# Patient Record
Sex: MALE | Race: WHITE | NOT HISPANIC OR LATINO
[De-identification: names, ages, dates, MRNs, and addresses within clinical notes are randomized per-mention and may not be internally consistent; named-entity substitution may affect disease eponyms.]

---

## 2017-02-18 ENCOUNTER — APPOINTMENT (OUTPATIENT)
Dept: INTERNAL MEDICINE | Facility: CLINIC | Age: 57
End: 2017-02-18

## 2017-02-18 VITALS
BODY MASS INDEX: 29.84 KG/M2 | OXYGEN SATURATION: 87 % | TEMPERATURE: 98.1 F | SYSTOLIC BLOOD PRESSURE: 181 MMHG | WEIGHT: 240 LBS | HEART RATE: 87 BPM | DIASTOLIC BLOOD PRESSURE: 86 MMHG | HEIGHT: 75 IN

## 2017-02-19 LAB
25(OH)D3 SERPL-MCNC: 16.8 NG/ML
ALBUMIN SERPL ELPH-MCNC: 4.8 G/DL
ALP BLD-CCNC: 113 U/L
ALT SERPL-CCNC: 28 U/L
ANION GAP SERPL CALC-SCNC: 22 MMOL/L
AST SERPL-CCNC: 18 U/L
BASOPHILS # BLD AUTO: 0.01 K/UL
BASOPHILS NFR BLD AUTO: 0.2 %
BILIRUB SERPL-MCNC: 0.4 MG/DL
BUN SERPL-MCNC: 18 MG/DL
CALCIUM SERPL-MCNC: 10.1 MG/DL
CHLORIDE SERPL-SCNC: 100 MMOL/L
CHOLEST SERPL-MCNC: 252 MG/DL
CHOLEST/HDLC SERPL: 5.2 RATIO
CO2 SERPL-SCNC: 18 MMOL/L
CREAT SERPL-MCNC: 1.2 MG/DL
EOSINOPHIL # BLD AUTO: 0.09 K/UL
EOSINOPHIL NFR BLD AUTO: 1.9 %
GLUCOSE SERPL-MCNC: 198 MG/DL
HBA1C MFR BLD HPLC: 6.8 %
HCT VFR BLD CALC: 45 %
HDLC SERPL-MCNC: 48 MG/DL
HGB BLD-MCNC: 13.7 G/DL
IMM GRANULOCYTES NFR BLD AUTO: 0.2 %
LDLC SERPL CALC-MCNC: 154 MG/DL
LYMPHOCYTES # BLD AUTO: 0.8 K/UL
LYMPHOCYTES NFR BLD AUTO: 17.1 %
MAGNESIUM SERPL-MCNC: 1.9 MG/DL
MAN DIFF?: NORMAL
MCHC RBC-ENTMCNC: 27.7 PG
MCHC RBC-ENTMCNC: 30.4 GM/DL
MCV RBC AUTO: 91.1 FL
MONOCYTES # BLD AUTO: 0.18 K/UL
MONOCYTES NFR BLD AUTO: 3.8 %
NEUTROPHILS # BLD AUTO: 3.59 K/UL
NEUTROPHILS NFR BLD AUTO: 76.8 %
PHOSPHATE SERPL-MCNC: 1.9 MG/DL
PLATELET # BLD AUTO: 161 K/UL
POTASSIUM SERPL-SCNC: 4.1 MMOL/L
PROT SERPL-MCNC: 7.3 G/DL
PSA SERPL-MCNC: 1.38 NG/ML
RBC # BLD: 4.94 M/UL
RBC # FLD: 14.5 %
SODIUM SERPL-SCNC: 139 MMOL/L
T4 SERPL-MCNC: 7.5 UG/DL
TRIGL SERPL-MCNC: 251 MG/DL
TSH SERPL-ACNC: 1.03 UIU/ML
WBC # FLD AUTO: 4.68 K/UL

## 2017-04-03 ENCOUNTER — RX RENEWAL (OUTPATIENT)
Age: 57
End: 2017-04-03

## 2017-07-05 ENCOUNTER — RX RENEWAL (OUTPATIENT)
Age: 57
End: 2017-07-05

## 2017-08-01 ENCOUNTER — RX RENEWAL (OUTPATIENT)
Age: 57
End: 2017-08-01

## 2017-09-11 ENCOUNTER — APPOINTMENT (OUTPATIENT)
Dept: ELECTROPHYSIOLOGY | Facility: CLINIC | Age: 57
End: 2017-09-11
Payer: COMMERCIAL

## 2017-09-11 PROCEDURE — 93298 REM INTERROG DEV EVAL SCRMS: CPT

## 2017-09-28 ENCOUNTER — RX RENEWAL (OUTPATIENT)
Age: 57
End: 2017-09-28

## 2017-10-23 ENCOUNTER — APPOINTMENT (OUTPATIENT)
Dept: ELECTROPHYSIOLOGY | Facility: CLINIC | Age: 57
End: 2017-10-23
Payer: COMMERCIAL

## 2017-10-23 PROCEDURE — 93298 REM INTERROG DEV EVAL SCRMS: CPT

## 2017-11-30 ENCOUNTER — APPOINTMENT (OUTPATIENT)
Dept: ELECTROPHYSIOLOGY | Facility: CLINIC | Age: 57
End: 2017-11-30
Payer: COMMERCIAL

## 2017-11-30 PROCEDURE — 93298 REM INTERROG DEV EVAL SCRMS: CPT

## 2018-01-11 ENCOUNTER — APPOINTMENT (OUTPATIENT)
Dept: ELECTROPHYSIOLOGY | Facility: CLINIC | Age: 58
End: 2018-01-11
Payer: COMMERCIAL

## 2018-01-11 PROCEDURE — 93298 REM INTERROG DEV EVAL SCRMS: CPT

## 2018-02-27 ENCOUNTER — APPOINTMENT (OUTPATIENT)
Dept: ELECTROPHYSIOLOGY | Facility: CLINIC | Age: 58
End: 2018-02-27
Payer: COMMERCIAL

## 2018-02-27 PROCEDURE — 93298 REM INTERROG DEV EVAL SCRMS: CPT

## 2018-04-02 ENCOUNTER — APPOINTMENT (OUTPATIENT)
Dept: INTERNAL MEDICINE | Facility: CLINIC | Age: 58
End: 2018-04-02
Payer: COMMERCIAL

## 2018-04-02 ENCOUNTER — LABORATORY RESULT (OUTPATIENT)
Age: 58
End: 2018-04-02

## 2018-04-02 VITALS
WEIGHT: 238 LBS | OXYGEN SATURATION: 97 % | HEART RATE: 72 BPM | BODY MASS INDEX: 29.59 KG/M2 | TEMPERATURE: 98.3 F | DIASTOLIC BLOOD PRESSURE: 85 MMHG | HEIGHT: 75 IN | SYSTOLIC BLOOD PRESSURE: 182 MMHG

## 2018-04-02 DIAGNOSIS — R25.2 CRAMP AND SPASM: ICD-10-CM

## 2018-04-02 DIAGNOSIS — R42 DIZZINESS AND GIDDINESS: ICD-10-CM

## 2018-04-02 DIAGNOSIS — Z86.79 PERSONAL HISTORY OF OTHER DISEASES OF THE CIRCULATORY SYSTEM: ICD-10-CM

## 2018-04-02 PROCEDURE — 36415 COLL VENOUS BLD VENIPUNCTURE: CPT

## 2018-04-02 PROCEDURE — 99396 PREV VISIT EST AGE 40-64: CPT | Mod: 25

## 2018-04-03 LAB
25(OH)D3 SERPL-MCNC: 20.5 NG/ML
BASOPHILS # BLD AUTO: 0.01 K/UL
BASOPHILS NFR BLD AUTO: 0.2 %
CHOLEST SERPL-MCNC: 267 MG/DL
CHOLEST/HDLC SERPL: 5.4 RATIO
EOSINOPHIL # BLD AUTO: 0.09 K/UL
EOSINOPHIL NFR BLD AUTO: 2 %
GGT SERPL-CCNC: 60 U/L
HBA1C MFR BLD HPLC: 7.3 %
HCT VFR BLD CALC: 45.8 %
HDLC SERPL-MCNC: 49 MG/DL
HGB BLD-MCNC: 14.4 G/DL
IMM GRANULOCYTES NFR BLD AUTO: 0.4 %
LDLC SERPL CALC-MCNC: 161 MG/DL
LYMPHOCYTES # BLD AUTO: 0.9 K/UL
LYMPHOCYTES NFR BLD AUTO: 20.1 %
MAN DIFF?: NORMAL
MCHC RBC-ENTMCNC: 28.5 PG
MCHC RBC-ENTMCNC: 31.4 GM/DL
MCV RBC AUTO: 90.7 FL
MONOCYTES # BLD AUTO: 0.3 K/UL
MONOCYTES NFR BLD AUTO: 6.7 %
NEUTROPHILS # BLD AUTO: 3.15 K/UL
NEUTROPHILS NFR BLD AUTO: 70.6 %
PLATELET # BLD AUTO: 145 K/UL
RBC # BLD: 5.05 M/UL
RBC # FLD: 14.1 %
T4 SERPL-MCNC: 7.3 UG/DL
TRIGL SERPL-MCNC: 284 MG/DL
TSH SERPL-ACNC: 1.03 UIU/ML
WBC # FLD AUTO: 4.47 K/UL

## 2018-04-04 ENCOUNTER — APPOINTMENT (OUTPATIENT)
Dept: ELECTROPHYSIOLOGY | Facility: CLINIC | Age: 58
End: 2018-04-04
Payer: COMMERCIAL

## 2018-04-04 PROCEDURE — 93298 REM INTERROG DEV EVAL SCRMS: CPT

## 2018-05-16 ENCOUNTER — APPOINTMENT (OUTPATIENT)
Dept: ELECTROPHYSIOLOGY | Facility: CLINIC | Age: 58
End: 2018-05-16
Payer: COMMERCIAL

## 2018-05-16 PROCEDURE — 93298 REM INTERROG DEV EVAL SCRMS: CPT

## 2018-06-18 ENCOUNTER — APPOINTMENT (OUTPATIENT)
Dept: ELECTROPHYSIOLOGY | Facility: CLINIC | Age: 58
End: 2018-06-18
Payer: COMMERCIAL

## 2018-06-18 PROCEDURE — 93298 REM INTERROG DEV EVAL SCRMS: CPT

## 2018-07-03 ENCOUNTER — RX RENEWAL (OUTPATIENT)
Age: 58
End: 2018-07-03

## 2018-07-31 ENCOUNTER — APPOINTMENT (OUTPATIENT)
Dept: ELECTROPHYSIOLOGY | Facility: CLINIC | Age: 58
End: 2018-07-31
Payer: COMMERCIAL

## 2018-07-31 ENCOUNTER — RX RENEWAL (OUTPATIENT)
Age: 58
End: 2018-07-31

## 2018-07-31 PROCEDURE — XXXXX: CPT

## 2018-08-08 ENCOUNTER — RX RENEWAL (OUTPATIENT)
Age: 58
End: 2018-08-08

## 2018-10-19 ENCOUNTER — MEDICATION RENEWAL (OUTPATIENT)
Age: 58
End: 2018-10-19

## 2019-06-12 ENCOUNTER — RX RENEWAL (OUTPATIENT)
Age: 59
End: 2019-06-12

## 2019-06-25 ENCOUNTER — MEDICATION RENEWAL (OUTPATIENT)
Age: 59
End: 2019-06-25

## 2019-06-29 ENCOUNTER — INPATIENT (INPATIENT)
Facility: HOSPITAL | Age: 59
LOS: 4 days | Discharge: HOME | DRG: 247 | End: 2019-07-04
Attending: INTERNAL MEDICINE | Admitting: HOSPITALIST
Payer: COMMERCIAL

## 2019-06-29 VITALS
HEIGHT: 75 IN | OXYGEN SATURATION: 96 % | TEMPERATURE: 99 F | SYSTOLIC BLOOD PRESSURE: 194 MMHG | WEIGHT: 229.94 LBS | HEART RATE: 103 BPM | DIASTOLIC BLOOD PRESSURE: 101 MMHG | RESPIRATION RATE: 20 BRPM

## 2019-06-29 DIAGNOSIS — Z95.4 PRESENCE OF OTHER HEART-VALVE REPLACEMENT: Chronic | ICD-10-CM

## 2019-06-29 DIAGNOSIS — Z98.89 OTHER SPECIFIED POSTPROCEDURAL STATES: Chronic | ICD-10-CM

## 2019-06-29 LAB
ALBUMIN SERPL ELPH-MCNC: 4.4 G/DL — SIGNIFICANT CHANGE UP (ref 3.3–5.2)
ALP SERPL-CCNC: 115 U/L — SIGNIFICANT CHANGE UP (ref 40–120)
ALT FLD-CCNC: 32 U/L — SIGNIFICANT CHANGE UP
ANION GAP SERPL CALC-SCNC: 20 MMOL/L — HIGH (ref 5–17)
APTT BLD: 30.3 SEC — SIGNIFICANT CHANGE UP (ref 27.5–36.3)
AST SERPL-CCNC: 22 U/L — SIGNIFICANT CHANGE UP
BASOPHILS # BLD AUTO: 0.02 K/UL — SIGNIFICANT CHANGE UP (ref 0–0.2)
BASOPHILS NFR BLD AUTO: 0.3 % — SIGNIFICANT CHANGE UP (ref 0–2)
BILIRUB SERPL-MCNC: 0.3 MG/DL — LOW (ref 0.4–2)
BLD GP AB SCN SERPL QL: SIGNIFICANT CHANGE UP
BUN SERPL-MCNC: 14 MG/DL — SIGNIFICANT CHANGE UP (ref 8–20)
CALCIUM SERPL-MCNC: 9.6 MG/DL — SIGNIFICANT CHANGE UP (ref 8.6–10.2)
CHLORIDE SERPL-SCNC: 100 MMOL/L — SIGNIFICANT CHANGE UP (ref 98–107)
CO2 SERPL-SCNC: 16 MMOL/L — LOW (ref 22–29)
CREAT SERPL-MCNC: 1.07 MG/DL — SIGNIFICANT CHANGE UP (ref 0.5–1.3)
EOSINOPHIL # BLD AUTO: 0.09 K/UL — SIGNIFICANT CHANGE UP (ref 0–0.5)
EOSINOPHIL NFR BLD AUTO: 1.5 % — SIGNIFICANT CHANGE UP (ref 0–6)
GLUCOSE SERPL-MCNC: 172 MG/DL — HIGH (ref 70–115)
HCT VFR BLD CALC: 40 % — SIGNIFICANT CHANGE UP (ref 39–50)
HGB BLD-MCNC: 12.6 G/DL — LOW (ref 13–17)
IMM GRANULOCYTES NFR BLD AUTO: 0.2 % — SIGNIFICANT CHANGE UP (ref 0–1.5)
INR BLD: 0.92 RATIO — SIGNIFICANT CHANGE UP (ref 0.88–1.16)
LYMPHOCYTES # BLD AUTO: 1.09 K/UL — SIGNIFICANT CHANGE UP (ref 1–3.3)
LYMPHOCYTES # BLD AUTO: 18.5 % — SIGNIFICANT CHANGE UP (ref 13–44)
MCHC RBC-ENTMCNC: 27.5 PG — SIGNIFICANT CHANGE UP (ref 27–34)
MCHC RBC-ENTMCNC: 31.5 GM/DL — LOW (ref 32–36)
MCV RBC AUTO: 87.1 FL — SIGNIFICANT CHANGE UP (ref 80–100)
MONOCYTES # BLD AUTO: 0.54 K/UL — SIGNIFICANT CHANGE UP (ref 0–0.9)
MONOCYTES NFR BLD AUTO: 9.2 % — SIGNIFICANT CHANGE UP (ref 2–14)
NEUTROPHILS # BLD AUTO: 4.14 K/UL — SIGNIFICANT CHANGE UP (ref 1.8–7.4)
NEUTROPHILS NFR BLD AUTO: 70.3 % — SIGNIFICANT CHANGE UP (ref 43–77)
PLATELET # BLD AUTO: 174 K/UL — SIGNIFICANT CHANGE UP (ref 150–400)
POTASSIUM SERPL-MCNC: 3.9 MMOL/L — SIGNIFICANT CHANGE UP (ref 3.5–5.3)
POTASSIUM SERPL-SCNC: 3.9 MMOL/L — SIGNIFICANT CHANGE UP (ref 3.5–5.3)
PROT SERPL-MCNC: 6.9 G/DL — SIGNIFICANT CHANGE UP (ref 6.6–8.7)
PROTHROM AB SERPL-ACNC: 10.5 SEC — SIGNIFICANT CHANGE UP (ref 10–12.9)
RBC # BLD: 4.59 M/UL — SIGNIFICANT CHANGE UP (ref 4.2–5.8)
RBC # FLD: 13.6 % — SIGNIFICANT CHANGE UP (ref 10.3–14.5)
SODIUM SERPL-SCNC: 136 MMOL/L — SIGNIFICANT CHANGE UP (ref 135–145)
TROPONIN T SERPL-MCNC: <0.01 NG/ML — SIGNIFICANT CHANGE UP (ref 0–0.06)
TYPE + AB SCN PNL BLD: SIGNIFICANT CHANGE UP
WBC # BLD: 5.89 K/UL — SIGNIFICANT CHANGE UP (ref 3.8–10.5)
WBC # FLD AUTO: 5.89 K/UL — SIGNIFICANT CHANGE UP (ref 3.8–10.5)

## 2019-06-29 PROCEDURE — 75635 CT ANGIO ABDOMINAL ARTERIES: CPT | Mod: 26

## 2019-06-29 PROCEDURE — 71275 CT ANGIOGRAPHY CHEST: CPT | Mod: 26

## 2019-06-29 PROCEDURE — 99218: CPT

## 2019-06-29 RX ORDER — LOSARTAN POTASSIUM 100 MG/1
25 TABLET, FILM COATED ORAL DAILY
Refills: 0 | Status: DISCONTINUED | OUTPATIENT
Start: 2019-06-29 | End: 2019-06-30

## 2019-06-29 RX ORDER — SUCRALFATE 1 G
1 TABLET ORAL ONCE
Refills: 0 | Status: COMPLETED | OUTPATIENT
Start: 2019-06-29 | End: 2019-06-29

## 2019-06-29 RX ORDER — GABAPENTIN 400 MG/1
600 CAPSULE ORAL
Refills: 0 | Status: DISCONTINUED | OUTPATIENT
Start: 2019-06-29 | End: 2019-06-30

## 2019-06-29 RX ORDER — METFORMIN HYDROCHLORIDE 850 MG/1
1000 TABLET ORAL
Refills: 0 | Status: DISCONTINUED | OUTPATIENT
Start: 2019-06-29 | End: 2019-06-30

## 2019-06-29 RX ORDER — PANTOPRAZOLE SODIUM 20 MG/1
80 TABLET, DELAYED RELEASE ORAL ONCE
Refills: 0 | Status: COMPLETED | OUTPATIENT
Start: 2019-06-29 | End: 2019-06-29

## 2019-06-29 RX ORDER — FENTANYL CITRATE 50 UG/ML
100 INJECTION INTRAVENOUS ONCE
Refills: 0 | Status: DISCONTINUED | OUTPATIENT
Start: 2019-06-29 | End: 2019-06-29

## 2019-06-29 RX ORDER — ONDANSETRON 8 MG/1
8 TABLET, FILM COATED ORAL ONCE
Refills: 0 | Status: COMPLETED | OUTPATIENT
Start: 2019-06-29 | End: 2019-06-29

## 2019-06-29 RX ADMIN — FENTANYL CITRATE 100 MICROGRAM(S): 50 INJECTION INTRAVENOUS at 22:22

## 2019-06-29 RX ADMIN — ONDANSETRON 8 MILLIGRAM(S): 8 TABLET, FILM COATED ORAL at 18:37

## 2019-06-29 RX ADMIN — Medication 1 GRAM(S): at 22:22

## 2019-06-29 RX ADMIN — FENTANYL CITRATE 100 MICROGRAM(S): 50 INJECTION INTRAVENOUS at 18:39

## 2019-06-29 RX ADMIN — FENTANYL CITRATE 100 MICROGRAM(S): 50 INJECTION INTRAVENOUS at 18:27

## 2019-06-29 RX ADMIN — PANTOPRAZOLE SODIUM 80 MILLIGRAM(S): 20 TABLET, DELAYED RELEASE ORAL at 19:19

## 2019-06-29 NOTE — ED ADULT NURSE NOTE - ED STAT RN HANDOFF DETAILS
Pt alert and oriented. resting in stretcher, no signs of distress noted. Handoff report given to Tanya Romero RN and pt's safety maintained. RN with no questions or concerns at this time

## 2019-06-29 NOTE — ED PROVIDER NOTE - CLINICAL SUMMARY MEDICAL DECISION MAKING FREE TEXT BOX
Patient presents with chets pain.  CT scan demonstrates no acute pathology. Lab values do not require emergent intervention. ECG as above.  Patient now pain free.  smiling.  d/w Dr. Navarrete and will trasnfer to obs.  VSS.  Uneventful ED observation period. Non toxic.  Well appearing.

## 2019-06-29 NOTE — ED PROVIDER NOTE - PROGRESS NOTE DETAILS
Given the significant and immediate threats to this patient based on initial presentation, the benefits of emergency contrast-enhanced CT imaging without obtaining GFR/creatinine serum level results greatly outweigh the potential risk of harm due to contrast-induced nephropathy. d/w radiology and no signs of dissection.  repeating ECG for potential dynamic change

## 2019-06-29 NOTE — ED PROVIDER NOTE - OBJECTIVE STATEMENT
Pertinent PMH/PSH/FHx/SHx and Review of Systems contained within:  Patient presents to the ED for chest pain.  PMH of aortic valve replaced and ascending aneurysm repair (bovine), HTN, NIDDM2.  Patient states one month of pain.  States worsening today.  around chest and upper abdomen into back and left arm.  HTN to 200s/100s.  emergent CTA without signs of dissection.  As interpreted by ED physician, ECG is NSR with LVH but otherwise normal intervals/axis, no changes in QRS, no ST/T changes. family bedside.  No aggravating or relieving factors. No other pertinent PMH.  No other pertinent PSH.  No other pertinent FHx.  Patient denies EtOH/tobacco/illicit substance use. No fever/chills, No photophobia/eye pain/changes in vision, No ear pain/sore throat/dysphagia, No palpitations, no SOB/cough/wheeze/stridor,  No diarrhea, no dysuria/frequency/discharge, No neck/back pain, no rash, no changes in neurological status/function.

## 2019-06-29 NOTE — ED PROVIDER NOTE - PHYSICAL EXAMINATION
Gen: Alert, diaphoretic  Head: NC, AT, PERRL, EOMI, normal lids/conjunctiva  ENT: normal hearing, patent oropharynx without erythema/exudate, uvula midline  Neck: +supple, no tenderness/meningismus/JVD, +Trachea midline  Pulm: Bilateral BS, normal resp effort, no wheeze/stridor/retractions  CV: RRR, no M/R/G, +dist pulses  Abd: soft, NT/ND, +BS, no hepatosplenomegaly  Mskel: no edema/erythema/cyanosis  Skin: no rash  Neuro: AAOx3, no gross sensory/motor deficits, CN 2-12 intact

## 2019-06-29 NOTE — ED ADULT NURSE REASSESSMENT NOTE - NS ED NURSE REASSESS COMMENT FT1
assumed pt care from previous Rn Karlos Price@[id number]  pt transported to CDU-9 for observation. pt  A&Ox3;  resting in stretcher, with no complaints of pain or discomfort. B/L lungs clear, normal s1&s2 heard on ausculation. (+) pedal pulses, skin warm/dry intact. PIV patent. VSS and documented as per flow sheet.  NSR  on cardiac monitor. plan of care reviewed and pt verbalizes understanding. bed in lowest position, call bell within reach and safety maintained. monitoring ongoing for any changes.

## 2019-06-29 NOTE — ED ADULT NURSE NOTE - CHIEF COMPLAINT QUOTE
pt arrive by ambulance with sudden onset of left sided CP and heaviness that began after drinking water and choking on it. EMS administer 1 nitro SL, ASA 324mg.

## 2019-06-30 DIAGNOSIS — I24.9 ACUTE ISCHEMIC HEART DISEASE, UNSPECIFIED: ICD-10-CM

## 2019-06-30 DIAGNOSIS — Z98.890 OTHER SPECIFIED POSTPROCEDURAL STATES: Chronic | ICD-10-CM

## 2019-06-30 LAB
ALBUMIN SERPL ELPH-MCNC: 3.4 G/DL — SIGNIFICANT CHANGE UP (ref 3.3–5.2)
ALP SERPL-CCNC: 84 U/L — SIGNIFICANT CHANGE UP (ref 40–120)
ALT FLD-CCNC: 11 U/L — SIGNIFICANT CHANGE UP
AMYLASE P1 CFR SERPL: 30 U/L — LOW (ref 36–128)
ANION GAP SERPL CALC-SCNC: 8 MMOL/L — SIGNIFICANT CHANGE UP (ref 5–17)
APPEARANCE UR: CLEAR — SIGNIFICANT CHANGE UP
APTT BLD: 40.1 SEC — HIGH (ref 27.5–36.3)
APTT BLD: 68.9 SEC — HIGH (ref 27.5–36.3)
AST SERPL-CCNC: 17 U/L — SIGNIFICANT CHANGE UP
BILIRUB DIRECT SERPL-MCNC: 0.1 MG/DL — SIGNIFICANT CHANGE UP (ref 0–0.3)
BILIRUB INDIRECT FLD-MCNC: 0.5 MG/DL — SIGNIFICANT CHANGE UP (ref 0.2–1)
BILIRUB SERPL-MCNC: 0.6 MG/DL — SIGNIFICANT CHANGE UP (ref 0.4–2)
BILIRUB UR-MCNC: NEGATIVE — SIGNIFICANT CHANGE UP
BUN SERPL-MCNC: 7 MG/DL — LOW (ref 8–20)
CALCIUM SERPL-MCNC: 8.9 MG/DL — SIGNIFICANT CHANGE UP (ref 8.6–10.2)
CHLORIDE SERPL-SCNC: 108 MMOL/L — HIGH (ref 98–107)
CK SERPL-CCNC: 34 U/L — SIGNIFICANT CHANGE UP (ref 30–200)
CO2 SERPL-SCNC: 24 MMOL/L — SIGNIFICANT CHANGE UP (ref 22–29)
COLOR SPEC: YELLOW — SIGNIFICANT CHANGE UP
CREAT SERPL-MCNC: 0.37 MG/DL — LOW (ref 0.5–1.3)
DIFF PNL FLD: NEGATIVE — SIGNIFICANT CHANGE UP
EPI CELLS # UR: SIGNIFICANT CHANGE UP
GLUCOSE BLDC GLUCOMTR-MCNC: 118 MG/DL — HIGH (ref 70–99)
GLUCOSE BLDC GLUCOMTR-MCNC: 139 MG/DL — HIGH (ref 70–99)
GLUCOSE BLDC GLUCOMTR-MCNC: 177 MG/DL — HIGH (ref 70–99)
GLUCOSE SERPL-MCNC: 90 MG/DL — SIGNIFICANT CHANGE UP (ref 70–115)
GLUCOSE UR QL: 50 MG/DL
HCT VFR BLD CALC: 37.4 % — LOW (ref 39–50)
HCT VFR BLD CALC: 38.9 % — LOW (ref 39–50)
HGB BLD-MCNC: 12 G/DL — LOW (ref 13–17)
HGB BLD-MCNC: 12.1 G/DL — LOW (ref 13–17)
KETONES UR-MCNC: NEGATIVE — SIGNIFICANT CHANGE UP
LEUKOCYTE ESTERASE UR-ACNC: NEGATIVE — SIGNIFICANT CHANGE UP
LIDOCAIN IGE QN: 21 U/L — LOW (ref 22–51)
MCHC RBC-ENTMCNC: 27.2 PG — SIGNIFICANT CHANGE UP (ref 27–34)
MCHC RBC-ENTMCNC: 28 PG — SIGNIFICANT CHANGE UP (ref 27–34)
MCHC RBC-ENTMCNC: 31.1 GM/DL — LOW (ref 32–36)
MCHC RBC-ENTMCNC: 32.1 GM/DL — SIGNIFICANT CHANGE UP (ref 32–36)
MCV RBC AUTO: 87.2 FL — SIGNIFICANT CHANGE UP (ref 80–100)
MCV RBC AUTO: 87.4 FL — SIGNIFICANT CHANGE UP (ref 80–100)
NITRITE UR-MCNC: NEGATIVE — SIGNIFICANT CHANGE UP
PH UR: 6 — SIGNIFICANT CHANGE UP (ref 5–8)
PLATELET # BLD AUTO: 147 K/UL — LOW (ref 150–400)
PLATELET # BLD AUTO: 164 K/UL — SIGNIFICANT CHANGE UP (ref 150–400)
POTASSIUM SERPL-MCNC: 3.8 MMOL/L — SIGNIFICANT CHANGE UP (ref 3.5–5.3)
POTASSIUM SERPL-SCNC: 3.8 MMOL/L — SIGNIFICANT CHANGE UP (ref 3.5–5.3)
PROT SERPL-MCNC: 7 G/DL — SIGNIFICANT CHANGE UP (ref 6.6–8.7)
PROT UR-MCNC: 30 MG/DL
RBC # BLD: 4.29 M/UL — SIGNIFICANT CHANGE UP (ref 4.2–5.8)
RBC # BLD: 4.45 M/UL — SIGNIFICANT CHANGE UP (ref 4.2–5.8)
RBC # FLD: 13.6 % — SIGNIFICANT CHANGE UP (ref 10.3–14.5)
RBC # FLD: 13.8 % — SIGNIFICANT CHANGE UP (ref 10.3–14.5)
SODIUM SERPL-SCNC: 140 MMOL/L — SIGNIFICANT CHANGE UP (ref 135–145)
SP GR SPEC: 1.01 — SIGNIFICANT CHANGE UP (ref 1.01–1.02)
TROPONIN T SERPL-MCNC: 0.09 NG/ML — HIGH (ref 0–0.06)
TROPONIN T SERPL-MCNC: 0.33 NG/ML — HIGH (ref 0–0.06)
TROPONIN T SERPL-MCNC: <0.01 NG/ML — SIGNIFICANT CHANGE UP (ref 0–0.06)
UROBILINOGEN FLD QL: NEGATIVE MG/DL — SIGNIFICANT CHANGE UP
WBC # BLD: 4.83 K/UL — SIGNIFICANT CHANGE UP (ref 3.8–10.5)
WBC # BLD: 5.1 K/UL — SIGNIFICANT CHANGE UP (ref 3.8–10.5)
WBC # FLD AUTO: 4.83 K/UL — SIGNIFICANT CHANGE UP (ref 3.8–10.5)
WBC # FLD AUTO: 5.1 K/UL — SIGNIFICANT CHANGE UP (ref 3.8–10.5)

## 2019-06-30 PROCEDURE — 93010 ELECTROCARDIOGRAM REPORT: CPT | Mod: 77

## 2019-06-30 PROCEDURE — 99217: CPT

## 2019-06-30 PROCEDURE — 99222 1ST HOSP IP/OBS MODERATE 55: CPT | Mod: 25

## 2019-06-30 PROCEDURE — 93010 ELECTROCARDIOGRAM REPORT: CPT | Mod: 76

## 2019-06-30 PROCEDURE — 99223 1ST HOSP IP/OBS HIGH 75: CPT

## 2019-06-30 PROCEDURE — 12345: CPT | Mod: NC

## 2019-06-30 RX ORDER — HEPARIN SODIUM 5000 [USP'U]/ML
5000 INJECTION INTRAVENOUS; SUBCUTANEOUS ONCE
Refills: 0 | Status: COMPLETED | OUTPATIENT
Start: 2019-06-30 | End: 2019-06-30

## 2019-06-30 RX ORDER — MORPHINE SULFATE 50 MG/1
2 CAPSULE, EXTENDED RELEASE ORAL EVERY 6 HOURS
Refills: 0 | Status: DISCONTINUED | OUTPATIENT
Start: 2019-06-30 | End: 2019-07-01

## 2019-06-30 RX ORDER — GABAPENTIN 400 MG/1
600 CAPSULE ORAL
Refills: 0 | Status: DISCONTINUED | OUTPATIENT
Start: 2019-06-30 | End: 2019-07-04

## 2019-06-30 RX ORDER — SODIUM CHLORIDE 9 MG/ML
1000 INJECTION, SOLUTION INTRAVENOUS
Refills: 0 | Status: DISCONTINUED | OUTPATIENT
Start: 2019-06-30 | End: 2019-07-01

## 2019-06-30 RX ORDER — ASPIRIN/CALCIUM CARB/MAGNESIUM 324 MG
325 TABLET ORAL ONCE
Refills: 0 | Status: COMPLETED | OUTPATIENT
Start: 2019-06-30 | End: 2019-06-30

## 2019-06-30 RX ORDER — HEPARIN SODIUM 5000 [USP'U]/ML
INJECTION INTRAVENOUS; SUBCUTANEOUS
Qty: 25000 | Refills: 0 | Status: DISCONTINUED | OUTPATIENT
Start: 2019-06-30 | End: 2019-07-01

## 2019-06-30 RX ORDER — ASPIRIN/CALCIUM CARB/MAGNESIUM 324 MG
81 TABLET ORAL DAILY
Refills: 0 | Status: DISCONTINUED | OUTPATIENT
Start: 2019-07-01 | End: 2019-07-04

## 2019-06-30 RX ORDER — ENOXAPARIN SODIUM 100 MG/ML
40 INJECTION SUBCUTANEOUS DAILY
Refills: 0 | Status: DISCONTINUED | OUTPATIENT
Start: 2019-06-30 | End: 2019-06-30

## 2019-06-30 RX ORDER — GLUCAGON INJECTION, SOLUTION 0.5 MG/.1ML
1 INJECTION, SOLUTION SUBCUTANEOUS ONCE
Refills: 0 | Status: DISCONTINUED | OUTPATIENT
Start: 2019-06-30 | End: 2019-07-01

## 2019-06-30 RX ORDER — NITROGLYCERIN 6.5 MG
0.4 CAPSULE, EXTENDED RELEASE ORAL
Refills: 0 | Status: COMPLETED | OUTPATIENT
Start: 2019-06-30 | End: 2019-07-01

## 2019-06-30 RX ORDER — DEXTROSE 50 % IN WATER 50 %
25 SYRINGE (ML) INTRAVENOUS ONCE
Refills: 0 | Status: DISCONTINUED | OUTPATIENT
Start: 2019-06-30 | End: 2019-07-01

## 2019-06-30 RX ORDER — ATORVASTATIN CALCIUM 80 MG/1
40 TABLET, FILM COATED ORAL AT BEDTIME
Refills: 0 | Status: DISCONTINUED | OUTPATIENT
Start: 2019-06-30 | End: 2019-07-01

## 2019-06-30 RX ORDER — INSULIN LISPRO 100/ML
VIAL (ML) SUBCUTANEOUS
Refills: 0 | Status: DISCONTINUED | OUTPATIENT
Start: 2019-06-30 | End: 2019-07-01

## 2019-06-30 RX ORDER — DEXTROSE 50 % IN WATER 50 %
12.5 SYRINGE (ML) INTRAVENOUS ONCE
Refills: 0 | Status: DISCONTINUED | OUTPATIENT
Start: 2019-06-30 | End: 2019-07-01

## 2019-06-30 RX ORDER — HEPARIN SODIUM 5000 [USP'U]/ML
6000 INJECTION INTRAVENOUS; SUBCUTANEOUS EVERY 6 HOURS
Refills: 0 | Status: DISCONTINUED | OUTPATIENT
Start: 2019-06-30 | End: 2019-07-01

## 2019-06-30 RX ORDER — METOPROLOL TARTRATE 50 MG
25 TABLET ORAL
Refills: 0 | Status: DISCONTINUED | OUTPATIENT
Start: 2019-06-30 | End: 2019-07-02

## 2019-06-30 RX ORDER — LISINOPRIL 2.5 MG/1
10 TABLET ORAL DAILY
Refills: 0 | Status: DISCONTINUED | OUTPATIENT
Start: 2019-06-30 | End: 2019-06-30

## 2019-06-30 RX ORDER — DEXTROSE 50 % IN WATER 50 %
15 SYRINGE (ML) INTRAVENOUS ONCE
Refills: 0 | Status: DISCONTINUED | OUTPATIENT
Start: 2019-06-30 | End: 2019-07-01

## 2019-06-30 RX ORDER — HYDRALAZINE HCL 50 MG
10 TABLET ORAL EVERY 6 HOURS
Refills: 0 | Status: DISCONTINUED | OUTPATIENT
Start: 2019-06-30 | End: 2019-07-04

## 2019-06-30 RX ORDER — PANTOPRAZOLE SODIUM 20 MG/1
40 TABLET, DELAYED RELEASE ORAL
Refills: 0 | Status: DISCONTINUED | OUTPATIENT
Start: 2019-06-30 | End: 2019-07-04

## 2019-06-30 RX ORDER — LISINOPRIL 2.5 MG/1
10 TABLET ORAL DAILY
Refills: 0 | Status: DISCONTINUED | OUTPATIENT
Start: 2019-06-30 | End: 2019-07-04

## 2019-06-30 RX ADMIN — HEPARIN SODIUM 5000 UNIT(S): 5000 INJECTION INTRAVENOUS; SUBCUTANEOUS at 16:38

## 2019-06-30 RX ADMIN — HEPARIN SODIUM 1000 UNIT(S)/HR: 5000 INJECTION INTRAVENOUS; SUBCUTANEOUS at 21:10

## 2019-06-30 RX ADMIN — Medication 25 MILLIGRAM(S): at 05:36

## 2019-06-30 RX ADMIN — HEPARIN SODIUM 1000 UNIT(S)/HR: 5000 INJECTION INTRAVENOUS; SUBCUTANEOUS at 16:35

## 2019-06-30 RX ADMIN — LISINOPRIL 10 MILLIGRAM(S): 2.5 TABLET ORAL at 21:23

## 2019-06-30 RX ADMIN — LISINOPRIL 10 MILLIGRAM(S): 2.5 TABLET ORAL at 05:36

## 2019-06-30 RX ADMIN — Medication 25 MILLIGRAM(S): at 16:38

## 2019-06-30 RX ADMIN — HEPARIN SODIUM 1200 UNIT(S)/HR: 5000 INJECTION INTRAVENOUS; SUBCUTANEOUS at 23:46

## 2019-06-30 RX ADMIN — ENOXAPARIN SODIUM 40 MILLIGRAM(S): 100 INJECTION SUBCUTANEOUS at 08:24

## 2019-06-30 RX ADMIN — MORPHINE SULFATE 2 MILLIGRAM(S): 50 CAPSULE, EXTENDED RELEASE ORAL at 12:39

## 2019-06-30 RX ADMIN — Medication 0.4 MILLIGRAM(S): at 10:36

## 2019-06-30 RX ADMIN — ATORVASTATIN CALCIUM 40 MILLIGRAM(S): 80 TABLET, FILM COATED ORAL at 21:22

## 2019-06-30 RX ADMIN — GABAPENTIN 600 MILLIGRAM(S): 400 CAPSULE ORAL at 21:23

## 2019-06-30 RX ADMIN — GABAPENTIN 600 MILLIGRAM(S): 400 CAPSULE ORAL at 09:53

## 2019-06-30 RX ADMIN — Medication 325 MILLIGRAM(S): at 05:28

## 2019-06-30 RX ADMIN — PANTOPRAZOLE SODIUM 40 MILLIGRAM(S): 20 TABLET, DELAYED RELEASE ORAL at 08:24

## 2019-06-30 RX ADMIN — MORPHINE SULFATE 2 MILLIGRAM(S): 50 CAPSULE, EXTENDED RELEASE ORAL at 11:46

## 2019-06-30 NOTE — PATIENT PROFILE ADULT - NSPROGENBLOODRESTRICT_GEN_A_NUR
Called pt to schedule er f/u.  Pt stated no f/u needed at this time.  Pt is still sore, but was given muscle relaxer and pain med.  Stayed home from work today to rest, but plans to go back to work tomorrow.  
none

## 2019-06-30 NOTE — CHART NOTE - NSCHARTNOTEFT_GEN_A_CORE
Patient seen and examined , chart reviewed , patient c/o severe left sided pain , present for about 1 month , now severe , radiating to L arm , also c/o LUQ pain , sob  , states vomited once yesterday , no n/v today .     Urinalysis (06.30.19 @ 15:55)    pH Urine: 6.0    Glucose Qualitative, Urine: 50 mg/dL    Blood, Urine: Negative    Color: Yellow    Urine Appearance: Clear    Bilirubin: Negative    Ketone - Urine: Negative    Specific Gravity: 1.015    Protein, Urine: 30 mg/dL    Urobilinogen: Negative mg/dL    Nitrite: Negative    Leukocyte Esterase Concentration: Negative  Troponin T, Serum (06.30.19 @ 15:23)    Troponin T, Serum: 0.33: TYPE:(C=Critical, N=Notification, A=Abnormal) N  TESTS: tropo  DATE/TIME CALLED: 06/30/19 15:51  CALLED TO: stevenson clarke  READ BACK (2 Patient Identifiers)(Y/N): y  READ BACK VALUES (Y/N): y  CALLED BY: [facility]  Reference Interval for Troponin T  Less than 0.06 ng/mL - includes the 99th percentile of a healthy  population at a method C.V. of 10% or less.  NOTE: Troponin T is measured by the Roche CLIA method and these  results are not interchangable with Troponin I results since they are  different assays with different reference intervals. ng/mL  Troponin T, Serum (06.30.19 @ 06:54)    Troponin T, Serum: <0.01: Reference Interval for Troponin T  Less than 0.06 ng/mL - includes the 99th percentile of a healthy  population at a method C.V. of 10% or less.  NOTE: Troponin T is measured by the Roche CLIA method and these  results are not interchangable with Troponin I results since they are  different assays with different reference intervals. ng/mL  Troponin T, Serum (06.30.19 @ 01:59)    Troponin T, Serum: 0.09: TYPE:(C=Critical, N=Notification, A=Abnormal) N  TESTS: TROPONIN  DATE/TIME CALLED: 06/30/19 03:12  CALLED TO: FABRIZIO BOWERS  READ BACK (2 Patient Identifiers)(Y/N): Y  READ BACK VALUES (Y/N): Y  CALLED BY: IAA ng/mL  Lipase, Serum (06.30.19 @ 06:54)    Lipase, Serum: 21 U/L  Complete Blood Count (06.30.19 @ 12:00)    WBC Count: 4.83 K/uL    RBC Count: 4.45 M/uL    Hemoglobin: 12.1 g/dL    Hematocrit: 38.9 %    Mean Cell Volume: 87.4 fl    Mean Cell Hemoglobin: 27.2 pg    Mean Cell Hemoglobin Conc: 31.1 gm/dL    Red Cell Distrib Width: 13.8 %    Platelet Count - Automated: 164 K/uL  Amylase, Serum Total (06.30.19 @ 06:54)    Amylase, Serum Total: 30 U/L  Hepatic Function Panel (06.30.19 @ 06:54)    Indirect Reacting Bilirubin: 0.5 mg/dL    Protein Total, Serum: 7.0 g/dL    Albumin, Serum: 3.4 g/dL    Bilirubin Total, Serum: 0.6 mg/dL    Bilirubin Direct, Serum: 0.1 mg/dL    Alkaline Phosphatase, Serum: 84 U/L    Aspartate Aminotransferase (AST/SGOT): 17 U/L    Alanine Aminotransferase (ALT/SGPT): 11 U/L  Basic Metabolic Panel (06.30.19 @ 06:54)    Sodium, Serum: 140 mmol/L    Potassium, Serum: 3.8 mmol/L    Chloride, Serum: 108 mmol/L    Carbon Dioxide, Serum: 24.0 mmol/L    Anion Gap, Serum: 8 mmol/L    Blood Urea Nitrogen, Serum: 7.0 mg/dL    Creatinine, Serum: 0.37 mg/dL    Glucose, Serum: 90 mg/dL    Calcium, Total Serum: 8.9 mg/dL    eGFR if Non : 133: Interpretative comment  The units for eGFR are mL/min/1.73M2 (normalized body surface area). The  eGFR is calculated from a serum creatinine using the CKD-EPI equation.  Other variables required for calculation are race, age and sex. Among  patients with chronic kidney disease (CKD), the eGFR is useful in  determining the stage of disease according to KDOQI CKD classification.  All eGFR results are reported numerically with the following  interpretation.          GFR                    With                 Without     (ml/min/1.73 m2)    Kidney Damage       Kidney Damage        >= 90                    Stage 1                     Normal        60-89                    Stage 2                     Decreased GFR        30-59     Stage 3                     Stage 3        15-29                    Stage 4                     Stage 4        < 15                      Stage 5                     Stage 5  Each stage of CKD assumes that the associated GFR level has been in  effect for at least 3 months. Determination of stages one and two (with  eGFR > 59 ml/min/m2) requires estimation of kidney damage for at least 3  months as defined by structural or functional abnormalities.  Limitations: All estimates of GFR will be less accurate for patients at  extremes of muscle mass (including but not limited to frail elderly,  critically ill, or cancer patients), those with unusual diets, and those  with conditions associated with reduced secretion or extrarenal  elimination of creatinine. The eGFR equation is not recommended for use  in patients with unstable creatinine levels. mL/min/1.73M2    eGFR if African American: 154 mL/min/1.73M2      < from: CT Angio Abd Aorta w/run-off w/ IV Cont (06.29.19 @ 18:48) >       EXAM:  CT ANGIO ABD AOR W RUN(W)AW IC                         EXAM:  CT ANGIO CHEST (W)AW IC                          PROCEDURE DATE:  06/29/2019          INTERPRETATION:  CLINICAL INFORMATION: Sudden chest pain, hypertension,   history of aortic aneurysm, assess aortic dissection.    PROCEDURE: After noncontrast CT was obtained, a CT angiography of the   chest,abdomen and pelvis was performed with intravenous contrast   utilizing dedicated dissection protocol. 96 mls of Omnipaque-350   administered without complication. Coronal and sagittal reconstruction   images were obtained. Axial MIP images were obtained from a separate   workstation.     COMPARISON: None.    FINDINGS: Artifact from the patient's arms degrades images.    CHEST:     LUNGS AND AIRWAYS: Patent central airways. No focal consolidation.   Subsegmental bibasilar atelectasis.  PLEURA: No pleural effusion or pneumothorax.  HEART: Normal size. No pericardial effusion. Aortic valve replacement. A   5.7 x 1.2 cm low-attenuation structure along the right heart, suggesting   a pericardial cyst.  VESSELS: Atherosclerotic change of the thoracic aorta, great vessel   origin and coronary arteries. Post surgical changes in the ascending   aorta. No thoracic aortic intramural hematoma, dissection or aneurysm.   CHEST WALL AND LOWER NECK: Median sternotomy.  MEDIASTINUM AND ALTA: Subcentimeter mediastinal lymph nodes without   lymphadenopathy.    ABDOMEN/PELVIS:      LIVER: Grossly unremarkable.  GALLBLADDER/BILE DUCTS: No intrahepatic or extrahepatic biliary   dilatation. Cholelithiasis.  PANCREAS: Diffuse fatty atrophy.  SPLEEN: Grossly unremarkable.    ADRENALS: Grossly unremarkable.  KIDNEYS/URETERS: Nonspecific mild bilateral perinephric stranding without   hydroureteronephrosis. A 3.0 x 2.8 cm left renal cyst. Subcentimeter   hypodense foci in the kidneys, too small to characterize.  BLADDER: Partially distended.  REPRODUCTIVE ORGANS: Unremarkable.    BOWEL: No bowel obstruction. Unremarkable appendix. No significant bowel   wall thickening or inflammatory change. Colon diverticulosis.  PERITONEUM: No drainable fluid collection or free air.  VESSELS: Atherosclerotic change of the abdominal aorta and its branches.   Normal caliber of the abdominal aorta.   RETROPERITONEUM: No lymphadenopathy.    ABDOMINAL WALL/SOFT TISSUES: Small fat-containing umbilical and inguinal   hernias.  BONES: Degenerative changes of the spine. Chronic mild anterior wedging   of the midthoracic spine. Nonspecific small sclerotic foci in the femurs.    IMPRESSION:    No aortic dissection.    Additional findings as described.      VINCENZO RIGGINS M.D., ATTENDING RADIOLOGIST  This document has been electronically signed. Jun 29 2019  7:17PM        < end of copied text >    < from: CT Angio Chest w/ IV Cont (06.29.19 @ 18:39) >       EXAM:  CT ANGIO ABD AOR W RUN(W)AW IC                         EXAM:  CT ANGIO CHEST (W)AW IC                          PROCEDURE DATE:  06/29/2019          INTERPRETATION:  CLINICAL INFORMATION: Sudden chest pain, hypertension,   history of aortic aneurysm, assess aortic dissection.    PROCEDURE: After noncontrast CT was obtained, a CT angiography of the   chest,abdomen and pelvis was performed with intravenous contrast   utilizing dedicated dissection protocol. 96 mls of Omnipaque-350   administered without complication. Coronal and sagittal reconstruction   images were obtained. Axial MIP images were obtained from a separate   workstation.     COMPARISON: None.    FINDINGS: Artifact from the patient's arms degrades images.    CHEST:     LUNGS AND AIRWAYS: Patent central airways. No focal consolidation.   Subsegmental bibasilar atelectasis.  PLEURA: No pleural effusion or pneumothorax.  HEART: Normal size. No pericardial effusion. Aortic valve replacement. A   5.7 x 1.2 cm low-attenuation structure along the right heart, suggesting   a pericardial cyst.  VESSELS: Atherosclerotic change of the thoracic aorta, great vessel   origin and coronary arteries. Post surgical changes in the ascending   aorta. No thoracic aortic intramural hematoma, dissection or aneurysm.   CHEST WALL AND LOWER NECK: Median sternotomy.  MEDIASTINUM AND ALTA: Subcentimeter mediastinal lymph nodes without   lymphadenopathy.    ABDOMEN/PELVIS:      LIVER: Grossly unremarkable.  GALLBLADDER/BILE DUCTS: No intrahepatic or extrahepatic biliary   dilatation. Cholelithiasis.  PANCREAS: Diffuse fatty atrophy.  SPLEEN: Grossly unremarkable.    ADRENALS: Grossly unremarkable.  KIDNEYS/URETERS: Nonspecific mild bilateral perinephric stranding without   hydroureteronephrosis. A 3.0 x 2.8 cm left renal cyst. Subcentimeter   hypodense foci in the kidneys, too small to characterize.  BLADDER: Partially distended.  REPRODUCTIVE ORGANS: Unremarkable.    BOWEL: No bowel obstruction. Unremarkable appendix. No significant bowel   wall thickening or inflammatory change. Colon diverticulosis.  PERITONEUM: No drainable fluid collection or free air.  VESSELS: Atherosclerotic change of the abdominal aorta and its branches.   Normal caliber of the abdominal aorta.   RETROPERITONEUM: No lymphadenopathy.    ABDOMINAL WALL/SOFT TISSUES: Small fat-containing umbilical and inguinal   hernias.  BONES: Degenerative changes of the spine. Chronic mild anterior wedging   of the midthoracic spine. Nonspecific small sclerotic foci in the femurs.    IMPRESSION:    No aortic dissection.    Additional findings as described.    VINCENZO RIGGINS M.D., ATTENDING RADIOLOGIST  This document has been electronically signed. Jun 29 2019  7:17PM        < end of copied text >    < from: 12 Lead ECG (06.29.19 @ 18:46) >      Ventricular Rate 87 BPM    Atrial Rate 87 BPM    P-R Interval 148 ms    QRS Duration 110 ms    Q-T Interval 384 ms    QTC Calculation(Bezet) 462 ms    P Axis 5 degrees    R Axis -40 degrees    T Axis 60 degrees    Diagnosis Line Normal sinus rhythm  Left axis deviation  Voltage criteria for left ventricular hypertrophy  Abnormal ECG  poor r wave progression    Confirmed by Viry Fraga ([id number]) on 6/29/2019 9:22:26 PM    < end of copied text >    Pt is 59 y/o male with PMH of HTN, DM, Aortic valve replacement, ascending aneurysm repair, presented to ED with Chest pain - left sided , positive  Troponin .  1. NSTEMI - continue ASA / BB/ statins , cardiology on board , will start Heparin drip , monitor bed . For cath in am .

## 2019-06-30 NOTE — ED CDU PROVIDER DISPOSITION NOTE - ATTENDING CONTRIBUTION TO CARE
I, Gladis Payton, participated in the care of this patient with the PA. I discussed the history and physical exam findings as well as lab results and plan of care with the PA. I agree with PA's history, physical and assessment. I agree with the final disposition.

## 2019-06-30 NOTE — CONSULT NOTE ADULT - ASSESSMENT
59 y/o male with PMH of HTN, DM, Aortic valve replacement, ascending aneurysm repair, DM, HTN, presents to ED with c/o chest pain. Pt states CP intermittent x 1 month, yesterday worst its been, constant, all day, radiating to arm, no alleviating or aggravating factors, sharp, achy, pressure, tightness, associated with shortness of breath, nausea, and one episode vomiting. During exam pt began to experience same chest pain, and states also having abd pain, cramping, unable to get comfortable. EKG completed, no acute changes. Nitro sublingual given without relief.     Chest Pain   - Troponin <0.01, 0.09, <0.01. Send one more Troponin   - repeat EKG, during chest pain- no acute changes. SR  - continue ASA 81mg daily  - Pt states had cardiac work up in March (ECHO, stress, carotids) "all normal at that time" - office closed unable to get records  - chest pain relieved with Morphine/fentanyl   - Cardiac cath for Monday- NPO after midnight  - TTE ordered     HTN  - continue with lisinopril/metorpolol    DM  - as per admitting team     Abd pain-  - As per admitting team 57 y/o male with PMH of HTN, DM, Aortic valve replacement, ascending aneurysm repair, DM, HTN, presents to ED with c/o chest pain. Pt states CP intermittent x 1 month, yesterday worst its been, constant, all day, radiating to arm, no alleviating or aggravating factors, sharp, achy, pressure, tightness, associated with shortness of breath, nausea, and one episode vomiting. During exam pt began to experience same chest pain, and states also having abd pain, cramping, unable to get comfortable. EKG completed, no acute changes. Nitro sublingual given without relief.     NSTEMI/Chest Pain - Last troponin 0.33  - Troponin <0.01, 0.09, <0.01. Send one more Troponin   - repeat EKG, during chest pain- no acute changes. SR  - continue ASA 81mg daily  - Pt states had cardiac work up in March (ECHO, stress, carotids) "all normal at that time" - office closed unable to get records  - chest pain relieved with Morphine/fentanyl   - Cardiac cath for Monday- NPO after midnight  - TTE ordered   - Start IV Heparin  - Continue ASA/Metoprolol  HTN  - continue with lisinopril/metorpolol    DM  - as per admitting team     Abd pain-  - As per admitting team

## 2019-06-30 NOTE — H&P ADULT - NSHPPHYSICALEXAM_GEN_ALL_CORE
PHYSICAL EXAM:    Vital Signs Last 24 Hrs  T(C): 36.6 (29 Jun 2019 23:54), Max: 37.1 (29 Jun 2019 17:47)  T(F): 97.8 (29 Jun 2019 23:54), Max: 98.7 (29 Jun 2019 17:47)  HR: 76 (29 Jun 2019 23:54) (76 - 103)  BP: 170/76 (29 Jun 2019 23:54) (133/63 - 194/101)  BP(mean): --  RR: 18 (29 Jun 2019 23:54) (18 - 24)  SpO2: 97% (29 Jun 2019 23:54) (92% - 97%)    GENERAL: Pt lying comfortably, NAD.  ENMT: PERRL, +EOMI.  NECK: soft, Supple, No JVD,   CHEST/LUNG: Clear to auscultatation bilaterally; No wheezing.  HEART: S1S2+, Regular rate and rhythm; No murmurs.  ABDOMEN: Soft, Nontender, Nondistended; Bowel sounds present.  MUSCULOSKELETAL: Normal range of motion.  SKIN: No rashes or lesions.  NEURO: AAOX3, no focal deficits, no motor r sensory loss.  PSYCH: normal mood.

## 2019-06-30 NOTE — H&P ADULT - NSICDXPASTMEDICALHX_GEN_ALL_CORE_FT
PAST MEDICAL HISTORY:  Diabetes mellitus     H/O aortic valve repair     HTN (hypertension)     S/P aneurysm repair

## 2019-06-30 NOTE — H&P ADULT - NSHPLABSRESULTS_GEN_ALL_CORE
LABS:                        12.6   5.89  )-----------( 174      ( 29 Jun 2019 18:33 )             40.0     06-29    136  |  100  |  14.0  ----------------------------<  172<H>  3.9   |  16.0<L>  |  1.07    Ca    9.6      29 Jun 2019 18:33    TPro  6.9  /  Alb  4.4  /  TBili  0.3<L>  /  DBili  x   /  AST  22  /  ALT  32  /  AlkPhos  115  06-29    PT/INR - ( 29 Jun 2019 18:33 )   PT: 10.5 sec;   INR: 0.92 ratio         PTT - ( 29 Jun 2019 18:33 )  PTT:30.3 sec    LIVER FUNCTIONS - ( 29 Jun 2019 18:33 )  Alb: 4.4 g/dL / Pro: 6.9 g/dL / ALK PHOS: 115 U/L / ALT: 32 U/L / AST: 22 U/L / GGT: x               CARDIAC MARKERS ( 30 Jun 2019 01:59 )  x     / 0.09 ng/mL / x     / x     / x      CARDIAC MARKERS ( 29 Jun 2019 18:33 )  x     / <0.01 ng/mL / x     / x     / x

## 2019-06-30 NOTE — H&P ADULT - HISTORY OF PRESENT ILLNESS
Pt is 59 y/o male with PMH of HTN, DM, Aortic valve replacement, ascending aneurysm repair, presented to ED with Chest pain for 1 month. Pt has chest pain for 1 month, got more worse yesterday, 10/10, located at left upper chest, no radiation to neck/jaw or left arm, associated with one episode of vomiting. Currently chest pain free and resting comfortably. NO Fever, chills, nausea, vomiting, headache, dizziness, palpitation, SOB, bowel/ bladder habits at baseline.   Pt was initially admitted to ED observation- second troponin 0.09 hence hospitalist service was called for admission. During my eval Pt is comfortably and has no chest pain.

## 2019-06-30 NOTE — ED CDU PROVIDER DISPOSITION NOTE - CLINICAL COURSE
Pt is a 58M with chest pain.  PMH of aortic valve replaced and ascending aneurysm repair (bovine), HTN, NIDDM2.  Patient states one month of pain.  States worsening today.  around chest and upper abdomen into back and left arm. No aggravating or relieving factors. No other pertinent PMH.  No other pertinent PSH.  No other pertinent FHx.  Patient denies EtOH/tobacco/illicit substance use. No fever/chills, No photophobia/eye pain/changes in vision, No ear pain/sore throat/dysphagia, No palpitations, no SOB/cough/wheeze/stridor,  No diarrhea, no dysuria/frequency/discharge, No neck/back pain, no rash, no changes in neurological status/function. Pt now reports pain is subsiding. In obs for repeat trops/ekgs. Pt found to have elevated trop. Will admit.

## 2019-06-30 NOTE — CONSULT NOTE ADULT - SUBJECTIVE AND OBJECTIVE BOX
[place] [facility]                                                        Office: [address] [address]                                                       Telephone: [phone]. Fax:[phone]                                                              CARDIOLOGY CONSULTATION NOTE                                                                                             Consult requested by:      PCP    Primary Cardiologist.    Reason for Consultation:     History obtained by: Patient and medical record     obtained: No    Chief complaint:    Patient is a 58y old  Male who presents with a chief complaint of Chest pain (30 Jun 2019 03:53)      HPI:  Pt is 57 y/o male with PMH of HTN, DM, Aortic valve replacement, ascending aneurysm repair, presented to ED with Chest pain for 1 month. Pt has chest pain for 1 month, got more worse yesterday, 10/10, located at left upper chest, no radiation to neck/jaw or left arm, associated with one episode of vomiting. Currently chest pain free and resting comfortably. NO Fever, chills, nausea, vomiting, headache, dizziness, palpitation, SOB, bowel/ bladder habits at baseline.   Pt was initially admitted to ED observation- second troponin 0.09 hence hospitalist service was called for admission. During my eval Pt is comfortably and has no chest pain. (30 Jun 2019 03:53)        ALLERGIES: Allergies    Normodyne (Unknown)    Intolerances          CURRENT MEDICATIONS:  MEDICATIONS  (STANDING):  atorvastatin 40 milliGRAM(s) Oral at bedtime  dextrose 5%. 1000 milliLiter(s) (50 mL/Hr) IV Continuous <Continuous>  dextrose 50% Injectable 12.5 Gram(s) IV Push once  dextrose 50% Injectable 25 Gram(s) IV Push once  dextrose 50% Injectable 25 Gram(s) IV Push once  enoxaparin Injectable 40 milliGRAM(s) SubCutaneous daily  gabapentin 600 milliGRAM(s) Oral two times a day  insulin lispro (HumaLOG) corrective regimen sliding scale   SubCutaneous three times a day before meals  lisinopril 10 milliGRAM(s) Oral daily  metoprolol tartrate 25 milliGRAM(s) Oral two times a day  pantoprazole    Tablet 40 milliGRAM(s) Oral before breakfast      HOME MEDICATIONS:  Home Medications:  esomeprazole 40 mg oral delayed release capsule: 1 cap(s) orally once a day (30 Jun 2019 06:18)  gabapentin 600 mg oral tablet: 1 tab(s) orally 2 times a day (30 Jun 2019 06:18)  lisinopril 10 mg oral tablet: 1 tab(s) orally once a day (30 Jun 2019 06:18)  meclizine 12.5 mg oral tablet: 1 tab(s) orally 3 times a day, As Needed (30 Jun 2019 06:18)  metFORMIN 1000 mg oral tablet: 1 tab(s) orally once a day (30 Jun 2019 06:18)  metoprolol tartrate 25 mg oral tablet: 1 tab(s) orally 2 times a day (30 Jun 2019 06:18)    PAST MEDICAL HISTORY  S/P aneurysm repair  H/O aortic valve repair  Diabetes mellitus  HTN (hypertension)      PAST SURGICAL HISTORY  H/O aortic valve repair  S/P aortic aneurysm repair      FAMILY HISTORY:  FH: HTN (hypertension)      SOCIAL HISTORY:       CIGARETTES:       ALCOHOL    DRUG ABUSE      REVIEW OF SYSTEMS:  CONSTITUTIONAL:  as per HPI  HEENT:  Eyes:  No diplopia or blurred vision. ENT:  No earache, sore throat or runny nose.  CARDIOVASCULAR:  No pressure, squeezing, strangling, tightness, heaviness or aching about the chest, neck, axilla or epigastrium.  RESPIRATORY:  No cough, shortness of breath, PND or orthopnea.  GASTROINTESTINAL:  No nausea, vomiting or diarrhea.  GENITOURINARY:  No dysuria, frequency or urgency. No Blood in urine  MUSCULOSKELETAL:  no joint aches, no muscle pain  SKIN:  No change in skin, hair or nails.  NEUROLOGIC:  No paresthesias, fasciculations, seizures or weakness.  PSYCHIATRIC:  No disorder of thought or mood.  ENDOCRINE:  No heat or cold intolerance, polyuria or polydipsia.  HEMATOLOGICAL:  No easy bruising or bleeding.           	        Vital Signs Last 24 Hrs  T(C): 36.6 (06-30-19 @ 07:28), Max: 37.1 (06-29-19 @ 17:47)  T(F): 97.9 (06-30-19 @ 07:28), Max: 98.7 (06-29-19 @ 17:47)  HR: 67 (06-30-19 @ 10:38) (63 - 103)  BP: 175/95 (06-30-19 @ 10:38) (122/80 - 194/101)  BP(mean): --  RR: 18 (06-30-19 @ 07:28) (18 - 24)  SpO2: 98% (06-30-19 @ 07:28) (92% - 98%)    PHYSICAL EXAM:  Constitutional: Comfortable . No acute distress.   HEENT: Atraumatic and normcephalic , neck is supple . no JVD. Unremarkable  CNS: Alert and awake, Grossly nonfocal.  Lymph Nodes: Cervical : Not palpable.  Respiratory: Bilateral clear breath sounds.  Cardiovascular: Normal S1S2. . No murmur/rubs or gallop.  Gastrointestinal: Soft non-tender and non distended . +Bowel sounds.   Extremities: No leg edema.   Psychiatric: Calm . no agitation.  Skin: No skin rash.    Intake and output:     LABS:                        12.6   5.89  )-----------( 174      ( 29 Jun 2019 18:33 )             40.0     06-30    140  |  108<H>  |  7.0<L>  ----------------------------<  90  3.8   |  24.0  |  0.37<L>    Ca    8.9      30 Jun 2019 06:54    TPro  7.0  /  Alb  3.4  /  TBili  0.6  /  DBili  0.1  /  AST  17  /  ALT  11  /  AlkPhos  84  06-30    CARDIAC MARKERS ( 30 Jun 2019 06:54 )  x     / <0.01 ng/mL / 34 U/L / x     / x      CARDIAC MARKERS ( 30 Jun 2019 01:59 )  x     / 0.09 ng/mL / x     / x     / x      CARDIAC MARKERS ( 29 Jun 2019 18:33 )  x     / <0.01 ng/mL / x     / x     / x        ;p-BNP=  PT/INR - ( 29 Jun 2019 18:33 )   PT: 10.5 sec;   INR: 0.92 ratio         PTT - ( 29 Jun 2019 18:33 )  PTT:30.3 sec    LIVER FUNCTIONS - ( 30 Jun 2019 06:54 )  Alb: 3.4 g/dL / Pro: 7.0 g/dL / ALK PHOS: 84 U/L / ALT: 11 U/L / AST: 17 U/L / GGT: x           INTERPRETATION OF TELEMETRY: Reviewed by me.   ECG: Reviewed by me.     RADIOLOGY & ADDITIONAL STUDIES/ECHO/CARDIAC CATH: [place] [facility]                                                        Office: [address] [address]                                                       Telephone: [phone]. Fax:[phone]                                                              CARDIOLOGY CONSULTATION NOTE                                                                                             Consult requested by: Dr. Carpio     PCP    Primary Cardiologist. Dr. Cee/Vicente    Reason for Consultation: chest pain     History obtained by: Patient and medical record     obtained: No    Chief complaint:    Patient is a 58y old  Male who presents with a chief complaint of Chest pain (30 Jun 2019 03:53)      HPI: 57 y/o male with PMH of HTN, DM, Aortic valve replacement, ascending aneurysm repair, DM, HTN, presents to ED with c/o chest pain. Pt states CP intermittent x 1 month, yesterday worst its been, constant, all day, radiating to arm, no alleviating or aggravating factors, sharp, achy, pressure, tightness, associated with shortness of breath, nausea, and one episode vomiting. During exam pt began to experience same chest pain, and states also having abd pain, cramping, unable to get comfortable. EKG completed, no acute changes. Nitro sublingual given without relief. Troponin <0.01, 0.09, <0.01. Denies fever, chills, cough, phlegm production, orthopnea, PND, palpitations, irregular, fast heart beat, nausea, vomiting, melena, rectal bleed, hematuria, lightheadedness, dizziness, syncope, near syncope.      ALLERGIES: Allergies  Normodyne (Unknown)  Intolerances      CURRENT MEDICATIONS:  MEDICATIONS  (STANDING):  atorvastatin 40 milliGRAM(s) Oral at bedtime  dextrose 5%. 1000 milliLiter(s) (50 mL/Hr) IV Continuous <Continuous>  dextrose 50% Injectable 12.5 Gram(s) IV Push once  dextrose 50% Injectable 25 Gram(s) IV Push once  dextrose 50% Injectable 25 Gram(s) IV Push once  enoxaparin Injectable 40 milliGRAM(s) SubCutaneous daily  gabapentin 600 milliGRAM(s) Oral two times a day  insulin lispro (HumaLOG) corrective regimen sliding scale   SubCutaneous three times a day before meals  lisinopril 10 milliGRAM(s) Oral daily  metoprolol tartrate 25 milliGRAM(s) Oral two times a day  pantoprazole    Tablet 40 milliGRAM(s) Oral before breakfast      HOME MEDICATIONS:  esomeprazole 40 mg oral delayed release capsule: 1 cap(s) orally once a day (30 Jun 2019 06:18)  gabapentin 600 mg oral tablet: 1 tab(s) orally 2 times a day (30 Jun 2019 06:18)  lisinopril 10 mg oral tablet: 1 tab(s) orally once a day (30 Jun 2019 06:18)  meclizine 12.5 mg oral tablet: 1 tab(s) orally 3 times a day, As Needed (30 Jun 2019 06:18)  metFORMIN 1000 mg oral tablet: 1 tab(s) orally once a day (30 Jun 2019 06:18)  metoprolol tartrate 25 mg oral tablet: 1 tab(s) orally 2 times a day (30 Jun 2019 06:18)      PAST MEDICAL HISTORY  S/P aneurysm repair  H/O aortic valve repair  Diabetes mellitus  HTN (hypertension)      PAST SURGICAL HISTORY  H/O aortic valve repair  S/P aortic aneurysm repair      FAMILY HISTORY:  FH: HTN (hypertension)      SOCIAL HISTORY:     CIGARETTES:   former smoker   ALCOHOL: social 2-3 times/week   DRUG ABUSE: denies       REVIEW OF SYSTEMS:  CONSTITUTIONAL:  as per HPI  HEENT:  Eyes:  No diplopia or blurred vision. ENT:  No earache, sore throat or runny nose.  CARDIOVASCULAR:  + CHEST PAIN No pressure, squeezing, strangling, tightness, heaviness or aching about the chest, neck, axilla or epigastrium.  RESPIRATORY:  No cough, shortness of breath, PND or orthopnea.  GASTROINTESTINAL:  + ABD PAIN No nausea, vomiting or diarrhea.  GENITOURINARY:  No dysuria, frequency or urgency. No Blood in urine  MUSCULOSKELETAL:  no joint aches, no muscle pain  SKIN:  No change in skin, hair or nails.  NEUROLOGIC:  No paresthesias, fasciculations, seizures or weakness.  PSYCHIATRIC:  No disorder of thought or mood.  ENDOCRINE:  No heat or cold intolerance, polyuria or polydipsia.  HEMATOLOGICAL:  No easy bruising or bleeding. 	        Vital Signs Last 24 Hrs  T(C): 36.6 (06-30-19 @ 07:28), Max: 37.1 (06-29-19 @ 17:47)  T(F): 97.9 (06-30-19 @ 07:28), Max: 98.7 (06-29-19 @ 17:47)  HR: 67 (06-30-19 @ 10:38) (63 - 103)  BP: 175/95 (06-30-19 @ 10:38) (122/80 - 194/101)  RR: 18 (06-30-19 @ 07:28) (18 - 24)  SpO2: 98% (06-30-19 @ 07:28) (92% - 98%)        PHYSICAL EXAM:  Constitutional: uncomfortable, related to abd pain/chest pain    HEENT: Atraumatic and normocephalic , neck is supple . no JVD. Unremarkable  CNS: Alert and awake, Grossly nonfocal.  Lymph Nodes: Cervical : Not palpable.  Respiratory: Bilateral clear breath sounds.  Cardiovascular: Normal S1S2. . 2/6 systolic murmur/No rubs or gallop.  Gastrointestinal: tender to palpation L and R side; non distended . +Bowel sounds.   Extremities: trace B/L LE edema   Psychiatric: Calm . no agitation.  Skin: No skin rash.      LABS:                        12.6   5.89  )-----------( 174      ( 29 Jun 2019 18:33 )             40.0     06-30    140  |  108<H>  |  7.0<L>  ----------------------------<  90  3.8   |  24.0  |  0.37<L>    Ca    8.9      30 Jun 2019 06:54    TPro  7.0  /  Alb  3.4  /  TBili  0.6  /  DBili  0.1  /  AST  17  /  ALT  11  /  AlkPhos  84  06-30    CARDIAC MARKERS ( 30 Jun 2019 06:54 )  x     / <0.01 ng/mL / 34 U/L / x     / x      CARDIAC MARKERS ( 30 Jun 2019 01:59 )  x     / 0.09 ng/mL / x     / x     / x      CARDIAC MARKERS ( 29 Jun 2019 18:33 )  x     / <0.01 ng/mL / x     / x     / x          PT/INR - ( 29 Jun 2019 18:33 )   PT: 10.5 sec;   INR: 0.92 ratio    PTT - ( 29 Jun 2019 18:33 )  PTT:30.3 sec      LIVER FUNCTIONS - ( 30 Jun 2019 06:54 )  Alb: 3.4 g/dL / Pro: 7.0 g/dL / ALK PHOS: 84 U/L / ALT: 11 U/L / AST: 17 U/L / GGT: x             INTERPRETATION OF TELEMETRY: Reviewed by me.   ECG: Reviewed by me.       RADIOLOGY & ADDITIONAL STUDIES/ECHO/CARDIAC CATH: [place] [facility]                                                        Office: [address] [address]                                                       Telephone: [phone]. Fax:[phone]                                                              CARDIOLOGY CONSULTATION NOTE                                                                                             Consult requested by: Dr. Carpio     PCP    Primary Cardiologist. Dr. Cee/Vicente    Reason for Consultation: chest pain     History obtained by: Patient and medical record     obtained: No    Chief complaint:    Patient is a 58y old  Male who presents with a chief complaint of Chest pain (30 Jun 2019 03:53)      HPI: 57 y/o male with PMH of HTN, DM, Aortic valve replacement, ascending aneurysm repair, DM, HTN, presents to ED with c/o chest pain. Pt states CP intermittent x 1 month, yesterday worst its been, constant, all day, radiating to arm, no alleviating or aggravating factors, sharp, achy, pressure, tightness, associated with shortness of breath, nausea, and one episode vomiting. During exam pt began to experience same chest pain, and states also having abd pain, cramping, unable to get comfortable. EKG completed, no acute changes. Nitro sublingual given without relief. Troponin <0.01, 0.09, <0.01. Denies fever, chills, cough, phlegm production, orthopnea, PND, palpitations, irregular, fast heart beat, nausea, vomiting, melena, rectal bleed, hematuria, lightheadedness, dizziness, syncope, near syncope.      ALLERGIES: Allergies  Normodyne (Unknown)  Intolerances      CURRENT MEDICATIONS:  MEDICATIONS  (STANDING):  atorvastatin 40 milliGRAM(s) Oral at bedtime  dextrose 5%. 1000 milliLiter(s) (50 mL/Hr) IV Continuous <Continuous>  dextrose 50% Injectable 12.5 Gram(s) IV Push once  dextrose 50% Injectable 25 Gram(s) IV Push once  dextrose 50% Injectable 25 Gram(s) IV Push once  enoxaparin Injectable 40 milliGRAM(s) SubCutaneous daily  gabapentin 600 milliGRAM(s) Oral two times a day  insulin lispro (HumaLOG) corrective regimen sliding scale   SubCutaneous three times a day before meals  lisinopril 10 milliGRAM(s) Oral daily  metoprolol tartrate 25 milliGRAM(s) Oral two times a day  pantoprazole    Tablet 40 milliGRAM(s) Oral before breakfast      HOME MEDICATIONS:  esomeprazole 40 mg oral delayed release capsule: 1 cap(s) orally once a day (30 Jun 2019 06:18)  gabapentin 600 mg oral tablet: 1 tab(s) orally 2 times a day (30 Jun 2019 06:18)  lisinopril 10 mg oral tablet: 1 tab(s) orally once a day (30 Jun 2019 06:18)  meclizine 12.5 mg oral tablet: 1 tab(s) orally 3 times a day, As Needed (30 Jun 2019 06:18)  metFORMIN 1000 mg oral tablet: 1 tab(s) orally once a day (30 Jun 2019 06:18)  metoprolol tartrate 25 mg oral tablet: 1 tab(s) orally 2 times a day (30 Jun 2019 06:18)      PAST MEDICAL HISTORY  S/P aneurysm repair  H/O aortic valve repair  Diabetes mellitus  HTN (hypertension)      PAST SURGICAL HISTORY  H/O aortic valve repair  S/P aortic aneurysm repair      FAMILY HISTORY:  FH: HTN (hypertension)      SOCIAL HISTORY:     CIGARETTES:   former smoker   ALCOHOL: social 2-3 times/week   DRUG ABUSE: denies       REVIEW OF SYSTEMS:  CONSTITUTIONAL:  as per HPI  HEENT:  Eyes:  No diplopia or blurred vision. ENT:  No earache, sore throat or runny nose.  CARDIOVASCULAR:  + CHEST PAIN No pressure, squeezing, strangling, tightness, heaviness or aching about the chest, neck, axilla or epigastrium.  RESPIRATORY:  No cough, shortness of breath, PND or orthopnea.  GASTROINTESTINAL:  + ABD PAIN No nausea, vomiting or diarrhea.  GENITOURINARY:  No dysuria, frequency or urgency. No Blood in urine  MUSCULOSKELETAL:  no joint aches, no muscle pain  SKIN:  No change in skin, hair or nails.  NEUROLOGIC:  No paresthesias, fasciculations, seizures or weakness.  PSYCHIATRIC:  No disorder of thought or mood.  ENDOCRINE:  No heat or cold intolerance, polyuria or polydipsia.  HEMATOLOGICAL:  No easy bruising or bleeding. 	        Vital Signs Last 24 Hrs  T(C): 36.6 (06-30-19 @ 07:28), Max: 37.1 (06-29-19 @ 17:47)  T(F): 97.9 (06-30-19 @ 07:28), Max: 98.7 (06-29-19 @ 17:47)  HR: 67 (06-30-19 @ 10:38) (63 - 103)  BP: 175/95 (06-30-19 @ 10:38) (122/80 - 194/101)  RR: 18 (06-30-19 @ 07:28) (18 - 24)  SpO2: 98% (06-30-19 @ 07:28) (92% - 98%)        PHYSICAL EXAM:  Constitutional: uncomfortable, related to abd pain/chest pain    HEENT: Atraumatic and normocephalic , neck is supple . no JVD. Unremarkable  CNS: Alert and awake, Grossly nonfocal.  Lymph Nodes: Cervical : Not palpable.  Respiratory: Bilateral clear breath sounds.  Cardiovascular: Normal S1S2. . 2/6 systolic murmur/No rubs or gallop.  Gastrointestinal: tender to palpation L and R side; non distended . +Bowel sounds.   Extremities: trace B/L LE edema   Psychiatric: Calm . no agitation.  Skin: No skin rash.      LABS:                        12.6   5.89  )-----------( 174      ( 29 Jun 2019 18:33 )             40.0     06-30    140  |  108<H>  |  7.0<L>  ----------------------------<  90  3.8   |  24.0  |  0.37<L>    Ca    8.9      30 Jun 2019 06:54    TPro  7.0  /  Alb  3.4  /  TBili  0.6  /  DBili  0.1  /  AST  17  /  ALT  11  /  AlkPhos  84  06-30    CARDIAC MARKERS ( 30 Jun 2019 06:54 )  x     / <0.01 ng/mL / 34 U/L / x     / x      CARDIAC MARKERS ( 30 Jun 2019 01:59 )  x     / 0.09 ng/mL / x     / x     / x      CARDIAC MARKERS ( 29 Jun 2019 18:33 )  x     / <0.01 ng/mL / x     / x     / x          PT/INR - ( 29 Jun 2019 18:33 )   PT: 10.5 sec;   INR: 0.92 ratio    PTT - ( 29 Jun 2019 18:33 )  PTT:30.3 sec      LIVER FUNCTIONS - ( 30 Jun 2019 06:54 )  Alb: 3.4 g/dL / Pro: 7.0 g/dL / ALK PHOS: 84 U/L / ALT: 11 U/L / AST: 17 U/L / GGT: x             INTERPRETATION OF TELEMETRY: Reviewed by me.   ECG: SR, LAD, LVH, NSST-T abnl      RADIOLOGY & ADDITIONAL STUDIES/ECHO/CARDIAC CATH:

## 2019-06-30 NOTE — H&P ADULT - ASSESSMENT
Pt is 57 y/o male with PMH of HTN, DM, Aortic valve replacement, ascending aneurysm repair, presented to ED with Chest pain for 1 month which got more worse yesterday hence Pt came to ED. In ED initial troponin negative X1, no acute ischemic changes on EKG, no dissection on CTA and Pt was kept in ED observation- second troponin 0.09 hence hospitalist service was called for admission. During my eval Pt is comfortably and has no chest pain. Pt admitted for CP r/o ACS.    >Chest pain R/o ACS:  - Pt CP free now.   - First troponin negative, second troponin mildly elevated 0.09  - Stat EKG done, compared with evening EKG- only change in lead 111 TW inversion otherwise stable.   - Keep on telemetry, cycle CE/ EKG  - Start ASA/ Statins, nitro prn for CP. Will hold on starting AC. If second troponin goes high then will start AC.  - Resume home metoprolol / lisinopril.  - Cardiology consult called ([facility] cardiology).    >HTN- Resume home meds BB/ ACEIs.    >DM- Keep on MARJAN + FS monitoring.     >Hx of Aortic valve repair and aneurysm repair- O/p follow up.     >DVT ppx- Lovenox.

## 2019-06-30 NOTE — ED CDU PROVIDER INITIAL DAY NOTE - ATTENDING CONTRIBUTION TO CARE
I, Gladis Payton, participated in the care of this patient with the PA. I discussed the history and physical exam findings as well as lab results and plan of care with the PA. I agree with PA's history, physical and assessment. I, Gladis Payton, participated in the care of this patient with the PA. I discussed the history and physical exam findings as well as lab results and plan of care with the PA. I agree with PA's history, physical and assessment.    57 y/o M with HTN, DM2, AAA presents c/o one month of pain to his left chest, abdomen and left arm. He had negative work up for dissection in the ED, will be placed on obs for serial troponin and cardiac monitoring.    Exam: NAD, RRR, lungs CTA B/L, abd soft NT/ ND, no pulsatile midline mass, 2+ symmetric distal pulses.    Will cycle troponin and keep patient on cardiac monitor. Will also start protonix for GERD (patient has history, not on medications).

## 2019-06-30 NOTE — ED CDU PROVIDER INITIAL DAY NOTE - MEDICAL DECISION MAKING DETAILS
Pt with cardiac hx in obs for cp x 1 month. Pt had NST/ECHO in May which he states were fine. Will get repeat trops and ekgs and cards f/u.

## 2019-07-01 DIAGNOSIS — I25.10 ATHEROSCLEROTIC HEART DISEASE OF NATIVE CORONARY ARTERY WITHOUT ANGINA PECTORIS: ICD-10-CM

## 2019-07-01 LAB
APTT BLD: 32.9 SEC — SIGNIFICANT CHANGE UP (ref 27.5–36.3)
GLUCOSE BLDC GLUCOMTR-MCNC: 108 MG/DL — HIGH (ref 70–99)
GLUCOSE BLDC GLUCOMTR-MCNC: 109 MG/DL — HIGH (ref 70–99)
GLUCOSE BLDC GLUCOMTR-MCNC: 156 MG/DL — HIGH (ref 70–99)
HBA1C BLD-MCNC: 7.4 % — HIGH (ref 4–5.6)
HCT VFR BLD CALC: 40 % — SIGNIFICANT CHANGE UP (ref 39–50)
HCV AB S/CO SERPL IA: 0.05 S/CO — SIGNIFICANT CHANGE UP (ref 0–0.99)
HCV AB SERPL-IMP: SIGNIFICANT CHANGE UP
HGB BLD-MCNC: 12.5 G/DL — LOW (ref 13–17)
MCHC RBC-ENTMCNC: 27.5 PG — SIGNIFICANT CHANGE UP (ref 27–34)
MCHC RBC-ENTMCNC: 31.3 GM/DL — LOW (ref 32–36)
MCV RBC AUTO: 87.9 FL — SIGNIFICANT CHANGE UP (ref 80–100)
PLATELET # BLD AUTO: 156 K/UL — SIGNIFICANT CHANGE UP (ref 150–400)
RBC # BLD: 4.55 M/UL — SIGNIFICANT CHANGE UP (ref 4.2–5.8)
RBC # FLD: 13.9 % — SIGNIFICANT CHANGE UP (ref 10.3–14.5)
WBC # BLD: 4.84 K/UL — SIGNIFICANT CHANGE UP (ref 3.8–10.5)
WBC # FLD AUTO: 4.84 K/UL — SIGNIFICANT CHANGE UP (ref 3.8–10.5)

## 2019-07-01 PROCEDURE — 99233 SBSQ HOSP IP/OBS HIGH 50: CPT

## 2019-07-01 PROCEDURE — 99152 MOD SED SAME PHYS/QHP 5/>YRS: CPT

## 2019-07-01 PROCEDURE — 99232 SBSQ HOSP IP/OBS MODERATE 35: CPT

## 2019-07-01 PROCEDURE — 92941 PRQ TRLML REVSC TOT OCCL AMI: CPT | Mod: LD

## 2019-07-01 PROCEDURE — 93010 ELECTROCARDIOGRAM REPORT: CPT | Mod: 76

## 2019-07-01 PROCEDURE — 93454 CORONARY ARTERY ANGIO S&I: CPT | Mod: 26,59

## 2019-07-01 RX ORDER — ATORVASTATIN CALCIUM 80 MG/1
80 TABLET, FILM COATED ORAL AT BEDTIME
Refills: 0 | Status: DISCONTINUED | OUTPATIENT
Start: 2019-07-01 | End: 2019-07-04

## 2019-07-01 RX ORDER — SODIUM CHLORIDE 9 MG/ML
1000 INJECTION, SOLUTION INTRAVENOUS
Refills: 0 | Status: DISCONTINUED | OUTPATIENT
Start: 2019-07-01 | End: 2019-07-04

## 2019-07-01 RX ORDER — CLOPIDOGREL BISULFATE 75 MG/1
75 TABLET, FILM COATED ORAL DAILY
Refills: 0 | Status: DISCONTINUED | OUTPATIENT
Start: 2019-07-02 | End: 2019-07-04

## 2019-07-01 RX ORDER — MORPHINE SULFATE 50 MG/1
2 CAPSULE, EXTENDED RELEASE ORAL EVERY 4 HOURS
Refills: 0 | Status: DISCONTINUED | OUTPATIENT
Start: 2019-07-01 | End: 2019-07-03

## 2019-07-01 RX ORDER — DEXTROSE 50 % IN WATER 50 %
25 SYRINGE (ML) INTRAVENOUS ONCE
Refills: 0 | Status: DISCONTINUED | OUTPATIENT
Start: 2019-07-01 | End: 2019-07-04

## 2019-07-01 RX ORDER — DEXTROSE 50 % IN WATER 50 %
12.5 SYRINGE (ML) INTRAVENOUS ONCE
Refills: 0 | Status: DISCONTINUED | OUTPATIENT
Start: 2019-07-01 | End: 2019-07-04

## 2019-07-01 RX ORDER — GLUCAGON INJECTION, SOLUTION 0.5 MG/.1ML
1 INJECTION, SOLUTION SUBCUTANEOUS ONCE
Refills: 0 | Status: DISCONTINUED | OUTPATIENT
Start: 2019-07-01 | End: 2019-07-04

## 2019-07-01 RX ORDER — DEXTROSE 50 % IN WATER 50 %
15 SYRINGE (ML) INTRAVENOUS ONCE
Refills: 0 | Status: DISCONTINUED | OUTPATIENT
Start: 2019-07-01 | End: 2019-07-04

## 2019-07-01 RX ORDER — CLOPIDOGREL BISULFATE 75 MG/1
600 TABLET, FILM COATED ORAL ONCE
Refills: 0 | Status: COMPLETED | OUTPATIENT
Start: 2019-07-01 | End: 2019-07-01

## 2019-07-01 RX ORDER — INSULIN LISPRO 100/ML
VIAL (ML) SUBCUTANEOUS EVERY 6 HOURS
Refills: 0 | Status: DISCONTINUED | OUTPATIENT
Start: 2019-07-01 | End: 2019-07-04

## 2019-07-01 RX ORDER — NITROGLYCERIN 6.5 MG
0.4 CAPSULE, EXTENDED RELEASE ORAL
Refills: 0 | Status: COMPLETED | OUTPATIENT
Start: 2019-07-01 | End: 2019-07-02

## 2019-07-01 RX ADMIN — Medication 0.4 MILLIGRAM(S): at 05:16

## 2019-07-01 RX ADMIN — MORPHINE SULFATE 2 MILLIGRAM(S): 50 CAPSULE, EXTENDED RELEASE ORAL at 20:23

## 2019-07-01 RX ADMIN — GABAPENTIN 600 MILLIGRAM(S): 400 CAPSULE ORAL at 18:45

## 2019-07-01 RX ADMIN — Medication 0.4 MILLIGRAM(S): at 05:22

## 2019-07-01 RX ADMIN — HEPARIN SODIUM 1500 UNIT(S)/HR: 5000 INJECTION INTRAVENOUS; SUBCUTANEOUS at 05:41

## 2019-07-01 RX ADMIN — HEPARIN SODIUM 6000 UNIT(S): 5000 INJECTION INTRAVENOUS; SUBCUTANEOUS at 05:45

## 2019-07-01 RX ADMIN — ATORVASTATIN CALCIUM 80 MILLIGRAM(S): 80 TABLET, FILM COATED ORAL at 22:08

## 2019-07-01 RX ADMIN — LISINOPRIL 10 MILLIGRAM(S): 2.5 TABLET ORAL at 05:12

## 2019-07-01 RX ADMIN — Medication 25 MILLIGRAM(S): at 05:12

## 2019-07-01 RX ADMIN — Medication 2: at 08:29

## 2019-07-01 RX ADMIN — Medication 25 MILLIGRAM(S): at 18:45

## 2019-07-01 RX ADMIN — CLOPIDOGREL BISULFATE 600 MILLIGRAM(S): 75 TABLET, FILM COATED ORAL at 22:07

## 2019-07-01 RX ADMIN — MORPHINE SULFATE 2 MILLIGRAM(S): 50 CAPSULE, EXTENDED RELEASE ORAL at 20:38

## 2019-07-01 RX ADMIN — Medication 81 MILLIGRAM(S): at 11:13

## 2019-07-01 NOTE — PROGRESS NOTE ADULT - SUBJECTIVE AND OBJECTIVE BOX
CC:  Patient is a 58y old  Male who presents with a chief complaint of Chest pain (2019 17:29)      HPI:  Pt is 59 y/o male with PMH of HTN, DM, Aortic valve replacement, ascending aneurysm repair, presented to ED with Chest pain for 1 month. Pt has chest pain for 1 month, got more worse yesterday, 10/10, located at left upper chest, no radiation to neck/jaw or left arm, associated with one episode of vomiting. Currently chest pain free and resting comfortably. NO Fever, chills, nausea, vomiting, headache, dizziness, palpitation, SOB, bowel/ bladder habits at baseline.   Pt was initially admitted to ED observation- second troponin 0.09 hence hospitalist service was called for admission. During my eval Pt is comfortably and has no chest pain. (2019 03:53)      ROS: All review of systems negative unless indicated otherwise below.     Lab Results:  CBC  CBC Full  -  ( 2019 05:04 )  WBC Count : 4.84 K/uL  RBC Count : 4.55 M/uL  Hemoglobin : 12.5 g/dL  Hematocrit : 40.0 %  Platelet Count - Automated : 156 K/uL  Mean Cell Volume : 87.9 fl  Mean Cell Hemoglobin : 27.5 pg  Mean Cell Hemoglobin Concentration : 31.3 gm/dL  Auto Neutrophil # : x  Auto Lymphocyte # : x  Auto Monocyte # : x  Auto Eosinophil # : x  Auto Basophil # : x  Auto Neutrophil % : x  Auto Lymphocyte % : x  Auto Monocyte % : x  Auto Eosinophil % : x  Auto Basophil % : x    .		Differential:	[] Automated		[] Manual  Chemistry                        12.5   4.84  )-----------( 156      ( 2019 05:04 )             40.0     06-30    140  |  108<H>  |  7.0<L>  ----------------------------<  90  3.8   |  24.0  |  0.37<L>    Ca    8.9      2019 06:54    TPro  7.0  /  Alb  3.4  /  TBili  0.6  /  DBili  0.1  /  AST  17  /  ALT  11  /  AlkPhos  84  06-30    LIVER FUNCTIONS - ( 2019 06:54 )  Alb: 3.4 g/dL / Pro: 7.0 g/dL / ALK PHOS: 84 U/L / ALT: 11 U/L / AST: 17 U/L / GGT: x           PT/INR - ( 2019 18:33 )   PT: 10.5 sec;   INR: 0.92 ratio         PTT - ( 2019 05:04 )  PTT:32.9 sec  Urinalysis Basic - ( 2019 15:55 )    Color: Yellow / Appearance: Clear / S.015 / pH: x  Gluc: x / Ketone: Negative  / Bili: Negative / Urobili: Negative mg/dL   Blood: x / Protein: 30 mg/dL / Nitrite: Negative   Leuk Esterase: Negative / RBC: x / WBC x   Sq Epi: x / Non Sq Epi: Occasional / Bacteria: x    CARDIAC MARKERS ( 2019 15:23 )  x     / 0.33 ng/mL / x     / x     / x      CARDIAC MARKERS ( 2019 06:54 )  x     / <0.01 ng/mL / 34 U/L / x     / x      CARDIAC MARKERS ( 2019 01:59 )  x     / 0.09 ng/mL / x     / x     / x      CARDIAC MARKERS ( 2019 18:33 )  x     / <0.01 ng/mL / x     / x     / x        CATH REPORT: Patient s/p PCI to Diagonal. 1 AMBER.   Moderate LAD disease.   Significant progression of disease since .     MEDICATIONS  (STANDING):  aspirin  chewable 81 milliGRAM(s) Oral daily  atorvastatin 40 milliGRAM(s) Oral at bedtime  dextrose 5%. 1000 milliLiter(s) IV Continuous <Continuous>  dextrose 50% Injectable 12.5 Gram(s) IV Push once  dextrose 50% Injectable 25 Gram(s) IV Push once  dextrose 50% Injectable 25 Gram(s) IV Push once  gabapentin 600 milliGRAM(s) Oral two times a day  heparin  Infusion.  Unit(s)/Hr IV Continuous <Continuous>  insulin lispro (HumaLOG) corrective regimen sliding scale   SubCutaneous every 6 hours  lisinopril 10 milliGRAM(s) Oral daily  metoprolol tartrate 25 milliGRAM(s) Oral two times a day  pantoprazole    Tablet 40 milliGRAM(s) Oral before breakfast    MEDICATIONS  (PRN):  dextrose 40% Gel 15 Gram(s) Oral once PRN Blood Glucose LESS THAN 70 milliGRAM(s)/deciliter  glucagon  Injectable 1 milliGRAM(s) IntraMuscular once PRN Glucose LESS THAN 70 milligrams/deciliter  heparin  Injectable 6000 Unit(s) IV Push every 6 hours PRN For aPTT less than 40  hydrALAZINE 10 milliGRAM(s) Oral every 6 hours PRN for SBP > 170  nitroglycerin     SubLingual 0.4 milliGRAM(s) SubLingual every 5 minutes PRN Chest Pain      PHYSICAL EXAM:  Vital Signs Last 24 Hrs  T(C): 36.7 (2019 14:13), Max: 36.9 (2019 20:15)  T(F): 98 (2019 14:13), Max: 98.4 (2019 20:15)  HR: 59 (2019 15:00) (56 - 64)  BP: 167/77 (2019 15:00) (129/82 - 225/95)  BP(mean): --  RR: 12 (2019 15:00) (12 - 22)  SpO2: 98% (2019 15:00) (94% - 98%)    GENERAL: NAD, well-groomed, well-developed  HEAD:  Atraumatic, Normocephalic  NECK: Supple, No JVD  NERVOUS SYSTEM:  Alert & Oriented X3, Good concentration; Motor Strength 5/5 B/L upper and lower extremities, sensation intact  CHEST/LUNG: Clear to auscultation bilaterally, No rales, rhonchi, wheezing, or rubs  HEART: Regular rate and rhythm; s1 and s2 auscultated, No murmurs, rubs, or gallops  ABDOMEN: Soft, Nontender, Nondistended; Bowel sounds present and normoactive.   EXTREMITIES:  2+ Peripheral Pulses, No clubbing, cyanosis, or edema  SKIN: No rashes or lesions  Cath site: Right wrist benign s/p cath. band in place. No bleeding, no ecchymosis, no hematoma. Extremity Warm to touch, with palpable distal pulses, and brisk capillary refill.

## 2019-07-01 NOTE — PROGRESS NOTE ADULT - ASSESSMENT
57 y/o male with PMH of HTN, DM, Aortic valve replacement, ascending aneurysm repair,  and one vessel CABG DM, HTN,   Pt presented with acute SOB and chest pain and leg swelling  Nitro sublingual given without relief.   troponin now positive NSTEMI/ Chest Pain - Last troponin 0.33  Pt presents fro Cardiac cath   Currently brief episode of 8/10 chest pain  radiation down arm with hypertension treated with Nitroglycerin 59 y/o male with PMH of HTN, DM, Aortic valve replacement, ascending aneurysm repair,  and one vessel CABG DM, HTN,   Pt presented with acute SOB and chest pain and leg swelling  Nitro sublingual given without relief.   troponin now positive NSTEMI/ Chest Pain - Last troponin 0.33  Pt presents fro Cardiac cath   Currently brief episode of 8/10 chest pain  radiation down arm with hypertension treated with Nitroglycerin   PRE-PROCEDURE ASSESSMENT  [facility]   -Patient seen and examined  -Labs reviewed  -Pre-procedure teaching completed with patient and family  -Informed consent witnessed  -Questions answered  Pt received asa and plavix today

## 2019-07-01 NOTE — PROGRESS NOTE ADULT - ASSESSMENT
Pt is 59 y/o male with PMH of HTN, DM, Aortic valve replacement, ascending aneurysm repair, presented to ED with Chest pain for 1 month which got more worse day prior to admit, admitted for cp r/o acs.    >NSTEMI w/ unstable angina:  - Pt CP free now.   - peak trop 0.33  - tele  - ASA/ Statins, nitro prn for CP, hep drip  - metoprolol / lisinopril.  - Cardiology consult appreciated, ss cardio, npo for cath today  - pending echo  - lipid panel in am for risk stratification    >HTN- controlled  home meds BB/ ACEIs.    >DM2 not on long term insulin complicated by asymptomatic hyperglycemia - ISS + FS q6 while npo  a1c 7.4, controlled     > anemia. baseline hgb 12-13  no acute/active signs blood loss  outpt fu for w/u    >DVT ppx. hep drip    >dispo. dc home post cardiac w/u, likely in 1-2 days    > outpt fu. pmd, cardio Pt is 57 y/o male with PMH of HTN, DM, Aortic valve replacement, ascending aneurysm repair, presented to ED with Chest pain for 1 month which got more worse day prior to admit, admitted for cp r/o acs.    >NSTEMI w/ unstable angina:  - Pt CP free now.   - peak trop 0.33  - tele  - ASA/ Statins, nitro prn for CP, hep drip  - metoprolol / lisinopril.  - Cardiology consult appreciated, ss cardio, so cath w/ franchesca x1 today, well tolerated uncomplicated  - pending echo  - lipid panel in am for risk stratification    >HTN- controlled  home meds BB/ ACEIs.    >DM2 not on long term insulin complicated by asymptomatic hyperglycemia - ISS + FS q6 while npo  a1c 7.4, controlled     > anemia. baseline hgb 12-13  no acute/active signs blood loss  outpt fu for w/u    >DVT ppx. hep drip    >dispo. dc home post cardiac w/u, likely in 1-2 days    > outpt fu. pmd, cardio

## 2019-07-01 NOTE — PROGRESS NOTE ADULT - SUBJECTIVE AND OBJECTIVE BOX
CC:    Patient seen and examined at the bedside. No acute overnight events. - yesterday. Complaining of - today. Denies fever/chills, headache, lightheadedness, dizziness, chest pain, palpitations, shortness of breath, cough, abd pain, nausea/vomiting/diarrhea, muscle pain.      =========================================================================================  T(C): 36.9 (19 @ 07:59), Max: 36.9 (19 @ 20:15)  HR: 64 (19 @ 07:59) (56 - 67)  BP: 130/78 (19 @ 07:59) (129/82 - 175/95)  RR: 20 (19 @ 07:59) (18 - 20)  SpO2: 95% (19 @ 05:10) (94% - 98%)    PHYSICAL EXAM.    GEN - appears age appropriate. well nourished. pleasant. no distress.   HEENT - NCAT, EOMI, JALEN  RESP - CTA BL, no wheeze/stridor/rhonchi/crackles. not on supplemental O2. able to speak in full sentences without distress.   CARDIO - NS1S2, RRR. No murmurs/rubs/gallops.  ABD - Soft/Non tender/Non distended. Normal BS x4 quadrants. no guarding/rebound tenderness.  Ext - No VAISHNAVI.  MSK - BL 5/5 strength on upper and lower extremities.   Neuro - AAOx3. cn 2-12 grossly intact  Psych - normal affect  Skin - c/d/i. no rashes/lesions      I&O's Summary    2019 07:01  -  2019 07:00  --------------------------------------------------------  IN: 480 mL / OUT: 0 mL / NET: 480 mL      Daily     Daily     =========================================================================================  LABS.        140  |  108<H>  |  7.0<L>  ----------------------------<  90  3.8   |  24.0  |  0.37<L>    Ca    8.9      2019 06:54    TPro  7.0  /  Alb  3.4  /  TBili  0.6  /  DBili  0.1  /  AST  17  /  ALT  11  /  AlkPhos  84  30                          12.5   4.84  )-----------( 156      ( 2019 05:04 )             40.0     LIVER FUNCTIONS - ( 2019 06:54 )  Alb: 3.4 g/dL / Pro: 7.0 g/dL / ALK PHOS: 84 U/L / ALT: 11 U/L / AST: 17 U/L / GGT: x           PT/INR - ( 2019 18:33 )   PT: 10.5 sec;   INR: 0.92 ratio         PTT - ( 2019 05:04 )  PTT:32.9 sec  CARDIAC MARKERS ( 2019 15:23 )  x     / 0.33 ng/mL / x     / x     / x      CARDIAC MARKERS ( 2019 06:54 )  x     / <0.01 ng/mL / 34 U/L / x     / x      CARDIAC MARKERS ( 2019 01:59 )  x     / 0.09 ng/mL / x     / x     / x      CARDIAC MARKERS ( 2019 18:33 )  x     / <0.01 ng/mL / x     / x     / x          Urinalysis Basic - ( 2019 15:55 )    Color: Yellow / Appearance: Clear / S.015 / pH: x  Gluc: x / Ketone: Negative  / Bili: Negative / Urobili: Negative mg/dL   Blood: x / Protein: 30 mg/dL / Nitrite: Negative   Leuk Esterase: Negative / RBC: x / WBC x   Sq Epi: x / Non Sq Epi: Occasional / Bacteria: x          =========================================================================================  IMAGING.     =========================================================================================    HOME MEDS.    Home Medications:  aspirin 81 mg oral tablet, chewable: 1 tab(s) orally once a day (2016 20:15)  atorvastatin 40 mg oral tablet: 1 tab(s) orally once a day (at bedtime) (2016 20:15)  docusate sodium 100 mg oral capsule: 1 cap(s) orally once a day (2016 20:15)  esomeprazole 40 mg oral delayed release capsule: 1 cap(s) orally once a day (2019 17:53)  gabapentin 600 mg oral tablet:  orally 4 times a day (2016 20:15)  gabapentin 600 mg oral tablet: 1 tab(s) orally 2 times a day (2019 17:53)  Lasix 20 mg oral tablet: 1 tab(s) orally once a day (2016 20:15)  lisinopril 10 mg oral tablet: 1 tab(s) orally once a day (2019 17:53)  meclizine 12.5 mg oral tablet: 1 tab(s) orally 3 times a day, As Needed (2019 17:53)  metFORMIN 1000 mg oral tablet: 1 tab(s) orally once a day (2019 17:53)  metFORMIN 1000 mg oral tablet: 1 tab(s) orally 2 times a day (2016 20:15)  metoprolol tartrate 25 mg oral tablet: 1 tab(s) orally 2 times a day (2016 20:15)  metoprolol tartrate 25 mg oral tablet: 1 tab(s) orally 2 times a day (2019 17:53)  ocular lubricant ophthalmic solution: 1 drop(s) to each affected eye every 4 hours, As needed, Dry Eyes (2016 20:15)      =========================================================================================    HOSPITAL MEDS.    MEDICATIONS  (STANDING):  aspirin  chewable 81 milliGRAM(s) Oral daily  atorvastatin 40 milliGRAM(s) Oral at bedtime  dextrose 5%. 1000 milliLiter(s) (50 mL/Hr) IV Continuous <Continuous>  dextrose 50% Injectable 12.5 Gram(s) IV Push once  dextrose 50% Injectable 25 Gram(s) IV Push once  dextrose 50% Injectable 25 Gram(s) IV Push once  gabapentin 600 milliGRAM(s) Oral two times a day  heparin  Infusion.  Unit(s)/Hr (10 mL/Hr) IV Continuous <Continuous>  insulin lispro (HumaLOG) corrective regimen sliding scale   SubCutaneous three times a day before meals  lisinopril 10 milliGRAM(s) Oral daily  metoprolol tartrate 25 milliGRAM(s) Oral two times a day  pantoprazole    Tablet 40 milliGRAM(s) Oral before breakfast    MEDICATIONS  (PRN):  dextrose 40% Gel 15 Gram(s) Oral once PRN Blood Glucose LESS THAN 70 milliGRAM(s)/deciliter  glucagon  Injectable 1 milliGRAM(s) IntraMuscular once PRN Glucose LESS THAN 70 milligrams/deciliter  heparin  Injectable 6000 Unit(s) IV Push every 6 hours PRN For aPTT less than 40  hydrALAZINE 10 milliGRAM(s) Oral every 6 hours PRN for SBP > 170  morphine  - Injectable 2 milliGRAM(s) IV Push every 6 hours PRN Severe Pain (7 - 10) CC: cp    Patient seen and examined at the bedside. No acute overnight events. Complaining of cp post cath today. Denies fever/chills, headache, lightheadedness, dizziness, palpitations, shortness of breath, cough, abd pain, nausea/vomiting/diarrhea      =========================================================================================  T(C): 36.9 (19 @ 07:59), Max: 36.9 (19 @ 20:15)  HR: 64 (19 @ 07:59) (56 - 67)  BP: 130/78 (19 @ 07:59) (129/82 - 175/95)  RR: 20 (19 @ 07:59) (18 - 20)  SpO2: 95% (19 @ 05:10) (94% - 98%)    PHYSICAL EXAM.    GEN - appears age appropriate. well nourished. pleasant. no distress.   HEENT - NCAT, EOMI, JALEN  RESP - CTA BL, no wheeze/stridor/rhonchi/crackles. not on supplemental O2. able to speak in full sentences without distress.   CARDIO - NS1S2, RRR. No murmurs/rubs/gallops.  ABD - Soft/Non tender/Non distended. Normal BS x4 quadrants. no guarding/rebound tenderness.  Ext - No VAISHNAVI. RT wrist comrpession noted  MSK - BL 5/5 strength on upper and lower extremities.   Neuro - AAOx3. cn 2-12 grossly intact  Psych - normal affect  Skin - c/d/i. no rashes/lesions      I&O's Summary    2019 07:01  -  2019 07:00  --------------------------------------------------------  IN: 480 mL / OUT: 0 mL / NET: 480 mL      Daily     Daily     =========================================================================================  LABS.        140  |  108<H>  |  7.0<L>  ----------------------------<  90  3.8   |  24.0  |  0.37<L>    Ca    8.9      2019 06:54    TPro  7.0  /  Alb  3.4  /  TBili  0.6  /  DBili  0.1  /  AST  17  /  ALT  11  /  AlkPhos  84                            12.5   4.84  )-----------( 156      ( 2019 05:04 )             40.0     LIVER FUNCTIONS - ( 2019 06:54 )  Alb: 3.4 g/dL / Pro: 7.0 g/dL / ALK PHOS: 84 U/L / ALT: 11 U/L / AST: 17 U/L / GGT: x           PT/INR - ( 2019 18:33 )   PT: 10.5 sec;   INR: 0.92 ratio         PTT - ( 2019 05:04 )  PTT:32.9 sec  CARDIAC MARKERS ( 2019 15:23 )  x     / 0.33 ng/mL / x     / x     / x      CARDIAC MARKERS ( 2019 06:54 )  x     / <0.01 ng/mL / 34 U/L / x     / x      CARDIAC MARKERS ( 2019 01:59 )  x     / 0.09 ng/mL / x     / x     / x      CARDIAC MARKERS ( 2019 18:33 )  x     / <0.01 ng/mL / x     / x     / x          Urinalysis Basic - ( 2019 15:55 )    Color: Yellow / Appearance: Clear / S.015 / pH: x  Gluc: x / Ketone: Negative  / Bili: Negative / Urobili: Negative mg/dL   Blood: x / Protein: 30 mg/dL / Nitrite: Negative   Leuk Esterase: Negative / RBC: x / WBC x   Sq Epi: x / Non Sq Epi: Occasional / Bacteria: x          =========================================================================================  IMAGING.     =========================================================================================    HOME MEDS.    Home Medications:  aspirin 81 mg oral tablet, chewable: 1 tab(s) orally once a day (2016 20:15)  atorvastatin 40 mg oral tablet: 1 tab(s) orally once a day (at bedtime) (2016 20:15)  docusate sodium 100 mg oral capsule: 1 cap(s) orally once a day (2016 20:15)  esomeprazole 40 mg oral delayed release capsule: 1 cap(s) orally once a day (2019 17:53)  gabapentin 600 mg oral tablet:  orally 4 times a day (2016 20:15)  gabapentin 600 mg oral tablet: 1 tab(s) orally 2 times a day (2019 17:53)  Lasix 20 mg oral tablet: 1 tab(s) orally once a day (2016 20:15)  lisinopril 10 mg oral tablet: 1 tab(s) orally once a day (2019 17:53)  meclizine 12.5 mg oral tablet: 1 tab(s) orally 3 times a day, As Needed (2019 17:53)  metFORMIN 1000 mg oral tablet: 1 tab(s) orally once a day (2019 17:53)  metFORMIN 1000 mg oral tablet: 1 tab(s) orally 2 times a day (2016 20:15)  metoprolol tartrate 25 mg oral tablet: 1 tab(s) orally 2 times a day (2016 20:15)  metoprolol tartrate 25 mg oral tablet: 1 tab(s) orally 2 times a day (2019 17:53)  ocular lubricant ophthalmic solution: 1 drop(s) to each affected eye every 4 hours, As needed, Dry Eyes (2016 20:15)      =========================================================================================    HOSPITAL MEDS.    MEDICATIONS  (STANDING):  aspirin  chewable 81 milliGRAM(s) Oral daily  atorvastatin 40 milliGRAM(s) Oral at bedtime  dextrose 5%. 1000 milliLiter(s) (50 mL/Hr) IV Continuous <Continuous>  dextrose 50% Injectable 12.5 Gram(s) IV Push once  dextrose 50% Injectable 25 Gram(s) IV Push once  dextrose 50% Injectable 25 Gram(s) IV Push once  gabapentin 600 milliGRAM(s) Oral two times a day  heparin  Infusion.  Unit(s)/Hr (10 mL/Hr) IV Continuous <Continuous>  insulin lispro (HumaLOG) corrective regimen sliding scale   SubCutaneous three times a day before meals  lisinopril 10 milliGRAM(s) Oral daily  metoprolol tartrate 25 milliGRAM(s) Oral two times a day  pantoprazole    Tablet 40 milliGRAM(s) Oral before breakfast    MEDICATIONS  (PRN):  dextrose 40% Gel 15 Gram(s) Oral once PRN Blood Glucose LESS THAN 70 milliGRAM(s)/deciliter  glucagon  Injectable 1 milliGRAM(s) IntraMuscular once PRN Glucose LESS THAN 70 milligrams/deciliter  heparin  Injectable 6000 Unit(s) IV Push every 6 hours PRN For aPTT less than 40  hydrALAZINE 10 milliGRAM(s) Oral every 6 hours PRN for SBP > 170  morphine  - Injectable 2 milliGRAM(s) IV Push every 6 hours PRN Severe Pain (7 - 10)

## 2019-07-01 NOTE — PROGRESS NOTE ADULT - ASSESSMENT
59 y/o male with PMH of HTN, DM, Aortic valve replacement, ascending aneurysm repair,  and one vessel CABG DM, HTN,   Pt presented with acute SOB and chest pain and leg swelling  Nitro sublingual given without relief.   troponin now positive NSTEMI/ Chest Pain - Last troponin 0.33  Pt presents fro Cardiac cath   Currently brief episode of 8/10 chest pain  radiation down arm with hypertension treated with Nitroglycerin   Now s/p [facility] with successful PCI to Diag x 1 AMBER. Moderate LAD disease. Significant progression of disease since 2015.   Patient had marked symptoms which resolved after PCI but was symptomatic on table. Close monitoring overnight.

## 2019-07-01 NOTE — PROGRESS NOTE ADULT - SUBJECTIVE AND OBJECTIVE BOX
[place] [facility]                                                        Office: [address] [address]                                                       Telephone: [phone]. Fax:[phone]                                                                             PROGRESS NOTE    Subjective:  Patient states that he had 3 episodes of chest pain today, awaiting [facility]. Currently OK, no chest pain     Review of symptoms:   Cardiac:  No chest pain. No dyspnea. No palpitations.  Respiratory:no cough. No dyspnea  Gastrointestinal: No diarrhea. No abdominal pain. No bleeding.   Neuro: No focal neuro complaints.      	  Vitals:  T(C): 36.7 (07-01-19 @ 14:13), Max: 36.9 (06-30-19 @ 20:15)  HR: 59 (07-01-19 @ 15:00) (56 - 64)  BP: 167/77 (07-01-19 @ 15:00) (129/82 - 225/95)  RR: 12 (07-01-19 @ 15:00) (12 - 22)  SpO2: 98% (07-01-19 @ 15:00) (94% - 98%)  Wt(kg): --  I&O's Summary    30 Jun 2019 07:01  -  01 Jul 2019 07:00  --------------------------------------------------------  IN: 480 mL / OUT: 0 mL / NET: 480 mL      Weight (kg): 104.3 (06-29 @ 17:47)    PHYSICAL EXAM:  Appearance: Comfortable. No acute distress  HEENT:  Head and neck: Atraumatic. Normocephalic. , Neck is supple. No JVD,   Neurologic: Alert and awake, Grossly nonfocal.   Lymphatic: No cervical lymphadenopathy  Cardiovascular: Normal S1 S2, No murmurs. No JVD,   Respiratory: Lungs clear to auscultation  Gastrointestinal:  Soft, Non-tender, + BS  Lower Extremities: No edema  Psychiatry: Patient is calm. No agitation.  Skin: No rashes.    CURRENT MEDICATIONS:    MEDICATIONS  (STANDING):  aspirin  chewable 81 milliGRAM(s) Oral daily  atorvastatin 40 milliGRAM(s) Oral at bedtime  dextrose 5%. 1000 milliLiter(s) (50 mL/Hr) IV Continuous <Continuous>  dextrose 50% Injectable 12.5 Gram(s) IV Push once  dextrose 50% Injectable 25 Gram(s) IV Push once  dextrose 50% Injectable 25 Gram(s) IV Push once  gabapentin 600 milliGRAM(s) Oral two times a day  heparin  Infusion.  Unit(s)/Hr (10 mL/Hr) IV Continuous <Continuous>  insulin lispro (HumaLOG) corrective regimen sliding scale   SubCutaneous every 6 hours  lisinopril 10 milliGRAM(s) Oral daily  metoprolol tartrate 25 milliGRAM(s) Oral two times a day  pantoprazole    Tablet 40 milliGRAM(s) Oral before breakfast      LABS:	 	                          12.5   4.84  )-----------( 156      ( 01 Jul 2019 05:04 )             40.0     06-30    140  |  108<H>  |  7.0<L>  ----------------------------<  90  3.8   |  24.0  |  0.37<L>    Ca    8.9      30 Jun 2019 06:54    TPro  7.0  /  Alb  3.4  /  TBili  0.6  /  DBili  0.1  /  AST  17  /  ALT  11  /  AlkPhos  84  06-30    proBNP:   Lipid Profile:   CARDIAC MARKERS ( 30 Jun 2019 15:23 )  x     / 0.33 ng/mL / x     / x     / x      CARDIAC MARKERS ( 30 Jun 2019 06:54 )  x     / <0.01 ng/mL / 34 U/L / x     / x      CARDIAC MARKERS ( 30 Jun 2019 01:59 )  x     / 0.09 ng/mL / x     / x     / x      CARDIAC MARKERS ( 29 Jun 2019 18:33 )  x     / <0.01 ng/mL / x     / x     / x          LIVER FUNCTIONS - ( 30 Jun 2019 06:54 )  Alb: 3.4 g/dL / Pro: 7.0 g/dL / ALK PHOS: 84 U/L / ALT: 11 U/L / AST: 17 U/L / GGT: x             TELEMETRY: Reviewed  - No significant arrhythmias    ECG:    < from: TTE Echo Complete w/Doppler (07.01.19 @ 13:16) >  Summary:   1. Left ventricular ejection fraction, by visual estimation, is 45 to   50%.   2. Mildly decreased global left ventricular systolic function.   3. Apical inferior segment, mid and apical anterior wall, mid inferior   segment, and apex are abnormal as described above.   4. The left ventricular diastolic function could not be assessed in this   study.   5. There is no evidence of pericardial effusion.   6. Bioprosthesis in the aortic position.   7. Endocardial visualization was enhanced with intravenous echo contrast.   8. Peak aortic valve gradient is 19.2 mmHg and the mean gradient is 10.2   mmHg, which is probably normal in the setting of a prosthetic aortic  valve.    MD Alfa Electronically signed on 7/1/2019 at 5:09:04 PM         < end of copied text >

## 2019-07-01 NOTE — PROGRESS NOTE ADULT - SUBJECTIVE AND OBJECTIVE BOX
Patient s/p PCI to Diagonal. 1 AMBER.     Moderate LAD disease.     Significant progression of disease since 2015.

## 2019-07-01 NOTE — PROGRESS NOTE ADULT - ASSESSMENT
59 y/o male with PMH of HTN, DM, Aortic valve replacement, ascending aneurysm repair, DM, HTN, presents to ED with c/o chest pain. Pt states CP intermittent x 1 month, yesterday worst its been, constant, all day, radiating to arm, no alleviating or aggravating factors, sharp, achy, pressure, tightness, associated with shortness of breath, nausea, and one episode vomiting. During exam pt began to experience same chest pain, and states also having abd pain, cramping, unable to get comfortable. EKG completed, no acute changes. Nitro sublingual given without relief.     NSTEMI/Chest Pain - Last troponin 0.33  - Continue current medical therapy, Awaiting [facility] today    DM  - as per admitting team     Abd pain-  - As per admitting team

## 2019-07-01 NOTE — PROGRESS NOTE ADULT - SUBJECTIVE AND OBJECTIVE BOX
Subjective:  I am having pain R sided pain positional with radiation to Left arm wit associated hypertensive   EKG done NSR rate 58 with no acute  ST or T wave changes          Medications:  aspirin  chewable 81 milliGRAM(s) Oral daily  atorvastatin 40 milliGRAM(s) Oral at bedtime  gabapentin 600 milliGRAM(s) Oral two times a day  glucagon  Injectable 1 milliGRAM(s) IntraMuscular once PRN  heparin  Infusion.  Unit(s)/Hr IV Continuous <Continuous>  heparin  Injectable 6000 Unit(s) IV Push every 6 hours PRN  hydrALAZINE 10 milliGRAM(s) Oral every 6 hours PRN  insulin lispro (HumaLOG) corrective regimen sliding scale   SubCutaneous every 6 hours  lisinopril 10 milliGRAM(s) Oral daily  metoprolol tartrate 25 milliGRAM(s) Oral two times a day  nitroglycerin     SubLingual 0.4 milliGRAM(s) SubLingual every 5 minutes PRN  pantoprazole    Tablet 40 milliGRAM(s) Oral before breakfast      PHYSICAL EXAM:  Vital Signs Last 24 Hrs  T(C): 36.7 (01 Jul 2019 11:05), Max: 36.9 (30 Jun 2019 20:15)  T(F): 98 (01 Jul 2019 11:05), Max: 98.4 (30 Jun 2019 20:15)  HR: 56 (01 Jul 2019 12:15) (56 - 64)  BP: 155/82 (01 Jul 2019 12:15) (129/82 - 156/77)  RR: 16 (01 Jul 2019 12:15) (16 - 20)  SpO2: 96% (01 Jul 2019 12:15) (94% - 98%)      General: A/ox 3, No acute Distress  Neck: Supple, NO JVD  Cardiac: S1 S2, No M/R/G  Pulmonary: decreased   Abdomen: Soft, Non -tender, +BS   Extremities: No Rashes, No edema  Neuro: A/o x 3, No focal deficits  Psch: normal mood , normal affect    LABS:                          12.5   4.84  )-----------( 156      ( 01 Jul 2019 05:04 )             40.0     06-30    140  |  108<H>  |  7.0<L>  ----------------------------<  90  3.8   |  24.0  |  0.37<L>    Ca    8.9      30 Jun 2019 06:54    TPro  7.0  /  Alb  3.4  /  TBili  0.6  /  DBili  0.1  /  AST  17  /  ALT  11  /  AlkPhos  84  06-30    PT/INR - ( 29 Jun 2019 18:33 )   PT: 10.5 sec;   INR: 0.92 ratio         PTT - ( 01 Jul 2019 05:04 )  PTT:32.9 sec    Troponin T, Serum: 0.33: T (06.30.19 @ 15:23) Subjective:  I am having pain R sided pain positional with radiation to Left arm wit associated hypertensive   EKG done NSR rate 58 with no acute  ST or T wave changes        Medications:  aspirin  chewable 81 milliGRAM(s) Oral daily  atorvastatin 40 milliGRAM(s) Oral at bedtime  gabapentin 600 milliGRAM(s) Oral two times a day  glucagon  Injectable 1 milliGRAM(s) IntraMuscular once PRN  heparin  Infusion.  Unit(s)/Hr IV Continuous <Continuous>  heparin  Injectable 6000 Unit(s) IV Push every 6 hours PRN  hydrALAZINE 10 milliGRAM(s) Oral every 6 hours PRN  insulin lispro (HumaLOG) corrective regimen sliding scale   SubCutaneous every 6 hours  lisinopril 10 milliGRAM(s) Oral daily  metoprolol tartrate 25 milliGRAM(s) Oral two times a day  nitroglycerin     SubLingual 0.4 milliGRAM(s) SubLingual every 5 minutes PRN  pantoprazole    Tablet 40 milliGRAM(s) Oral before breakfast      PHYSICAL EXAM:  Vital Signs Last 24 Hrs  T(C): 36.7 (01 Jul 2019 11:05), Max: 36.9 (30 Jun 2019 20:15)  T(F): 98 (01 Jul 2019 11:05), Max: 98.4 (30 Jun 2019 20:15)  HR: 56 (01 Jul 2019 12:15) (56 - 64)  BP: 155/82 (01 Jul 2019 12:15) (129/82 - 156/77)  RR: 16 (01 Jul 2019 12:15) (16 - 20)  SpO2: 96% (01 Jul 2019 12:15) (94% - 98%)      General: A/ox 3, No acute Distress  Neck: Supple, NO JVD  Cardiac: S1 S2 2/3 ODILON   Pulmonary: decreased   Abdomen: Soft, Non -tender, +BS   Extremities: +pp cool leg  edema   Neuro: A/o x 3, No focal deficits  Psch: normal mood , normal affect    LABS:                          12.5   4.84  )-----------( 156      ( 01 Jul 2019 05:04 )             40.0     06-30    140  |  108<H>  |  7.0<L>  ----------------------------<  90  3.8   |  24.0  |  0.37<L>    Ca    8.9      30 Jun 2019 06:54    TPro  7.0  /  Alb  3.4  /  TBili  0.6  /  DBili  0.1  /  AST  17  /  ALT  11  /  AlkPhos  84  06-30    PT/INR - ( 29 Jun 2019 18:33 )   PT: 10.5 sec;   INR: 0.92 ratio         PTT - ( 01 Jul 2019 05:04 )  PTT:32.9 sec    Troponin T, Serum: 0.33: T (06.30.19 @ 15:23) Subjective:  I am having pain R sided pain positional with radiation to Left arm wit associated hypertensive   EKG done NSR rate 58 with no acute  ST or T wave changes   Mallampati 2   ASA 2  BRA 0.9       Medications:  aspirin  chewable 81 milliGRAM(s) Oral daily  atorvastatin 40 milliGRAM(s) Oral at bedtime  gabapentin 600 milliGRAM(s) Oral two times a day  glucagon  Injectable 1 milliGRAM(s) IntraMuscular once PRN  heparin  Infusion.  Unit(s)/Hr IV Continuous <Continuous>  heparin  Injectable 6000 Unit(s) IV Push every 6 hours PRN  hydrALAZINE 10 milliGRAM(s) Oral every 6 hours PRN  insulin lispro (HumaLOG) corrective regimen sliding scale   SubCutaneous every 6 hours  lisinopril 10 milliGRAM(s) Oral daily  metoprolol tartrate 25 milliGRAM(s) Oral two times a day  nitroglycerin     SubLingual 0.4 milliGRAM(s) SubLingual every 5 minutes PRN  pantoprazole    Tablet 40 milliGRAM(s) Oral before breakfast      PHYSICAL EXAM:  Vital Signs Last 24 Hrs  T(C): 36.7 (01 Jul 2019 11:05), Max: 36.9 (30 Jun 2019 20:15)  T(F): 98 (01 Jul 2019 11:05), Max: 98.4 (30 Jun 2019 20:15)  HR: 56 (01 Jul 2019 12:15) (56 - 64)  BP: 155/82 (01 Jul 2019 12:15) (129/82 - 156/77)  RR: 16 (01 Jul 2019 12:15) (16 - 20)  SpO2: 96% (01 Jul 2019 12:15) (94% - 98%)      General: A/ox 3, No acute Distress  Neck: Supple, NO JVD  Cardiac: S1 S2 2/3 ODILON   Pulmonary: decreased   Abdomen: Soft, Non -tender, +BS   Extremities: +pp cool leg  edema   Neuro: A/o x 3, No focal deficits  Psch: normal mood , normal affect    LABS:                          12.5   4.84  )-----------( 156      ( 01 Jul 2019 05:04 )             40.0     06-30    140  |  108<H>  |  7.0<L>  ----------------------------<  90  3.8   |  24.0  |  0.37<L>    Ca    8.9      30 Jun 2019 06:54    TPro  7.0  /  Alb  3.4  /  TBili  0.6  /  DBili  0.1  /  AST  17  /  ALT  11  /  AlkPhos  84  06-30    PT/INR - ( 29 Jun 2019 18:33 )   PT: 10.5 sec;   INR: 0.92 ratio         PTT - ( 01 Jul 2019 05:04 )  PTT:32.9 sec    Troponin T, Serum: 0.33: T (06.30.19 @ 15:23)

## 2019-07-02 ENCOUNTER — TRANSCRIPTION ENCOUNTER (OUTPATIENT)
Age: 59
End: 2019-07-02

## 2019-07-02 DIAGNOSIS — D64.9 ANEMIA, UNSPECIFIED: ICD-10-CM

## 2019-07-02 DIAGNOSIS — I10 ESSENTIAL (PRIMARY) HYPERTENSION: ICD-10-CM

## 2019-07-02 DIAGNOSIS — I21.4 NON-ST ELEVATION (NSTEMI) MYOCARDIAL INFARCTION: ICD-10-CM

## 2019-07-02 DIAGNOSIS — E11.9 TYPE 2 DIABETES MELLITUS WITHOUT COMPLICATIONS: ICD-10-CM

## 2019-07-02 DIAGNOSIS — Z87.19 PERSONAL HISTORY OF OTHER DISEASES OF THE DIGESTIVE SYSTEM: ICD-10-CM

## 2019-07-02 LAB
ALBUMIN SERPL ELPH-MCNC: 3.9 G/DL — SIGNIFICANT CHANGE UP (ref 3.3–5.2)
ALP SERPL-CCNC: 95 U/L — SIGNIFICANT CHANGE UP (ref 40–120)
ALT FLD-CCNC: 34 U/L — SIGNIFICANT CHANGE UP
ANION GAP SERPL CALC-SCNC: 11 MMOL/L — SIGNIFICANT CHANGE UP (ref 5–17)
AST SERPL-CCNC: 29 U/L — SIGNIFICANT CHANGE UP
BASOPHILS # BLD AUTO: 0.03 K/UL — SIGNIFICANT CHANGE UP (ref 0–0.2)
BASOPHILS NFR BLD AUTO: 0.6 % — SIGNIFICANT CHANGE UP (ref 0–2)
BILIRUB SERPL-MCNC: 0.5 MG/DL — SIGNIFICANT CHANGE UP (ref 0.4–2)
BUN SERPL-MCNC: 21 MG/DL — HIGH (ref 8–20)
CALCIUM SERPL-MCNC: 9.5 MG/DL — SIGNIFICANT CHANGE UP (ref 8.6–10.2)
CHLORIDE SERPL-SCNC: 100 MMOL/L — SIGNIFICANT CHANGE UP (ref 98–107)
CHOLEST SERPL-MCNC: 196 MG/DL — SIGNIFICANT CHANGE UP (ref 110–199)
CO2 SERPL-SCNC: 27 MMOL/L — SIGNIFICANT CHANGE UP (ref 22–29)
CREAT SERPL-MCNC: 0.93 MG/DL — SIGNIFICANT CHANGE UP (ref 0.5–1.3)
EOSINOPHIL # BLD AUTO: 0.09 K/UL — SIGNIFICANT CHANGE UP (ref 0–0.5)
EOSINOPHIL NFR BLD AUTO: 1.8 % — SIGNIFICANT CHANGE UP (ref 0–6)
GLUCOSE BLDC GLUCOMTR-MCNC: 109 MG/DL — HIGH (ref 70–99)
GLUCOSE BLDC GLUCOMTR-MCNC: 132 MG/DL — HIGH (ref 70–99)
GLUCOSE BLDC GLUCOMTR-MCNC: 156 MG/DL — HIGH (ref 70–99)
GLUCOSE BLDC GLUCOMTR-MCNC: 160 MG/DL — HIGH (ref 70–99)
GLUCOSE SERPL-MCNC: 126 MG/DL — HIGH (ref 70–115)
HCT VFR BLD CALC: 39.8 % — SIGNIFICANT CHANGE UP (ref 39–50)
HDLC SERPL-MCNC: 37 MG/DL — LOW
HGB BLD-MCNC: 12.5 G/DL — LOW (ref 13–17)
IMM GRANULOCYTES NFR BLD AUTO: 0.4 % — SIGNIFICANT CHANGE UP (ref 0–1.5)
LIPID PNL WITH DIRECT LDL SERPL: 106 MG/DL — SIGNIFICANT CHANGE UP
LYMPHOCYTES # BLD AUTO: 0.71 K/UL — LOW (ref 1–3.3)
LYMPHOCYTES # BLD AUTO: 14.5 % — SIGNIFICANT CHANGE UP (ref 13–44)
MCHC RBC-ENTMCNC: 27.4 PG — SIGNIFICANT CHANGE UP (ref 27–34)
MCHC RBC-ENTMCNC: 31.4 GM/DL — LOW (ref 32–36)
MCV RBC AUTO: 87.3 FL — SIGNIFICANT CHANGE UP (ref 80–100)
MONOCYTES # BLD AUTO: 0.52 K/UL — SIGNIFICANT CHANGE UP (ref 0–0.9)
MONOCYTES NFR BLD AUTO: 10.7 % — SIGNIFICANT CHANGE UP (ref 2–14)
NEUTROPHILS # BLD AUTO: 3.51 K/UL — SIGNIFICANT CHANGE UP (ref 1.8–7.4)
NEUTROPHILS NFR BLD AUTO: 72 % — SIGNIFICANT CHANGE UP (ref 43–77)
PLATELET # BLD AUTO: 140 K/UL — LOW (ref 150–400)
POTASSIUM SERPL-MCNC: 4.1 MMOL/L — SIGNIFICANT CHANGE UP (ref 3.5–5.3)
POTASSIUM SERPL-SCNC: 4.1 MMOL/L — SIGNIFICANT CHANGE UP (ref 3.5–5.3)
PROT SERPL-MCNC: 6.6 G/DL — SIGNIFICANT CHANGE UP (ref 6.6–8.7)
RBC # BLD: 4.56 M/UL — SIGNIFICANT CHANGE UP (ref 4.2–5.8)
RBC # FLD: 13.7 % — SIGNIFICANT CHANGE UP (ref 10.3–14.5)
SODIUM SERPL-SCNC: 138 MMOL/L — SIGNIFICANT CHANGE UP (ref 135–145)
TOTAL CHOLESTEROL/HDL RATIO MEASUREMENT: 5 RATIO — SIGNIFICANT CHANGE UP (ref 3.4–9.6)
TRIGL SERPL-MCNC: 265 MG/DL — HIGH (ref 10–200)
WBC # BLD: 4.88 K/UL — SIGNIFICANT CHANGE UP (ref 3.8–10.5)
WBC # FLD AUTO: 4.88 K/UL — SIGNIFICANT CHANGE UP (ref 3.8–10.5)

## 2019-07-02 PROCEDURE — 99233 SBSQ HOSP IP/OBS HIGH 50: CPT

## 2019-07-02 PROCEDURE — 93010 ELECTROCARDIOGRAM REPORT: CPT | Mod: 77

## 2019-07-02 PROCEDURE — 93010 ELECTROCARDIOGRAM REPORT: CPT

## 2019-07-02 PROCEDURE — 99232 SBSQ HOSP IP/OBS MODERATE 35: CPT

## 2019-07-02 RX ORDER — HEPARIN SODIUM 5000 [USP'U]/ML
INJECTION INTRAVENOUS; SUBCUTANEOUS
Qty: 25000 | Refills: 0 | Status: DISCONTINUED | OUTPATIENT
Start: 2019-07-02 | End: 2019-07-03

## 2019-07-02 RX ORDER — HEPARIN SODIUM 5000 [USP'U]/ML
4000 INJECTION INTRAVENOUS; SUBCUTANEOUS EVERY 6 HOURS
Refills: 0 | Status: DISCONTINUED | OUTPATIENT
Start: 2019-07-02 | End: 2019-07-03

## 2019-07-02 RX ORDER — CLOPIDOGREL BISULFATE 75 MG/1
1 TABLET, FILM COATED ORAL
Qty: 0 | Refills: 0 | DISCHARGE
Start: 2019-07-02

## 2019-07-02 RX ORDER — METOPROLOL TARTRATE 50 MG
5 TABLET ORAL ONCE
Refills: 0 | Status: COMPLETED | OUTPATIENT
Start: 2019-07-02 | End: 2019-07-02

## 2019-07-02 RX ORDER — METOPROLOL TARTRATE 50 MG
50 TABLET ORAL
Refills: 0 | Status: DISCONTINUED | OUTPATIENT
Start: 2019-07-02 | End: 2019-07-04

## 2019-07-02 RX ORDER — HEPARIN SODIUM 5000 [USP'U]/ML
4000 INJECTION INTRAVENOUS; SUBCUTANEOUS ONCE
Refills: 0 | Status: COMPLETED | OUTPATIENT
Start: 2019-07-02 | End: 2019-07-02

## 2019-07-02 RX ORDER — ATORVASTATIN CALCIUM 80 MG/1
1 TABLET, FILM COATED ORAL
Qty: 0 | Refills: 0 | DISCHARGE
Start: 2019-07-02

## 2019-07-02 RX ORDER — NITROGLYCERIN 6.5 MG
1 CAPSULE, EXTENDED RELEASE ORAL ONCE
Refills: 0 | Status: COMPLETED | OUTPATIENT
Start: 2019-07-02 | End: 2019-07-02

## 2019-07-02 RX ORDER — METOPROLOL TARTRATE 50 MG
1 TABLET ORAL
Qty: 0 | Refills: 0 | DISCHARGE

## 2019-07-02 RX ORDER — ISOSORBIDE MONONITRATE 60 MG/1
30 TABLET, EXTENDED RELEASE ORAL DAILY
Refills: 0 | Status: DISCONTINUED | OUTPATIENT
Start: 2019-07-02 | End: 2019-07-04

## 2019-07-02 RX ADMIN — GABAPENTIN 600 MILLIGRAM(S): 400 CAPSULE ORAL at 17:21

## 2019-07-02 RX ADMIN — MORPHINE SULFATE 2 MILLIGRAM(S): 50 CAPSULE, EXTENDED RELEASE ORAL at 11:01

## 2019-07-02 RX ADMIN — HEPARIN SODIUM 4000 UNIT(S): 5000 INJECTION INTRAVENOUS; SUBCUTANEOUS at 18:02

## 2019-07-02 RX ADMIN — Medication 25 MILLIGRAM(S): at 06:30

## 2019-07-02 RX ADMIN — HEPARIN SODIUM 1000 UNIT(S)/HR: 5000 INJECTION INTRAVENOUS; SUBCUTANEOUS at 18:03

## 2019-07-02 RX ADMIN — GABAPENTIN 600 MILLIGRAM(S): 400 CAPSULE ORAL at 06:30

## 2019-07-02 RX ADMIN — PANTOPRAZOLE SODIUM 40 MILLIGRAM(S): 20 TABLET, DELAYED RELEASE ORAL at 06:30

## 2019-07-02 RX ADMIN — LISINOPRIL 10 MILLIGRAM(S): 2.5 TABLET ORAL at 06:30

## 2019-07-02 RX ADMIN — MORPHINE SULFATE 2 MILLIGRAM(S): 50 CAPSULE, EXTENDED RELEASE ORAL at 11:16

## 2019-07-02 RX ADMIN — Medication 50 MILLIGRAM(S): at 15:29

## 2019-07-02 RX ADMIN — CLOPIDOGREL BISULFATE 75 MILLIGRAM(S): 75 TABLET, FILM COATED ORAL at 11:08

## 2019-07-02 RX ADMIN — Medication 1: at 12:32

## 2019-07-02 RX ADMIN — Medication 5 MILLIGRAM(S): at 11:06

## 2019-07-02 RX ADMIN — Medication 0.4 MILLIGRAM(S): at 10:50

## 2019-07-02 RX ADMIN — Medication 1 INCH(S): at 17:24

## 2019-07-02 RX ADMIN — Medication 1: at 17:20

## 2019-07-02 RX ADMIN — Medication 0.4 MILLIGRAM(S): at 10:57

## 2019-07-02 RX ADMIN — Medication 0.4 MILLIGRAM(S): at 15:45

## 2019-07-02 RX ADMIN — ATORVASTATIN CALCIUM 80 MILLIGRAM(S): 80 TABLET, FILM COATED ORAL at 21:22

## 2019-07-02 RX ADMIN — ISOSORBIDE MONONITRATE 30 MILLIGRAM(S): 60 TABLET, EXTENDED RELEASE ORAL at 15:25

## 2019-07-02 RX ADMIN — Medication 81 MILLIGRAM(S): at 11:08

## 2019-07-02 NOTE — PROGRESS NOTE ADULT - PROBLEM SELECTOR PLAN 1
S/p LHC mod LAD, PCI AMBER x 1 to Diag - Synergy 2.5 x 20mm   monitor overnight  Aggressive medication management   pt received brilinta, load with 600mg plavix tonight, start 75mg tomorrow  continue ASA, metoprolol, lisinopril,   increase atorvastatin to 80mg  lipid panel, a1c in AM  strict DASH diet   diet/lifestyle modifications reinforced  Site check AM, EKG/Labs AM  Follow up outpatient within 1 to 2 weeks.    Radial band off in 2 hours  HOB up @ 19:00, Ambulate @ 20:00  frequent neurovascular assessments.   Resume diet    Patient symptomatic in Lab, closely monitor patient overnight, if c/o of SOB/CP EKG STAT and call Cardiology NP
as above

## 2019-07-02 NOTE — PROGRESS NOTE ADULT - ATTENDING COMMENTS
Patient seen and examined by me.  I have discussed my recommendation with the PA which are outlined above.  I spoke to the patient, he went to the bathroom this AM, he had Substernal chest pressure radiating to left arm. Patient states this was his worst pain. Patient has localized chest pain upon touching, no rashes.  Patient has two types of chest pain based on his description.  Possible repeat LHC tomorrow Discussed with the patient.  Will follow.

## 2019-07-02 NOTE — PROGRESS NOTE ADULT - SUBJECTIVE AND OBJECTIVE BOX
SUBJECTIVE:    Cardiology NP F/U note:   Pt c/o recurent Chest pain that occurred at rest.  Then went to bathroom and it intensified but not as bad as this am.  4/10 CP radiating to Left arm. no diaphoresis , N/V/ starts out as a "cramp"   EKG RSR 80/  again no acute STTW changes/ less PVCs with increased metoprolol  NTG given x1 with relief     	  MEDICATIONS  (STANDING):  aspirin  chewable 81 milliGRAM(s) Oral daily  atorvastatin 80 milliGRAM(s) Oral at bedtime  clopidogrel Tablet 75 milliGRAM(s) Oral daily  dextrose 5%. 1000 milliLiter(s) IV Continuous <Continuous>  dextrose 50% Injectable 12.5 Gram(s) IV Push once  dextrose 50% Injectable 25 Gram(s) IV Push once  dextrose 50% Injectable 25 Gram(s) IV Push once  gabapentin 600 milliGRAM(s) Oral two times a day  insulin lispro (HumaLOG) corrective regimen sliding scale   SubCutaneous every 6 hours  isosorbide   mononitrate ER Tablet (IMDUR) 30 milliGRAM(s) Oral daily  lisinopril 10 milliGRAM(s) Oral daily  metoprolol tartrate 50 milliGRAM(s) Oral two times a day  pantoprazole    Tablet 40 milliGRAM(s) Oral before breakfast    MEDICATIONS  (PRN):  dextrose 40% Gel 15 Gram(s) Oral once PRN Blood Glucose LESS THAN 70 milliGRAM(s)/deciliter  glucagon  Injectable 1 milliGRAM(s) IntraMuscular once PRN Glucose LESS THAN 70 milligrams/deciliter  hydrALAZINE 10 milliGRAM(s) Oral every 6 hours PRN for SBP > 170  morphine  - Injectable 2 milliGRAM(s) IV Push every 4 hours PRN Severe Pain (7 - 10)      PHYSICAL EXAM:    T(C): 36.6 (19 @ 09:47), Max: 36.6 (19 @ 09:47)  HR: 93 (19 @ 15:50) (54 - 110)  BP: 132/88 (19 @ 15:50) (125/69 - 210/112)  RR: 18 (19 @ 15:50) (14 - 22)  SpO2: 95% (19 @ 15:50) (95% - 100%)  Wt(kg): --    I&O's Summary    2019 07:  -  2019 07:00  --------------------------------------------------------  IN: 240 mL / OUT: 550 mL / NET: -310 mL    2019 07:  -  2019 15:58  --------------------------------------------------------  IN: 480 mL / OUT: 0 mL / NET: 480 mL        Daily     Daily Weight in k.9 (2019 04:45)    Appearance: anxious at times 	  Cardiovascular: Normal S1 S2 RRR 80 , No JVD, No murmurs, No edema  Respiratory: Lungs clear to auscultation	  Psychiatry: A & O x 3, Mood & affect appropriate  Gastrointestinal:  Soft, Non-tender, + BS	  Skin: warm and dry  Neurologic: Non-focal  Extremities: Normal range of motion,:  Right Radial no hematoma / good pulse / dressing removed/ some ecchymosis noted   Vascular: Peripheral pulses palpable 2+ bilaterally    TELEMETRY: RSR 80's / less PVCs noted 	    ECG:  RSR 80/ no acute STTW changes / poor R wave progression	  RADIOLOGY:   DIAGNOSTIC TESTING:  [ ] Echocardiogram:  [ ]  Catheterization:PCI to Diagonal. 1 AMBER.  Moderate LAD disease. Significant progression of disease since .   [ ] Stress Test:    OTHER: 	    LABS:	 	    CARDIAC MARKERS: positive.                                   12.5   4.88  )-----------( 140      ( 2019 06:33 )             39.8         138  |  100  |  21.0<H>  ----------------------------<  126<H>  4.1   |  27.0  |  0.93    Ca    9.5      2019 06:33    TPro  6.6  /  Alb  3.9  /  TBili  0.5  /  DBili  x   /  AST  29  /  ALT  34  /  AlkPhos  95        ASSESSMENT:  58y m presents to ED with Chest pain x 1month / Hypertensive with  Troponin +  HX: HTN/ AVR/aneurysm repair/DM  Now with  NSTEMI with mildy decreased LVF  SP LHC : : PCI to Diagonal. 1 AMBER.  Moderate LAD disease. Significant progression of disease since .   Now with 2 episodes of chest discomfort today both  responding to NTG , one with exertion and one at rest/ no ekg changes noted  Beta blockers increased and Imdur ordered , just got am dose now   Plan:  continue current meds and management /   to be discussed with Dr. Rueda. SUBJECTIVE:    Cardiology NP F/U note:   Pt c/o recurent Chest pain that occurred at rest.  Then went to bathroom and it intensified but not as bad as this am.  4/10 CP radiating to Left arm. no diaphoresis , N/V/ starts out as a "cramp"   EKG RSR 80/  again no acute STTW changes/ less PVCs with increased metoprolol  NTG given x1 with relief     	  MEDICATIONS  (STANDING):  aspirin  chewable 81 milliGRAM(s) Oral daily  atorvastatin 80 milliGRAM(s) Oral at bedtime  clopidogrel Tablet 75 milliGRAM(s) Oral daily  dextrose 5%. 1000 milliLiter(s) IV Continuous <Continuous>  dextrose 50% Injectable 12.5 Gram(s) IV Push once  dextrose 50% Injectable 25 Gram(s) IV Push once  dextrose 50% Injectable 25 Gram(s) IV Push once  gabapentin 600 milliGRAM(s) Oral two times a day  insulin lispro (HumaLOG) corrective regimen sliding scale   SubCutaneous every 6 hours  isosorbide   mononitrate ER Tablet (IMDUR) 30 milliGRAM(s) Oral daily  lisinopril 10 milliGRAM(s) Oral daily  metoprolol tartrate 50 milliGRAM(s) Oral two times a day  pantoprazole    Tablet 40 milliGRAM(s) Oral before breakfast    MEDICATIONS  (PRN):  dextrose 40% Gel 15 Gram(s) Oral once PRN Blood Glucose LESS THAN 70 milliGRAM(s)/deciliter  glucagon  Injectable 1 milliGRAM(s) IntraMuscular once PRN Glucose LESS THAN 70 milligrams/deciliter  hydrALAZINE 10 milliGRAM(s) Oral every 6 hours PRN for SBP > 170  morphine  - Injectable 2 milliGRAM(s) IV Push every 4 hours PRN Severe Pain (7 - 10)      PHYSICAL EXAM:    T(C): 36.6 (19 @ 09:47), Max: 36.6 (19 @ 09:47)  HR: 93 (19 @ 15:50) (54 - 110)  BP: 132/88 (19 @ 15:50) (125/69 - 210/112)  RR: 18 (19 @ 15:50) (14 - 22)  SpO2: 95% (19 @ 15:50) (95% - 100%)  Wt(kg): --    I&O's Summary    2019 07:  -  2019 07:00  --------------------------------------------------------  IN: 240 mL / OUT: 550 mL / NET: -310 mL    2019 07:  -  2019 15:58  --------------------------------------------------------  IN: 480 mL / OUT: 0 mL / NET: 480 mL        Daily     Daily Weight in k.9 (2019 04:45)    Appearance: anxious at times 	  Cardiovascular: Normal S1 S2 RRR 80 , No JVD, No murmurs, No edema  Respiratory: Lungs clear to auscultation	  Psychiatry: A & O x 3, Mood & affect appropriate  Gastrointestinal:  Soft, Non-tender, + BS	  Skin: warm and dry  Neurologic: Non-focal  Extremities: Normal range of motion,:  Right Radial no hematoma / good pulse / dressing removed/ some ecchymosis noted   Vascular: Peripheral pulses palpable 2+ bilaterally    TELEMETRY: RSR 80's / less PVCs noted 	    ECG:  RSR 80/ no acute STTW changes / poor R wave progression	  RADIOLOGY:   DIAGNOSTIC TESTING:  [ ] Echocardiogram:  [ ]  Catheterization:PCI to Diagonal. 1 AMBER.  Moderate LAD disease. Significant progression of disease since .   [ ] Stress Test:    OTHER: 	    LABS:	 	    CARDIAC MARKERS: positive.                                   12.5   4.88  )-----------( 140      ( 2019 06:33 )             39.8         138  |  100  |  21.0<H>  ----------------------------<  126<H>  4.1   |  27.0  |  0.93    Ca    9.5      2019 06:33    TPro  6.6  /  Alb  3.9  /  TBili  0.5  /  DBili  x   /  AST  29  /  ALT  34  /  AlkPhos  95        ASSESSMENT:  58y m presents to ED with Chest pain x 1month / Hypertensive with  Troponin +  HX: HTN/ AVR/aneurysm repair/DM  Now with  NSTEMI with mildy decreased LVF  SP LHC : : PCI to Diagonal. 1 AMBER.  Moderate LAD disease. Significant progression of disease since .   Now with 2 episodes of chest discomfort today both  responding to NTG , one with exertion and one at rest/ no ekg changes noted  Beta blockers increased and Imdur ordered , just got am dose now   Plan:  continue current meds and management / add Nitropaste1 inch to chest wall    discussed with Dr. Rueda.  will re- cath in am SUBJECTIVE:    Cardiology NP F/U note:   Pt c/o recurent Chest pain that occurred at rest.  Then went to bathroom and it intensified but not as bad as this am.  4/10 CP radiating to Left arm. no diaphoresis , N/V/ starts out as a "cramp"   EKG RSR 80/  again no acute STTW changes/ less PVCs with increased metoprolol  NTG given x1 with relief     	  MEDICATIONS  (STANDING):  aspirin  chewable 81 milliGRAM(s) Oral daily  atorvastatin 80 milliGRAM(s) Oral at bedtime  clopidogrel Tablet 75 milliGRAM(s) Oral daily  dextrose 5%. 1000 milliLiter(s) IV Continuous <Continuous>  dextrose 50% Injectable 12.5 Gram(s) IV Push once  dextrose 50% Injectable 25 Gram(s) IV Push once  dextrose 50% Injectable 25 Gram(s) IV Push once  gabapentin 600 milliGRAM(s) Oral two times a day  insulin lispro (HumaLOG) corrective regimen sliding scale   SubCutaneous every 6 hours  isosorbide   mononitrate ER Tablet (IMDUR) 30 milliGRAM(s) Oral daily  lisinopril 10 milliGRAM(s) Oral daily  metoprolol tartrate 50 milliGRAM(s) Oral two times a day  pantoprazole    Tablet 40 milliGRAM(s) Oral before breakfast    MEDICATIONS  (PRN):  dextrose 40% Gel 15 Gram(s) Oral once PRN Blood Glucose LESS THAN 70 milliGRAM(s)/deciliter  glucagon  Injectable 1 milliGRAM(s) IntraMuscular once PRN Glucose LESS THAN 70 milligrams/deciliter  hydrALAZINE 10 milliGRAM(s) Oral every 6 hours PRN for SBP > 170  morphine  - Injectable 2 milliGRAM(s) IV Push every 4 hours PRN Severe Pain (7 - 10)      PHYSICAL EXAM:    T(C): 36.6 (19 @ 09:47), Max: 36.6 (19 @ 09:47)  HR: 93 (19 @ 15:50) (54 - 110)  BP: 132/88 (19 @ 15:50) (125/69 - 210/112)  RR: 18 (19 @ 15:50) (14 - 22)  SpO2: 95% (19 @ 15:50) (95% - 100%)  Wt(kg): --    I&O's Summary    2019 07:  -  2019 07:00  --------------------------------------------------------  IN: 240 mL / OUT: 550 mL / NET: -310 mL    2019 07:  -  2019 15:58  --------------------------------------------------------  IN: 480 mL / OUT: 0 mL / NET: 480 mL        Daily     Daily Weight in k.9 (2019 04:45)    Appearance: anxious at times 	  Cardiovascular: Normal S1 S2 RRR 80 , No JVD, No murmurs, No edema  Respiratory: Lungs clear to auscultation	  Psychiatry: A & O x 3, Mood & affect appropriate  Gastrointestinal:  Soft, Non-tender, + BS	  Skin: warm and dry  Neurologic: Non-focal  Extremities: Normal range of motion,:  Right Radial no hematoma / good pulse / dressing removed/ some ecchymosis noted   Vascular: Peripheral pulses palpable 2+ bilaterally    TELEMETRY: RSR 80's / less PVCs noted 	    ECG:  RSR 80/ no acute STTW changes / poor R wave progression	  RADIOLOGY:   DIAGNOSTIC TESTING:  [ ] Echocardiogram:  [ ]  Catheterization:PCI to Diagonal. 1 AMBER.  Moderate LAD disease. Significant progression of disease since .   [ ] Stress Test:    OTHER: 	    LABS:	 	    CARDIAC MARKERS: positive.                                   12.5   4.88  )-----------( 140      ( 2019 06:33 )             39.8         138  |  100  |  21.0<H>  ----------------------------<  126<H>  4.1   |  27.0  |  0.93    Ca    9.5      2019 06:33    TPro  6.6  /  Alb  3.9  /  TBili  0.5  /  DBili  x   /  AST  29  /  ALT  34  /  AlkPhos  95        ASSESSMENT:  58y m presents to ED with Chest pain x 1month / Hypertensive with  Troponin +  HX: HTN/ AVR/aneurysm repair/DM  Now with  NSTEMI with mildy decreased LVF  SP LHC : : PCI to Diagonal. 1 AMBER.  Moderate LAD disease. Significant progression of disease since .   Now with 2 episodes of chest discomfort today both  responding to NTG , one with exertion and one at rest/ no ekg changes noted  Beta blockers increased and Imdur ordered , just got am dose now   Plan:  continue current meds and management / add Nitropaste1 inch to chest wall   start heparin drip/ ACS protocol for unstable angina    discussed with Dr. Rueda.  will re- cath in am

## 2019-07-02 NOTE — DISCHARGE NOTE PROVIDER - NSDCCPCAREPLAN_GEN_ALL_CORE_FT
PRINCIPAL DISCHARGE DIAGNOSIS  Diagnosis: Non-ST elevation MI (NSTEMI)  Assessment and Plan of Treatment: S/P PCI , 1 AMBER to diagonal , continue meds , follow up with cardiologist / pmd as recommended PRINCIPAL DISCHARGE DIAGNOSIS  Diagnosis: Non-ST elevation MI (NSTEMI)  Assessment and Plan of Treatment: S/P PCI , 1 AMBER to diagonal , continue meds , follow up with cardiologist / pmd as recommended.   Also with left anterior chest tenderness , likely due to pericarditis vs musculosceletal inflamation - continu Naprosyn x 10 days      SECONDARY DISCHARGE DIAGNOSES  Diagnosis: Hyperlipidemia  Assessment and Plan of Treatment: continue Atorvastatin , follow Liver function test in 1- 2 month    Diagnosis: HTN (hypertension)  Assessment and Plan of Treatment: continue meds , follow up with PMD / Cardiologist    Diagnosis: DM (diabetes mellitus)  Assessment and Plan of Treatment: hold Metformin x 48 hrs after cath , restar on 7 /6

## 2019-07-02 NOTE — PROGRESS NOTE ADULT - SUBJECTIVE AND OBJECTIVE BOX
SUBJECTIVE:  Cardiology NP F/U note:  SP: [facility] which revealed:  s/p PCI to Diagonal. 1 AMBER.  Moderate LAD disease. Significant progression of disease since .   denies complaints of chest pain/sob/dizziness/palps this am   has some chest discomfort post procedure relieved with Fentanyl  none further  tolerating diet/ ambulating     	  MEDICATIONS  (STANDING):  aspirin  chewable 81 milliGRAM(s) Oral daily  atorvastatin 80 milliGRAM(s) Oral at bedtime  clopidogrel Tablet 75 milliGRAM(s) Oral daily  dextrose 5%. 1000 milliLiter(s) IV Continuous <Continuous>  dextrose 50% Injectable 12.5 Gram(s) IV Push once  dextrose 50% Injectable 25 Gram(s) IV Push once  dextrose 50% Injectable 25 Gram(s) IV Push once  gabapentin 600 milliGRAM(s) Oral two times a day  insulin lispro (HumaLOG) corrective regimen sliding scale   SubCutaneous every 6 hours  lisinopril 10 milliGRAM(s) Oral daily  metoprolol tartrate 25 milliGRAM(s) Oral two times a day  pantoprazole    Tablet 40 milliGRAM(s) Oral before breakfast    MEDICATIONS  (PRN):  dextrose 40% Gel 15 Gram(s) Oral once PRN Blood Glucose LESS THAN 70 milliGRAM(s)/deciliter  glucagon  Injectable 1 milliGRAM(s) IntraMuscular once PRN Glucose LESS THAN 70 milligrams/deciliter  hydrALAZINE 10 milliGRAM(s) Oral every 6 hours PRN for SBP > 170  morphine  - Injectable 2 milliGRAM(s) IV Push every 4 hours PRN Severe Pain (7 - 10)  nitroglycerin     SubLingual 0.4 milliGRAM(s) SubLingual every 5 minutes PRN Chest Pain      PHYSICAL EXAM:    T(C): 36.6 (19 @ 09:47), Max: 36.7 (19 @ 11:05)  HR: 78 (19 @ 09:47) (54 - 78)  BP: 155/86 (19 @ 09:47) (125/69 - 225/95)  RR: 18 (19 @ 09:47) (12 - 22)  SpO2: 98% (19 @ 09:47) (95% - 100%)  Wt(kg): --    I&O's Summary    2019 07:01  -  2019 07:00  --------------------------------------------------------  IN: 240 mL / OUT: 550 mL / NET: -310 mL        Daily     Daily Weight in k.9 (2019 04:45)    Appearance: NAD   Cardiovascular: Normal S1 S2,RRR 74  No JVD, 2/6 ODILON LSB  No edema  Respiratory: Lungs clear to auscultation	  Psychiatry: A & O x 3, Mood & affect appropriate  Gastrointestinal:  Soft, Non-tender, + BS	  Skin: warm and dry  Neurologic: Non-focal  Extremities: Normal range of motion,:  Right Radial no hematoma / good pulse / dressing removed/ right hand swollen from old IV   Vascular: Peripheral pulses palpable 2+ bilaterally    TELEMETRY: 	RSR 70's PVC's and couplets overnight     ECG:  Pending 	  RADIOLOGY:   DIAGNOSTIC TESTING:  [X ] Echocardiogram:  < from: TTE Echo Complete w/Doppler (19 @ 13:16) >  1. Left ventricular ejection fraction, by visual estimation, is 45 to   50%.   2. Mildly decreased global left ventricular systolic function.   3. Apical inferior segment, mid and apical anterior wall, mid inferior   segment, and apex are abnormal as described above.   4. The left ventricular diastolic function could not be assessed in this   study.   5. There is no evidence of pericardial effusion.   6. Bioprosthesis in the aortic position.   7. Endocardial visualization was enhanced with intravenous echo contrast.   8. Peak aortic valve gradient is 19.2 mmHg and the mean gradient is 10.2   mmHg, which is probably normal in the setting of a prosthetic aortic  valve.    < end of copied text >    [X ]  Catheterization: official pending:  PCI to Diagonal. 1 AMBER.  Moderate LAD disease. Significant progression of disease since .   [ ] Stress Test:    OTHER: 	    LABS:	 	    CARDIAC MARKERS:                                  12.5   4.88  )-----------( 140      ( 2019 06:33 )             39.8     07-    138  |  100  |  21.0<H>  ----------------------------<  126<H>  4.1   |  27.0  |  0.93    Ca    9.5      2019 06:33    TPro  6.6  /  Alb  3.9  /  TBili  0.5  /  DBili  x   /  AST  29  /  ALT  34  /  AlkPhos  95  07-02    proBNP:   Lipid Profile:   HgA1c:   TSH:     ASSESSMENT:      Plan: SUBJECTIVE:  Cardiology NP F/U note:  SP: [facility] which revealed:  s/p PCI to Diagonal. 1 AMBER.  Moderate LAD disease. Significant progression of disease since .   denies complaints of chest pain/sob/dizziness/palps this am   has some chest discomfort post procedure relieved with Fentanyl  none further  tolerating diet/ ambulating     	  MEDICATIONS  (STANDING):  aspirin  chewable 81 milliGRAM(s) Oral daily  atorvastatin 80 milliGRAM(s) Oral at bedtime  clopidogrel Tablet 75 milliGRAM(s) Oral daily  dextrose 5%. 1000 milliLiter(s) IV Continuous <Continuous>  dextrose 50% Injectable 12.5 Gram(s) IV Push once  dextrose 50% Injectable 25 Gram(s) IV Push once  dextrose 50% Injectable 25 Gram(s) IV Push once  gabapentin 600 milliGRAM(s) Oral two times a day  insulin lispro (HumaLOG) corrective regimen sliding scale   SubCutaneous every 6 hours  lisinopril 10 milliGRAM(s) Oral daily  metoprolol tartrate 25 milliGRAM(s) Oral two times a day  pantoprazole    Tablet 40 milliGRAM(s) Oral before breakfast    MEDICATIONS  (PRN):  dextrose 40% Gel 15 Gram(s) Oral once PRN Blood Glucose LESS THAN 70 milliGRAM(s)/deciliter  glucagon  Injectable 1 milliGRAM(s) IntraMuscular once PRN Glucose LESS THAN 70 milligrams/deciliter  hydrALAZINE 10 milliGRAM(s) Oral every 6 hours PRN for SBP > 170  morphine  - Injectable 2 milliGRAM(s) IV Push every 4 hours PRN Severe Pain (7 - 10)  nitroglycerin     SubLingual 0.4 milliGRAM(s) SubLingual every 5 minutes PRN Chest Pain      PHYSICAL EXAM:    T(C): 36.6 (19 @ 09:47), Max: 36.7 (19 @ 11:05)  HR: 78 (19 @ 09:47) (54 - 78)  BP: 155/86 (19 @ 09:47) (125/69 - 225/95)  RR: 18 (19 @ 09:47) (12 - 22)  SpO2: 98% (19 @ 09:47) (95% - 100%)  Wt(kg): --    I&O's Summary    2019 07:01  -  2019 07:00  --------------------------------------------------------  IN: 240 mL / OUT: 550 mL / NET: -310 mL        Daily     Daily Weight in k.9 (2019 04:45)    Appearance: NAD   Cardiovascular: Normal S1 S2,RRR 74  No JVD, 2/6 ODILON LSB  No edema  Respiratory: Lungs clear to auscultation	  Psychiatry: A & O x 3, Mood & affect appropriate  Gastrointestinal:  Soft, Non-tender, + BS	  Skin: warm and dry  Neurologic: Non-focal  Extremities: Normal range of motion,:  Right Radial no hematoma / good pulse / dressing removed/ right hand swollen from old IV   Vascular: Peripheral pulses palpable 2+ bilaterally    TELEMETRY: 	RSR 70's PVC's and couplets overnight     ECG:  Pending 	  RADIOLOGY:   DIAGNOSTIC TESTING:  [X ] Echocardiogram:  < from: TTE Echo Complete w/Doppler (19 @ 13:16) >  1. Left ventricular ejection fraction, by visual estimation, is 45 to   50%.   2. Mildly decreased global left ventricular systolic function.   3. Apical inferior segment, mid and apical anterior wall, mid inferior   segment, and apex are abnormal as described above.   4. The left ventricular diastolic function could not be assessed in this   study.   5. There is no evidence of pericardial effusion.   6. Bioprosthesis in the aortic position.   7. Endocardial visualization was enhanced with intravenous echo contrast.   8. Peak aortic valve gradient is 19.2 mmHg and the mean gradient is 10.2   mmHg, which is probably normal in the setting of a prosthetic aortic  valve.    < end of copied text >    [X ]  Catheterization: official pending:  PCI to Diagonal. 1 AMBER.  Moderate LAD disease. Significant progression of disease since .   [ ] Stress Test:    OTHER: 	    LABS:	 	    CARDIAC MARKERS: Positive                                   12.5   4.88  )-----------( 140      ( 2019 06:33 )             39.8     07    138  |  100  |  21.0<H>  ----------------------------<  126<H>  4.1   |  27.0  |  0.93    Ca    9.5      2019 06:33    TPro  6.6  /  Alb  3.9  /  TBili  0.5  /  DBili  x   /  AST  29  /  ALT  34  /  AlkPhos  95  -    Addendum : pt was OOB to BR suddenly developed acute onset 10/10 Chest pain radiating to the left arm   EKG without acute STTW changes   /90 denies SOB/ HR / PVCs  NTG x2 given with relief/ Morphine 2mg and lopressor 5 IV given   Pain gone , pt less anxious     ASSESSMENT:  58y m presents to ED with Chest pain x 1month / Hypertensive with  Troponin +  HX: HTN/ AVR/aneurysm repair/DM  Now with  NSTEMI with mildy decreased LVF  [facility] : : PCI to Diagonal. 1 AMBER.  Moderate LAD disease. Significant progression of disease since .   Now with recurrent chest pain , likely related to LAD disease , responded to NTG x2   EKG : RSRT 100/ PVCs , no acute STTW changes.     Plan:  Continue current meds and management ASA/ plavix/ statin at 80mg /ACE/ BB  will add imdur  and increase BB / BP control   continue to monitor overnight for episodes of chest pain   med compliance/ radial care / follow up discussed  cardiac rehab info provided/referral and communication to cardiac rehab completed    D/w with Dr. Rueda and Dr. White

## 2019-07-02 NOTE — DISCHARGE NOTE PROVIDER - PROVIDER TOKENS
FREE:[LAST:[PMD],PHONE:[(   )    -],FAX:[(   )    -]],FREE:[LAST:[Own Cardiologist],PHONE:[(   )    -],FAX:[(   )    -],ADDRESS:[in 2 wks]]

## 2019-07-02 NOTE — DISCHARGE NOTE PROVIDER - HOSPITAL COURSE
Pt is 59 y/o male with PMH of HTN, DM, Aortic valve replacement, ascending aneurysm repair, presented to ED with Chest pain for 1 month which got more worse day prior to admit, admitted for cp r/o acs. Dx with NSTEMI , ECHO with      1. Left ventricular ejection fraction, by visual estimation, is 45 to     50%.     2. Mildly decreased global left ventricular systolic function.     3. Apical inferior segment, mid and apical anterior wall, mid inferior     segment, and apex are abnormal as described above.     4. The left ventricular diastolic function could not be assessed in this     study.     5. There is no evidence of pericardial effusion.     6. Bioprosthesis in the aortic position.    Seen by cardiologist Dr Thornton , s/p [facility] ( by Dr Rueda ) , s/p PCI , 1 AMBER to diagonal . Pt is 59 y/o male with PMH of HTN, DM, Aortic valve replacement, ascending aneurysm repair, presented to ED with Chest pain for 1 month which got more worse day prior to admit, admitted for cp r/o acs. Dx with NSTEMI , ECHO with      1. Left ventricular ejection fraction, by visual estimation, is 45 to     50%.     2. Mildly decreased global left ventricular systolic function.     3. Apical inferior segment, mid and apical anterior wall, mid inferior     segment, and apex are abnormal as described above.     4. The left ventricular diastolic function could not be assessed in this     study.     5. There is no evidence of pericardial effusion.     6. Bioprosthesis in the aortic position.        [facility] : 7/1: PCI to Diagonal. 1 AMBER.  Moderate LAD disease. Significant progression of disease since 2015. Returned to [facility] for recurrent CP:    7/3/19:Patent diagonal stent.  LAD disease- iFR normal.     LPDA- 90% lesion- small vessel. PCI performed.    Pt continues to complain of  left chest discomfort that is reproducible and related to movement .     Likely pericarditis Vs: musculoskeletal in origin / inflamatory . Started on Naprosyn .     Today patient seen and examined . L anterior chest wall - no tenderness , chest pain free . No other complaints .    D/W Dr Rueda - OK to discharge patient from cardiac standpoint .                 REVIEW OF SYSTEMS:    Chest pain resolved , L anterior chest wall tenderness - resolved , all other systems are reviewed and are negative .             PHYSICAL EXAM:        GENERAL: NAD, well-groomed, well-developed    HEAD:  Atraumatic, Normocephalic    EYES: EOMI, PERRLA, conjunctiva and sclera clear    NECK: Supple, No JVD, Normal thyroid    NERVOUS SYSTEM:  Alert & Oriented X3, no focal deficit    CHEST/LUNG: CTA b/l ,  no  rales, rhonchi, wheezing, or rubs , L sided tenderness +     HEART: Regular rate and rhythm; No murmurs, rubs, or gallops    ABDOMEN: Soft, Nontender, Nondistended; Bowel sounds present    EXTREMITIES:  2+ Peripheral Pulses, No clubbing, cyanosis, or edema    LYMPH: No lymphadenopathy noted    SKIN: No rashes or lesions    R groin - dry dressing + , clean and dry

## 2019-07-02 NOTE — PROGRESS NOTE ADULT - SUBJECTIVE AND OBJECTIVE BOX
Patient seen and examined . S/p  PCI to Diagonal. 1 AMBER. Moderate LAD disease. , POD # 1. Doing well , no sob , no chest pain , c/o mild R hand swelling at previous peripheral  line.     CC : chest pain , now resolved , R hand swelling         MEDICATIONS  (STANDING):  aspirin  chewable 81 milliGRAM(s) Oral daily  atorvastatin 80 milliGRAM(s) Oral at bedtime  clopidogrel Tablet 75 milliGRAM(s) Oral daily  dextrose 5%. 1000 milliLiter(s) (50 mL/Hr) IV Continuous <Continuous>  dextrose 50% Injectable 12.5 Gram(s) IV Push once  dextrose 50% Injectable 25 Gram(s) IV Push once  dextrose 50% Injectable 25 Gram(s) IV Push once  gabapentin 600 milliGRAM(s) Oral two times a day  insulin lispro (HumaLOG) corrective regimen sliding scale   SubCutaneous every 6 hours  lisinopril 10 milliGRAM(s) Oral daily  metoprolol tartrate 25 milliGRAM(s) Oral two times a day  pantoprazole    Tablet 40 milliGRAM(s) Oral before breakfast    MEDICATIONS  (PRN):  dextrose 40% Gel 15 Gram(s) Oral once PRN Blood Glucose LESS THAN 70 milliGRAM(s)/deciliter  glucagon  Injectable 1 milliGRAM(s) IntraMuscular once PRN Glucose LESS THAN 70 milligrams/deciliter  hydrALAZINE 10 milliGRAM(s) Oral every 6 hours PRN for SBP > 170  morphine  - Injectable 2 milliGRAM(s) IV Push every 4 hours PRN Severe Pain (7 - 10)  nitroglycerin     SubLingual 0.4 milliGRAM(s) SubLingual every 5 minutes PRN Chest Pain      LABS:                          12.5   4.88  )-----------( 140      ( 2019 06:33 )             39.8     07-02    138  |  100  |  21.0<H>  ----------------------------<  126<H>  4.1   |  27.0  |  0.93    Ca    9.5      2019 06:33    TPro  6.6  /  Alb  3.9  /  TBili  0.5  /  DBili  x   /  AST  29  /  ALT  34  /  AlkPhos  95  07-02    PTT - ( 2019 05:04 )  PTT:32.9 sec  Urinalysis Basic - ( 2019 15:55 )    Color: Yellow / Appearance: Clear / S.015 / pH: x  Gluc: x / Ketone: Negative  / Bili: Negative / Urobili: Negative mg/dL   Blood: x / Protein: 30 mg/dL / Nitrite: Negative   Leuk Esterase: Negative / RBC: x / WBC x   Sq Epi: x / Non Sq Epi: Occasional / Bacteria: x        RADIOLOGY & ADDITIONAL TESTS:  < from: CT Angio Abd Aorta w/run-off w/ IV Cont (19 @ 18:48) >   EXAM:  CT ANGIO ABD AOR W RUN(W)AW IC                         EXAM:  CT ANGIO CHEST (W)AW IC                          PROCEDURE DATE:  2019          INTERPRETATION:  CLINICAL INFORMATION: Sudden chest pain, hypertension,   history of aortic aneurysm, assess aortic dissection.    PROCEDURE: After noncontrast CT was obtained, a CT angiography of the   chest,abdomen and pelvis was performed with intravenous contrast   utilizing dedicated dissection protocol. 96 mls of Omnipaque-350   administered without complication. Coronal and sagittal reconstruction   images were obtained. Axial MIP images were obtained from a separate   workstation.     COMPARISON: None.    FINDINGS: Artifact from the patient's arms degrades images.    CHEST:     LUNGS AND AIRWAYS: Patent central airways. No focal consolidation.   Subsegmental bibasilar atelectasis.  PLEURA: No pleural effusion or pneumothorax.  HEART: Normal size. No pericardial effusion. Aortic valve replacement. A   5.7 x 1.2 cm low-attenuation structure along the right heart, suggesting   a pericardial cyst.  VESSELS: Atherosclerotic change of the thoracic aorta, great vessel   origin and coronary arteries. Post surgical changes in the ascending   aorta. No thoracic aortic intramural hematoma, dissection or aneurysm.   CHEST WALL AND LOWER NECK: Median sternotomy.  MEDIASTINUM AND ALTA: Subcentimeter mediastinal lymph nodes without   lymphadenopathy.    ABDOMEN/PELVIS:      LIVER: Grossly unremarkable.  GALLBLADDER/BILE DUCTS: No intrahepatic or extrahepatic biliary   dilatation. Cholelithiasis.  PANCREAS: Diffuse fatty atrophy.  SPLEEN: Grossly unremarkable.    ADRENALS: Grossly unremarkable.  KIDNEYS/URETERS: Nonspecific mild bilateral perinephric stranding without   hydroureteronephrosis. A 3.0 x 2.8 cm left renal cyst. Subcentimeter   hypodense foci in the kidneys, too small to characterize.  BLADDER: Partially distended.  REPRODUCTIVE ORGANS: Unremarkable.    BOWEL: No bowel obstruction. Unremarkable appendix. No significant bowel   wall thickening or inflammatory change. Colon diverticulosis.  PERITONEUM: No drainable fluid collection or free air.  VESSELS: Atherosclerotic change of the abdominal aorta and its branches.   Normal caliber of the abdominal aorta.   RETROPERITONEUM: No lymphadenopathy.    ABDOMINAL WALL/SOFT TISSUES: Small fat-containing umbilical and inguinal   hernias.  BONES: Degenerative changes of the spine. Chronic mild anterior wedging   of the midthoracic spine. Nonspecific small sclerotic foci in the femurs.    IMPRESSION:    No aortic dissection.    Additional findings as described.      VINCENZO RIGGINS M.D., ATTENDING RADIOLOGIST  This document has been electronically signed. 2019  7:17PM        < end of copied text >  < from: CT Angio Chest w/ IV Cont (19 @ 18:39) >     EXAM:  CT ANGIO ABD AOR W RUN(W)AW IC                         EXAM:  CT ANGIO CHEST (W)AW IC                          PROCEDURE DATE:  2019          INTERPRETATION:  CLINICAL INFORMATION: Sudden chest pain, hypertension,   history of aortic aneurysm, assess aortic dissection.    PROCEDURE: After noncontrast CT was obtained, a CT angiography of the   chest,abdomen and pelvis was performed with intravenous contrast   utilizing dedicated dissection protocol. 96 mls of Omnipaque-350   administered without complication. Coronal and sagittal reconstruction   images were obtained. Axial MIP images were obtained from a separate   workstation.     COMPARISON: None.    FINDINGS: Artifact from the patient's arms degrades images.    CHEST:     LUNGS AND AIRWAYS: Patent central airways. No focal consolidation.   Subsegmental bibasilar atelectasis.  PLEURA: No pleural effusion or pneumothorax.  HEART: Normal size. No pericardial effusion. Aortic valve replacement. A   5.7 x 1.2 cm low-attenuation structure along the right heart, suggesting   a pericardial cyst.  VESSELS: Atherosclerotic change of the thoracic aorta, great vessel   origin and coronary arteries. Post surgical changes in the ascending   aorta. No thoracic aortic intramural hematoma, dissection or aneurysm.   CHEST WALL AND LOWER NECK: Median sternotomy.  MEDIASTINUM AND ALTA: Subcentimeter mediastinal lymph nodes without   lymphadenopathy.    ABDOMEN/PELVIS:      LIVER: Grossly unremarkable.  GALLBLADDER/BILE DUCTS: No intrahepatic or extrahepatic biliary   dilatation. Cholelithiasis.  PANCREAS: Diffuse fatty atrophy.  SPLEEN: Grossly unremarkable.    ADRENALS: Grossly unremarkable.  KIDNEYS/URETERS: Nonspecific mild bilateral perinephric stranding without   hydroureteronephrosis. A 3.0 x 2.8 cm left renal cyst. Subcentimeter   hypodense foci in the kidneys, too small to characterize.  BLADDER: Partially distended.  REPRODUCTIVE ORGANS: Unremarkable.    BOWEL: No bowel obstruction. Unremarkable appendix. No significant bowel   wall thickening or inflammatory change. Colon diverticulosis.  PERITONEUM: No drainable fluid collection or free air.  VESSELS: Atherosclerotic change of the abdominal aorta and its branches.   Normal caliber of the abdominal aorta.   RETROPERITONEUM: No lymphadenopathy.    ABDOMINAL WALL/SOFT TISSUES: Small fat-containing umbilical and inguinal   hernias.  BONES: Degenerative changes of the spine. Chronic mild anterior wedging   of the midthoracic spine. Nonspecific small sclerotic foci in the femurs.    IMPRESSION:    No aortic dissection.    Additional findings as described.      VINCENZO RIGGINS M.D., ATTENDING RADIOLOGIST  This document has been electronically signed. 2019  7:17PM    < end of copied text >    < from: TTE Echo Complete w/Doppler (19 @ 13:16) >  EXAM:  ECHO TRANSTHORACIC COMP W DOPP      PROCEDURE DATE:  2019   .      INTERPRETATION:  REPORT:    TRANSTHORACIC ECHOCARDIOGRAM REPORT         Patient Name:   DUSTY STRICKLAND Patient Location: Inpatient  Medical Rec #:  [id number]        Accession #:      [id number]  Account #:      [id number]        Height:           74.8 in 190.0 cm  YOB: 1960      Weight:           229.3 lb 104.00 kg  Patient Age:    58 years        BSA:              2.32 m²  Patient Gender: M   BP:               129/82 mmHg       Date of Exam:        2019 1:16:32 PM  Sonographer:         Janina Alberto  Referring Physician: J Luis Hill MD    Procedure:   2D Echo/Doppler/Color Doppler Complete and Echocardiogram   with               Definity Contrast.  Indications: Chest pain, unspecified - R07.9  Diagnosis:   Chest pain, unspecified - R07.9         2D AND M-MODE MEASUREMENTS (normal ranges within parentheses):  Left Ventricle:                  Normal   Aorta/Left Atrium:   Normal  IVSd (2D):              1.16 cm (0.7-1.1) LA Volume Index    35.3 ml/m²  LVPWd (2D):             1.38 cm (0.7-1.1)  LVIDd (2D):             4.72 cm (3.4-5.7)  Relative Wall Thickness  0.58    (<0.42)    LV SYSTOLIC FUNCTION BY 2D PLANIMETRY (MOD):  EF-A2C View: 27.9 % EF-Biplane: 46 %    LV DIASTOLIC FUNCTION:  MV Peak E: 0.82 m/s Decel Time: 220 msec  MV Peak A: 0.66 m/s  E/A Ratio: 1.23    SPECTRAL DOPPLER ANALYSIS (where applicable):  Mitral Valve:  MV P1/2 Time: 63.71 msec  MV Area, PHT: 3.45 cm²    Aortic Valve: AoV Max Jasvir: 2.19 m/s AoV Peak P.2 mmHg AoV Mean PG:   10.2 mmHg    AoV Area, Vmax:  AoV Area, VTI:  AoV Area, Vmn:  Ao VTI: 0.465  Tricuspid Valve and PA/RV Systolic Pressure: TR Max Velocity: 2.46 m/s RA   Pressure: 3 mmHg RVSP/PASP: 27.2 mmHg       PHYSICIAN INTERPRETATION:  Left Ventricle: Endocardial visualization was enhanced with intravenous   echo contrast. The left ventricular internal cavity size is normal.  Global LV systolic function was mildly decreased. Left ventricular   ejection fraction, by visual estimation, is 45 to 50%. The left   ventricular diastolic function could not be assessed in this study.       LV Wall Scoring:  The apical inferior segment is akinetic. The mid and apical anterior   wall, mid  inferior segment, and apex are hypokinetic.    Right Ventricle: Normal right ventricular size and function.  Left Atrium: The left atrium is normal in size.  Right Atrium: The right atrium is normal in size.  Pericardium: There is no evidence of pericardial effusion.  Mitral Valve: Structurally normal mitral valve, with normal leaflet   excursion. Mild mitral valve regurgitation is seen.  Tricuspid Valve: Structurally normal tricuspid valve, with normal leaflet   excursion. Mild tricuspid regurgitation is visualized.  Aortic Valve: A bioprosthetic AoV is visualized. Peak aortic valve   gradient is 19.2 mmHg and the mean gradient is 10.2 mmHg, which is   probably normal in the setting of a prosthetic aortic valve. Trivial   aortic valve regurgitation is seen.  Pulmonic Valve: Structurally normal pulmonic valve, with normal leaflet   excursion. Trace pulmonic valve regurgitation.  Pulmonary Artery: The main pulmonary artery is normal in size.  Venous: The inferior vena cava was normal sized, with respiratory size   variation greater than 50%.       Summary:   1. Left ventricular ejection fraction, by visual estimation, is 45 to   50%.   2. Mildly decreased global left ventricular systolic function.   3. Apical inferior segment, mid and apical anterior wall, mid inferior   segment, and apex are abnormal as described above.   4. The left ventricular diastolic function could not be assessed in this   study.   5. There is no evidence of pericardial effusion.   6. Bioprosthesis in the aortic position.   7. Endocardial visualization was enhanced with intravenous echo contrast.   8. Peak aortic valve gradient is 19.2 mmHg and the mean gradient is 10.2   mmHg, which is probably normal in the setting of a prosthetic aortic  valve.    MD Alfa Electronically signed on 2019 at 5:09:04 PM     *** Final ***        ERICK GOLDSTEIN MD  This document has been electronically signed. 2019  1:16PM    < end of copied text >                REVIEW OF SYSTEMS:    As above , all other systems reviewed and are negative     Vital Signs Last 24 Hrs  T(C): 36.6 (2019 09:47), Max: 36.7 (2019 11:05)  T(F): 97.9 (2019 09:47), Max: 98 (2019 11:05)  HR: 90 (2019 10:56) (54 - 110)  BP: 186/82 (2019 10:56) (125/69 - 225/95)  BP(mean): --  RR: 17 (2019 10:56) (12 - 22)  SpO2: 100% (2019 10:56) (95% - 100%)    PHYSICAL EXAM:    GENERAL: NAD, well-groomed, well-developed  HEAD:  Atraumatic, Normocephalic  EYES: EOMI, PERRLA, conjunctiva and sclera clear  NECK: Supple, No JVD, Normal thyroid  NERVOUS SYSTEM:  Alert & Oriented X3, no focal deficit  CHEST/LUNG: CTA b/l ,  no  rales, rhonchi, wheezing, or rubs  HEART: Regular rate and rhythm; No murmurs, rubs, or gallops  ABDOMEN: Soft, Nontender, Nondistended; Bowel sounds present  EXTREMITIES:  2+ Peripheral Pulses, No clubbing, cyanosis, or edema  LYMPH: No lymphadenopathy noted  SKIN: No rashes or lesions

## 2019-07-02 NOTE — PROGRESS NOTE ADULT - ASSESSMENT
Pt is 59 y/o male with PMH of HTN, DM, Aortic valve replacement, ascending aneurysm repair, presented to ED with Chest pain for 1 month which got more worse day prior to admit, admitted for cp r/o acs.    >NSTEMI w/ unstable angina:  s/p LHC - 95% occlusion of diagonal , s/p PCI with 1 AMBER , significant progression of dz since 2015 .  Asymptomatic this am .   Continue ASA / BB / ACE / statins , cardiology follow up pending     >HTN- controlled  home meds BB/ ACEIs.    >DM2 not on long term insulin , on Metformin at home - ISS + FS q6 while npo  a1c 7.4, controlled . May restart Metformin on 7/4     > anemia. baseline hgb 12-13  no acute/active signs blood loss  outpt fu for w/u    > GERD - continue PPI     > Neuropathy - likely diabetic - continue Gabapentin .

## 2019-07-03 ENCOUNTER — RX RENEWAL (OUTPATIENT)
Age: 59
End: 2019-07-03

## 2019-07-03 LAB
APTT BLD: 133.8 SEC — CRITICAL HIGH (ref 27.5–36.3)
APTT BLD: 47.1 SEC — HIGH (ref 27.5–36.3)
BLD GP AB SCN SERPL QL: SIGNIFICANT CHANGE UP
CK SERPL-CCNC: 66 U/L — SIGNIFICANT CHANGE UP (ref 30–200)
CK SERPL-CCNC: 67 U/L — SIGNIFICANT CHANGE UP (ref 30–200)
GLUCOSE BLDC GLUCOMTR-MCNC: 114 MG/DL — HIGH (ref 70–99)
GLUCOSE BLDC GLUCOMTR-MCNC: 116 MG/DL — HIGH (ref 70–99)
GLUCOSE BLDC GLUCOMTR-MCNC: 140 MG/DL — HIGH (ref 70–99)
GLUCOSE BLDC GLUCOMTR-MCNC: 190 MG/DL — HIGH (ref 70–99)
HCT VFR BLD CALC: 37.8 % — LOW (ref 39–50)
HGB BLD-MCNC: 11.9 G/DL — LOW (ref 13–17)
MCHC RBC-ENTMCNC: 27.2 PG — SIGNIFICANT CHANGE UP (ref 27–34)
MCHC RBC-ENTMCNC: 31.5 GM/DL — LOW (ref 32–36)
MCV RBC AUTO: 86.5 FL — SIGNIFICANT CHANGE UP (ref 80–100)
PLATELET # BLD AUTO: 160 K/UL — SIGNIFICANT CHANGE UP (ref 150–400)
RBC # BLD: 4.37 M/UL — SIGNIFICANT CHANGE UP (ref 4.2–5.8)
RBC # FLD: 13.6 % — SIGNIFICANT CHANGE UP (ref 10.3–14.5)
TROPONIN T SERPL-MCNC: 0.33 NG/ML — HIGH (ref 0–0.06)
TROPONIN T SERPL-MCNC: 0.34 NG/ML — HIGH (ref 0–0.06)
TYPE + AB SCN PNL BLD: SIGNIFICANT CHANGE UP
WBC # BLD: 5.09 K/UL — SIGNIFICANT CHANGE UP (ref 3.8–10.5)
WBC # FLD AUTO: 5.09 K/UL — SIGNIFICANT CHANGE UP (ref 3.8–10.5)

## 2019-07-03 PROCEDURE — 93010 ELECTROCARDIOGRAM REPORT: CPT

## 2019-07-03 PROCEDURE — 93571 IV DOP VEL&/PRESS C FLO 1ST: CPT | Mod: 26,LD

## 2019-07-03 PROCEDURE — 99232 SBSQ HOSP IP/OBS MODERATE 35: CPT

## 2019-07-03 PROCEDURE — 92928 PRQ TCAT PLMT NTRAC ST 1 LES: CPT | Mod: LC

## 2019-07-03 PROCEDURE — 99152 MOD SED SAME PHYS/QHP 5/>YRS: CPT

## 2019-07-03 PROCEDURE — 99233 SBSQ HOSP IP/OBS HIGH 50: CPT

## 2019-07-03 PROCEDURE — 93454 CORONARY ARTERY ANGIO S&I: CPT | Mod: 26,59

## 2019-07-03 RX ORDER — SODIUM CHLORIDE 9 MG/ML
100 INJECTION INTRAMUSCULAR; INTRAVENOUS; SUBCUTANEOUS
Refills: 0 | Status: DISCONTINUED | OUTPATIENT
Start: 2019-07-03 | End: 2019-07-03

## 2019-07-03 RX ORDER — DOCUSATE SODIUM 100 MG
100 CAPSULE ORAL
Refills: 0 | Status: DISCONTINUED | OUTPATIENT
Start: 2019-07-03 | End: 2019-07-04

## 2019-07-03 RX ADMIN — Medication 375 MILLIGRAM(S): at 21:30

## 2019-07-03 RX ADMIN — Medication 375 MILLIGRAM(S): at 14:38

## 2019-07-03 RX ADMIN — MORPHINE SULFATE 2 MILLIGRAM(S): 50 CAPSULE, EXTENDED RELEASE ORAL at 05:46

## 2019-07-03 RX ADMIN — MORPHINE SULFATE 2 MILLIGRAM(S): 50 CAPSULE, EXTENDED RELEASE ORAL at 07:00

## 2019-07-03 RX ADMIN — GABAPENTIN 600 MILLIGRAM(S): 400 CAPSULE ORAL at 05:07

## 2019-07-03 RX ADMIN — Medication 81 MILLIGRAM(S): at 07:49

## 2019-07-03 RX ADMIN — LISINOPRIL 10 MILLIGRAM(S): 2.5 TABLET ORAL at 05:07

## 2019-07-03 RX ADMIN — ISOSORBIDE MONONITRATE 30 MILLIGRAM(S): 60 TABLET, EXTENDED RELEASE ORAL at 11:42

## 2019-07-03 RX ADMIN — Medication 50 MILLIGRAM(S): at 17:46

## 2019-07-03 RX ADMIN — GABAPENTIN 600 MILLIGRAM(S): 400 CAPSULE ORAL at 17:45

## 2019-07-03 RX ADMIN — PANTOPRAZOLE SODIUM 40 MILLIGRAM(S): 20 TABLET, DELAYED RELEASE ORAL at 05:07

## 2019-07-03 RX ADMIN — HEPARIN SODIUM 1200 UNIT(S)/HR: 5000 INJECTION INTRAVENOUS; SUBCUTANEOUS at 02:12

## 2019-07-03 RX ADMIN — Medication 50 MILLIGRAM(S): at 05:07

## 2019-07-03 RX ADMIN — CLOPIDOGREL BISULFATE 75 MILLIGRAM(S): 75 TABLET, FILM COATED ORAL at 07:49

## 2019-07-03 RX ADMIN — ATORVASTATIN CALCIUM 80 MILLIGRAM(S): 80 TABLET, FILM COATED ORAL at 21:14

## 2019-07-03 RX ADMIN — Medication 100 MILLIGRAM(S): at 18:32

## 2019-07-03 RX ADMIN — Medication 375 MILLIGRAM(S): at 17:36

## 2019-07-03 RX ADMIN — Medication 1 INCH(S): at 05:07

## 2019-07-03 RX ADMIN — Medication 1: at 17:45

## 2019-07-03 RX ADMIN — Medication 375 MILLIGRAM(S): at 21:14

## 2019-07-03 NOTE — PROGRESS NOTE ADULT - ASSESSMENT
58y m presents to ED with Chest pain x 1month / Hypertensive with  Troponin +  HX: HTN/ AVR/aneurysm repair/DM  Now with  NSTEMI with mildy decreased LVF  SP LHC : 7/1: PCI to Diagonal. 1 AMBER.  Moderate LAD disease. Significant progression of disease since 2015.   Patient had cath today, has PCI to PDA. No disease in LAD  LHC.   Patent diagonal stent.   LAD disease- iFR normal.   LPDA- 90% lesion- small vessel. Was medically managed on monday. PCI performed. Unclear regarding clinical significance.   Considering anterior wall hypokinesis and no clinically significant CAD, consider attila-myocarditis. Plan for naproxen 375 mg po bid for 7 days.     1. NSTEMI  2. Possible Myopericarditis  3. Other problems as noted    Recommendations:    Patient was educated about DAPT.  Patient to be on Naproxed for 7 days along with PPI  For F/u, Patient lives in [place], He wishes to f/u with a Cardiologist close to home, he states that there is a [facility] in [place], He will call and make an appointment for f/u with them.    As long as patient remains stable, May d/c d on 7/4/2019.    I will sign off    Discussed with Dr Souza.

## 2019-07-03 NOTE — PROGRESS NOTE ADULT - SUBJECTIVE AND OBJECTIVE BOX
Subjective:  58y  Male s/p AMBER to LPDA, RFA with angioseal closure device. Patient awake and alert without complaints. Denies chest pain, sob, palps.    PAST MEDICAL & SURGICAL HISTORY:  S/P aneurysm repair  H/O aortic valve repair  Diabetes mellitus  HTN (hypertension)  Cardiac tamponade  Aortic valve replaced  Leg weakness  GERD (gastroesophageal reflux disease)  AAA (abdominal aortic aneurysm): dx 2012  Hypertension  Diabetes mellitus  H/O aortic valve repair  S/P aortic aneurysm repair  S/P AAA repair: Repaired 3/2015  Aortic valve replaced    FAMILY HISTORY:  FH: HTN (hypertension)  Family history of COPD (chronic obstructive pulmonary disease) (Grandparent)  Family history of prostate cancer  Family history of stroke  Family history of hypertension  Family history of breast cancer    MEDICATIONS  (STANDING):  aspirin  chewable 81 milliGRAM(s) Oral daily  atorvastatin 80 milliGRAM(s) Oral at bedtime  clopidogrel Tablet 75 milliGRAM(s) Oral daily  dextrose 5%. 1000 milliLiter(s) (50 mL/Hr) IV Continuous <Continuous>  dextrose 50% Injectable 12.5 Gram(s) IV Push once  dextrose 50% Injectable 25 Gram(s) IV Push once  dextrose 50% Injectable 25 Gram(s) IV Push once  gabapentin 600 milliGRAM(s) Oral two times a day  insulin lispro (HumaLOG) corrective regimen sliding scale   SubCutaneous every 6 hours  isosorbide   mononitrate ER Tablet (IMDUR) 30 milliGRAM(s) Oral daily  lisinopril 10 milliGRAM(s) Oral daily  metoprolol tartrate 50 milliGRAM(s) Oral two times a day  naproxen 375 milliGRAM(s) Oral two times a day  pantoprazole    Tablet 40 milliGRAM(s) Oral before breakfast  sodium chloride 0.9%. 100 milliLiter(s) (100 mL/Hr) IV Continuous <Continuous>          General: No fatigue, no fevers/chills  Respiratory: No dyspnea, no cough, no wheeze  CV: No chest pain, no palpitations  Abd: No nausea  Neuro: No headache, no dizziness  Normodyne (Faint)  Normodyne (Unknown)      Objective:  Vital Signs Last 24 Hrs  T(C): 36.7 (03 Jul 2019 07:15), Max: 36.7 (02 Jul 2019 21:19)  T(F): 98 (03 Jul 2019 07:15), Max: 98 (02 Jul 2019 21:19)  HR: 64 (03 Jul 2019 09:30) (56 - 110)  BP: 138/78 (03 Jul 2019 09:30) (113/76 - 210/112)  BP(mean): --  RR: 18 (03 Jul 2019 09:30) (15 - 22)  SpO2: 96% (03 Jul 2019 09:30) (95% - 100%)    Neuro: A&OX3  Lungs: CTA B/L  CV: S1, S2, no murmur, RRR  Abd: Soft  Right Groin: Soft, no bleeding, no hematoma  Extremity: + distal pulses  EKG: NSR 66bpm                        11.9   5.09  )-----------( 160      ( 03 Jul 2019 01:48 )             37.8     07-02    138  |  100  |  21.0<H>  ----------------------------<  126<H>  4.1   |  27.0  |  0.93    Ca    9.5      02 Jul 2019 06:33    TPro  6.6  /  Alb  3.9  /  TBili  0.5  /  DBili  x   /  AST  29  /  ALT  34  /  AlkPhos  95  07-02    PTT - ( 03 Jul 2019 01:48 )  PTT:47.1 sec    A/P: CAD s/p PCI: AMBER to LPDA  1.                 Groin management discussed with patient.  2.                 Post procedure EKG reviewed and stable.    3.                 Pt given instructions on importance of taking antiplatelet medication.    4.                 Aspirin/Plavix/Statin/BB/ACE  5.                 Follow up with cardiologist in 2 weeks or sooner if needed  6.                 Bedrest x 4 hours  8.                 Add Naprosyn 375mg po bid for ?perimyocarditis  9.                 Follow up groin check in am

## 2019-07-03 NOTE — DIETITIAN INITIAL EVALUATION ADULT. - OTHER INFO
Pt with PMH of HTN, DM, Aortic valve replacement, ascending aneurysm repair, presented to ED with Chest pain for 1 month.  Pt with NSTEMI w/ unstable angina: s/p LHC.  Pt currently in cath lab.  Pt with good po intake per RN flowsheets.  Diabetes nutrition literature left at bedside.  RD to remain available.

## 2019-07-03 NOTE — PROGRESS NOTE ADULT - SUBJECTIVE AND OBJECTIVE BOX
SUBJECTIVE:    Cardiology NP F/U note:  SP: [facility] which revealed:  Patent diagonal stent.  LAD disease- iFR normal.   LPDA- 90% lesion- small vessel. Was medically managed on monday. PCI performed.  Pt post PCI ,, complains of" exact same pain " in left chest area 5/10 "episode while ambulating   Ekg without changes / pt seen and examined   non radiating / no diaphoresis/ BP stable   Left pectoral area with significant point tenderness this time / pt admits pain worse with moving the arm and shoulder   + tenderness with light to moderate palpatation   Naproysn ordered earlier , pt has not received as of yet  	  MEDICATIONS  (STANDING):  aspirin  chewable 81 milliGRAM(s) Oral daily  atorvastatin 80 milliGRAM(s) Oral at bedtime  clopidogrel Tablet 75 milliGRAM(s) Oral daily  dextrose 5%. 1000 milliLiter(s) IV Continuous <Continuous>  dextrose 50% Injectable 12.5 Gram(s) IV Push once  dextrose 50% Injectable 25 Gram(s) IV Push once  dextrose 50% Injectable 25 Gram(s) IV Push once  gabapentin 600 milliGRAM(s) Oral two times a day  insulin lispro (HumaLOG) corrective regimen sliding scale   SubCutaneous every 6 hours  isosorbide   mononitrate ER Tablet (IMDUR) 30 milliGRAM(s) Oral daily  lisinopril 10 milliGRAM(s) Oral daily  metoprolol tartrate 50 milliGRAM(s) Oral two times a day  naproxen 375 milliGRAM(s) Oral two times a day  naproxen 375 milliGRAM(s) Oral once  pantoprazole    Tablet 40 milliGRAM(s) Oral before breakfast    MEDICATIONS  (PRN):  dextrose 40% Gel 15 Gram(s) Oral once PRN Blood Glucose LESS THAN 70 milliGRAM(s)/deciliter  glucagon  Injectable 1 milliGRAM(s) IntraMuscular once PRN Glucose LESS THAN 70 milligrams/deciliter  hydrALAZINE 10 milliGRAM(s) Oral every 6 hours PRN for SBP > 170      PHYSICAL EXAM:    T(C): 36.8 (19 @ 10:59), Max: 36.8 (19 @ 10:59)  HR: 84 (19 @ 13:53) (56 - 93)  BP: 114/76 (19 @ 13:53) (113/76 - 176/96)  RR: 16 (19 @ 13:53) (15 - 20)  SpO2: 97% (19 @ 13:53) (95% - 100%)  Wt(kg): --    I&O's Summary    2019 07:01  -  2019 07:00  --------------------------------------------------------  IN: 480 mL / OUT: 0 mL / NET: 480 mL        Daily     Daily Weight in k.8 (2019 08:50)    Appearance: NAD	  Cardiovascular: Normal S1 S2, RRR 80  No JVD, No murmurs, No edema  Respiratory: Lungs clear to auscultation	  Psychiatry: A & O x 3, Mood & affect appropriate  Gastrointestinal:  Soft, Non-tender, + BS	  Skin: warm and dry  Neurologic: Non-focal  Extremities: Normal range of motion,: + tenderness of Left pectoral area with palpation  Vascular: Peripheral pulses palpable 2+ bilaterally    TELEMETRY: 	    ECG:  	  RADIOLOGY:   DIAGNOSTIC TESTING:  [X ] Echocardiogram:  < from: TTE Echo Complete w/Doppler (19 @ 13:16) >  Left ventricular ejection fraction, by visual estimation, is 45 to   50%.   2. Mildly decreased global left ventricular systolic function.   3. Apical inferior segment, mid and apical anterior wall, mid inferior   segment, and apex are abnormal as described above.   4. The left ventricular diastolic function could not be assessed in this   study.   5. There is no evidence of pericardial effusion.   6. Bioprosthesis in the aortic position.   7. Endocardial visualization was enhanced with intravenous echo contrast.   8. Peak aortic valve gradient is 19.2 mmHg and the mean gradient is 10.2   mmHg, which is probably normal in the setting of a prosthetic aortic  valve.        [X ]  Catheterization:   19:PCI to Diagonal. 1 AMBER.  Moderate LAD disease. Significant progression of disease since .   7/3/19:Patent diagonal stent.  LAD disease- iFR normal.   LPDA- 90% lesion- small vessel. Was medically managed on monday. PCI performed.  	    CARDIAC MARKERS: Positive                                  11.9   5.09  )-----------( 160      ( 2019 01:48 )             37.8     07-    138  |  100  |  21.0<H>  ----------------------------<  126<H>  4.1   |  27.0  |  0.93    Ca    9.5      2019 06:33    TPro  6.6  /  Alb  3.9  /  TBili  0.5  /  DBili  x   /  AST  29  /  ALT  34  /  AlkPhos  95      ASSESSMENT:  58y m presents to ED with Chest pain x 1month / Hypertensive with  Troponin +  HX: HTN/ AVR/aneurysm repair/DM  Now with  NSTEMI with mildy decreased LVF  SP LHC : : PCI to Diagonal. 1 AMBER.  Moderate LAD disease. Significant progression of disease since . [facility]ed [facility] for recurrent CP:  7/3/19:Patent diagonal stent.  LAD disease- iFR normal.   LPDA- 90% lesion- small vessel. Was medically managed on monday. PCI performed.  Pt continues to complain of  left chest discomfort that is reprodoucible and related to movement This episode is different that yesterday.   pericarditis Vs: musculoskeletal in origin / inflamatory   	    Plan:  Continue ASA/ Plavix/statin/BB/ACE  Naproyosen was ordered earlier give dose now  D/c Morphine   warm compress to chest wall   continue to monitor

## 2019-07-03 NOTE — PROGRESS NOTE ADULT - SUBJECTIVE AND OBJECTIVE BOX
LHC.   Patent diagonal stent.   LAD disease- iFR normal.   LPDA- 90% lesion- small vessel. Was medically managed on monday. PCI performed. Unclear regarding clinical significance.   Considering anterior wall hypokinesis and no clinically significant CAD, consider attila-myocarditis. Plan for naproxen 375 mg po bid for 7 days.

## 2019-07-03 NOTE — PROGRESS NOTE ADULT - SUBJECTIVE AND OBJECTIVE BOX
Covering Dr Cartagena .  Patient seen and examined .   S/P repeated LHC which revealed:  Patent diagonal stent ( placed on 7/1 ) ,  LAD disease- iFR normal.   LPDA- 90% lesion- small vessel. Was medically managed on Monday.  PCI performed today .   Pt post PCI ,, complains of" exact same pain " in left chest area 5/10 "episode while ambulating   non radiating / no diaphoresis/ BP stable , tenderness +      CC : chest pain , L sided chest tenderness +         MEDICATIONS  (STANDING):  aspirin  chewable 81 milliGRAM(s) Oral daily  atorvastatin 80 milliGRAM(s) Oral at bedtime  clopidogrel Tablet 75 milliGRAM(s) Oral daily  dextrose 5%. 1000 milliLiter(s) (50 mL/Hr) IV Continuous <Continuous>  dextrose 50% Injectable 12.5 Gram(s) IV Push once  dextrose 50% Injectable 25 Gram(s) IV Push once  dextrose 50% Injectable 25 Gram(s) IV Push once  gabapentin 600 milliGRAM(s) Oral two times a day  insulin lispro (HumaLOG) corrective regimen sliding scale   SubCutaneous every 6 hours  isosorbide   mononitrate ER Tablet (IMDUR) 30 milliGRAM(s) Oral daily  lisinopril 10 milliGRAM(s) Oral daily  metoprolol tartrate 50 milliGRAM(s) Oral two times a day  naproxen 375 milliGRAM(s) Oral two times a day  pantoprazole    Tablet 40 milliGRAM(s) Oral before breakfast    MEDICATIONS  (PRN):  dextrose 40% Gel 15 Gram(s) Oral once PRN Blood Glucose LESS THAN 70 milliGRAM(s)/deciliter  glucagon  Injectable 1 milliGRAM(s) IntraMuscular once PRN Glucose LESS THAN 70 milligrams/deciliter  hydrALAZINE 10 milliGRAM(s) Oral every 6 hours PRN for SBP > 170      LABS:                          11.9   5.09  )-----------( 160      ( 03 Jul 2019 01:48 )             37.8     07-02    138  |  100  |  21.0<H>  ----------------------------<  126<H>  4.1   |  27.0  |  0.93    Ca    9.5      02 Jul 2019 06:33    TPro  6.6  /  Alb  3.9  /  TBili  0.5  /  DBili  x   /  AST  29  /  ALT  34  /  AlkPhos  95  07-02    PTT - ( 03 Jul 2019 11:58 )  PTT:133.8 sec      RADIOLOGY & ADDITIONAL TESTS:    < from: 12 Lead ECG (07.02.19 @ 15:34) >    Ventricular Rate 80 BPM    Atrial Rate 80 BPM    P-R Interval 182 ms    QRS Duration 78 ms    Q-T Interval 360 ms    QTC Calculation(Bezet) 415 ms    P Axis 44 degrees    R Axis -40 degrees    T Axis 32 degrees    Diagnosis Line *** Poor data quality, interpretation may be adversely affected  Normal sinus rhythm  Left axis deviation  Voltage criteria for left ventricular hypertrophy  Possible Lateral infarct , age undetermined  Inferior infarct , age undetermined  Abnormal ECG    Confirmed by ERICK GOLDSTEIN ([id number]) on 7/2/2019 5:38:11 PM    < end of copied text >        REVIEW OF SYSTEMS:  Chest pain / l sided tenderness + , all other systems reviewed and are negative .     Vital Signs Last 24 Hrs  T(C): 36.4 (03 Jul 2019 15:36), Max: 36.8 (03 Jul 2019 10:59)  T(F): 97.6 (03 Jul 2019 15:36), Max: 98.2 (03 Jul 2019 10:59)  HR: 80 (03 Jul 2019 15:36) (56 - 88)  BP: 125/80 (03 Jul 2019 15:36) (114/76 - 158/98)  BP(mean): --  RR: 16 (03 Jul 2019 15:36) (15 - 18)  SpO2: 97% (03 Jul 2019 15:36) (95% - 98%)    PHYSICAL EXAM:    GENERAL: NAD, well-groomed, well-developed  HEAD:  Atraumatic, Normocephalic  EYES: EOMI, PERRLA, conjunctiva and sclera clear  NECK: Supple, No JVD, Normal thyroid  NERVOUS SYSTEM:  Alert & Oriented X3, no focal deficit  CHEST/LUNG: CTA b/l ,  no  rales, rhonchi, wheezing, or rubs , L sided tenderness +   HEART: Regular rate and rhythm; No murmurs, rubs, or gallops  ABDOMEN: Soft, Nontender, Nondistended; Bowel sounds present  EXTREMITIES:  2+ Peripheral Pulses, No clubbing, cyanosis, or edema  LYMPH: No lymphadenopathy noted  SKIN: No rashes or lesions

## 2019-07-03 NOTE — PROGRESS NOTE ADULT - ASSESSMENT
58y m presents to ED with Chest pain x 1month / Hypertensive with  Troponin +  HX: HTN/ AVR/aneurysm repair/DM  Now with  NSTEMI with mildy decreased LVF  SP LHC : 7/1: PCI to Diagonal. 1 AMBER.  Moderate LAD disease. Significant progression of disease since 2015. Returned to CCL for recurrent CP:  7/3/19:Patent diagonal stent.  LAD disease- iFR normal.   LPDA- 90% lesion- small vessel. Was medically managed on monday. PCI performed.  Pt continues to complain of  left chest discomfort that is reproducible and related to movement This episode is different that yesterday.   pericarditis Vs: musculoskeletal in origin / inflamatory   	    Plan:    1. CAD / NSTEMI - S/P AMBER X1 to diagonal ,   Returned to CCL today , s/p PCI - patient stent , LAD - iFR nl .  likely pericarditis  vs musculoskeletal , Naprosyn given - got some relief    Continue ASA/ Plavix/statin/BB/ACE  warm compress to chest wall   continue to monitor    Cardiology on board       >HTN- controlled  home meds BB/ ACEIs.    >DM2 not on long term insulin , on Metformin at home - ISS + FS q6 while npo  a1c 7.4, controlled . May restart Metformin 48 hrs post cath     > anemia. baseline hgb 12-13  no acute/active signs blood loss  outpt fu for w/u    > GERD - continue PPI     > Neuropathy - likely diabetic - continue Gabapentin .    d/c plan likely in am .

## 2019-07-03 NOTE — PROGRESS NOTE ADULT - SUBJECTIVE AND OBJECTIVE BOX
NSTEMI   PCI D1 with residual chest pain with total relief after morphine  Heparin infusion DCd at 0730  Repeat ECG NRS No ST elevation  ASA 3   Mallampati2  BR 0.8%  GFR>60   IVF 100cc / hr for repeat contrast planned   Dr Rueda to consent for repeat C to evaluate cardiac source of post PCI chest pain  ASA/Plavix given        REVIEW OF SYSTEMS:  Denies SOB, CP, NV, HA, dizziness, palpitations, site pain    PHYSICAL EXAM: A&Ox3 NAD Skin warm and dry  NEURO: Speech intact +gag +swallow Tongue midline JUSTICE  NECK: No JVD, trachea midline. Eupneic  HEART: RRR S1S2 Tele/ECG NSR  PULMONARY:  CTA marshal  ABDOMEN: Soft nontender X4 +BS Vdg  EXTREMITIES: Rt Radial site: Rt radial pulse + w/pulse ox on right index finger SaO2>95% RUE w/oneurovascular deficit. Capillary refill <3 sec  Site healing well

## 2019-07-03 NOTE — DIETITIAN INITIAL EVALUATION ADULT. - DIET TYPE
cons cho.  NPO for cath lab today DASH/TLC (sodium and cholesterol restricted diet)/consistent carbohydrate (no snacks)

## 2019-07-04 ENCOUNTER — TRANSCRIPTION ENCOUNTER (OUTPATIENT)
Age: 59
End: 2019-07-04

## 2019-07-04 VITALS
RESPIRATION RATE: 16 BRPM | OXYGEN SATURATION: 99 % | DIASTOLIC BLOOD PRESSURE: 93 MMHG | TEMPERATURE: 97 F | HEART RATE: 60 BPM | SYSTOLIC BLOOD PRESSURE: 160 MMHG

## 2019-07-04 LAB — GLUCOSE BLDC GLUCOMTR-MCNC: 148 MG/DL — HIGH (ref 70–99)

## 2019-07-04 PROCEDURE — 86850 RBC ANTIBODY SCREEN: CPT

## 2019-07-04 PROCEDURE — 80053 COMPREHEN METABOLIC PANEL: CPT

## 2019-07-04 PROCEDURE — 75635 CT ANGIO ABDOMINAL ARTERIES: CPT

## 2019-07-04 PROCEDURE — 99239 HOSP IP/OBS DSCHRG MGMT >30: CPT

## 2019-07-04 PROCEDURE — 84484 ASSAY OF TROPONIN QUANT: CPT

## 2019-07-04 PROCEDURE — C1760: CPT

## 2019-07-04 PROCEDURE — 82962 GLUCOSE BLOOD TEST: CPT

## 2019-07-04 PROCEDURE — 99152 MOD SED SAME PHYS/QHP 5/>YRS: CPT

## 2019-07-04 PROCEDURE — 96375 TX/PRO/DX INJ NEW DRUG ADDON: CPT | Mod: XU

## 2019-07-04 PROCEDURE — 93010 ELECTROCARDIOGRAM REPORT: CPT

## 2019-07-04 PROCEDURE — 85610 PROTHROMBIN TIME: CPT

## 2019-07-04 PROCEDURE — 99285 EMERGENCY DEPT VISIT HI MDM: CPT | Mod: 25

## 2019-07-04 PROCEDURE — 93306 TTE W/DOPPLER COMPLETE: CPT

## 2019-07-04 PROCEDURE — 83036 HEMOGLOBIN GLYCOSYLATED A1C: CPT

## 2019-07-04 PROCEDURE — 80061 LIPID PANEL: CPT

## 2019-07-04 PROCEDURE — 82150 ASSAY OF AMYLASE: CPT

## 2019-07-04 PROCEDURE — C1725: CPT

## 2019-07-04 PROCEDURE — 86900 BLOOD TYPING SEROLOGIC ABO: CPT

## 2019-07-04 PROCEDURE — 83690 ASSAY OF LIPASE: CPT

## 2019-07-04 PROCEDURE — 93005 ELECTROCARDIOGRAM TRACING: CPT

## 2019-07-04 PROCEDURE — 93454 CORONARY ARTERY ANGIO S&I: CPT | Mod: 59

## 2019-07-04 PROCEDURE — C9606: CPT | Mod: LD

## 2019-07-04 PROCEDURE — C9600: CPT | Mod: LC

## 2019-07-04 PROCEDURE — 86803 HEPATITIS C AB TEST: CPT

## 2019-07-04 PROCEDURE — 93571 IV DOP VEL&/PRESS C FLO 1ST: CPT | Mod: LD

## 2019-07-04 PROCEDURE — C1894: CPT

## 2019-07-04 PROCEDURE — 80076 HEPATIC FUNCTION PANEL: CPT

## 2019-07-04 PROCEDURE — 99153 MOD SED SAME PHYS/QHP EA: CPT

## 2019-07-04 PROCEDURE — 81001 URINALYSIS AUTO W/SCOPE: CPT

## 2019-07-04 PROCEDURE — 85730 THROMBOPLASTIN TIME PARTIAL: CPT

## 2019-07-04 PROCEDURE — C1769: CPT

## 2019-07-04 PROCEDURE — G0378: CPT

## 2019-07-04 PROCEDURE — 71275 CT ANGIOGRAPHY CHEST: CPT

## 2019-07-04 PROCEDURE — 96376 TX/PRO/DX INJ SAME DRUG ADON: CPT | Mod: XU

## 2019-07-04 PROCEDURE — 82550 ASSAY OF CK (CPK): CPT

## 2019-07-04 PROCEDURE — C1887: CPT

## 2019-07-04 PROCEDURE — 96374 THER/PROPH/DIAG INJ IV PUSH: CPT | Mod: XU

## 2019-07-04 PROCEDURE — 86901 BLOOD TYPING SEROLOGIC RH(D): CPT

## 2019-07-04 PROCEDURE — 80048 BASIC METABOLIC PNL TOTAL CA: CPT

## 2019-07-04 PROCEDURE — C1874: CPT

## 2019-07-04 PROCEDURE — 85027 COMPLETE CBC AUTOMATED: CPT

## 2019-07-04 PROCEDURE — 36415 COLL VENOUS BLD VENIPUNCTURE: CPT

## 2019-07-04 RX ORDER — ATORVASTATIN CALCIUM 80 MG/1
1 TABLET, FILM COATED ORAL
Qty: 30 | Refills: 0
Start: 2019-07-04 | End: 2019-08-02

## 2019-07-04 RX ORDER — PANTOPRAZOLE SODIUM 20 MG/1
1 TABLET, DELAYED RELEASE ORAL
Qty: 30 | Refills: 0
Start: 2019-07-04 | End: 2019-08-02

## 2019-07-04 RX ORDER — METOPROLOL TARTRATE 50 MG
1 TABLET ORAL
Qty: 0 | Refills: 0 | DISCHARGE
Start: 2019-07-04

## 2019-07-04 RX ORDER — ESOMEPRAZOLE MAGNESIUM 40 MG/1
1 CAPSULE, DELAYED RELEASE ORAL
Qty: 0 | Refills: 0 | DISCHARGE

## 2019-07-04 RX ORDER — ASPIRIN/CALCIUM CARB/MAGNESIUM 324 MG
1 TABLET ORAL
Qty: 30 | Refills: 0
Start: 2019-07-04 | End: 2019-08-02

## 2019-07-04 RX ORDER — METFORMIN HYDROCHLORIDE 850 MG/1
1 TABLET ORAL
Qty: 0 | Refills: 0 | DISCHARGE

## 2019-07-04 RX ORDER — POLYETHYLENE GLYCOL 3350 17 G/17G
17 POWDER, FOR SOLUTION ORAL ONCE
Refills: 0 | Status: COMPLETED | OUTPATIENT
Start: 2019-07-04 | End: 2019-07-04

## 2019-07-04 RX ORDER — CLOPIDOGREL BISULFATE 75 MG/1
1 TABLET, FILM COATED ORAL
Qty: 30 | Refills: 0
Start: 2019-07-04 | End: 2019-08-02

## 2019-07-04 RX ORDER — METOPROLOL TARTRATE 50 MG
1 TABLET ORAL
Qty: 60 | Refills: 0
Start: 2019-07-04 | End: 2019-08-02

## 2019-07-04 RX ADMIN — Medication 81 MILLIGRAM(S): at 10:31

## 2019-07-04 RX ADMIN — Medication 375 MILLIGRAM(S): at 05:37

## 2019-07-04 RX ADMIN — Medication 100 MILLIGRAM(S): at 05:36

## 2019-07-04 RX ADMIN — CLOPIDOGREL BISULFATE 75 MILLIGRAM(S): 75 TABLET, FILM COATED ORAL at 10:31

## 2019-07-04 RX ADMIN — Medication 50 MILLIGRAM(S): at 05:37

## 2019-07-04 RX ADMIN — LISINOPRIL 10 MILLIGRAM(S): 2.5 TABLET ORAL at 05:37

## 2019-07-04 RX ADMIN — PANTOPRAZOLE SODIUM 40 MILLIGRAM(S): 20 TABLET, DELAYED RELEASE ORAL at 05:37

## 2019-07-04 RX ADMIN — GABAPENTIN 600 MILLIGRAM(S): 400 CAPSULE ORAL at 05:37

## 2019-07-04 NOTE — PROGRESS NOTE ADULT - REASON FOR ADMISSION
Chest pain

## 2019-07-04 NOTE — PROGRESS NOTE ADULT - SUBJECTIVE AND OBJECTIVE BOX
Np assessment and cath lab follow up.    Pt is 57 y/o male with PMH of HTN, DM, Aortic valve replacement, ascending aneurysm repair, presented to ED with Chest pain for 1 month. Pt has chest pain for 1 month, got more worse yesterday, 10/10, located at left upper chest, no radiation to neck/jaw or left arm, associated with one episode of vomiting.  LHC yesterday    LHC Hx.   SP: LHC which revealed:  Patent diagonal stent.  LAD disease- iFR normal.   LPDA- 90% lesion- small vessel. Was medically managed on monday. PCI performed.  Pt post PCI ,, complained of" exact same pain " in left chest area 5/10 "episode while ambulating which was diagnosed as mycarditis and started on Naproxen    PM  Acute ischemic heart disease ACS  S/P aneurysm repair  Diabetes mellitus  HTN (hypertension)  Cardiac tamponade  Aortic valve replaced  Leg weakness  GERD (gastroesophageal reflux disease)  AAA (abdominal aortic aneurysm)  Hypertension  Diabetes mellitus  Non-ST elevation MI (NSTEMI)  gastroesophageal reflux (GERD)  CAD (coronary artery disease)  Hyperlipidemia    REVIEW OF SYSTEMS  General: Denies fever, chills, pain, no discomfort  Respiratory and Thorax:  Denies cough, sob, or any discomfort  Cardiovascular:  Denies chest pain, palpitations or any discomfort	  Gastrointestinal:  Denies n/v/d, constipation, or any discomfort  Genitourinary:  Denies frequency, burning, or pain  Musculoskeletal:  Denies joint pain, swelling, or any discomfort   Neurological:  Denies headache, dizziness blurred vision, numbing or tingling  Psychiatric:  Denies sadness or depression  Hematology  Javier bleeding o swelling  	  MEDICATIONS:  hydrALAZINE 10 milliGRAM(s) Oral every 6 hours PRN  lisinopril 10 milliGRAM(s) Oral daily  metoprolol tartrate 50 milliGRAM(s) Oral two times a day  gabapentin 600 milliGRAM(s) Oral two times a day  naproxen 375 milliGRAM(s) Oral two times a day  docusate sodium 100 milliGRAM(s) Oral two times a day  pantoprazole    Tablet 40 milliGRAM(s) Oral before breakfast  atorvastatin 80 milliGRAM(s) Oral at bedtime  dextrose 40% Gel 15 Gram(s) Oral once PRN  dextrose 50% Injectable 25 Gram(s) IV Push once  glucagon  Injectable 1 milliGRAM(s) IntraMuscular once PRN  insulin lispro (HumaLOG) corrective regimen sliding scale   SubCutaneous every 6 hours  aspirin  chewable 81 milliGRAM(s) Oral daily  clopidogrel Tablet 75 milliGRAM(s) Oral daily  dextrose 5%. 1000 milliLiter(s) IV Continuous <Continuous>    PHYSICAL EXAM:  T(C): 36.1 (19 @ 10:28), Max: 36.4 (19 @ 15:36)  HR: 60 (19 @ 10:28) (59 - 84)  BP: 160/93 (19 @ 10:28) (114/76 - 160/93)  RR: 16 (19 @ 10:28) (16 - 18)  SpO2: 99% (19 @ 10:28) (97% - 99%)  I&O's Summary  2019 07:01  -  2019 11:16  --------------------------------------------------------  IN: 240 mL / OUT: 0 mL / NET: 240 mL  Daily Weight in k.3 (2019 03:56)  Appearance: Normal	  HEENT:   Normal oral mucosa, PERRL, EOMI	  Lymphatic: No lymphadenopathy  Cardiovascular: Normal S1 S2, No JVD, No murmurs, No edema  Respiratory: Lungs clear to auscultation	  Psychiatry: A & O x 3, Mood & affect appropriate  Gastrointestinal:  Soft, Non-tender, + BS	  Skin: No rashes, No ecchymoses, No cyanosis  Neurologic: Non-focal  Extremities: Normal range of motion, No clubbing, cyanosis or edema  Vascular: Peripheral pulses palpable 2+ bilaterally  Procedure Site:  Right groin, no bleeding, no pain, no bruising, no hematoma, +PP, no edema.      TELEMETRY:  SR with PVC's, trigeminey	    ECG:  	NSR 63,     LABS:	 	                       11.9   5.09  )-----------( 160      ( 2019 01:48 )             37.8

## 2019-07-04 NOTE — DISCHARGE NOTE NURSING/CASE MANAGEMENT/SOCIAL WORK - NSDCDPATPORTLINK_GEN_ALL_CORE
You can access the [facility], offered by [facility], by registering with the following website: [email or web address]

## 2019-07-04 NOTE — PROGRESS NOTE ADULT - ASSESSMENT
Np assessment and cath lab follow up.    Pt is 57 y/o male with PMH of HTN, DM, Aortic valve replacement, ascending aneurysm repair, presented to ED with Chest pain for 1 month. Pt has chest pain for 1 month, got more worse yesterday, 10/10, located at left upper chest, no radiation to neck/jaw or left arm, associated with one episode of vomiting.  LHC yesterday    LHC Hx.   SP: LHC which revealed:  Patent diagonal stent.  LAD disease- iFR normal.   LPDA- 90% lesion- small vessel. Was medically managed on monday. PCI performed.  Pt post PCI ,, complained of" exact same pain " in left chest area 5/10 "episode while ambulating which was diagnosed as mycarditis and started on Naproxen    Plan:  Continue ASA/ Plavix/statin/BB/ACE  Naprosyn given with + response   Life style modifications including:  Diet/excercise/medications compliance and cardiac rehab discusses.  Patient stated verbal understanding of risk and benefits    Follow up with cardilogy in one to two weeks  As discussed with michelle Alba d.c.  Groin precautions  No lifting > 10 pounds, no bathing x 3 days, limits stairs, showering okay.

## 2019-07-04 NOTE — PROGRESS NOTE ADULT - PROVIDER SPECIALTY LIST ADULT
Cardiology
Hospitalist
Hospitalist
Intervent Cardiology
Intervent Cardiology
Cardiology
Hospitalist

## 2019-07-24 ENCOUNTER — MEDICATION RENEWAL (OUTPATIENT)
Age: 59
End: 2019-07-24

## 2019-07-24 PROBLEM — Z98.890 OTHER SPECIFIED POSTPROCEDURAL STATES: Chronic | Status: ACTIVE | Noted: 2019-06-30

## 2019-07-24 PROBLEM — I10 ESSENTIAL (PRIMARY) HYPERTENSION: Chronic | Status: ACTIVE | Noted: 2019-06-30

## 2019-08-07 ENCOUNTER — APPOINTMENT (OUTPATIENT)
Dept: INTERNAL MEDICINE | Facility: CLINIC | Age: 59
End: 2019-08-07
Payer: COMMERCIAL

## 2019-08-07 VITALS
TEMPERATURE: 98.8 F | HEART RATE: 73 BPM | HEIGHT: 75 IN | OXYGEN SATURATION: 98 % | WEIGHT: 238 LBS | DIASTOLIC BLOOD PRESSURE: 81 MMHG | SYSTOLIC BLOOD PRESSURE: 177 MMHG | BODY MASS INDEX: 29.59 KG/M2

## 2019-08-07 DIAGNOSIS — R74.8 ABNORMAL LEVELS OF OTHER SERUM ENZYMES: ICD-10-CM

## 2019-08-07 PROCEDURE — 36415 COLL VENOUS BLD VENIPUNCTURE: CPT

## 2019-08-07 PROCEDURE — 99214 OFFICE O/P EST MOD 30 MIN: CPT | Mod: 25

## 2019-08-07 RX ORDER — CLOPIDOGREL 75 MG/1
75 TABLET, FILM COATED ORAL
Refills: 0 | Status: COMPLETED | COMMUNITY

## 2019-08-07 RX ORDER — ATORVASTATIN CALCIUM 80 MG/1
80 TABLET, FILM COATED ORAL
Refills: 0 | Status: COMPLETED | COMMUNITY

## 2019-08-07 RX ORDER — ESOMEPRAZOLE MAGNESIUM 20 MG/1
20 CAPSULE, DELAYED RELEASE ORAL DAILY
Qty: 90 | Refills: 0 | Status: DISCONTINUED | COMMUNITY
Start: 2018-04-02 | End: 2019-08-07

## 2019-08-07 NOTE — REVIEW OF SYSTEMS
[Fever] : no fever [Night Sweats] : no night sweats [Earache] : no earache [Nasal Discharge] : no nasal discharge [Chest Pain] : no chest pain [Shortness Of Breath] : no shortness of breath [Orthopena] : no orthopnea [Wheezing] : no wheezing [Dysuria] : no dysuria [Hematuria] : no hematuria [Joint Pain] : no joint pain [Back Pain] : no back pain

## 2019-08-07 NOTE — PHYSICAL EXAM
[Well Nourished] : well nourished [No Acute Distress] : no acute distress [Normal Outer Ear/Nose] : the outer ears and nose were normal in appearance [Normal Oropharynx] : the oropharynx was normal [No JVD] : no jugular venous distention [No Lymphadenopathy] : no lymphadenopathy [No Respiratory Distress] : no respiratory distress  [Normal Rate] : normal rate  [No Accessory Muscle Use] : no accessory muscle use [Regular Rhythm] : with a regular rhythm

## 2019-08-07 NOTE — PLAN
[FreeTextEntry1] : f/u stent and chest pain\par check blood , lipid and infl markers\par change omeprazole to pantoprazole with plavix\par increase lisinopril to 20

## 2019-08-07 NOTE — HISTORY OF PRESENT ILLNESS
[Post-hospitalization from ___ Hospital] : Post-hospitalization from [unfilled] Hospital [Admitted on: ___] : The patient was admitted on [unfilled] [Discharged on ___] : discharged on [unfilled] [FreeTextEntry2] : In hospital 5 days  [place]\par chest pain   stents\par pericarditis\par required naproxen\par \par diabetes on metformin and diet and reduced alcohol\par now on high dose statin

## 2019-08-08 LAB
25(OH)D3 SERPL-MCNC: 27.2 NG/ML
ALBUMIN SERPL ELPH-MCNC: 4.4 G/DL
ALP BLD-CCNC: 124 U/L
ALT SERPL-CCNC: 28 U/L
ANION GAP SERPL CALC-SCNC: 17 MMOL/L
AST SERPL-CCNC: 19 U/L
BASOPHILS # BLD AUTO: 0.02 K/UL
BASOPHILS NFR BLD AUTO: 0.4 %
BILIRUB SERPL-MCNC: 0.4 MG/DL
BUN SERPL-MCNC: 16 MG/DL
CALCIUM SERPL-MCNC: 9.4 MG/DL
CHLORIDE SERPL-SCNC: 102 MMOL/L
CHOLEST SERPL-MCNC: 139 MG/DL
CHOLEST/HDLC SERPL: 3.4 RATIO
CK SERPL-CCNC: 129 U/L
CO2 SERPL-SCNC: 21 MMOL/L
CREAT SERPL-MCNC: 1.03 MG/DL
CRP SERPL HS-MCNC: 0.56 MG/L
EOSINOPHIL # BLD AUTO: 0.06 K/UL
EOSINOPHIL NFR BLD AUTO: 1.2 %
ERYTHROCYTE [SEDIMENTATION RATE] IN BLOOD BY WESTERGREN METHOD: 14 MM/HR
ESTIMATED AVERAGE GLUCOSE: 160 MG/DL
GGT SERPL-CCNC: 28 U/L
GLUCOSE SERPL-MCNC: 125 MG/DL
HBA1C MFR BLD HPLC: 7.2 %
HCT VFR BLD CALC: 38.3 %
HDLC SERPL-MCNC: 41 MG/DL
HGB BLD-MCNC: 11.9 G/DL
IMM GRANULOCYTES NFR BLD AUTO: 0.2 %
LDLC SERPL CALC-MCNC: 61 MG/DL
LYMPHOCYTES # BLD AUTO: 0.65 K/UL
LYMPHOCYTES NFR BLD AUTO: 13.4 %
MAN DIFF?: NORMAL
MCHC RBC-ENTMCNC: 26.9 PG
MCHC RBC-ENTMCNC: 31.1 GM/DL
MCV RBC AUTO: 86.5 FL
MONOCYTES # BLD AUTO: 0.38 K/UL
MONOCYTES NFR BLD AUTO: 7.8 %
NEUTROPHILS # BLD AUTO: 3.73 K/UL
NEUTROPHILS NFR BLD AUTO: 77 %
PLATELET # BLD AUTO: 173 K/UL
POTASSIUM SERPL-SCNC: 4 MMOL/L
PROT SERPL-MCNC: 6.7 G/DL
RBC # BLD: 4.43 M/UL
RBC # FLD: 13.7 %
SODIUM SERPL-SCNC: 140 MMOL/L
T4 FREE SERPL-MCNC: 1.3 NG/DL
TRIGL SERPL-MCNC: 185 MG/DL
TSH SERPL-ACNC: 0.79 UIU/ML
WBC # FLD AUTO: 4.85 K/UL

## 2019-08-28 NOTE — DISCHARGE NOTE PROVIDER - NSDCCPTREATMENT_GEN_ALL_CORE_FT
Patient called to schedule ER follow up visit. Patient was seen at [facility] ER and diagnosed with biliary colic. Patient states she had been diagnosed with gallstones approximately 8 years ago, but had been told it did not require surgery.  Patient states willard
PRINCIPAL PROCEDURE  Procedure: PCI, with stent insertion  Findings and Treatment: Follow up with cardiologist in 2-4 wks

## 2019-09-11 ENCOUNTER — APPOINTMENT (OUTPATIENT)
Dept: INTERNAL MEDICINE | Facility: CLINIC | Age: 59
End: 2019-09-11

## 2019-09-28 ENCOUNTER — TRANSCRIPTION ENCOUNTER (OUTPATIENT)
Age: 59
End: 2019-09-28

## 2019-11-16 ENCOUNTER — TRANSCRIPTION ENCOUNTER (OUTPATIENT)
Age: 59
End: 2019-11-16

## 2019-11-17 ENCOUNTER — INPATIENT (INPATIENT)
Facility: HOSPITAL | Age: 59
LOS: 3 days | Discharge: HOME | DRG: 571 | End: 2019-11-21
Attending: HOSPITALIST | Admitting: HOSPITALIST
Payer: COMMERCIAL

## 2019-11-17 ENCOUNTER — TRANSCRIPTION ENCOUNTER (OUTPATIENT)
Age: 59
End: 2019-11-17

## 2019-11-17 VITALS
HEART RATE: 98 BPM | SYSTOLIC BLOOD PRESSURE: 167 MMHG | WEIGHT: 220.9 LBS | RESPIRATION RATE: 15 BRPM | DIASTOLIC BLOOD PRESSURE: 97 MMHG | OXYGEN SATURATION: 99 % | TEMPERATURE: 98 F | HEIGHT: 75 IN

## 2019-11-17 DIAGNOSIS — Z95.4 PRESENCE OF OTHER HEART-VALVE REPLACEMENT: Chronic | ICD-10-CM

## 2019-11-17 DIAGNOSIS — Z95.5 PRESENCE OF CORONARY ANGIOPLASTY IMPLANT AND GRAFT: ICD-10-CM

## 2019-11-17 DIAGNOSIS — Z98.890 OTHER SPECIFIED POSTPROCEDURAL STATES: Chronic | ICD-10-CM

## 2019-11-17 DIAGNOSIS — Z29.9 ENCOUNTER FOR PROPHYLACTIC MEASURES, UNSPECIFIED: ICD-10-CM

## 2019-11-17 DIAGNOSIS — L03.313 CELLULITIS OF CHEST WALL: ICD-10-CM

## 2019-11-17 DIAGNOSIS — K21.9 GASTRO-ESOPHAGEAL REFLUX DISEASE WITHOUT ESOPHAGITIS: ICD-10-CM

## 2019-11-17 DIAGNOSIS — E78.00 PURE HYPERCHOLESTEROLEMIA, UNSPECIFIED: ICD-10-CM

## 2019-11-17 DIAGNOSIS — Z98.89 OTHER SPECIFIED POSTPROCEDURAL STATES: Chronic | ICD-10-CM

## 2019-11-17 DIAGNOSIS — I10 ESSENTIAL (PRIMARY) HYPERTENSION: ICD-10-CM

## 2019-11-17 DIAGNOSIS — E11.9 TYPE 2 DIABETES MELLITUS WITHOUT COMPLICATIONS: ICD-10-CM

## 2019-11-17 LAB
ALBUMIN SERPL ELPH-MCNC: 4.1 G/DL — SIGNIFICANT CHANGE UP (ref 3.3–5)
ALP SERPL-CCNC: 131 U/L — HIGH (ref 40–120)
ALT FLD-CCNC: 30 U/L — SIGNIFICANT CHANGE UP (ref 12–78)
ANION GAP SERPL CALC-SCNC: 10 MMOL/L — SIGNIFICANT CHANGE UP (ref 5–17)
APTT BLD: 29.4 SEC — SIGNIFICANT CHANGE UP (ref 28.5–37)
AST SERPL-CCNC: 19 U/L — SIGNIFICANT CHANGE UP (ref 15–37)
BASOPHILS # BLD AUTO: 0.03 K/UL — SIGNIFICANT CHANGE UP (ref 0–0.2)
BASOPHILS NFR BLD AUTO: 0.4 % — SIGNIFICANT CHANGE UP (ref 0–2)
BILIRUB SERPL-MCNC: 0.8 MG/DL — SIGNIFICANT CHANGE UP (ref 0.2–1.2)
BUN SERPL-MCNC: 17 MG/DL — SIGNIFICANT CHANGE UP (ref 7–23)
CALCIUM SERPL-MCNC: 9 MG/DL — SIGNIFICANT CHANGE UP (ref 8.5–10.1)
CHLORIDE SERPL-SCNC: 104 MMOL/L — SIGNIFICANT CHANGE UP (ref 96–108)
CO2 SERPL-SCNC: 22 MMOL/L — SIGNIFICANT CHANGE UP (ref 22–31)
CREAT SERPL-MCNC: 1.1 MG/DL — SIGNIFICANT CHANGE UP (ref 0.5–1.3)
EOSINOPHIL # BLD AUTO: 0.1 K/UL — SIGNIFICANT CHANGE UP (ref 0–0.5)
EOSINOPHIL NFR BLD AUTO: 1.4 % — SIGNIFICANT CHANGE UP (ref 0–6)
GLUCOSE SERPL-MCNC: 144 MG/DL — HIGH (ref 70–99)
HCT VFR BLD CALC: 39.6 % — SIGNIFICANT CHANGE UP (ref 39–50)
HGB BLD-MCNC: 12.7 G/DL — LOW (ref 13–17)
IMM GRANULOCYTES NFR BLD AUTO: 0.3 % — SIGNIFICANT CHANGE UP (ref 0–1.5)
INR BLD: 1.12 RATIO — SIGNIFICANT CHANGE UP (ref 0.88–1.16)
LACTATE SERPL-SCNC: 1.7 MMOL/L — SIGNIFICANT CHANGE UP (ref 0.7–2)
LACTATE SERPL-SCNC: 2.3 MMOL/L — HIGH (ref 0.7–2)
LYMPHOCYTES # BLD AUTO: 0.83 K/UL — LOW (ref 1–3.3)
LYMPHOCYTES # BLD AUTO: 11.3 % — LOW (ref 13–44)
MCHC RBC-ENTMCNC: 26.8 PG — LOW (ref 27–34)
MCHC RBC-ENTMCNC: 32.1 GM/DL — SIGNIFICANT CHANGE UP (ref 32–36)
MCV RBC AUTO: 83.7 FL — SIGNIFICANT CHANGE UP (ref 80–100)
MONOCYTES # BLD AUTO: 0.65 K/UL — SIGNIFICANT CHANGE UP (ref 0–0.9)
MONOCYTES NFR BLD AUTO: 8.9 % — SIGNIFICANT CHANGE UP (ref 2–14)
NEUTROPHILS # BLD AUTO: 5.71 K/UL — SIGNIFICANT CHANGE UP (ref 1.8–7.4)
NEUTROPHILS NFR BLD AUTO: 77.7 % — HIGH (ref 43–77)
NRBC # BLD: 0 /100 WBCS — SIGNIFICANT CHANGE UP (ref 0–0)
PLATELET # BLD AUTO: 170 K/UL — SIGNIFICANT CHANGE UP (ref 150–400)
POTASSIUM SERPL-MCNC: 4 MMOL/L — SIGNIFICANT CHANGE UP (ref 3.5–5.3)
POTASSIUM SERPL-SCNC: 4 MMOL/L — SIGNIFICANT CHANGE UP (ref 3.5–5.3)
PROT SERPL-MCNC: 7.4 G/DL — SIGNIFICANT CHANGE UP (ref 6–8.3)
PROTHROM AB SERPL-ACNC: 12.8 SEC — SIGNIFICANT CHANGE UP (ref 10–12.9)
RBC # BLD: 4.73 M/UL — SIGNIFICANT CHANGE UP (ref 4.2–5.8)
RBC # FLD: 14.4 % — SIGNIFICANT CHANGE UP (ref 10.3–14.5)
SODIUM SERPL-SCNC: 136 MMOL/L — SIGNIFICANT CHANGE UP (ref 135–145)
WBC # BLD: 7.34 K/UL — SIGNIFICANT CHANGE UP (ref 3.8–10.5)
WBC # FLD AUTO: 7.34 K/UL — SIGNIFICANT CHANGE UP (ref 3.8–10.5)

## 2019-11-17 PROCEDURE — 71045 X-RAY EXAM CHEST 1 VIEW: CPT | Mod: 26

## 2019-11-17 PROCEDURE — 99285 EMERGENCY DEPT VISIT HI MDM: CPT

## 2019-11-17 PROCEDURE — 93010 ELECTROCARDIOGRAM REPORT: CPT

## 2019-11-17 PROCEDURE — 99223 1ST HOSP IP/OBS HIGH 75: CPT | Mod: GC

## 2019-11-17 RX ORDER — DEXTROSE 50 % IN WATER 50 %
15 SYRINGE (ML) INTRAVENOUS ONCE
Refills: 0 | Status: DISCONTINUED | OUTPATIENT
Start: 2019-11-17 | End: 2019-11-18

## 2019-11-17 RX ORDER — ACETAMINOPHEN 500 MG
1000 TABLET ORAL ONCE
Refills: 0 | Status: COMPLETED | OUTPATIENT
Start: 2019-11-17 | End: 2019-11-17

## 2019-11-17 RX ORDER — INSULIN LISPRO 100/ML
VIAL (ML) SUBCUTANEOUS EVERY 6 HOURS
Refills: 0 | Status: DISCONTINUED | OUTPATIENT
Start: 2019-11-17 | End: 2019-11-18

## 2019-11-17 RX ORDER — PANTOPRAZOLE SODIUM 20 MG/1
40 TABLET, DELAYED RELEASE ORAL
Refills: 0 | Status: DISCONTINUED | OUTPATIENT
Start: 2019-11-17 | End: 2019-11-18

## 2019-11-17 RX ORDER — INSULIN LISPRO 100/ML
VIAL (ML) SUBCUTANEOUS
Refills: 0 | Status: DISCONTINUED | OUTPATIENT
Start: 2019-11-17 | End: 2019-11-17

## 2019-11-17 RX ORDER — VANCOMYCIN HCL 1 G
1500 VIAL (EA) INTRAVENOUS EVERY 12 HOURS
Refills: 0 | Status: DISCONTINUED | OUTPATIENT
Start: 2019-11-18 | End: 2019-11-18

## 2019-11-17 RX ORDER — SODIUM CHLORIDE 9 MG/ML
1000 INJECTION INTRAMUSCULAR; INTRAVENOUS; SUBCUTANEOUS ONCE
Refills: 0 | Status: COMPLETED | OUTPATIENT
Start: 2019-11-17 | End: 2019-11-17

## 2019-11-17 RX ORDER — ASPIRIN/CALCIUM CARB/MAGNESIUM 324 MG
81 TABLET ORAL DAILY
Refills: 0 | Status: DISCONTINUED | OUTPATIENT
Start: 2019-11-17 | End: 2019-11-18

## 2019-11-17 RX ORDER — TRAMADOL HYDROCHLORIDE 50 MG/1
50 TABLET ORAL EVERY 6 HOURS
Refills: 0 | Status: DISCONTINUED | OUTPATIENT
Start: 2019-11-17 | End: 2019-11-18

## 2019-11-17 RX ORDER — METOPROLOL TARTRATE 50 MG
25 TABLET ORAL DAILY
Refills: 0 | Status: DISCONTINUED | OUTPATIENT
Start: 2019-11-17 | End: 2019-11-18

## 2019-11-17 RX ORDER — VANCOMYCIN HCL 1 G
1000 VIAL (EA) INTRAVENOUS EVERY 12 HOURS
Refills: 0 | Status: DISCONTINUED | OUTPATIENT
Start: 2019-11-17 | End: 2019-11-17

## 2019-11-17 RX ORDER — PIPERACILLIN AND TAZOBACTAM 4; .5 G/20ML; G/20ML
3.38 INJECTION, POWDER, LYOPHILIZED, FOR SOLUTION INTRAVENOUS ONCE
Refills: 0 | Status: COMPLETED | OUTPATIENT
Start: 2019-11-17 | End: 2019-11-17

## 2019-11-17 RX ORDER — MORPHINE SULFATE 50 MG/1
4 CAPSULE, EXTENDED RELEASE ORAL ONCE
Refills: 0 | Status: DISCONTINUED | OUTPATIENT
Start: 2019-11-17 | End: 2019-11-17

## 2019-11-17 RX ORDER — VANCOMYCIN HCL 1 G
1000 VIAL (EA) INTRAVENOUS ONCE
Refills: 0 | Status: COMPLETED | OUTPATIENT
Start: 2019-11-17 | End: 2019-11-17

## 2019-11-17 RX ORDER — SODIUM CHLORIDE 9 MG/ML
1000 INJECTION, SOLUTION INTRAVENOUS
Refills: 0 | Status: DISCONTINUED | OUTPATIENT
Start: 2019-11-17 | End: 2019-11-18

## 2019-11-17 RX ORDER — GLUCAGON INJECTION, SOLUTION 0.5 MG/.1ML
1 INJECTION, SOLUTION SUBCUTANEOUS ONCE
Refills: 0 | Status: DISCONTINUED | OUTPATIENT
Start: 2019-11-17 | End: 2019-11-18

## 2019-11-17 RX ORDER — TRAMADOL HYDROCHLORIDE 50 MG/1
25 TABLET ORAL EVERY 6 HOURS
Refills: 0 | Status: DISCONTINUED | OUTPATIENT
Start: 2019-11-17 | End: 2019-11-18

## 2019-11-17 RX ORDER — PIPERACILLIN AND TAZOBACTAM 4; .5 G/20ML; G/20ML
3.38 INJECTION, POWDER, LYOPHILIZED, FOR SOLUTION INTRAVENOUS ONCE
Refills: 0 | Status: DISCONTINUED | OUTPATIENT
Start: 2019-11-17 | End: 2019-11-17

## 2019-11-17 RX ORDER — PIPERACILLIN AND TAZOBACTAM 4; .5 G/20ML; G/20ML
3.38 INJECTION, POWDER, LYOPHILIZED, FOR SOLUTION INTRAVENOUS EVERY 8 HOURS
Refills: 0 | Status: DISCONTINUED | OUTPATIENT
Start: 2019-11-17 | End: 2019-11-17

## 2019-11-17 RX ORDER — OXYCODONE AND ACETAMINOPHEN 5; 325 MG/1; MG/1
2 TABLET ORAL EVERY 6 HOURS
Refills: 0 | Status: DISCONTINUED | OUTPATIENT
Start: 2019-11-17 | End: 2019-11-17

## 2019-11-17 RX ORDER — ONDANSETRON 8 MG/1
4 TABLET, FILM COATED ORAL ONCE
Refills: 0 | Status: COMPLETED | OUTPATIENT
Start: 2019-11-17 | End: 2019-11-17

## 2019-11-17 RX ORDER — CLOPIDOGREL BISULFATE 75 MG/1
75 TABLET, FILM COATED ORAL DAILY
Refills: 0 | Status: DISCONTINUED | OUTPATIENT
Start: 2019-11-17 | End: 2019-11-18

## 2019-11-17 RX ORDER — ONDANSETRON 8 MG/1
4 TABLET, FILM COATED ORAL EVERY 6 HOURS
Refills: 0 | Status: DISCONTINUED | OUTPATIENT
Start: 2019-11-17 | End: 2019-11-18

## 2019-11-17 RX ORDER — OXYCODONE AND ACETAMINOPHEN 5; 325 MG/1; MG/1
1 TABLET ORAL EVERY 6 HOURS
Refills: 0 | Status: DISCONTINUED | OUTPATIENT
Start: 2019-11-17 | End: 2019-11-17

## 2019-11-17 RX ORDER — ATORVASTATIN CALCIUM 80 MG/1
80 TABLET, FILM COATED ORAL AT BEDTIME
Refills: 0 | Status: DISCONTINUED | OUTPATIENT
Start: 2019-11-17 | End: 2019-11-18

## 2019-11-17 RX ORDER — ACETAMINOPHEN 500 MG
1000 TABLET ORAL ONCE
Refills: 0 | Status: DISCONTINUED | OUTPATIENT
Start: 2019-11-17 | End: 2019-11-17

## 2019-11-17 RX ORDER — DEXTROSE 50 % IN WATER 50 %
12.5 SYRINGE (ML) INTRAVENOUS ONCE
Refills: 0 | Status: DISCONTINUED | OUTPATIENT
Start: 2019-11-17 | End: 2019-11-18

## 2019-11-17 RX ORDER — GABAPENTIN 400 MG/1
600 CAPSULE ORAL
Refills: 0 | Status: DISCONTINUED | OUTPATIENT
Start: 2019-11-17 | End: 2019-11-18

## 2019-11-17 RX ORDER — PIPERACILLIN AND TAZOBACTAM 4; .5 G/20ML; G/20ML
3.38 INJECTION, POWDER, LYOPHILIZED, FOR SOLUTION INTRAVENOUS EVERY 8 HOURS
Refills: 0 | Status: DISCONTINUED | OUTPATIENT
Start: 2019-11-17 | End: 2019-11-18

## 2019-11-17 RX ORDER — SENNA PLUS 8.6 MG/1
2 TABLET ORAL AT BEDTIME
Refills: 0 | Status: DISCONTINUED | OUTPATIENT
Start: 2019-11-17 | End: 2019-11-17

## 2019-11-17 RX ADMIN — SODIUM CHLORIDE 1000 MILLILITER(S): 9 INJECTION INTRAMUSCULAR; INTRAVENOUS; SUBCUTANEOUS at 20:53

## 2019-11-17 RX ADMIN — Medication 250 MILLIGRAM(S): at 20:55

## 2019-11-17 RX ADMIN — SODIUM CHLORIDE 1000 MILLILITER(S): 9 INJECTION INTRAMUSCULAR; INTRAVENOUS; SUBCUTANEOUS at 20:00

## 2019-11-17 RX ADMIN — ONDANSETRON 4 MILLIGRAM(S): 8 TABLET, FILM COATED ORAL at 20:45

## 2019-11-17 RX ADMIN — PIPERACILLIN AND TAZOBACTAM 3.38 GRAM(S): 4; .5 INJECTION, POWDER, LYOPHILIZED, FOR SOLUTION INTRAVENOUS at 20:55

## 2019-11-17 RX ADMIN — MORPHINE SULFATE 4 MILLIGRAM(S): 50 CAPSULE, EXTENDED RELEASE ORAL at 20:45

## 2019-11-17 RX ADMIN — Medication 400 MILLIGRAM(S): at 22:54

## 2019-11-17 RX ADMIN — Medication 1000 MILLIGRAM(S): at 21:55

## 2019-11-17 RX ADMIN — Medication 1000 MILLIGRAM(S): at 23:10

## 2019-11-17 RX ADMIN — Medication 1: at 22:53

## 2019-11-17 RX ADMIN — SODIUM CHLORIDE 1000 MILLILITER(S): 9 INJECTION INTRAMUSCULAR; INTRAVENOUS; SUBCUTANEOUS at 21:56

## 2019-11-17 RX ADMIN — SODIUM CHLORIDE 1000 MILLILITER(S): 9 INJECTION INTRAMUSCULAR; INTRAVENOUS; SUBCUTANEOUS at 20:56

## 2019-11-17 RX ADMIN — PIPERACILLIN AND TAZOBACTAM 200 GRAM(S): 4; .5 INJECTION, POWDER, LYOPHILIZED, FOR SOLUTION INTRAVENOUS at 20:25

## 2019-11-17 RX ADMIN — MORPHINE SULFATE 4 MILLIGRAM(S): 50 CAPSULE, EXTENDED RELEASE ORAL at 21:00

## 2019-11-17 NOTE — ED PROVIDER NOTE - NS ED ROS FT

## 2019-11-17 NOTE — H&P ADULT - NSHPPHYSICALEXAM_GEN_ALL_CORE
Vital Signs Last 24 Hrs  T(C): 36.7 (17 Nov 2019 19:26), Max: 36.7 (17 Nov 2019 19:26)  T(F): 98 (17 Nov 2019 19:26), Max: 98 (17 Nov 2019 19:26)  HR: 98 (17 Nov 2019 19:26) (98 - 98)  BP: 167/97 (17 Nov 2019 19:26) (167/97 - 167/97)  RR: 15 (17 Nov 2019 19:26) (15 - 15)  SpO2: 99% (17 Nov 2019 19:26) (99% - 99%)    General: NAD, well-developed, well-nourished  HEENT: Normocephalic/Atrauamatic, moist mucous membranes, PERRLA, ears with significant wax b/l, L tympanic membrane nonvisualized due to wax, R tympanic membrane intact  Cardio: +S1/S2, regular rhythm, grade III/VI holosystolic murmur heard best at R 2nd ICS  Resp: Good thoraxic expansion, CTAB  Abd: Nontender, nondistended, normoactive bowel sounds  Extremities: No peripheral edema, no calf tenderness  Skin: Excessive warmth on R upper chest wall with 6x6 cm erythema with raised central focus, allodynia, fluctuance not assessable due to pain

## 2019-11-17 NOTE — H&P ADULT - ASSESSMENT
Pt is 59 y/o male with PMH of HTN, DM, Aortic valve replacement 2015, ascending aneurysm repair 2015, s/p 2 stents 3/19 presents to ED c/o chest redness and pain for the past 5 days Pt is 59 y/o male with PMH of HTN, DM, Aortic valve replacement 2015, ascending aneurysm repair 2015, s/p 2 stents 3/19 presents to ED c/o chest redness and pain for the past 5 days admitted for chest cellulitis.

## 2019-11-17 NOTE — H&P ADULT - NSHPREVIEWOFSYSTEMS_GEN_ALL_CORE
Constitutional: denies fever, chills, diaphoresis   HEENT: denies blurry vision, double vision, eye pain, difficulty hearing admits occasional R ear pain  Respiratory: denies SOB, cough, sputum production  Cardiovascular: denies cardiac chest pain, palpitations, edema  Gastrointestinal: denies nausea, vomiting, diarrhea, constipation, abdominal pain  Genitourinary: denies dysuria, frequency, urgency, hematuria   Skin/Breast: admits rash  Neurologic: denies headache, weakness, dizziness, paresthesias, numbness/tingling  Psychiatric: denies anxiety, depression, suicidal, homicidal thoughts Constitutional: denies fever, chills, diaphoresis   HEENT: denies blurry vision, double vision, eye pain, difficulty hearing admits occasional R ear pain  Respiratory: denies SOB, cough, sputum production  Cardiovascular: denies cardiac chest pain, palpitations, edema  Gastrointestinal: denies nausea, vomiting, diarrhea, constipation, abdominal pain  Genitourinary: denies dysuria, frequency, urgency, hematuria   Skin/Breast: admits rash/infection on chest  Neurologic: denies headache, weakness, dizziness, paresthesias, numbness/tingling  Psychiatric: denies anxiety, depression, suicidal, homicidal thoughts

## 2019-11-17 NOTE — H&P ADULT - PROBLEM SELECTOR PLAN 7
Home
IMPROVE VTE Individual Risk Assessment    RISK                                                                Points  [  ] Previous VTE                                                  3  [  ] Thrombophilia                                               2  [  ] Lower limb paralysis                                      2        (unable to hold up >15 seconds)    [  ] Current Cancer                                              2         (within 6 months)  [  ] Immobilization > 24 hrs                                1  [  ] ICU/CCU stay > 24 hours                                [  ] Age > 60                                                      1    IMPROVE VTE Score _________    IMPROVE Score 0-1: Low Risk, No VTE prophylaxis required for most patients, encourage ambulation.   IMPROVE Score 2-3: At risk, pharmacologic VTE prophylaxis is indicated for most patients (in the absence of a contraindication)  IMPROVE Score > or = 4: High Risk, pharmacologic VTE prophylaxis is indicated for most patients (in the absence of a contraindication)

## 2019-11-17 NOTE — H&P ADULT - NSICDXPASTMEDICALHX_GEN_ALL_CORE_FT
PAST MEDICAL HISTORY:  AAA (abdominal aortic aneurysm) dx 2012    Aortic valve replaced     Cardiac tamponade     Diabetes mellitus     Diabetes mellitus     GERD (gastroesophageal reflux disease)     H/O aortic valve repair     HTN (hypertension)     Hypertension     Leg weakness     S/P aneurysm repair

## 2019-11-17 NOTE — ED PROVIDER NOTE - OBJECTIVE STATEMENT
57 yo male hx of CAD with 2 stents on ASA c/o right sided chest wall cellulitis that developed past few days, went to urgent care, started on doxycycline, but now getting worse and spreading, +pain, when palpated.  No fever/chills. 59 yo male hx of CAD with 2 stents on ASA c/o right sided chest wall cellulitis that developed past few days, went to urgent care, started on doxycycline, but now getting worse and spreading, +pain, when palpated.  No fever/chills.    pmd Dr. Evans

## 2019-11-17 NOTE — ED PROVIDER NOTE - PHYSICAL EXAMINATION
Gen: Alert, NAD  Head/eyes: NC/AT, PERRL, EOMI  ENT: airway patent  Neck: supple, no tenderness/meningismus/JVD, Trachea midline  Pulm/lung: Bilateral clear BS, normal resp effort, no wheeze/stridor/retractions  CV/heart: RRR, no M/R/G, +2 dist pulses (radial, pedal DP/PT, popliteal)  GI/Abd: soft, NT/ND, +BS, no guarding/rebound tenderness  Musculoskeletal: no edema/erythema/cyanosis, FROM in all extremities, no C/T/L spine ttp  Skin: right chest wall erythema with warmth, crossing marked borders (done at urgent care), +induration, no active drainage  Neuro: AAOx3, CN 2-12 intact, normal sensation, 5/5 motor strength in all extremities, normal gait, no dysmetria

## 2019-11-17 NOTE — H&P ADULT - PROBLEM SELECTOR PLAN 1
-Patient s/p vanc and zosyn  -Will continue vanc at 1500 mg Q12H, will continue zosyn as patient has history of diabetes and cannot exclude pseudomonas at this time  -Patient will likely need I+D for concern of SubQ abscess  -F/U ID for de-escalation of antibiotics  -F/U Surgery rec's -Patient s/p vanc and zosyn  -Will continue vanc at 1500 mg Q12H, will continue zosyn as patient has history of diabetes and cannot exclude pseudomonas at this time  -Patient will likely need I+D for concern of SubQ abscess  -Ofirmev for severe pain if not controlled. Tramadol 50 PRN severe, tramadol 25 PRN mod  -Will not give anti-constipation meds at this time as patient on partial opiod agonist and will monitor for diarrhea for possible C. Diff   -F/U ID for de-escalation of antibiotics  -F/U Surgery rec's Dr. Valdivia. MAKIO wendy, I+D in a.m.

## 2019-11-17 NOTE — ED ADULT NURSE NOTE - OBJECTIVE STATEMENT
Received pt from Home with c/o cellulitis to right chest wall since 2 days progressively increasing in dimensions and pain.  Skin is red and warm over the site. Reports having recurrent pimple on the right chest wall and pt used to pop it own his own. Hx of DM, Aortic aneurysm repair, Valve replacement on Plavix and Baby Aspirin, HTN, Hypercholesteremia. AO4, Ambulatory, wincing in pain. No fever reported. Skin intact otherwise

## 2019-11-17 NOTE — H&P ADULT - NSHPSOCIALHISTORY_GEN_ALL_CORE
. Lives with in-laws (ages of 85) and with wife. smoked 1ppd for 10 years quit 40 years ago. drinks 2 times a week, 4 beers on each night of drinking. Ambulates independently. Travelled to [facility] in past 6 months has trip to [facility] planned on Dec. 15th 2019. . Lives with in-laws (ages of 85) and with wife. smoked 1ppd for 10 years quit 40 years ago. drinks 2 times a week, 4 beers on each night of drinking. Ambulates independently. Travelled to [place] in past 6 months has trip to [facility] planned on Dec. 15th 2019.

## 2019-11-17 NOTE — CONSULT NOTE ADULT - SUBJECTIVE AND OBJECTIVE BOX
58 year old man popped a chronic cyst of right anteromedial chest wall 6 days ago.  Next day had pain and swelling    PMH: DM, Htn, s/p bovine AVR and thoracic aortic aneurysm repair    PE: very tender indurated red area of upper right anterior chest wall       P: I & D in OR in AM

## 2019-11-17 NOTE — ED PROVIDER NOTE - PMH
AAA (abdominal aortic aneurysm)  dx 2012  Aortic valve replaced    Cardiac tamponade    Diabetes mellitus    Diabetes mellitus    GERD (gastroesophageal reflux disease)    H/O aortic valve repair    HTN (hypertension)    Hypertension    Leg weakness    S/P aneurysm repair

## 2019-11-17 NOTE — H&P ADULT - HISTORY OF PRESENT ILLNESS
Pt is 57 y/o male with PMH of HTN, DM, Aortic valve replacement, ascending aneurysm repair, presents to ED c/o ____.    CHARTING IN PROGRESS        In the ED:  T; 98 HR: 98 BP: 167/97 RR; 15 Sat%:99 Pt is 57 y/o male with PMH of HTN, DM, Aortic valve replacement 2015, ascending aneurysm repair 2015, s/p 2 stents 3/19 presents to ED c/o chest redness and pain for the past 5 days. Patient states he has had a pimple on his chest for the past 30 years that he occasionally has to pop. It notes it's always in the same place, and has never had a problem popping it before. 5 days prior, he says his chest was hurting and he noted the pimple there, popped it and expressed a significant amount of pus. A few days later, a rash developed and spread on his right upper chest, and the chest pain worsened to the point where he went to urgent care yesterday. He was prescribed doxy 100 mg BID for which he was compliant with, and at that point per patient they did a small I+D but were superficial in their drainage attempt. His pain worsened in the past 24 hours, and he noticed the redness getting worse so he came to the ED. Denies pressure like chest pain, palpitations, SOB, diaphoresis, fever but admits to chills in past 30 minutes of being in the ED.       In the ED:  T; 98 HR: 98 BP: 167/97 RR; 15 Sat%:99  EKG: pending  CXR: Official read pending  s/p 1L NS bolus, s/p 1 dose vanc, s/p 1 dose zosyn

## 2019-11-17 NOTE — H&P ADULT - NSICDXPASTSURGICALHX_GEN_ALL_CORE_FT
PAST SURGICAL HISTORY:  Aortic valve replaced     H/O aortic valve repair     S/P AAA repair Repaired 3/2015    S/P aortic aneurysm repair

## 2019-11-17 NOTE — H&P ADULT - NSICDXFAMILYHX_GEN_ALL_CORE_FT
FAMILY HISTORY:  Family history of breast cancer  Family history of hypertension  Family history of prostate cancer  Family history of stroke  FH: HTN (hypertension)    Grandparent  Still living? Unknown  Family history of COPD (chronic obstructive pulmonary disease), Age at diagnosis: Age Unknown

## 2019-11-17 NOTE — H&P ADULT - PROBLEM SELECTOR PLAN 2
-Patient with stent placement in march  -Continue plavix and baby asa  -Patient in need of cardiologist, is willing to be seen by [facility] outpatient  -PLEASE have patient follow up with Dr. [facility]doñez[facility] group on DC for routine cardiac care -Patient with stent placement in march  -Continue plavix and baby asa  -Patient also in need of a cardiologist, and unclear what metoprolol dose patient is on.   -F/U Cardiology consult Dr. Walt xavier

## 2019-11-18 ENCOUNTER — RESULT REVIEW (OUTPATIENT)
Age: 59
End: 2019-11-18

## 2019-11-18 LAB
ALBUMIN SERPL ELPH-MCNC: 3.1 G/DL — LOW (ref 3.3–5)
ALP SERPL-CCNC: 108 U/L — SIGNIFICANT CHANGE UP (ref 40–120)
ALT FLD-CCNC: 22 U/L — SIGNIFICANT CHANGE UP (ref 12–78)
ANION GAP SERPL CALC-SCNC: 8 MMOL/L — SIGNIFICANT CHANGE UP (ref 5–17)
AST SERPL-CCNC: 15 U/L — SIGNIFICANT CHANGE UP (ref 15–37)
BASOPHILS # BLD AUTO: 0.02 K/UL — SIGNIFICANT CHANGE UP (ref 0–0.2)
BASOPHILS NFR BLD AUTO: 0.4 % — SIGNIFICANT CHANGE UP (ref 0–2)
BILIRUB SERPL-MCNC: 0.5 MG/DL — SIGNIFICANT CHANGE UP (ref 0.2–1.2)
BUN SERPL-MCNC: 14 MG/DL — SIGNIFICANT CHANGE UP (ref 7–23)
CALCIUM SERPL-MCNC: 8.1 MG/DL — LOW (ref 8.5–10.1)
CHLORIDE SERPL-SCNC: 109 MMOL/L — HIGH (ref 96–108)
CO2 SERPL-SCNC: 23 MMOL/L — SIGNIFICANT CHANGE UP (ref 22–31)
CREAT SERPL-MCNC: 0.98 MG/DL — SIGNIFICANT CHANGE UP (ref 0.5–1.3)
EOSINOPHIL # BLD AUTO: 0.08 K/UL — SIGNIFICANT CHANGE UP (ref 0–0.5)
EOSINOPHIL NFR BLD AUTO: 1.5 % — SIGNIFICANT CHANGE UP (ref 0–6)
GLUCOSE SERPL-MCNC: 112 MG/DL — HIGH (ref 70–99)
HBA1C BLD-MCNC: 6.6 % — HIGH (ref 4–5.6)
HCT VFR BLD CALC: 34.9 % — LOW (ref 39–50)
HGB BLD-MCNC: 11.1 G/DL — LOW (ref 13–17)
IMM GRANULOCYTES NFR BLD AUTO: 0.9 % — SIGNIFICANT CHANGE UP (ref 0–1.5)
LYMPHOCYTES # BLD AUTO: 0.56 K/UL — LOW (ref 1–3.3)
LYMPHOCYTES # BLD AUTO: 10.2 % — LOW (ref 13–44)
MCHC RBC-ENTMCNC: 26.8 PG — LOW (ref 27–34)
MCHC RBC-ENTMCNC: 31.8 GM/DL — LOW (ref 32–36)
MCV RBC AUTO: 84.3 FL — SIGNIFICANT CHANGE UP (ref 80–100)
MONOCYTES # BLD AUTO: 0.55 K/UL — SIGNIFICANT CHANGE UP (ref 0–0.9)
MONOCYTES NFR BLD AUTO: 10 % — SIGNIFICANT CHANGE UP (ref 2–14)
NEUTROPHILS # BLD AUTO: 4.23 K/UL — SIGNIFICANT CHANGE UP (ref 1.8–7.4)
NEUTROPHILS NFR BLD AUTO: 77 % — SIGNIFICANT CHANGE UP (ref 43–77)
NRBC # BLD: 0 /100 WBCS — SIGNIFICANT CHANGE UP (ref 0–0)
PLATELET # BLD AUTO: 125 K/UL — LOW (ref 150–400)
POTASSIUM SERPL-MCNC: 4.1 MMOL/L — SIGNIFICANT CHANGE UP (ref 3.5–5.3)
POTASSIUM SERPL-SCNC: 4.1 MMOL/L — SIGNIFICANT CHANGE UP (ref 3.5–5.3)
PROT SERPL-MCNC: 5.9 G/DL — LOW (ref 6–8.3)
RBC # BLD: 4.14 M/UL — LOW (ref 4.2–5.8)
RBC # FLD: 14.4 % — SIGNIFICANT CHANGE UP (ref 10.3–14.5)
SODIUM SERPL-SCNC: 140 MMOL/L — SIGNIFICANT CHANGE UP (ref 135–145)
WBC # BLD: 5.49 K/UL — SIGNIFICANT CHANGE UP (ref 3.8–10.5)
WBC # FLD AUTO: 5.49 K/UL — SIGNIFICANT CHANGE UP (ref 3.8–10.5)

## 2019-11-18 PROCEDURE — 99233 SBSQ HOSP IP/OBS HIGH 50: CPT

## 2019-11-18 PROCEDURE — 88304 TISSUE EXAM BY PATHOLOGIST: CPT | Mod: 26

## 2019-11-18 PROCEDURE — 71250 CT THORAX DX C-: CPT | Mod: 26

## 2019-11-18 PROCEDURE — 99222 1ST HOSP IP/OBS MODERATE 55: CPT

## 2019-11-18 RX ORDER — SODIUM CHLORIDE 9 MG/ML
1000 INJECTION, SOLUTION INTRAVENOUS
Refills: 0 | Status: DISCONTINUED | OUTPATIENT
Start: 2019-11-18 | End: 2019-11-21

## 2019-11-18 RX ORDER — DEXTROSE 50 % IN WATER 50 %
15 SYRINGE (ML) INTRAVENOUS ONCE
Refills: 0 | Status: DISCONTINUED | OUTPATIENT
Start: 2019-11-18 | End: 2019-11-21

## 2019-11-18 RX ORDER — ACETAMINOPHEN 500 MG
1000 TABLET ORAL ONCE
Refills: 0 | Status: COMPLETED | OUTPATIENT
Start: 2019-11-18 | End: 2019-11-18

## 2019-11-18 RX ORDER — INSULIN LISPRO 100/ML
VIAL (ML) SUBCUTANEOUS
Refills: 0 | Status: DISCONTINUED | OUTPATIENT
Start: 2019-11-18 | End: 2019-11-21

## 2019-11-18 RX ORDER — GLUCAGON INJECTION, SOLUTION 0.5 MG/.1ML
1 INJECTION, SOLUTION SUBCUTANEOUS ONCE
Refills: 0 | Status: DISCONTINUED | OUTPATIENT
Start: 2019-11-18 | End: 2019-11-21

## 2019-11-18 RX ORDER — HYDROMORPHONE HYDROCHLORIDE 2 MG/ML
0.5 INJECTION INTRAMUSCULAR; INTRAVENOUS; SUBCUTANEOUS ONCE
Refills: 0 | Status: DISCONTINUED | OUTPATIENT
Start: 2019-11-18 | End: 2019-11-18

## 2019-11-18 RX ORDER — METOPROLOL TARTRATE 50 MG
25 TABLET ORAL DAILY
Refills: 0 | Status: DISCONTINUED | OUTPATIENT
Start: 2019-11-18 | End: 2019-11-21

## 2019-11-18 RX ORDER — ATORVASTATIN CALCIUM 80 MG/1
80 TABLET, FILM COATED ORAL AT BEDTIME
Refills: 0 | Status: DISCONTINUED | OUTPATIENT
Start: 2019-11-18 | End: 2019-11-21

## 2019-11-18 RX ORDER — HYDROMORPHONE HYDROCHLORIDE 2 MG/ML
0.5 INJECTION INTRAMUSCULAR; INTRAVENOUS; SUBCUTANEOUS
Refills: 0 | Status: DISCONTINUED | OUTPATIENT
Start: 2019-11-18 | End: 2019-11-18

## 2019-11-18 RX ORDER — TRAMADOL HYDROCHLORIDE 50 MG/1
25 TABLET ORAL EVERY 6 HOURS
Refills: 0 | Status: DISCONTINUED | OUTPATIENT
Start: 2019-11-18 | End: 2019-11-19

## 2019-11-18 RX ORDER — CLOPIDOGREL BISULFATE 75 MG/1
75 TABLET, FILM COATED ORAL DAILY
Refills: 0 | Status: DISCONTINUED | OUTPATIENT
Start: 2019-11-18 | End: 2019-11-21

## 2019-11-18 RX ORDER — HYDROMORPHONE HYDROCHLORIDE 2 MG/ML
1 INJECTION INTRAMUSCULAR; INTRAVENOUS; SUBCUTANEOUS ONCE
Refills: 0 | Status: DISCONTINUED | OUTPATIENT
Start: 2019-11-18 | End: 2019-11-18

## 2019-11-18 RX ORDER — METOCLOPRAMIDE HCL 10 MG
5 TABLET ORAL ONCE
Refills: 0 | Status: DISCONTINUED | OUTPATIENT
Start: 2019-11-18 | End: 2019-11-18

## 2019-11-18 RX ORDER — TRAMADOL HYDROCHLORIDE 50 MG/1
50 TABLET ORAL EVERY 6 HOURS
Refills: 0 | Status: DISCONTINUED | OUTPATIENT
Start: 2019-11-18 | End: 2019-11-19

## 2019-11-18 RX ORDER — INSULIN LISPRO 100/ML
VIAL (ML) SUBCUTANEOUS
Refills: 0 | Status: DISCONTINUED | OUTPATIENT
Start: 2019-11-18 | End: 2019-11-18

## 2019-11-18 RX ORDER — ASPIRIN/CALCIUM CARB/MAGNESIUM 324 MG
81 TABLET ORAL DAILY
Refills: 0 | Status: DISCONTINUED | OUTPATIENT
Start: 2019-11-18 | End: 2019-11-21

## 2019-11-18 RX ORDER — ONDANSETRON 8 MG/1
4 TABLET, FILM COATED ORAL EVERY 6 HOURS
Refills: 0 | Status: DISCONTINUED | OUTPATIENT
Start: 2019-11-18 | End: 2019-11-19

## 2019-11-18 RX ORDER — INSULIN LISPRO 100/ML
VIAL (ML) SUBCUTANEOUS EVERY 6 HOURS
Refills: 0 | Status: DISCONTINUED | OUTPATIENT
Start: 2019-11-18 | End: 2019-11-18

## 2019-11-18 RX ORDER — PIPERACILLIN AND TAZOBACTAM 4; .5 G/20ML; G/20ML
3.38 INJECTION, POWDER, LYOPHILIZED, FOR SOLUTION INTRAVENOUS EVERY 8 HOURS
Refills: 0 | Status: DISCONTINUED | OUTPATIENT
Start: 2019-11-18 | End: 2019-11-19

## 2019-11-18 RX ORDER — SODIUM CHLORIDE 9 MG/ML
1000 INJECTION, SOLUTION INTRAVENOUS
Refills: 0 | Status: DISCONTINUED | OUTPATIENT
Start: 2019-11-18 | End: 2019-11-18

## 2019-11-18 RX ORDER — DEXTROSE 50 % IN WATER 50 %
12.5 SYRINGE (ML) INTRAVENOUS ONCE
Refills: 0 | Status: DISCONTINUED | OUTPATIENT
Start: 2019-11-18 | End: 2019-11-21

## 2019-11-18 RX ORDER — PANTOPRAZOLE SODIUM 20 MG/1
40 TABLET, DELAYED RELEASE ORAL
Refills: 0 | Status: DISCONTINUED | OUTPATIENT
Start: 2019-11-18 | End: 2019-11-21

## 2019-11-18 RX ORDER — INSULIN LISPRO 100/ML
VIAL (ML) SUBCUTANEOUS AT BEDTIME
Refills: 0 | Status: DISCONTINUED | OUTPATIENT
Start: 2019-11-18 | End: 2019-11-21

## 2019-11-18 RX ORDER — VANCOMYCIN HCL 1 G
1500 VIAL (EA) INTRAVENOUS EVERY 12 HOURS
Refills: 0 | Status: DISCONTINUED | OUTPATIENT
Start: 2019-11-18 | End: 2019-11-19

## 2019-11-18 RX ORDER — GABAPENTIN 400 MG/1
600 CAPSULE ORAL
Refills: 0 | Status: DISCONTINUED | OUTPATIENT
Start: 2019-11-18 | End: 2019-11-21

## 2019-11-18 RX ADMIN — HYDROMORPHONE HYDROCHLORIDE 0.5 MILLIGRAM(S): 2 INJECTION INTRAMUSCULAR; INTRAVENOUS; SUBCUTANEOUS at 22:07

## 2019-11-18 RX ADMIN — GABAPENTIN 600 MILLIGRAM(S): 400 CAPSULE ORAL at 17:02

## 2019-11-18 RX ADMIN — TRAMADOL HYDROCHLORIDE 50 MILLIGRAM(S): 50 TABLET ORAL at 01:20

## 2019-11-18 RX ADMIN — HYDROMORPHONE HYDROCHLORIDE 0.5 MILLIGRAM(S): 2 INJECTION INTRAMUSCULAR; INTRAVENOUS; SUBCUTANEOUS at 22:20

## 2019-11-18 RX ADMIN — HYDROMORPHONE HYDROCHLORIDE 0.5 MILLIGRAM(S): 2 INJECTION INTRAMUSCULAR; INTRAVENOUS; SUBCUTANEOUS at 15:45

## 2019-11-18 RX ADMIN — Medication 400 MILLIGRAM(S): at 20:38

## 2019-11-18 RX ADMIN — PIPERACILLIN AND TAZOBACTAM 25 GRAM(S): 4; .5 INJECTION, POWDER, LYOPHILIZED, FOR SOLUTION INTRAVENOUS at 05:54

## 2019-11-18 RX ADMIN — Medication 25 MILLIGRAM(S): at 05:55

## 2019-11-18 RX ADMIN — Medication 1000 MILLIGRAM(S): at 20:48

## 2019-11-18 RX ADMIN — Medication 300 MILLIGRAM(S): at 21:16

## 2019-11-18 RX ADMIN — SODIUM CHLORIDE 110 MILLILITER(S): 9 INJECTION, SOLUTION INTRAVENOUS at 15:30

## 2019-11-18 RX ADMIN — CLOPIDOGREL BISULFATE 75 MILLIGRAM(S): 75 TABLET, FILM COATED ORAL at 13:54

## 2019-11-18 RX ADMIN — Medication 400 MILLIGRAM(S): at 07:19

## 2019-11-18 RX ADMIN — PANTOPRAZOLE SODIUM 40 MILLIGRAM(S): 20 TABLET, DELAYED RELEASE ORAL at 05:54

## 2019-11-18 RX ADMIN — Medication 300 MILLIGRAM(S): at 09:48

## 2019-11-18 RX ADMIN — HYDROMORPHONE HYDROCHLORIDE 0.5 MILLIGRAM(S): 2 INJECTION INTRAMUSCULAR; INTRAVENOUS; SUBCUTANEOUS at 15:30

## 2019-11-18 RX ADMIN — Medication 81 MILLIGRAM(S): at 13:54

## 2019-11-18 RX ADMIN — TRAMADOL HYDROCHLORIDE 50 MILLIGRAM(S): 50 TABLET ORAL at 02:20

## 2019-11-18 RX ADMIN — ATORVASTATIN CALCIUM 80 MILLIGRAM(S): 80 TABLET, FILM COATED ORAL at 21:16

## 2019-11-18 RX ADMIN — GABAPENTIN 600 MILLIGRAM(S): 400 CAPSULE ORAL at 05:54

## 2019-11-18 RX ADMIN — PIPERACILLIN AND TAZOBACTAM 25 GRAM(S): 4; .5 INJECTION, POWDER, LYOPHILIZED, FOR SOLUTION INTRAVENOUS at 22:12

## 2019-11-18 NOTE — PROGRESS NOTE ADULT - ASSESSMENT
Pt is 57 y/o male with PMH of HTN, DM, Aortic valve replacement 2015, ascending aneurysm repair 2015, s/p 2 stents 3/19 presents to ED c/o chest redness and pain for the past 5 days admitted for chest cellulitis.

## 2019-11-18 NOTE — PATIENT PROFILE ADULT - PRIMARY SOURCE OF SUPPORT/COMFORT
From: Susan K Kocher  To: Donald Queen MD  Sent: 4/19/2019 12:50 AM CDT  Subject: Other    Hi Fela, this is Susan Kocher just to let you know I am having surgery   With Dr. Hahn on April 30th. So I will not need an appointment with   In the near future. Thanks, Mine  
Reply to patient concerning MyChart message sent to the patient.  
spouse

## 2019-11-18 NOTE — CHART NOTE - NSCHARTNOTEFT_GEN_A_CORE
Called by RN for Pt c/o cellulitis pain. Patient seen and examined at bedside. States the pain is a 10/10 and is requesting something for the pain. He is scheduled for I&D in the morning. Denies cardiac chest pain, shortness of breath, abdominal pain, dizziness, headache, or nausea/vomiting      T(C): 36.4 (11-18-19 @ 00:30), Max: 36.8 (11-17-19 @ 23:10)  HR: 65 (11-18-19 @ 05:55) (65 - 98)  BP: 144/85 (11-18-19 @ 05:55) (144/85 - 176/97)  RR: 17 (11-18-19 @ 00:30) (15 - 17)  SpO2: 99% (11-18-19 @ 00:30) (98% - 99%)  Wt(kg): --    Physical Exam:  Gen: well appearing, NAD  HEENT: NCAT, PEERLA b/l, EOMI b/l, no conjunctival erythema  Cardio: regular rate and rhythm, +s1s2, no murmurs, rubs, or gallops  Pulm: CTA b/l, no wheezes, rales or rhonchi  Abdomen: soft, nontender, nondistended, +BS x4 quadrants, no guarding  Extremities: no clubbing, cyanosis or edema, +2 pedal pulses  Neuro: AAOx3, CNII-XII intact grossly, 5/5 strength all extremities, sensation intact  Skin: warm and dry    Assessment/Plan  57 y/o male with PMH of HTN, DM, Aortic valve replacement 2015, ascending aneurysm repair 2015, s/p 2 stents 3/19 presents to ED c/o chest redness and pain for the past 5 days admitted for chest cellulitis.     1. Pain  - pt. requesting to receive more IV tylenol at this time as he feels that it worked best for him  - will hold off on Dilaudid at this time

## 2019-11-18 NOTE — BRIEF OPERATIVE NOTE - OPERATION/FINDINGS
2 cm diameter sebaceous cyst of right anteriomedial chest filled with and surrounded by foul smelling pus

## 2019-11-18 NOTE — CONSULT NOTE ADULT - SUBJECTIVE AND OBJECTIVE BOX
HPI:  Pt is 57 y/o male with PMH of HTN, DM, Aortic valve replacement 2015, ascending aneurysm repair 2015, s/p 2 stents 3/19 presented to the hospital with CC of chest wall pain and redness with purulent drainage of sebaceous cyst, cyst has been longstanding and occasionally get infected bu per pt , this time it grew in size rapidly and noted to foul smelling drainage, He and his wife tried to express as much pus out but he felt worse. He then went to urgent care and had I&D and was prescribed doxy 100 mg BID. Did not feel better and he came to the hospital to be evaluated. Noted to have infected sebaceous cyst with abscess and chest wall cellulitis. Denies fever chills n/v/d. Seen by surgery and taken to OR for I&D.     Infectious Disease consult was called to evaluate pt and for antibiotic management.    Past Medical & Surgical Hx:  PAST MEDICAL & SURGICAL HISTORY:  S/P aneurysm repair  H/O aortic valve repair  Diabetes mellitus  HTN (hypertension)  Cardiac tamponade  Aortic valve replaced  Leg weakness  GERD (gastroesophageal reflux disease)  AAA (abdominal aortic aneurysm): dx 2012  Hypertension  Diabetes mellitus  H/O aortic valve repair  S/P aortic aneurysm repair  S/P AAA repair: Repaired 3/2015  Aortic valve replaced    Social History--  EtOH: denies   Tobacco: denies   Drug Use: denies     FAMILY HISTORY:  FH: HTN (hypertension)  Family history of COPD (chronic obstructive pulmonary disease) (Grandparent)  Family history of prostate cancer  Family history of stroke  Family history of hypertension  Family history of breast cancer      Allergies  Normodyne (Faint)    Intolerances  NONE    Home Medications:  aspirin 81 mg oral tablet, chewable: 1 tab(s) orally once a day (17 Nov 2019 21:51)  atorvastatin 80 mg oral tablet: 1 tab(s) orally once a day (at bedtime) (17 Nov 2019 21:51)  clopidogrel 75 mg oral tablet: 1 tab(s) orally once a day (17 Nov 2019 21:51)  gabapentin 600 mg oral tablet: 1 tab(s) orally 2 times a day (17 Nov 2019 21:51)  metFORMIN 1000 mg oral tablet: may restart on 7/ 6  1 tab(s) orally once a day (17 Nov 2019 21:51)  metoprolol tartrate 50 mg oral tablet: 1 tab(s) orally once a day (17 Nov 2019 21:51)      Current Inpatient Medications :    ANTIBIOTICS:   piperacillin/tazobactam IVPB.. 3.375 Gram(s) IV Intermittent every 8 hours  vancomycin  IVPB 1500 milliGRAM(s) IV Intermittent every 12 hours      OTHER RELEVANT MEDICATIONS :  aspirin  chewable 81 milliGRAM(s) Oral daily  atorvastatin 80 milliGRAM(s) Oral at bedtime  clopidogrel Tablet 75 milliGRAM(s) Oral daily  dextrose 40% Gel 15 Gram(s) Oral once PRN  dextrose 5%. 1000 milliLiter(s) IV Continuous <Continuous>  dextrose 50% Injectable 12.5 Gram(s) IV Push once  gabapentin 600 milliGRAM(s) Oral two times a day  glucagon  Injectable 1 milliGRAM(s) IntraMuscular once PRN  insulin lispro (HumaLOG) corrective regimen sliding scale   SubCutaneous three times a day before meals  insulin lispro (HumaLOG) corrective regimen sliding scale   SubCutaneous at bedtime  metoprolol tartrate 25 milliGRAM(s) Oral daily  ondansetron Injectable 4 milliGRAM(s) IV Push every 6 hours PRN  pantoprazole    Tablet 40 milliGRAM(s) Oral before breakfast  traMADol 50 milliGRAM(s) Oral every 6 hours PRN  traMADol 25 milliGRAM(s) Oral every 6 hours PRN    ROS:  CONSTITUTIONAL:  Negative fever or chills +chest wall pain  EYES:  Negative  blurry vision or double vision  CARDIOVASCULAR:  Negative for chest pain or palpitations  RESPIRATORY:  Negative for cough, wheezing, or SOB   GASTROINTESTINAL:  Negative for nausea, vomiting, diarrhea, constipation, or abdominal pain  GENITOURINARY:  Negative frequency, urgency , dysuria or hematuria   NEUROLOGIC:  No headache, confusion, dizziness, lightheadedness  All other systems were reviewed and are negative    I&O's Detail    18 Nov 2019 07:01  -  18 Nov 2019 22:29  --------------------------------------------------------  IN:    Solution: 500 mL  Total IN: 500 mL    OUT:  Total OUT: 0 mL    Total NET: 500 mL      Physical Exam:  Vital Signs Last 24 Hrs  T(C): 36.6 (18 Nov 2019 16:35), Max: 36.8 (17 Nov 2019 23:10)  T(F): 97.9 (18 Nov 2019 16:35), Max: 98.2 (17 Nov 2019 23:10)  HR: 68 (18 Nov 2019 16:35) (57 - 87)  BP: 158/81 (18 Nov 2019 16:35) (131/78 - 176/97)  RR: 18 (18 Nov 2019 16:35) (14 - 18)  SpO2: 96% (18 Nov 2019 16:35) (92% - 99%)  Height (cm): 190 (11-18 @ 12:48)  Weight (kg): 100.2 (11-18 @ 12:48)  BMI (kg/m2): 27.8 (11-18 @ 12:48)  BSA (m2): 2.28 (11-18 @ 12:48)    General: well developed well nourished, in no acute distress  Eyes: sclera anicteric, pupils equal and reactive to light  ENMT: buccal mucosa moist, pharynx not injected  Neck: supple, trachea midline  Lungs: clear, no wheeze/rhonchi Chest wall drsg c/d/i  Cardiovascular: regular rate and rhythm, S1 S2  Abdomen: soft, nontender, no organomegaly present, bowel sounds normal  Neurological:  alert and oriented x3, Cranial Nerves II-XII grossly intact  Skin:no increased ecchymosis/petechiae/purpura  Lymph Nodes: no palpable cervical/supraclavicular lymph nodes enlargements  Extremities: no cyanosis/clubbing/edema    Labs:                         11.1   5.49  )-----------( 125      ( 18 Nov 2019 06:44 )             34.9   11-18    140  |  109<H>  |  14  ----------------------------<  112<H>  4.1   |  23  |  0.98    Ca    8.1<L>      18 Nov 2019 06:44    TPro  5.9<L>  /  Alb  3.1<L>  /  TBili  0.5  /  DBili  x   /  AST  15  /  ALT  22  /  AlkPhos  108  11-18      RECENT CULTURES:  pending    RADIOLOGY & ADDITIONAL STUDIES:  EXAM:  CT CHEST                            PROCEDURE DATE:  11/18/2019          INTERPRETATION:  CLINICAL INFORMATION: Lupus recorder and chest location   to the impression the abscess    COMPARISON: 6/29/2019    PROCEDURE:   CT of the Chest was performed without intravenous contrast.  Sagittal and coronal reformats were performed.      FINDINGS:    LUNGS AND AIRWAYS: Patent central airways.  Minimal bronchiectasis.   Chronic changes in the periphery of the bilateral lower lobes. Minimal   biapical pleural parenchymal scarring. No definite segmental   consolidations. No definite discrete nodules.    PLEURA: No pleural effusion.    MEDIASTINUM AND ALTA: Small hiatal hernia. No significant mediastinal or   hilar adenopathy.    VESSELS: Atherosclerotic changes of the aorta and coronary vasculature.   Prior valve replacement and question of ascending thoracic aortic repair.   No definite aortic aneurysm.    HEART: Heart size is normal. Small right paracardiac fluid density   decrease in size from prior exam.    CHEST WALL AND LOWER NECK: Skin thickening to the right of the midline in   the mid to upper chest with underlying subcutaneous edema. Additional   focal round fluid density measuring 1.3 cm with droplets of air seen   superiorlyand inferiorly likely representing reported partially drained   fluid collection. Skin thickening and subcutaneous edema compatible   cellulitis. A loop recorder is seen to the left of the midline. Minimal   infiltration of the subcutaneous fat to the right of the loop recorder   however this is 90 proximity to the partially drained fluid density.    VISUALIZED UPPER ABDOMEN: Gallstones. Mild elevation of the right   hemidiaphragm.    BONES: Degenerative changes. Prior sternotomy. Mild kyphosis.    IMPRESSION:     Anterior mid to upper chest just to the right of midline cellulitis with   additional findings suggesting partially drains fluid density. Loop   recorder seen to the left of the midline not in proximity to the   partially drained fluid density. Subcutaneous infiltrative changes extend   to the level of the loop recorder.    No segmental pulmonary consolidations.      Assessment :   Pt is 57 y/o male with PMH of HTN, DM, Aortic valve replacement 2015, ascending aneurysm repair 2015, s/p 2 stents 3/19 Admitted with iInfected sebaceous cyst with abscess and chest wall cellulitis. SP I&D in OR pod 0. Angely Fleming    Plan :   Cont Vancomycin and Zosyn  Fu cultures  Trend temps and wbc    D/w Hospitalist    Continue with present regime .  Approptiate use of antibiotics and adverse effects reviewed.      I have discussed the above plan of care with patient/family in detail. They expressed understanding of the treatment plan . Risks, benefits and alternatives discussed in detail. I have asked if they have any questions or concerns and appropriately addressed them to the best of my ability .      > 45 minutes spent in direct patient care reviewing  the notes, lab data/ imaging , discussion with multidisciplinary team. All questions were addressed and answered to the best of my capacity .    Thank you for allowing me to participate in the care of your patient .      Gaston Blanco MD  Infectious Disease  [phone]

## 2019-11-18 NOTE — CONSULT NOTE ADULT - ASSESSMENT
58m HTN, DM, bicuspid AV s/p bio AVR and aortoplasty 2015.  CAD s/p pci of a diagonal branch and rpda in 6-7/19, for which he has remained on dapt.  Has not followed with a cardiologist in several months.  He presents to ED c/o chest redness and pain for the past 5 days. Patient states he has had a pimple on his chest for the past 30 years that he occasionally has to pop. 5 days prior, he says his chest was hurting and he noted the pimple there, popped it and expressed a significant amount of pus. A few days later, a rash developed and spread on his right upper chest, and the chest pain worsened to the point where he went to urgent care yesterday. He was prescribed doxy 100 mg BID for which he was compliant with, and at that point per patient they did a small I+D but were superficial in their drainage attempt. His pain worsened in the past 24 hours, and he noticed the redness getting worse so he came to the ED. Denies pressure like chest pain, palpitations, SOB, diaphoresis, fever but admits to chills in past 30 minutes of being in the ED.     From a cv perspective he has not had sxs suggestive of angina, hf or arrhythmia.    -there is no evidence of acute ischemia.  -cad s/p pci diag and rpda in 6-7/2019  -cont statin  -cont dapt  -cont bb    -there is no evidence of significant arrhythmia.  -there is no evidence for meaningful  volume overload.    -mild global dysfunction based on echo  -no need to repeat at this time    -is optimized from a cv perspective for this low to intermediate risk surgery   -should cont dapt  -cont abx iv noting his bio avr  -will follow in the hospital and he will follow in my office after dc

## 2019-11-18 NOTE — BRIEF OPERATIVE NOTE - NSICDXBRIEFPROCEDURE_GEN_ALL_CORE_FT
PROCEDURES:  Drainage of simple skin abscess 18-Nov-2019 14:33:21  Dada Valdivia  Excision, sebaceous cyst, torso 18-Nov-2019 14:32:24  Dada Valdivia

## 2019-11-18 NOTE — PROGRESS NOTE ADULT - SUBJECTIVE AND OBJECTIVE BOX
Post Operative Note  Patient: DUSTY STRICKLAND 58y (1960) Male   MRN: [id number]  Location: [address]  Visit: 11-17-19 Inpatient  Date: 11-18-19 @ 20:33    Procedure: S/P I&D of infected sebaceous cyst of chest    Subjective:   Patient seen and examined at bedside, complaining of 6/10 pain at incision site, requesting IV pain medication to help him sleep.  Tolerating regular diet, voiding and ambulating.  Patient denies dizziness, substernal or pleuritic pain, sob, nausea or vomiting.    Objective:  Vitals: T(F): 97.9 (11-18-19 @ 16:35), Max: 98.2 (11-17-19 @ 23:10)  HR: 68 (11-18-19 @ 16:35)  BP: 158/81 (11-18-19 @ 16:35) (131/78 - 176/97)  RR: 18 (11-18-19 @ 16:35)  SpO2: 96% (11-18-19 @ 16:35)    In:   11-18-19 @ 07:01  -  11-18-19 @ 20:33  --------------------------------------------------------  IN: 500 mL    IV Fluids: dextrose 5%. 1000 milliLiter(s) (50 mL/Hr) IV Continuous <Continuous>    Out:   11-18-19 @ 07:01  -  11-18-19 @ 20:33  --------------------------------------------------------  OUT: 0 mL    EBL: 5mL    Voided Urine:   11-18-19 @ 07:01  -  11-18-19 @ 20:33  --------------------------------------------------------  OUT: 0 mL    PHYSICAL EXAM  GENERAL:  Well-nourished, well-developed male lying comfortably in bed in NAD  HEAD:  Normocephalic, atraumatic  CHEST: Surgical dressing clean and dry.  +TTP around incision site  ABDOMEN:  Soft, nondistended, nontender  EXTREMITIES: No calf tenderness  NEURO:  A&O x 3    Medications: [Standing]  acetaminophen  IVPB .. 1000 milliGRAM(s) IV Intermittent once  aspirin  chewable 81 milliGRAM(s) Oral daily  atorvastatin 80 milliGRAM(s) Oral at bedtime  clopidogrel Tablet 75 milliGRAM(s) Oral daily  dextrose 40% Gel 15 Gram(s) Oral once PRN  dextrose 5%. 1000 milliLiter(s) IV Continuous <Continuous>  dextrose 50% Injectable 12.5 Gram(s) IV Push once  gabapentin 600 milliGRAM(s) Oral two times a day  glucagon  Injectable 1 milliGRAM(s) IntraMuscular once PRN  insulin lispro (HumaLOG) corrective regimen sliding scale   SubCutaneous three times a day before meals  insulin lispro (HumaLOG) corrective regimen sliding scale   SubCutaneous at bedtime  metoprolol tartrate 25 milliGRAM(s) Oral daily  ondansetron Injectable 4 milliGRAM(s) IV Push every 6 hours PRN  pantoprazole    Tablet 40 milliGRAM(s) Oral before breakfast  piperacillin/tazobactam IVPB.. 3.375 Gram(s) IV Intermittent every 8 hours  traMADol 50 milliGRAM(s) Oral every 6 hours PRN  traMADol 25 milliGRAM(s) Oral every 6 hours PRN  vancomycin  IVPB 1500 milliGRAM(s) IV Intermittent every 12 hours    Medications: [PRN]  acetaminophen  IVPB .. 1000 milliGRAM(s) IV Intermittent once  aspirin  chewable 81 milliGRAM(s) Oral daily  atorvastatin 80 milliGRAM(s) Oral at bedtime  clopidogrel Tablet 75 milliGRAM(s) Oral daily  dextrose 40% Gel 15 Gram(s) Oral once PRN  dextrose 5%. 1000 milliLiter(s) IV Continuous <Continuous>  dextrose 50% Injectable 12.5 Gram(s) IV Push once  gabapentin 600 milliGRAM(s) Oral two times a day  glucagon  Injectable 1 milliGRAM(s) IntraMuscular once PRN  insulin lispro (HumaLOG) corrective regimen sliding scale   SubCutaneous three times a day before meals  insulin lispro (HumaLOG) corrective regimen sliding scale   SubCutaneous at bedtime  metoprolol tartrate 25 milliGRAM(s) Oral daily  ondansetron Injectable 4 milliGRAM(s) IV Push every 6 hours PRN  pantoprazole    Tablet 40 milliGRAM(s) Oral before breakfast  piperacillin/tazobactam IVPB.. 3.375 Gram(s) IV Intermittent every 8 hours  traMADol 50 milliGRAM(s) Oral every 6 hours PRN  traMADol 25 milliGRAM(s) Oral every 6 hours PRN  vancomycin  IVPB 1500 milliGRAM(s) IV Intermittent every 12 hours    Labs:                        11.1   5.49  )-----------( 125      ( 18 Nov 2019 06:44 )             34.9     11-18    140  |  109<H>  |  14  ----------------------------<  112<H>  4.1   |  23  |  0.98    Ca    8.1<L>      18 Nov 2019 06:44    TPro  5.9<L>  /  Alb  3.1<L>  /  TBili  0.5  /  DBili  x   /  AST  15  /  ALT  22  /  AlkPhos  108  11-18    PT/INR - ( 17 Nov 2019 20:23 )   PT: 12.8 sec;   INR: 1.12 ratio  PTT - ( 17 Nov 2019 20:23 )  PTT:29.4 sec    Imaging:  No post-op imaging studies    Assessment:  58 year old male patient S/P I&D of infected sebaceous cyst of chest.    Plan:  - IV tylenol ordered x 1 dose.  Continue PO tramadol PRN  - Continue diabetic/DASH diet  - Continue zosyn and vanco  - Incentive spirometry  - Encourage ambulation  - SCDs  - Follow up AM labs  - Will continue to monitor

## 2019-11-18 NOTE — PROGRESS NOTE ADULT - PROBLEM SELECTOR PLAN 1
-Patient s/p vanc and zosyn  -Will continue vanc at 1500 mg Q12H, and will continue zosyn   -Patient is for I+D  SubQ abscess by Dr Valdivia  [facility] for severe pain if not controlled. Tramadol 50 PRN severe, tramadol 25 PRN mod  -F/U ID consult.  -Pt is clinically stable, and cleared for this low risk procedure, pending cardio clearance.

## 2019-11-18 NOTE — PROGRESS NOTE ADULT - PROBLEM SELECTOR PLAN 2
-Patient with stent placement in march  -Continue plavix and baby asa and BB  -Pt asymptomatic, and NPO for procedure  -F/U Cardiology consult  for clearance.

## 2019-11-18 NOTE — BRIEF OPERATIVE NOTE - NSICDXBRIEFPREOP_GEN_ALL_CORE_FT
PRE-OP DIAGNOSIS:  Infected sebaceous cyst of skin 18-Nov-2019 14:35:11  Dada Valdivia  Skin abscess 18-Nov-2019 14:34:14  Dada Valdivia

## 2019-11-18 NOTE — BRIEF OPERATIVE NOTE - NSICDXBRIEFPOSTOP_GEN_ALL_CORE_FT
POST-OP DIAGNOSIS:  Skin abscess 18-Nov-2019 14:35:46  Dada Valdivia  Infected sebaceous cyst of skin 18-Nov-2019 14:35:31  Dada Valdivia

## 2019-11-18 NOTE — PROGRESS NOTE ADULT - SUBJECTIVE AND OBJECTIVE BOX
Patient is a 58y old  Male who presents with a chief complaint of Skin redness (18 Nov 2019 09:19)      INTERVAL HPI: Pt seen and examined bedside, c/o chest pain Rt >lt side. denies any fever, chills, SOB, N/V. Pt is for I&D today.   OVERNIGHT EVENTS:  T(C): 36.7 (11-18-19 @ 08:04), Max: 36.8 (11-17-19 @ 23:10)  HR: 57 (11-18-19 @ 08:04) (57 - 98)  BP: 132/79 (11-18-19 @ 08:04) (132/79 - 176/97)  RR: 16 (11-18-19 @ 08:04) (15 - 17)  SpO2: 98% (11-18-19 @ 08:04) (98% - 99%)  Wt(kg): --  I&O's Summary      Review of System:    REVIEW OF SYSTEMS  Constitutional: No fever, chills, fatigue  Neuro: No headache, numbness, weakness  Resp: No cough, wheezing, shortness of breath  CVS: +chest pain, no palpitations, leg swelling  GI: No abdominal pain, nausea, vomiting, diarrhea   : No dysuria, frequency, incontinence  Skin: No itching, burning, rashes, or lesions   Msk: No joint pain or swelling  Psych: No depression, anxiety, mood swings      PHYSICAL EXAM:  GENERAL: NAD, well-groomed, well-developed  HEAD:  Atraumatic, Normocephalic  EYES: EOMI, PERRLA, conjunctiva and sclera clear  ENMT:  Moist mucous membranes  NECK: Supple, No JVD, Normal thyroid  HEART: Regular rate and rhythm; No murmurs, rubs, or gallops  CHEST: erythema with tenderness of ant chest extending from Rt to lt side.  RESPIRATORY: CTA B/L, No wheezing/ rhonchi  ABDOMEN: Soft, Nontender, Nondistended; Bowel sounds present  NEUROLOGY: A&Ox3, nonfocal, CN II-XII grossly intact, sensation intact  EXTREMITIES:  2+ Peripheral Pulses, No clubbing, cyanosis, or edema  SKIN: warm, dry, normal color, no rash or abnormal lesions        LABS:                        11.1   5.49  )-----------( 125      ( 18 Nov 2019 06:44 )             34.9     11-18    140  |  109<H>  |  14  ----------------------------<  112<H>  4.1   |  23  |  0.98    Ca    8.1<L>      18 Nov 2019 06:44    TPro  5.9<L>  /  Alb  3.1<L>  /  TBili  0.5  /  DBili  x   /  AST  15  /  ALT  22  /  AlkPhos  108  11-18    PT/INR - ( 17 Nov 2019 20:23 )   PT: 12.8 sec;   INR: 1.12 ratio         PTT - ( 17 Nov 2019 20:23 )  PTT:29.4 sec    CAPILLARY BLOOD GLUCOSE      POCT Blood Glucose.: 101 mg/dL (18 Nov 2019 05:51)  POCT Blood Glucose.: 120 mg/dL (18 Nov 2019 00:44)  POCT Blood Glucose.: 183 mg/dL (17 Nov 2019 22:45)              Diet:    RADIOLOGY & ADDITIONAL TESTS:    Imaging Personally Reviewed:  [ ] YES  [ ] NO    Consultant(s) Notes Reviewed:  [ ] YES  [ ] NO    MEDICATIONS  (STANDING):  aspirin  chewable 81 milliGRAM(s) Oral daily  atorvastatin 80 milliGRAM(s) Oral at bedtime  clopidogrel Tablet 75 milliGRAM(s) Oral daily  dextrose 5%. 1000 milliLiter(s) (50 mL/Hr) IV Continuous <Continuous>  dextrose 50% Injectable 12.5 Gram(s) IV Push once  gabapentin 600 milliGRAM(s) Oral two times a day  insulin lispro (HumaLOG) corrective regimen sliding scale   SubCutaneous every 6 hours  metoprolol tartrate 25 milliGRAM(s) Oral daily  pantoprazole    Tablet 40 milliGRAM(s) Oral before breakfast  piperacillin/tazobactam IVPB.. 3.375 Gram(s) IV Intermittent every 8 hours  vancomycin  IVPB 1500 milliGRAM(s) IV Intermittent every 12 hours    MEDICATIONS  (PRN):  dextrose 40% Gel 15 Gram(s) Oral once PRN Blood Glucose LESS THAN 70 milliGRAM(s)/deciliter  glucagon  Injectable 1 milliGRAM(s) IntraMuscular once PRN Glucose LESS THAN 70 milligrams/deciliter  ondansetron Injectable 4 milliGRAM(s) IV Push every 6 hours PRN Nausea and/or Vomiting  traMADol 50 milliGRAM(s) Oral every 6 hours PRN Severe Pain (7 - 10)  traMADol 25 milliGRAM(s) Oral every 6 hours PRN Moderate Pain (4 - 6)        Disposition:    DVT Prophylaxis:  Subcu Heparin [  ]     LMWH [ ]     Coumadin [ ]    Xaeralto [ ]    Eliquis [ ]   Venodyne pumps [ ]    Discussed with Patient [x ]     Family [ ]          [ ]   RN[ ]      [ ]    Advance Directives:      Palliative Care:    Care Discussed with Consultants/Other Providers [x ] YES  [ ] NO

## 2019-11-18 NOTE — CONSULT NOTE ADULT - SUBJECTIVE AND OBJECTIVE BOX
[facility]         Merry Ordoñez, Rupesh, Effie, Graham, Yoan, Natasha        [phone] (office)    CHIEF COMPLAINT: Patient is a 58y old  Male who presents with a chief complaint of Skin redness (17 Nov 2019 23:17)      HPI:  58m HTN, DM, bicuspid AV s/p bio AVR and aortoplasty 2015.  CAD s/p pci of a diagonal branch and rpda in 3/19, for which he has remained on dapt.  Has not followed with a cardiologist in several months.  He presents to ED c/o chest redness and pain for the past 5 days. Patient states he has had a pimple on his chest for the past 30 years that he occasionally has to pop. 5 days prior, he says his chest was hurting and he noted the pimple there, popped it and expressed a significant amount of pus. A few days later, a rash developed and spread on his right upper chest, and the chest pain worsened to the point where he went to urgent care yesterday. He was prescribed doxy 100 mg BID for which he was compliant with, and at that point per patient they did a small I+D but were superficial in their drainage attempt. His pain worsened in the past 24 hours, and he noticed the redness getting worse so he came to the ED. Denies pressure like chest pain, palpitations, SOB, diaphoresis, fever but admits to chills in past 30 minutes of being in the ED.     From a cv perspective he has not had sxs suggestive of angina, hf or arrhythmia.    In the ED:  T; 98 HR: 98 BP: 167/97 RR; 15 Sat%:99  EKG: pending  CXR: Official read pending  s/p 1L NS bolus, s/p 1 dose vanc, s/p 1 dose zosyn (17 Nov 2019 21:37)      PAST MEDICAL & SURGICAL HISTORY:  S/P aneurysm repair  H/O aortic valve repair  Diabetes mellitus  HTN (hypertension)  Cardiac tamponade  Aortic valve replaced  Leg weakness  GERD (gastroesophageal reflux disease)  AAA (abdominal aortic aneurysm): dx 2012  Hypertension  Diabetes mellitus  H/O aortic valve repair  S/P aortic aneurysm repair  S/P AAA repair: Repaired 3/2015  Aortic valve replaced      SOCIAL HISTORY: No active tobacco, alcohol or illicit drug use    FAMILY HISTORY:  FH: HTN (hypertension)  Family history of COPD (chronic obstructive pulmonary disease) (Grandparent)  Family history of prostate cancer  Family history of stroke  Family history of hypertension  Family history of breast cancer   No pertinent family history of CAD    Outpatient medications: asa plavix metoprolol, atorva metformin    MEDICATIONS  (STANDING):  aspirin  chewable 81 milliGRAM(s) Oral daily  atorvastatin 80 milliGRAM(s) Oral at bedtime  clopidogrel Tablet 75 milliGRAM(s) Oral daily  dextrose 5%. 1000 milliLiter(s) (50 mL/Hr) IV Continuous <Continuous>  dextrose 50% Injectable 12.5 Gram(s) IV Push once  gabapentin 600 milliGRAM(s) Oral two times a day  insulin lispro (HumaLOG) corrective regimen sliding scale   SubCutaneous every 6 hours  metoprolol tartrate 25 milliGRAM(s) Oral daily  pantoprazole    Tablet 40 milliGRAM(s) Oral before breakfast  piperacillin/tazobactam IVPB.. 3.375 Gram(s) IV Intermittent every 8 hours  vancomycin  IVPB 1500 milliGRAM(s) IV Intermittent every 12 hours    MEDICATIONS  (PRN):  dextrose 40% Gel 15 Gram(s) Oral once PRN Blood Glucose LESS THAN 70 milliGRAM(s)/deciliter  glucagon  Injectable 1 milliGRAM(s) IntraMuscular once PRN Glucose LESS THAN 70 milligrams/deciliter  ondansetron Injectable 4 milliGRAM(s) IV Push every 6 hours PRN Nausea and/or Vomiting  traMADol 50 milliGRAM(s) Oral every 6 hours PRN Severe Pain (7 - 10)  traMADol 25 milliGRAM(s) Oral every 6 hours PRN Moderate Pain (4 - 6)      Allergies    Normodyne (Faint)  Normodyne (Unknown)    Intolerances        REVIEW OF SYSTEMS: Is negative for eye, ENT, GI, , allergic, dermatologic, musculoskeletal and neurologic, except as described above.    VITAL SIGNS:   Vital Signs Last 24 Hrs  T(C): 36.7 (18 Nov 2019 08:04), Max: 36.8 (17 Nov 2019 23:10)  T(F): 98.1 (18 Nov 2019 08:04), Max: 98.2 (17 Nov 2019 23:10)  HR: 57 (18 Nov 2019 08:04) (57 - 98)  BP: 132/79 (18 Nov 2019 08:04) (132/79 - 176/97)  BP(mean): --  RR: 16 (18 Nov 2019 08:04) (15 - 17)  SpO2: 98% (18 Nov 2019 08:04) (98% - 99%)    I&O's Summary      PHYSICAL EXAM:    Constitutional: NAD, awake and alert, well-developed  Eyes:  EOMI, no oral cyanosis, conjunctivae clear, anicteric.  Pulmonary: Non-labored, breath sounds are clear bilaterally, no wheezing, rales or rhonchi  Cardiovascular:  regular S1 and S2. No murmur.  No rubs, gallops or clicks  Gastrointestinal: Bowel Sounds present, soft, nontender.   Lymph: No peripheral edema.   Neurological: Alert, strength and sensitivity are grossly intact  Skin: erythmema fluctuance right anterior chest wall  Psych:  Mood & affect appropriate .    LABS: All Labs Reviewed:                        11.1 5.49  )-----------( 125      ( 18 Nov 2019 06:44 )             34.9                         12.7   7.34  )-----------( 170      ( 17 Nov 2019 20:23 )             39.6     18 Nov 2019 06:44    140    |  109    |  14     ----------------------------<  112    4.1     |  23     |  0.98   17 Nov 2019 20:23    136    |  104    |  17     ----------------------------<  144    4.0     |  22     |  1.10     Ca    8.1        18 Nov 2019 06:44  Ca    9.0        17 Nov 2019 20:23    TPro  5.9    /  Alb  3.1    /  TBili  0.5    /  DBili  x      /  AST  15     /  ALT  22     /  AlkPhos  108    18 Nov 2019 06:44  TPro  7.4    /  Alb  4.1    /  TBili  0.8    /  DBili  x      /  AST  19     /  ALT  30     /  AlkPhos  131    17 Nov 2019 20:23    PT/INR - ( 17 Nov 2019 20:23 )   PT: 12.8 sec;   INR: 1.12 ratio         PTT - ( 17 Nov 2019 20:23 )  PTT:29.4 sec      Blood Culture:         RADIOLOGY:  < from: Intra-Operative Transesopha Echo (w/3D) (03.16.15 @ 16:11) >    Patient name: DUSTY STRICKLAND  YOB: 1960   Age: 54 (M)   MR#: [id number]  Study Date: 3/16/2015  [facility]  Study quality: Technically good  Referring Physician: Jolie Keys MD  Blood Pressure:130/75 mmHg  Height: 6ft 3in  Weight: 220 lb  BSA: 2.3 m2  ------------------------------------------------------------------------  PROCEDURE: Intra operative transeophageal echocardiogram  with 2D, M mode and complete  Doppler examination. The  patient was approached in the operative suite after  induction of full anesthesia and transesophageal probe had  been positioned in the esophagus posterior to the heart.  Real-time and reconstructed 3-dimensional imaging was  performed.  Color Doppler analysis was carried out using  both 2D and 3D mapping.  INDICATION: Aortic Valve Disorder (424.1)  ------------------------------------------------------------------------  PRE-BYPASS OBSERVATIONS:  Mitral Valve: Normal mitral valve. Mild mitral  regurgitation.  Aortic Root: Normal aortic root.  Mild dilatation of the  ascending aorta (about  4.1 cm).  Normal aortic arch and  descending thoracic aorta.  Aortic Valve: Calcified bicuspid aortic valve with  decreased opening.  A heavily calcified midline raphe is  visualized. Peak transaortic valve gradient equals 59 mm  Hg, mean transaortic valve gradient equals 41 mm Hg,  estimated aortic valve area equals 0.6 sqcm (by continuity  equation and by planimetry), consistent with severe aortic  stenosis.  Left Atrium: Normal left atrium.  No left atrium or left  atrial appendage thrombus.  Left Ventricle: Normal left ventricular systolic function.  No segmental wall motion abnormalities.  Right Heart: Normal right atrium. Normal right ventricular  size and function. Normal tricuspid valve. Minimal  tricuspid regurgitation. Normal pulmonic valve.  Pericardium/PleuraNormal pericardium with no pericardial  effusion.  ------------------------------------------------------------------------  PRE-BYPASS CONCLUSIONS:  1. Normal mitral valve. Mild mitral regurgitation.  2. Calcified bicuspid aortic valve with decreased opening.  A heavily calcified midline raphe is visualized. Peak  transaortic valve gradient equals 59 mm Hg, mean  transaortic valve gradient equals 41 mm Hg, estimated  aortic valve area equals 0.6 sqcm (by continuity equation  and by planimetry), consistent with severe aortic stenosis.  3. Normal aortic root.  Mild dilatation of the ascending  aorta (about  4.1 cm).  Normal aortic arch and descending  thoracic aorta.  4. Normal left atrium.  No left atrium or left atrial  appendage thrombus.  5. Normal left ventricular systolic function. No segmental  wall motion abnormalities.  6. Normal right ventricular size and function.  ------------------------------------------------------------------------  POST-BYPASS CONCLUSIONS:  s/p AVR, aortoplasty:  A bioprosthetic valve is present in  the aortic position and is well seated with normal leaflet  motion.  No aortic regurgitation seen.  The peak systolic  transaortic gradient is 17 mmHg, with a mean gradient of 7  mmHg, which is normal for this type of valve.  Both LV and  RV systolic function remain normal.  ------------------------------------------------------------------------  Confirmed on  3/17/2015 - 07:24:00 by Rashad Chicas M.D.  ------------------------------------------------------------------------    < end of copied text >    JOSEFINA: cierra

## 2019-11-19 ENCOUNTER — TRANSCRIPTION ENCOUNTER (OUTPATIENT)
Age: 59
End: 2019-11-19

## 2019-11-19 LAB
ANION GAP SERPL CALC-SCNC: 6 MMOL/L — SIGNIFICANT CHANGE UP (ref 5–17)
BUN SERPL-MCNC: 11 MG/DL — SIGNIFICANT CHANGE UP (ref 7–23)
CALCIUM SERPL-MCNC: 8.6 MG/DL — SIGNIFICANT CHANGE UP (ref 8.5–10.1)
CHLORIDE SERPL-SCNC: 105 MMOL/L — SIGNIFICANT CHANGE UP (ref 96–108)
CO2 SERPL-SCNC: 25 MMOL/L — SIGNIFICANT CHANGE UP (ref 22–31)
CREAT SERPL-MCNC: 0.97 MG/DL — SIGNIFICANT CHANGE UP (ref 0.5–1.3)
GLUCOSE SERPL-MCNC: 140 MG/DL — HIGH (ref 70–99)
HCT VFR BLD CALC: 36.7 % — LOW (ref 39–50)
HGB BLD-MCNC: 11.5 G/DL — LOW (ref 13–17)
MCHC RBC-ENTMCNC: 26.6 PG — LOW (ref 27–34)
MCHC RBC-ENTMCNC: 31.3 GM/DL — LOW (ref 32–36)
MCV RBC AUTO: 84.8 FL — SIGNIFICANT CHANGE UP (ref 80–100)
NRBC # BLD: 0 /100 WBCS — SIGNIFICANT CHANGE UP (ref 0–0)
PLATELET # BLD AUTO: 155 K/UL — SIGNIFICANT CHANGE UP (ref 150–400)
POTASSIUM SERPL-MCNC: 4.8 MMOL/L — SIGNIFICANT CHANGE UP (ref 3.5–5.3)
POTASSIUM SERPL-SCNC: 4.8 MMOL/L — SIGNIFICANT CHANGE UP (ref 3.5–5.3)
RBC # BLD: 4.33 M/UL — SIGNIFICANT CHANGE UP (ref 4.2–5.8)
RBC # FLD: 14.1 % — SIGNIFICANT CHANGE UP (ref 10.3–14.5)
SODIUM SERPL-SCNC: 136 MMOL/L — SIGNIFICANT CHANGE UP (ref 135–145)
VANCOMYCIN TROUGH SERPL-MCNC: 16.2 UG/ML — SIGNIFICANT CHANGE UP (ref 10–20)
WBC # BLD: 4.98 K/UL — SIGNIFICANT CHANGE UP (ref 3.8–10.5)
WBC # FLD AUTO: 4.98 K/UL — SIGNIFICANT CHANGE UP (ref 3.8–10.5)

## 2019-11-19 PROCEDURE — 99232 SBSQ HOSP IP/OBS MODERATE 35: CPT

## 2019-11-19 PROCEDURE — 99233 SBSQ HOSP IP/OBS HIGH 50: CPT

## 2019-11-19 RX ORDER — LINEZOLID 600 MG/300ML
600 INJECTION, SOLUTION INTRAVENOUS EVERY 12 HOURS
Refills: 0 | Status: DISCONTINUED | OUTPATIENT
Start: 2019-11-19 | End: 2019-11-21

## 2019-11-19 RX ADMIN — Medication 81 MILLIGRAM(S): at 11:31

## 2019-11-19 RX ADMIN — ATORVASTATIN CALCIUM 80 MILLIGRAM(S): 80 TABLET, FILM COATED ORAL at 22:00

## 2019-11-19 RX ADMIN — GABAPENTIN 600 MILLIGRAM(S): 400 CAPSULE ORAL at 05:22

## 2019-11-19 RX ADMIN — GABAPENTIN 600 MILLIGRAM(S): 400 CAPSULE ORAL at 18:26

## 2019-11-19 RX ADMIN — PIPERACILLIN AND TAZOBACTAM 25 GRAM(S): 4; .5 INJECTION, POWDER, LYOPHILIZED, FOR SOLUTION INTRAVENOUS at 05:22

## 2019-11-19 RX ADMIN — CLOPIDOGREL BISULFATE 75 MILLIGRAM(S): 75 TABLET, FILM COATED ORAL at 11:31

## 2019-11-19 RX ADMIN — LINEZOLID 600 MILLIGRAM(S): 600 INJECTION, SOLUTION INTRAVENOUS at 22:00

## 2019-11-19 RX ADMIN — PANTOPRAZOLE SODIUM 40 MILLIGRAM(S): 20 TABLET, DELAYED RELEASE ORAL at 05:22

## 2019-11-19 RX ADMIN — LINEZOLID 600 MILLIGRAM(S): 600 INJECTION, SOLUTION INTRAVENOUS at 11:31

## 2019-11-19 NOTE — PROGRESS NOTE ADULT - ASSESSMENT
Rhiannon Couch DNP, NP-C  Cardiology   Spectra #[phone] 58m HTN, DM, bicuspid AV s/p bio AVR and aortoplasty 2015.  CAD s/p pci of a diagonal branch and rpda in 6-7/19, for which he has remained on dapt.  Has not followed with a cardiologist in several months.  He presents to ED c/o chest redness and pain for the past 5 days. Patient states he has had a pimple on his chest for the past 30 years that he occasionally has to pop. 5 days prior, he says his chest was hurting and he noted the pimple there, popped it and expressed a significant amount of pus. A few days later, a rash developed and spread on his right upper chest, and the chest pain worsened to the point where he went to urgent care yesterday. He was prescribed doxy 100 mg BID for which he was compliant with, and at that point per patient they did a small I+D but were superficial in their drainage attempt. His pain worsened in the past 24 hours, and he noticed the redness getting worse so he came to the ED. Denies pressure like chest pain, palpitations, SOB, diaphoresis, fever but admits to chills in past 30 minutes of being in the ED.     CAD  - He has clinical evidence of acute ischemia.  - s/p recent pci diag and rpda in 6-7/2019  - Continue DAPT  - Continue BB  - Continue statin  - No evidence of significant arrhythmia.  - No evidence for meaningful  volume overload.    Mild global LVSD  - TTE in 7/2019 showed EF 45-50% with SWMA, bioprosthesis in the aortic position  - No need to repeat at this time    Chest wall cellulitis  - s/p I/D and cyst excision  - Care per Sx  - Pain control  - Continue abx IV noting his bio avr    Will follow in the hospital and he will follow with Dr. Chou as outpatient.  He was following up with Dr. Zhang but now he wishes to follow with us  Other w/u per Primary/Sx/ID    Rhiannon Couch DNP, NP-C  Cardiology   Spectra #[phone]

## 2019-11-19 NOTE — DISCHARGE NOTE PROVIDER - NSDCMRMEDTOKEN_GEN_ALL_CORE_FT
aspirin 81 mg oral tablet, chewable: 1 tab(s) orally once a day  atorvastatin 80 mg oral tablet: 1 tab(s) orally once a day (at bedtime)  clopidogrel 75 mg oral tablet: 1 tab(s) orally once a day  gabapentin 600 mg oral tablet: 1 tab(s) orally 2 times a day  metFORMIN 1000 mg oral tablet: may restart on 7/ 6  1 tab(s) orally once a day  metoprolol tartrate 50 mg oral tablet: 1 tab(s) orally once a day  Protonix 40 mg oral delayed release tablet: 1 tab(s) orally once a day acetaminophen 325 mg oral tablet: 2 tab(s) orally every 6 hours, As needed  aspirin 81 mg oral tablet, chewable: 1 tab(s) orally once a day  atorvastatin 80 mg oral tablet: 1 tab(s) orally once a day (at bedtime)  cefpodoxime 200 mg oral tablet: 1 tab(s) orally every 12 hours  clopidogrel 75 mg oral tablet: 1 tab(s) orally once a day  gabapentin 600 mg oral tablet: 1 tab(s) orally 2 times a day  metFORMIN 1000 mg oral tablet:   1 tab(s) orally once a day  metoprolol tartrate 50 mg oral tablet: 1 tab(s) orally once a day  Protonix 40 mg oral delayed release tablet: 1 tab(s) orally once a day

## 2019-11-19 NOTE — PROGRESS NOTE ADULT - SUBJECTIVE AND OBJECTIVE BOX
[facility] -- Merry Ordoñez, Effie Goddard, Yoan Stevenson [place]ella, [place]  Office # [phone]    Follow Up:  Preop Oprimization    Subjective/Observations:     REVIEW OF SYSTEMS: All other review of systems is negative unless indicated above  PAST MEDICAL & SURGICAL HISTORY:  S/P aneurysm repair  H/O aortic valve repair  Diabetes mellitus  HTN (hypertension)  Cardiac tamponade  Aortic valve replaced  Leg weakness  GERD (gastroesophageal reflux disease)  AAA (abdominal aortic aneurysm): dx   Hypertension  Diabetes mellitus  H/O aortic valve repair  S/P aortic aneurysm repair  S/P AAA repair: Repaired 3/2015  Aortic valve replaced    MEDICATIONS  (STANDING):  aspirin  chewable 81 milliGRAM(s) Oral daily  atorvastatin 80 milliGRAM(s) Oral at bedtime  clopidogrel Tablet 75 milliGRAM(s) Oral daily  dextrose 5%. 1000 milliLiter(s) (50 mL/Hr) IV Continuous <Continuous>  dextrose 50% Injectable 12.5 Gram(s) IV Push once  gabapentin 600 milliGRAM(s) Oral two times a day  insulin lispro (HumaLOG) corrective regimen sliding scale   SubCutaneous three times a day before meals  insulin lispro (HumaLOG) corrective regimen sliding scale   SubCutaneous at bedtime  linezolid    Tablet 600 milliGRAM(s) Oral every 12 hours  metoprolol tartrate 25 milliGRAM(s) Oral daily  pantoprazole    Tablet 40 milliGRAM(s) Oral before breakfast    MEDICATIONS  (PRN):  dextrose 40% Gel 15 Gram(s) Oral once PRN Blood Glucose LESS THAN 70 milliGRAM(s)/deciliter  glucagon  Injectable 1 milliGRAM(s) IntraMuscular once PRN Glucose LESS THAN 70 milligrams/deciliter    Allergies    Normodyne (Faint)  Normodyne (Unknown)    Intolerances      Vital Signs Last 24 Hrs  T(C): 36.5 (2019 07:35), Max: 36.7 (2019 00:22)  T(F): 97.7 (2019 07:35), Max: 98 (2019 00:22)  HR: 65 (2019 07:35) (57 - 68)  BP: 156/85 (2019 07:35) (112/70 - 158/81)  BP(mean): --  RR: 17 (2019 07:35) (14 - 18)  SpO2: 98% (2019 07:35) (93% - 99%)  I&O's Summary    2019 07:01  -  2019 07:00  --------------------------------------------------------  IN: 500 mL / OUT: 300 mL / NET: 200 mL    2019 07:01  -  2019 15:53  --------------------------------------------------------  IN: 840 mL / OUT: 0 mL / NET: 840 mL        PHYSICAL EXAM:  TELE:   Constitutional: NAD, awake and alert, well-developed  HEENT: Moist Mucous Membranes, Anicteric  Pulmonary: Non-labored, breath sounds are clear bilaterally, No wheezing, rales or rhonchi  Cardiovascular: Regular, S1 and S2, No murmurs, rubs, gallops or clicks  Gastrointestinal: Bowel Sounds present, soft, nontender.   Lymph: No peripheral edema. No lymphadenopathy.  Skin: No visible rashes or ulcers.  Psych:  Mood & affect appropriate  LABS: All Labs Reviewed:                        11.5     )-----------( 155      ( 2019 07:18 )             36.7                         11.1   5.49  )-----------( 125      ( 2019 06:44 )             34.9                         12.7   7.34  )-----------( 170      ( 2019 20:23 )             39.6     2019 07:18    136    |  105    |  11     ----------------------------<  140    4.8     |  25     |  0.97   2019 06:44    140    |  109    |  14     ----------------------------<  112    4.1     |  23     |  0.98   2019 20:23    136    |  104    |  17     ----------------------------<  144    4.0     |  22     |  1.10     Ca    8.6        2019 07:18  Ca    8.1        2019 06:44  Ca    9.0        2019 20:23    TPro  5.9    /  Alb  3.1    /  TBili  0.5    /  DBili  x      /  AST  15     /  ALT  22     /  AlkPhos  108    2019 06:44  TPro  7.4    /  Alb  4.1    /  TBili  0.8    /  DBili  x      /  AST  19     /  ALT  30     /  AlkPhos  131    2019 20:23    PT/INR - ( 2019 20:23 )   PT: 12.8 sec;   INR: 1.12 ratio    PTT - ( 2019 20:23 )  PTT:29.4 sec    < from: TTE Echo Complete w/Doppler (19 @ 13:16) >    EXAM:  ECHO TRANSTHORACIC COMP W DOPP      PROCEDURE DATE:  2019   .  INTERPRETATION:  REPORT:    TRANSTHORACIC ECHOCARDIOGRAM REPORT    Patient Name:   DUSTY STRICKLAND Patient Location: Inpatient  Medical Rec #:  [id number]        Accession #:      [id number]  Account #:      [id number]        Height:           74.8 in 190.0 cm  YOB: 1960      Weight:           229.3 lb 104.00 kg  Patient Age:    58 years        BSA:              2.32 m²  Patient Gender: M   BP:               129/82 mmHg       Date of Exam:        2019 1:16:32 PM  Sonographer:         Janina Alberto  Referring Physician: J Luis Hill MD    Procedure:   2D Echo/Doppler/Color Doppler Complete and Echocardiogram   with               Definity Contrast.  Indications: Chest pain, unspecified - R07.9  Diagnosis:   Chest pain, unspecified - R07.9    2D AND M-MODE MEASUREMENTS (normal ranges within parentheses):  Left Ventricle:                  Normal   Aorta/Left Atrium:   Normal  IVSd (2D):              1.16 cm (0.7-1.1) LA Volume Index    35.3 ml/m²  LVPWd (2D):             1.38 cm (0.7-1.1)  LVIDd (2D):             4.72 cm (3.4-5.7)  Relative Wall Thickness  0.58    (<0.42)    LV SYSTOLIC FUNCTION BY 2D PLANIMETRY (MOD):  EF-A2C View: 27.9 % EF-Biplane: 46 %    LV DIASTOLIC FUNCTION:  MV Peak E: 0.82 m/s Decel Time: 220 msec  MV Peak A: 0.66 m/s  E/A Ratio: 1.23    SPECTRAL DOPPLER ANALYSIS (where applicable):  Mitral Valve:  MV P1/2 Time: 63.71 msec  MV Area, PHT: 3.45 cm²    Aortic Valve: AoV Max Jasvir: 2.19 m/s AoV Peak P.2 mmHg AoV Mean PG:   10.2 mmHg    AoV Area, Vmax:  AoV Area, VTI:  AoV Area, Vmn:  Ao VTI: 0.465  Tricuspid Valve and PA/RV Systolic Pressure: TR Max Velocity: 2.46 m/s RA   Pressure: 3 mmHg RVSP/PASP: 27.2 mmHg     PHYSICIAN INTERPRETATION:  Left Ventricle: Endocardial visualization was enhanced with intravenous   echo contrast. The left ventricular internal cavity size is normal.  Global LV systolic function was mildly decreased. Left ventricular   ejection fraction, by visual estimation, is 45 to 50%. The left   ventricular diastolic function could not be assessed in this study.       LV Wall Scoring:  The apical inferior segment is akinetic. The mid and apical anterior   wall, mid  inferior segment, and apex are hypokinetic.    Right Ventricle: Normal right ventricular size and function.  Left Atrium: The left atrium is normal in size.  Right Atrium: The right atrium is normal in size.  Pericardium: There is no evidence of pericardial effusion.  Mitral Valve: Structurally normal mitral valve, with normal leaflet   excursion. Mild mitral valve regurgitation is seen.  Tricuspid Valve: Structurally normal tricuspid valve, with normal leaflet   excursion. Mild tricuspid regurgitation is visualized.  Aortic Valve: A bioprosthetic AoV is visualized. Peak aortic valve   gradient is 19.2 mmHg and the mean gradient is 10.2 mmHg, which is   probably normal in the setting of a prosthetic aortic valve. Trivial   aortic valve regurgitation is seen.  Pulmonic Valve: Structurally normal pulmonic valve, with normal leaflet   excursion. Trace pulmonic valve regurgitation.  Pulmonary Artery: The main pulmonary artery is normal in size.  Venous: The inferior vena cava was normal sized, with respiratory size   variation greater than 50%.       Summary:   1. Left ventricular ejection fraction, by visual estimation, is 45 to   50%.   2. Mildly decreased global left ventricular systolic function.   3. Apical inferior segment, mid and apical anterior wall, mid inferior   segment, and apex are abnormal as described above.   4. The left ventricular diastolic function could not be assessed in this   study.   5. There is no evidence of pericardial effusion.   6. Bioprosthesis in the aortic position.   7. Endocardial visualization was enhanced with intravenous echo contrast.   8. Peak aortic valve gradient is 19.2 mmHg and the mean gradient is 10.2   mmHg, which is probably normal in the setting of a prosthetic aortic  valve.    MD Alfa Electronically signed on 2019 at 5:09:04 PM     *** Final ***    ERICK GOLDSTEIN MD  This document has been electronically signed. 2019  1:16PM     < end of copied text >    < from: CT Chest No Cont (11.. @ 12:29) >    EXAM:  CT CHEST                            PROCEDURE DATE:  2019          INTERPRETATION:  CLINICAL INFORMATION: Lupus recorder and chest location   to the impression the abscess    COMPARISON: 2019    PROCEDURE:   CT of the Chest was performed without intravenous contrast.  Sagittal and coronal reformats were performed.      FINDINGS:    LUNGS AND AIRWAYS: Patent central airways.  Minimal bronchiectasis.   Chronic changes in the periphery of the bilateral lower lobes. Minimal   biapical pleural parenchymal scarring. No definite segmental   consolidations. No definite discrete nodules.    PLEURA: No pleural effusion.    MEDIASTINUM AND ALTA: Small hiatal hernia. No significant mediastinal or   hilar adenopathy.    VESSELS: Atherosclerotic changes of the aorta and coronary vasculature.   Prior valve replacement and question of ascending thoracic aortic repair.   No definite aortic aneurysm.    HEART: Heart size is normal. Small right paracardiac fluid density   decrease in size from prior exam.    CHEST WALL AND LOWER NECK: Skin thickening to the right of the midline in   the mid to upper chest with underlying subcutaneous edema. Additional   focal round fluid density measuring 1.3 cm with droplets of air seen   superiorlyand inferiorly likely representing reported partially drained   fluid collection. Skin thickening and subcutaneous edema compatible   cellulitis. A loop recorder is seen to the left of the midline. Minimal   infiltration of the subcutaneous fat to the right of the loop recorder   however this is 90 proximity to the partially drained fluid density.    VISUALIZED UPPER ABDOMEN: Gallstones. Mild elevation of the right   hemidiaphragm.    BONES: Degenerative changes. Prior sternotomy. Mild kyphosis.    IMPRESSION:     Anterior mid to upper chest just to the right of midline cellulitis with   additional findings suggesting partially drains fluid density. Loop   recorder seen to the left of the midline not in proximity to the   partially drained fluid density. Subcutaneous infiltrative changes extend   to the level of the loop recorder.    No segmental pulmonary consolidations.    EARNEST HARGROVE M.D., ATTENDING RADIOLOGIST  This document has been electronically signed. 2019  2:02PM      < end of copied text >    < from: 12 Lead ECG (19 @ 20:25) >    Ventricular Rate 86 BPM    Atrial Rate 86 BPM    P-R Interval 146 ms    QRS Duration 100 ms    Q-T Interval 372 ms    QTC Calculation(Bezet) 445 ms    P Axis -4 degrees    R Axis -39 degrees    T Axis 41 degrees    Diagnosis Line Normal sinus rhythm  Left axis deviation  Incomplete right bundle branch block  Voltage criteria for left ventricular hypertrophy  Abnormal ECG  No previous ECGs available  Confirmed by Ried Chou MD ([id number]) on 2019 1:17:00 PM    < end of copied text > [facility] -- Merry Ordoñez, Effie Goddard, Yoan Stevenson Savella, [place]  Office # [phone]    Follow Up:  Preop Oprimization    Subjective/Observations: Awake and alert, very upset about events today.  Per patient, he had significant bleeding from his incision site.  Very anxious.  Denies any respiratory or cardiac discomfort.    REVIEW OF SYSTEMS: All other review of systems is negative unless indicated above  PAST MEDICAL & SURGICAL HISTORY:  S/P aneurysm repair  H/O aortic valve repair  Diabetes mellitus  HTN (hypertension)  Cardiac tamponade  Aortic valve replaced  Leg weakness  GERD (gastroesophageal reflux disease)  AAA (abdominal aortic aneurysm): dx   Hypertension  Diabetes mellitus  H/O aortic valve repair  S/P aortic aneurysm repair  S/P AAA repair: Repaired 3/2015  Aortic valve replaced    MEDICATIONS  (STANDING):  aspirin  chewable 81 milliGRAM(s) Oral daily  atorvastatin 80 milliGRAM(s) Oral at bedtime  clopidogrel Tablet 75 milliGRAM(s) Oral daily  dextrose 5%. 1000 milliLiter(s) (50 mL/Hr) IV Continuous <Continuous>  dextrose 50% Injectable 12.5 Gram(s) IV Push once  gabapentin 600 milliGRAM(s) Oral two times a day  insulin lispro (HumaLOG) corrective regimen sliding scale   SubCutaneous three times a day before meals  insulin lispro (HumaLOG) corrective regimen sliding scale   SubCutaneous at bedtime  linezolid    Tablet 600 milliGRAM(s) Oral every 12 hours  metoprolol tartrate 25 milliGRAM(s) Oral daily  pantoprazole    Tablet 40 milliGRAM(s) Oral before breakfast    MEDICATIONS  (PRN):  dextrose 40% Gel 15 Gram(s) Oral once PRN Blood Glucose LESS THAN 70 milliGRAM(s)/deciliter  glucagon  Injectable 1 milliGRAM(s) IntraMuscular once PRN Glucose LESS THAN 70 milligrams/deciliter    Allergies    Normodyne (Faint)  Normodyne (Unknown)    Intolerances    Vital Signs Last 24 Hrs  T(C): 36.5 (2019 07:35), Max: 36.7 (2019 00:22)  T(F): 97.7 (2019 07:35), Max: 98 (2019 00:22)  HR: 65 (2019 07:35) (57 - 68)  BP: 156/85 (2019 07:35) (112/70 - 158/81)  BP(mean): --  RR: 17 (2019 07:35) (14 - 18)  SpO2: 98% (2019 07:35) (93% - 99%)  I&O's Summary    2019 07:01  -  2019 07:00  --------------------------------------------------------  IN: 500 mL / OUT: 300 mL / NET: 200 mL    2019 07:01  -  2019 15:53  --------------------------------------------------------  IN: 840 mL / OUT: 0 mL / NET: 840 mL     PHYSICAL EXAM:  TELE: NSR  Constitutional: NAD, awake and alert, morbidly obese  HEENT: Moist Mucous Membranes, Anicteric  Pulmonary: Non-labored, breath sounds are clear bilaterally, No wheezing, rales or rhonchi  Cardiovascular: Regular, S1 and S2, No murmurs, rubs, gallops or clicks  Gastrointestinal: Bowel Sounds present, soft, nontender.   Lymph: Trace BLE edema. No lymphadenopathy.  Skin: No visible rashes or ulcers.  chest wall dressing dry and intact  Psych:  Mood & affect appropriate  LABS: All Labs Reviewed:                        11.5   4.98  )-----------( 155      ( 2019 07:18 )             36.7                         11.1   5.49  )-----------( 125      ( 2019 06:44 )             34.9                         12.7   7.34  )-----------( 170      ( 2019 20:23 )             39.6     2019 07:18    136    |  105    |  11     ----------------------------<  140    4.8     |  25     |  0.97   2019 06:44    140    |  109    |  14     ----------------------------<  112    4.1     |  23     |  0.98   2019 20:23    136    |  104    |  17     ----------------------------<  144    4.0     |  22     |  1.10     Ca    8.6        2019 07:18  Ca    8.1        2019 06:44  Ca    9.0        2019 20:23    TPro  5.9    /  Alb  3.1    /  TBili  0.5    /  DBili  x      /  AST  15     /  ALT  22     /  AlkPhos  108    2019 06:44  TPro  7.4    /  Alb  4.1    /  TBili  0.8    /  DBili  x      /  AST  19     /  ALT  30     /  AlkPhos  131    2019 20:23    PT/INR - ( 2019 20:23 )   PT: 12.8 sec;   INR: 1.12 ratio    PTT - ( 2019 20:23 )  PTT:29.4 sec    < from: TTE Echo Complete w/Doppler (19 @ 13:16) >    EXAM:  ECHO TRANSTHORACIC COMP W DOPP      PROCEDURE DATE:  2019   .  INTERPRETATION:  REPORT:    TRANSTHORACIC ECHOCARDIOGRAM REPORT    Patient Name:   DUSTY STRICKLAND Patient [facility]  Medical Rec #:  [id number]        Accession #:      [id number]  Account #:      [id number]        Height:           74.8 in 190.0 cm  YOB: 1960      Weight:           229.3 lb 104.00 kg  Patient Age:    58 years        BSA:              2.32 m²  Patient Gender: M   BP:               129/82 mmHg       Date of Exam:        2019 1:16:32 PM  Sonographer:         Janina Alberto  Referring Physician: J Luis Hill MD    Procedure:   2D Echo/Doppler/Color Doppler Complete and Echocardiogram   with               Definity Contrast.  Indications: Chest pain, unspecified - R07.9  Diagnosis:   Chest pain, unspecified - R07.9    2D AND M-MODE MEASUREMENTS (normal ranges within parentheses):  Left Ventricle:                  Normal   Aorta/Left Atrium:   Normal  IVSd (2D):              1.16 cm (0.7-1.1) LA Volume Index    35.3 ml/m²  LVPWd (2D):             1.38 cm (0.7-1.1)  LVIDd (2D):             4.72 cm (3.4-5.7)  Relative Wall Thickness  0.58    (<0.42)    LV SYSTOLIC FUNCTION BY 2D PLANIMETRY (MOD):  EF-A2C View: 27.9 % EF-Biplane: 46 %    LV DIASTOLIC FUNCTION:  MV Peak E: 0.82 m/s Decel Time: 220 msec  MV Peak A: 0.66 m/s  E/A Ratio: 1.23    SPECTRAL DOPPLER ANALYSIS (where applicable):  Mitral Valve:  MV P1/2 Time: 63.71 msec  MV Area, PHT: 3.45 cm²    Aortic Valve: AoV Max Jasvir: 2.19 m/s AoV Peak P.2 mmHg AoV Mean PG:   10.2 mmHg    AoV Area, Vmax:  AoV Area, VTI:  AoV Area, Vmn:  Ao VTI: 0.465  Tricuspid Valve and PA/RV Systolic Pressure: TR Max Velocity: 2.46 m/s RA   Pressure: 3 mmHg RVSP/PASP: 27.2 mmHg     PHYSICIAN INTERPRETATION:  Left Ventricle: Endocardial visualization was enhanced with intravenous   echo contrast. The left ventricular internal cavity size is normal.  Global LV systolic function was mildly decreased. Left ventricular   ejection fraction, by visual estimation, is 45 to 50%. The left   ventricular diastolic function could not be assessed in this study.       LV Wall Scoring:  The apical inferior segment is akinetic. The mid and apical anterior   wall, mid  inferior segment, and apex are hypokinetic.    Right Ventricle: Normal right ventricular size and function.  Left Atrium: The left atrium is normal in size.  Right Atrium: The right atrium is normal in size.  Pericardium: There is no evidence of pericardial effusion.  Mitral Valve: Structurally normal mitral valve, with normal leaflet   excursion. Mild mitral valve regurgitation is seen.  Tricuspid Valve: Structurally normal tricuspid valve, with normal leaflet   excursion. Mild tricuspid regurgitation is visualized.  Aortic Valve: A bioprosthetic AoV is visualized. Peak aortic valve   gradient is 19.2 mmHg and the mean gradient is 10.2 mmHg, which is   probably normal in the setting of a prosthetic aortic valve. Trivial   aortic valve regurgitation is seen.  Pulmonic Valve: Structurally normal pulmonic valve, with normal leaflet   excursion. Trace pulmonic valve regurgitation.  Pulmonary Artery: The main pulmonary artery is normal in size.  Venous: The inferior vena cava was normal sized, with respiratory size   variation greater than 50%.       Summary:   1. Left ventricular ejection fraction, by visual estimation, is 45 to   50%.   2. Mildly decreased global left ventricular systolic function.   3. Apical inferior segment, mid and apical anterior wall, mid inferior   segment, and apex are abnormal as described above.   4. The left ventricular diastolic function could not be assessed in this   study.   5. There is no evidence of pericardial effusion.   6. Bioprosthesis in the aortic position.   7. Endocardial visualization was enhanced with intravenous echo contrast.   8. Peak aortic valve gradient is 19.2 mmHg and the mean gradient is 10.2   mmHg, which is probably normal in the setting of a prosthetic aortic  valve.    MD lAfa Electronically signed on 2019 at 5:09:04 PM     *** Final ***    ERICK GOLDSTEIN MD  This document has been electronically signed. 2019  1:16PM     < end of copied text >    < from: CT Chest No Cont (11.18.19 @ 12:29) >    EXAM:  CT CHEST                            PROCEDURE DATE:  2019          INTERPRETATION:  CLINICAL INFORMATION: Lupus recorder and chest location   to the impression the abscess    COMPARISON: 2019    PROCEDURE:   CT of the Chest was performed without intravenous contrast.  Sagittal and coronal reformats were performed.      FINDINGS:    LUNGS AND AIRWAYS: Patent central airways.  Minimal bronchiectasis.   Chronic changes in the periphery of the bilateral lower lobes. Minimal   biapical pleural parenchymal scarring. No definite segmental   consolidations. No definite discrete nodules.    PLEURA: No pleural effusion.    MEDIASTINUM AND ALTA: Small hiatal hernia. No significant mediastinal or   hilar adenopathy.    VESSELS: Atherosclerotic changes of the aorta and coronary vasculature.   Prior valve replacement and question of ascending thoracic aortic repair.   No definite aortic aneurysm.    HEART: Heart size is normal. Small right paracardiac fluid density   decrease in size from prior exam.    CHEST WALL AND LOWER NECK: Skin thickening to the right of the midline in   the mid to upper chest with underlying subcutaneous edema. Additional   focal round fluid density measuring 1.3 cm with droplets of air seen   superiorlyand inferiorly likely representing reported partially drained   fluid collection. Skin thickening and subcutaneous edema compatible   cellulitis. A loop recorder is seen to the left of the midline. Minimal   infiltration of the subcutaneous fat to the right of the loop recorder   however this is 90 proximity to the partially drained fluid density.    VISUALIZED UPPER ABDOMEN: Gallstones. Mild elevation of the right   hemidiaphragm.    BONES: Degenerative changes. Prior sternotomy. Mild kyphosis.    IMPRESSION:     Anterior mid to upper chest just to the right of midline cellulitis with   additional findings suggesting partially drains fluid density. Loop   recorder seen to the left of the midline not in proximity to the   partially drained fluid density. Subcutaneous infiltrative changes extend   to the level of the loop recorder.    No segmental pulmonary consolidations.    EARNEST HARGROVE M.D., ATTENDING RADIOLOGIST  This document has been electronically signed. 2019  2:02PM      < end of copied text >    < from: 12 Lead ECG (19 @ 20:25) >    Ventricular Rate 86 BPM    Atrial Rate 86 BPM    P-R Interval 146 ms    QRS Duration 100 ms    Q-T Interval 372 ms    QTC Calculation(Bezet) 445 ms    P Axis -4 degrees    R Axis -39 degrees    T Axis 41 degrees    Diagnosis Line Normal sinus rhythm  Left axis deviation  Incomplete right bundle branch block  Voltage criteria for left ventricular hypertrophy  Abnormal ECG  No previous ECGs available  Confirmed by Reid Chou MD ([id number]) on 2019 1:17:00 PM    < end of copied text > [facility] -- Merry Ordoñez, Effie Goddard, Yoan Stevenson Savella, Goodger  Office # [phone]    Follow Up:  HTN    Subjective/Observations: Awake and alert, very upset about events today.  Per patient, he had significant bleeding from his incision site.  Very anxious.  Denies any respiratory or cardiac discomfort.    REVIEW OF SYSTEMS: All other review of systems is negative unless indicated above  PAST MEDICAL & SURGICAL HISTORY:  S/P aneurysm repair  H/O aortic valve repair  Diabetes mellitus  HTN (hypertension)  Cardiac tamponade  Aortic valve replaced  Leg weakness  GERD (gastroesophageal reflux disease)  AAA (abdominal aortic aneurysm): dx   Hypertension  Diabetes mellitus  H/O aortic valve repair  S/P aortic aneurysm repair  S/P AAA repair: Repaired 3/2015  Aortic valve replaced    MEDICATIONS  (STANDING):  aspirin  chewable 81 milliGRAM(s) Oral daily  atorvastatin 80 milliGRAM(s) Oral at bedtime  clopidogrel Tablet 75 milliGRAM(s) Oral daily  dextrose 5%. 1000 milliLiter(s) (50 mL/Hr) IV Continuous <Continuous>  dextrose 50% Injectable 12.5 Gram(s) IV Push once  gabapentin 600 milliGRAM(s) Oral two times a day  insulin lispro (HumaLOG) corrective regimen sliding scale   SubCutaneous three times a day before meals  insulin lispro (HumaLOG) corrective regimen sliding scale   SubCutaneous at bedtime  linezolid    Tablet 600 milliGRAM(s) Oral every 12 hours  metoprolol tartrate 25 milliGRAM(s) Oral daily  pantoprazole    Tablet 40 milliGRAM(s) Oral before breakfast    MEDICATIONS  (PRN):  dextrose 40% Gel 15 Gram(s) Oral once PRN Blood Glucose LESS THAN 70 milliGRAM(s)/deciliter  glucagon  Injectable 1 milliGRAM(s) IntraMuscular once PRN Glucose LESS THAN 70 milligrams/deciliter    Allergies    Normodyne (Faint)  Normodyne (Unknown)    Intolerances    Vital Signs Last 24 Hrs  T(C): 36.5 (2019 07:35), Max: 36.7 (2019 00:22)  T(F): 97.7 (2019 07:35), Max: 98 (2019 00:22)  HR: 65 (2019 07:35) (57 - 68)  BP: 156/85 (2019 07:35) (112/70 - 158/81)  BP(mean): --  RR: 17 (2019 07:35) (14 - 18)  SpO2: 98% (2019 07:35) (93% - 99%)  I&O's Summary    2019 07:01  -  2019 07:00  --------------------------------------------------------  IN: 500 mL / OUT: 300 mL / NET: 200 mL    2019 07:01  -  2019 15:53  --------------------------------------------------------  IN: 840 mL / OUT: 0 mL / NET: 840 mL     PHYSICAL EXAM:  TELE: NSR  Constitutional: NAD, awake and alert, morbidly obese  HEENT: Moist Mucous Membranes, Anicteric  Pulmonary: Non-labored, breath sounds are clear bilaterally, No wheezing, rales or rhonchi  Cardiovascular: Regular, S1 and S2, No murmurs, rubs, gallops or clicks  Gastrointestinal: Bowel Sounds present, soft, nontender.   Lymph: Trace BLE edema. No lymphadenopathy.  Skin: No visible rashes or ulcers.  chest wall dressing dry and intact  Psych:  Mood & affect appropriate  LABS: All Labs Reviewed:                        11.5   4.98  )-----------( 155      ( 2019 07:18 )             36.7                         11.1   5.49  )-----------( 125      ( 2019 06:44 )             34.9                         12.7   7.34  )-----------( 170      ( 2019 20:23 )             39.6     2019 07:18    136    |  105    |  11     ----------------------------<  140    4.8     |  25     |  0.97   2019 06:44    140    |  109    |  14     ----------------------------<  112    4.1     |  23     |  0.98   2019 20:23    136    |  104    |  17     ----------------------------<  144    4.0     |  22     |  1.10     Ca    8.6        2019 07:18  Ca    8.1        2019 06:44  Ca    9.0        2019 20:23    TPro  5.9    /  Alb  3.1    /  TBili  0.5    /  DBili  x      /  AST  15     /  ALT  22     /  AlkPhos  108    2019 06:44  TPro  7.4    /  Alb  4.1    /  TBili  0.8    /  DBili  x      /  AST  19     /  ALT  30     /  AlkPhos  131    2019 20:23    PT/INR - ( 2019 20:23 )   PT: 12.8 sec;   INR: 1.12 ratio    PTT - ( 2019 20:23 )  PTT:29.4 sec    < from: TTE Echo Complete w/Doppler (19 @ 13:16) >    EXAM:  ECHO TRANSTHORACIC COMP W DOPP      PROCEDURE DATE:  2019   .  INTERPRETATION:  REPORT:    TRANSTHORACIC ECHOCARDIOGRAM REPORT    Patient Name:   DUSTY STRICKLAND Patient Location: [facility] Rec #:  [id number]        Accession #:      [id number]  Account #:      [id number]        Height:           74.8 in 190.0 cm  YOB: 1960      Weight:           229.3 lb 104.00 kg  Patient Age:    58 years        BSA:              2.32 m²  Patient Gender: M   BP:               129/82 mmHg       Date of Exam:        2019 1:16:32 PM  Sonographer:         Janina Alberto  Referring Physician: J Luis Hill MD    Procedure:   2D Echo/Doppler/Color Doppler Complete and Echocardiogram   with               Definity Contrast.  Indications: Chest pain, unspecified - R07.9  Diagnosis:   Chest pain, unspecified - R07.9    2D AND M-MODE MEASUREMENTS (normal ranges within parentheses):  Left Ventricle:                  Normal   Aorta/Left Atrium:   Normal  IVSd (2D):              1.16 cm (0.7-1.1) LA Volume Index    35.3 ml/m²  LVPWd (2D):             1.38 cm (0.7-1.1)  LVIDd (2D):             4.72 cm (3.4-5.7)  Relative Wall Thickness  0.58    (<0.42)    LV SYSTOLIC FUNCTION BY 2D PLANIMETRY (MOD):  EF-A2C View: 27.9 % EF-Biplane: 46 %    LV DIASTOLIC FUNCTION:  MV Peak E: 0.82 m/s Decel Time: 220 msec  MV Peak A: 0.66 m/s  E/A Ratio: 1.23    SPECTRAL DOPPLER ANALYSIS (where applicable):  Mitral Valve:  MV P1/2 Time: 63.71 msec  MV Area, PHT: 3.45 cm²    Aortic Valve: AoV Max Jasvir: 2.19 m/s AoV Peak P.2 mmHg AoV Mean PG:   10.2 mmHg    AoV Area, Vmax:  AoV Area, VTI:  AoV Area, Vmn:  Ao VTI: 0.465  Tricuspid Valve and PA/RV Systolic Pressure: TR Max Velocity: 2.46 m/s RA   Pressure: 3 mmHg RVSP/PASP: 27.2 mmHg     PHYSICIAN INTERPRETATION:  Left Ventricle: Endocardial visualization was enhanced with intravenous   echo contrast. The left ventricular internal cavity size is normal.  Global LV systolic function was mildly decreased. Left ventricular   ejection fraction, by visual estimation, is 45 to 50%. The left   ventricular diastolic function could not be assessed in this study.       LV Wall Scoring:  The apical inferior segment is akinetic. The mid and apical anterior   wall, mid  inferior segment, and apex are hypokinetic.    Right Ventricle: Normal right ventricular size and function.  Left Atrium: The left atrium is normal in size.  Right Atrium: The right atrium is normal in size.  Pericardium: There is no evidence of pericardial effusion.  Mitral Valve: Structurally normal mitral valve, with normal leaflet   excursion. Mild mitral valve regurgitation is seen.  Tricuspid Valve: Structurally normal tricuspid valve, with normal leaflet   excursion. Mild tricuspid regurgitation is visualized.  Aortic Valve: A bioprosthetic AoV is visualized. Peak aortic valve   gradient is 19.2 mmHg and the mean gradient is 10.2 mmHg, which is   probably normal in the setting of a prosthetic aortic valve. Trivial   aortic valve regurgitation is seen.  Pulmonic Valve: Structurally normal pulmonic valve, with normal leaflet   excursion. Trace pulmonic valve regurgitation.  Pulmonary Artery: The main pulmonary artery is normal in size.  Venous: The inferior vena cava was normal sized, with respiratory size   variation greater than 50%.       Summary:   1. Left ventricular ejection fraction, by visual estimation, is 45 to   50%.   2. Mildly decreased global left ventricular systolic function.   3. Apical inferior segment, mid and apical anterior wall, mid inferior   segment, and apex are abnormal as described above.   4. The left ventricular diastolic function could not be assessed in this   study.   5. There is no evidence of pericardial effusion.   6. Bioprosthesis in the aortic position.   7. Endocardial visualization was enhanced with intravenous echo contrast.   8. Peak aortic valve gradient is 19.2 mmHg and the mean gradient is 10.2   mmHg, which is probably normal in the setting of a prosthetic aortic  valve.    MD Alfa Electronically signed on 2019 at 5:09:04 PM     *** Final ***    ERICK GOLDSTEIN MD  This document has been electronically signed. 2019  1:16PM     < end of copied text >    < from: CT Chest No Cont (11.18.19 @ 12:29) >    EXAM:  CT CHEST                            PROCEDURE DATE:  2019          INTERPRETATION:  CLINICAL INFORMATION: Lupus recorder and chest location   to the impression the abscess    COMPARISON: 2019    PROCEDURE:   CT of the Chest was performed without intravenous contrast.  Sagittal and coronal reformats were performed.      FINDINGS:    LUNGS AND AIRWAYS: Patent central airways.  Minimal bronchiectasis.   Chronic changes in the periphery of the bilateral lower lobes. Minimal   biapical pleural parenchymal scarring. No definite segmental   consolidations. No definite discrete nodules.    PLEURA: No pleural effusion.    MEDIASTINUM AND ALTA: Small hiatal hernia. No significant mediastinal or   hilar adenopathy.    VESSELS: Atherosclerotic changes of the aorta and coronary vasculature.   Prior valve replacement and question of ascending thoracic aortic repair.   No definite aortic aneurysm.    HEART: Heart size is normal. Small right paracardiac fluid density   decrease in size from prior exam.    CHEST WALL AND LOWER NECK: Skin thickening to the right of the midline in   the mid to upper chest with underlying subcutaneous edema. Additional   focal round fluid density measuring 1.3 cm with droplets of air seen   superiorlyand inferiorly likely representing reported partially drained   fluid collection. Skin thickening and subcutaneous edema compatible   cellulitis. A loop recorder is seen to the left of the midline. Minimal   infiltration of the subcutaneous fat to the right of the loop recorder   however this is 90 proximity to the partially drained fluid density.    VISUALIZED UPPER ABDOMEN: Gallstones. Mild elevation of the right   hemidiaphragm.    BONES: Degenerative changes. Prior sternotomy. Mild kyphosis.    IMPRESSION:     Anterior mid to upper chest just to the right of midline cellulitis with   additional findings suggesting partially drains fluid density. Loop   recorder seen to the left of the midline not in proximity to the   partially drained fluid density. Subcutaneous infiltrative changes extend   to the level of the loop recorder.    No segmental pulmonary consolidations.    EARNEST HARGROVE M.D., ATTENDING RADIOLOGIST  This document has been electronically signed. 2019  2:02PM      < end of copied text >    < from: 12 Lead ECG (19 @ 20:25) >    Ventricular Rate 86 BPM    Atrial Rate 86 BPM    P-R Interval 146 ms    QRS Duration 100 ms    Q-T Interval 372 ms    QTC Calculation(Bezet) 445 ms    P Axis -4 degrees    R Axis -39 degrees    T Axis 41 degrees    Diagnosis Line Normal sinus rhythm  Left axis deviation  Incomplete right bundle branch block  Voltage criteria for left ventricular hypertrophy  Abnormal ECG  No previous ECGs available  Confirmed by Reid Chou MD ([id number]) on 2019 1:17:00 PM    < end of copied text >

## 2019-11-19 NOTE — DISCHARGE NOTE PROVIDER - HOSPITAL COURSE
FROM ADMISSION H+P:     HPI:    Pt is 59 y/o male with PMH of HTN, DM, Aortic valve replacement 2015, ascending aneurysm repair 2015, s/p 2 stents 3/19 presents to ED c/o chest redness and pain for the past 5 days. Patient states he has had a pimple on his chest for the past 30 years that he occasionally has to pop. It notes it's always in the same place, and has never had a problem popping it before. 5 days prior, he says his chest was hurting and he noted the pimple there, popped it and expressed a significant amount of pus. A few days later, a rash developed and spread on his right upper chest, and the chest pain worsened to the point where he went to urgent care yesterday. He was prescribed doxy 100 mg BID for which he was compliant with, and at that point per patient they did a small I+D but were superficial in their drainage attempt. His pain worsened in the past 24 hours, and he noticed the redness getting worse so he came to the ED. Denies pressure like chest pain, palpitations, SOB, diaphoresis, fever but admits to chills in past 30 minutes of being in the ED.             In the ED:    T; 98 HR: 98 BP: 167/97 RR; 15 Sat%:99    EKG: pending    CXR: Official read pending    s/p 1L NS bolus, s/p 1 dose vanc, s/p 1 dose zosyn (17 Nov 2019 21:37)            ---    HOSPITAL COURSE: Pt was treated with IV Vanco and Zosyn. had I&D for sebaceous cyst abscess by Dr Valdivia. Pot op pt had bleeding from wound after packing was removed. It was cauterized bed side and dressing was done. pt is on po zyvox        ---            ---    FINAL DISCHARGE DIAGNOSIS LIST:    Please see last daily progress note for final discharge diagnoses        ---    Primary care provider was made aware of plan for discharge:      [  ] NO     [  ] YES FROM ADMISSION H+P:     HPI:    Pt is 57 y/o male with PMH of HTN, DM, Aortic valve replacement 2015, ascending aneurysm repair 2015, s/p 2 stents 3/19 presents to ED c/o chest redness and pain for the past 5 days. Patient states he has had a pimple on his chest for the past 30 years that he occasionally has to pop. It notes it's always in the same place, and has never had a problem popping it before. 5 days prior, he says his chest was hurting and he noted the pimple there, popped it and expressed a significant amount of pus. A few days later, a rash developed and spread on his right upper chest, and the chest pain worsened to the point where he went to urgent care yesterday. He was prescribed doxy 100 mg BID for which he was compliant with, and at that point per patient they did a small I+D but were superficial in their drainage attempt. His pain worsened in the past 24 hours, and he noticed the redness getting worse so he came to the ED. Denies pressure like chest pain, palpitations, SOB, diaphoresis, fever but admits to chills in past 30 minutes of being in the ED.             In the ED:    T; 98 HR: 98 BP: 167/97 RR; 15 Sat%:99    EKG: pending    CXR: Official read pending    s/p 1L NS bolus, s/p 1 dose vanc, s/p 1 dose zosyn (17 Nov 2019 21:37)            ---    HOSPITAL COURSE: Pt was treated with IV Vanco and Zosyn. had I&D for sebaceous cyst abscess by Dr Valdivia. Post op pt had bleeding from wound after packing was removed. It was cauterized bed side and dressing was done. Pt. was switched to PO Zyvox and Vantin.  Blood cultures showed no growth.  Surgical pathology showed     Soft tissue, right chest wall, excision:  Ruptured epidermal inclusion cyst with abscess formation and fat necrosis.  Surgical culture grew out pansensitive Proteus.          On day of discharge patient is in no distress.  Afebrile and hemodynamically stable.          Completion of discharge in 34 minutes.

## 2019-11-19 NOTE — DISCHARGE NOTE PROVIDER - NSDCCPCAREPLAN_GEN_ALL_CORE_FT
PRINCIPAL DISCHARGE DIAGNOSIS  Diagnosis: Cellulitis of chest wall  Assessment and Plan of Treatment: status post incision and drainage.  Please complete course of antibiotic.      SECONDARY DISCHARGE DIAGNOSES  Diagnosis: Stented coronary artery  Assessment and Plan of Treatment: continue aspirin, Plavix, metoprolol, and cholesterol lowering medications    Diagnosis: High cholesterol  Assessment and Plan of Treatment: continue your cholesterol lowering medication    Diagnosis: Diabetes mellitus  Assessment and Plan of Treatment: continue metformin.    Diagnosis: HTN (hypertension)  Assessment and Plan of Treatment: continue your antihypertensive medication

## 2019-11-19 NOTE — CHART NOTE - NSCHARTNOTEFT_GEN_A_CORE
RAPID RESPONSE INITIATED BY  RN FOR SURGICAL SITE BLEEDING  I WAS ON THE UNIT AND TAKING CARE OF ANOTHER PATIENT  INSTRUCTED THE NURSE TO CANCEL RAPID RESPONSE AS I WAS AVAILABLE TO ASSES AND TREAT     PATIENT IS S/P I & D INFECTED SEBACEOUS CYST OVER THE LOWER STERNUM 11/18/2019 BY DR. VAISHNAVI SANTIZO    ANTERIOR CHEST WALL WOUND/ OVER STERNUM : WOUND  INSPECTED BLEEDING FROM LOWER PART OF THE INCISION   HANDHELD CAUTERY PEN USED  TO CAUTERIZE THE BLEEDER, BLEEDING STOPPED , WOUND APPEARED DRY  WOUND PACKED WITH SURGICEL AND PRESSURE DRESSING   PATIENT TOLERATED PROCEDURE WELL      PATIENT SEEN AGAIN 45 MINUTES LATER  DRESSING REMAINS DRY AND INTACT    ADVISE TO KEEP DRESSING ON UNTIL TOMORROW MORNING

## 2019-11-19 NOTE — PROGRESS NOTE ADULT - SUBJECTIVE AND OBJECTIVE BOX
Postoperative Day #: 1    58y Male admitted with Cellulitis of chest wall. patient offers no complaints . Denies chest pain, dyspnea, cough.    T(F): 97.7 (11-19-19 @ 07:35), Max: 98 (11-19-19 @ 00:22)  HR: 65 (11-19-19 @ 07:35) (57 - 85)  BP: 156/85 (11-19-19 @ 07:35) (112/70 - 158/81)  RR: 17 (11-19-19 @ 07:35) (14 - 18)  SpO2: 98% (11-19-19 @ 07:35) (92% - 99%)  Wt(kg): --  CAPILLARY BLOOD GLUCOSE      POCT Blood Glucose.: 125 mg/dL (19 Nov 2019 07:55)  POCT Blood Glucose.: 156 mg/dL (18 Nov 2019 21:01)  POCT Blood Glucose.: 128 mg/dL (18 Nov 2019 17:03)  POCT Blood Glucose.: 96 mg/dL (18 Nov 2019 15:39)  POCT Blood Glucose.: 144 mg/dL (18 Nov 2019 11:57)      PHYSICAL EXAM:  General: NAD, WDWN.   CHEST: dressing intact, tender around the incision site   Neuro:  Alert & oriented x 3  HEENT: NCAT, EOMI, conjunctiva clear  CV: +S1+S2 regular rate and rhythm  Lung: clear to ausculation bilaterally, respirations nonlabored, good inspiratory effort  Abdomen: soft, NTND. Normactive BS  Extremities: no pedal edema or calf tenderness noted        LABS:                        11.5   4.98  )-----------( 155      ( 19 Nov 2019 07:18 )             36.7     11-19    136  |  105  |  11  ----------------------------<  140<H>  4.8   |  25  |  0.97    Ca    8.6      19 Nov 2019 07:18    TPro  5.9<L>  /  Alb  3.1<L>  /  TBili  0.5  /  DBili  x   /  AST  15  /  ALT  22  /  AlkPhos  108  11-18    PT/INR - ( 17 Nov 2019 20:23 )   PT: 12.8 sec;   INR: 1.12 ratio         PTT - ( 17 Nov 2019 20:23 )  PTT:29.4 sec  I&O's Detail    18 Nov 2019 07:01  -  19 Nov 2019 07:00  --------------------------------------------------------  IN:    Solution: 500 mL  Total IN: 500 mL    OUT:    Voided: 300 mL  Total OUT: 300 mL    Total NET: 200 mL          Impression: 58y Male admitted with Cellulitis of chest wall    PMH S/P aneurysm repair  H/O aortic valve repair  Diabetes mellitus  HTN (hypertension)  Cardiac tamponade  Aortic valve replaced  Leg weakness  GERD (gastroesophageal reflux disease)  AAA (abdominal aortic aneurysm)  Hypertension  Diabetes mellitus      Plan:  -continue VTE prophylaxis   -Increase activity with PT, OOB, Ambulate  -continue local wound care  -f/u AM labs  -will discuss with surgical attending

## 2019-11-19 NOTE — DISCHARGE NOTE PROVIDER - CARE PROVIDER_API CALL
Dada Valdivia)  [employer] [employer] [facility] [employer] [facility]  [address], [address]  [place] [address]  Phone: [phone]  Fax: [phone]  Follow Up Time:

## 2019-11-19 NOTE — PROGRESS NOTE ADULT - PROBLEM SELECTOR PLAN 1
-Patient is s/p I+D  sebaceous cyst abscess by Dr Valdivia  - pain is controlled.   - Pt has no IV access and vanco and zosyn could not be continued.  - as per ID switched to Po zyvox.  - F/U cx

## 2019-11-19 NOTE — PROGRESS NOTE ADULT - SUBJECTIVE AND OBJECTIVE BOX
Patient is a 58y old  Male who presents with a chief complaint of Skin redness (18 Nov 2019 09:19)      INTERVAL HPI: Pt seen and examined bedside, s/p I&D f sebaceous cyst on chest wall. denies any fever, chills, SOB, N/V. pain is tolerable. has no IV access..   OVERNIGHT EVENTS:  Vital Signs Last 24 Hrs  T(C): 36.5 (19 Nov 2019 07:35), Max: 36.7 (19 Nov 2019 00:22)  T(F): 97.7 (19 Nov 2019 07:35), Max: 98 (19 Nov 2019 00:22)  HR: 65 (19 Nov 2019 07:35) (57 - 85)  BP: 156/85 (19 Nov 2019 07:35) (112/70 - 158/81)  BP(mean): --  RR: 17 (19 Nov 2019 07:35) (14 - 18)  SpO2: 98% (19 Nov 2019 07:35) (92% - 99%)        REVIEW OF SYSTEMS  Constitutional: No fever, chills, fatigue  Neuro: No headache, numbness, weakness  Resp: No cough, wheezing, shortness of breath  CVS: +chest pain, no palpitations, leg swelling  GI: No abdominal pain, nausea, vomiting, diarrhea   : No dysuria, frequency, incontinence  Skin: No itching, burning, rashes, or lesions   Msk: No joint pain or swelling  Psych: No depression, anxiety, mood swings      PHYSICAL EXAM:  GENERAL: NAD, well-groomed, well-developed  HEAD:  Atraumatic, Normocephalic  EYES: EOMI, PERRLA, conjunctiva and sclera clear  ENMT:  Moist mucous membranes  NECK: Supple, No JVD, Normal thyroid  HEART: Regular rate and rhythm; No murmurs, rubs, or gallops  CHEST: dressing on ant chest, dry and clean  RESPIRATORY: CTA B/L, No wheezing/ rhonchi  ABDOMEN: Soft, Nontender, Nondistended; Bowel sounds present  NEUROLOGY: A&Ox3, nonfocal, CN II-XII grossly intact, sensation intact  EXTREMITIES:  2+ Peripheral Pulses, No clubbing, cyanosis, or edema  SKIN: warm, dry, normal color, no rash or abnormal lesions        LABS:                        11.5   4.98  )-----------( 155      ( 19 Nov 2019 07:18 )             36.7     19 Nov 2019 07:18    136    |  105    |  11     ----------------------------<  140    4.8     |  25     |  0.97     Ca    8.6        19 Nov 2019 07:18      PT/INR - ( 17 Nov 2019 20:23 )   PT: 12.8 sec;   INR: 1.12 ratio         PTT - ( 17 Nov 2019 20:23 )  PTT:29.4 sec    CAPILLARY BLOOD GLUCOSE      POCT Blood Glucose.: 108 mg/dL (19 Nov 2019 11:47)  POCT Blood Glucose.: 125 mg/dL (19 Nov 2019 07:55)  POCT Blood Glucose.: 156 mg/dL (18 Nov 2019 21:01)  POCT Blood Glucose.: 128 mg/dL (18 Nov 2019 17:03)  POCT Blood Glucose.: 96 mg/dL (18 Nov 2019 15:39)    UCx       RADIOLOGY & ADDITIONAL TESTS:              Diet:    RADIOLOGY & ADDITIONAL TESTS:    Imaging Personally Reviewed:  [x ] YES  [ ] NO    Consultant(s) Notes Reviewed:  [x ] YES  [ ] NO        Disposition:    DVT Prophylaxis:  Subcu Heparin [  ]     LMWH [ ]     Coumadin [ ]    Xaeralto [ ]    Eliquis [ ]   Venodyne pumps [x ]    Discussed with Patient [x ]     Family [x ]          [ ]   RN[x ]      [ ]    Advance Directives:      Palliative Care:    Care Discussed with Consultants/Other Providers [x ] YES  [ ] NO

## 2019-11-19 NOTE — PROGRESS NOTE ADULT - SUBJECTIVE AND OBJECTIVE BOX
DUSTY STRICKLAND is a 58yMale , patient examined and chart reviewed.    INTERVAL HPI/ OVERNIGHT EVENTS:   Had blood oozing from surgical wound.  Seen by surgery and repacked.  Lost IV access.    PAST MEDICAL & SURGICAL HISTORY:  S/P aneurysm repair  H/O aortic valve repair  Diabetes mellitus  HTN (hypertension)  Cardiac tamponade  Aortic valve replaced  Leg weakness  GERD (gastroesophageal reflux disease)  AAA (abdominal aortic aneurysm): dx 2012  Hypertension  Diabetes mellitus  H/O aortic valve repair  S/P aortic aneurysm repair  S/P AAA repair: Repaired 3/2015  Aortic valve replaced      For details regarding the patient's social history, family history, and other miscellaneous elements, please refer the initial infectious diseases consultation and/or the admitting history and physical examination for this admission.    ROS:  CONSTITUTIONAL:  Negative fever or chills  EYES:  Negative  blurry vision or double vision  CARDIOVASCULAR:  Negative for chest pain or palpitations  RESPIRATORY:  Negative for cough, wheezing, or SOB   GASTROINTESTINAL:  Negative for nausea, vomiting, diarrhea, constipation, or abdominal pain  GENITOURINARY:  Negative frequency, urgency or dysuria  NEUROLOGIC:  No headache, confusion, dizziness, lightheadedness  All other systems were reviewed and are negative     ALLERGIES:  Normodyne (Faint)     Current inpatient medications :    ANTIBIOTICS/RELEVANT:  linezolid    Tablet 600 milliGRAM(s) Oral every 12 hours  piperacillin/tazobactam IVPB.. 3.375 Gram(s) IV Intermittent every 8 hours  vancomycin  IVPB 1500 milliGRAM(s) IV Intermittent every 12 hours      aspirin  chewable 81 milliGRAM(s) Oral daily  atorvastatin 80 milliGRAM(s) Oral at bedtime  clopidogrel Tablet 75 milliGRAM(s) Oral daily  dextrose 40% Gel 15 Gram(s) Oral once PRN  dextrose 5%. 1000 milliLiter(s) IV Continuous <Continuous>  dextrose 50% Injectable 12.5 Gram(s) IV Push once  gabapentin 600 milliGRAM(s) Oral two times a day  glucagon  Injectable 1 milliGRAM(s) IntraMuscular once PRN  insulin lispro (HumaLOG) corrective regimen sliding scale   SubCutaneous three times a day before meals  insulin lispro (HumaLOG) corrective regimen sliding scale   SubCutaneous at bedtime  metoprolol tartrate 25 milliGRAM(s) Oral daily  pantoprazole    Tablet 40 milliGRAM(s) Oral before breakfast      Objective:    11-18 @ 07:01  -  11-19 @ 07:00  --------------------------------------------------------  IN: 500 mL / OUT: 300 mL / NET: 200 mL    11-19 @ 07:01  -  11-19 @ 13:30  --------------------------------------------------------  IN: 840 mL / OUT: 0 mL / NET: 840 mL      T(C): 36.5 (11-19-19 @ 07:35), Max: 36.7 (11-19-19 @ 00:22)  HR: 65 (11-19-19 @ 07:35) (57 - 85)  BP: 156/85 (11-19-19 @ 07:35) (112/70 - 158/81)  RR: 17 (11-19-19 @ 07:35) (14 - 18)  SpO2: 98% (11-19-19 @ 07:35) (92% - 99%)      Physical Exam:  General: well developed well nourished, in no acute distress  Eyes: sclera anicteric, pupils equal and reactive to light  ENMT: buccal mucosa moist, pharynx not injected  Neck: supple, trachea midline  Lungs: clear, no wheeze/rhonchi Chest wall drsg soaked with blood +erythema improved  Cardiovascular: regular rate and rhythm, S1 S2  Abdomen: soft, nontender, no organomegaly present, bowel sounds normal  Neurological: alert and oriented x3, Cranial Nerves II-XII grossly intact  Skin: no increased ecchymosis/petechiae/purpura  Lymph Nodes: no palpable cervical/supraclavicular lymph nodes enlargements  Extremities: no cyanosis/clubbing/edema      LABS:                          11.5   4.98  )-----------( 155      ( 19 Nov 2019 07:18 )             36.7       11-19    136  |  105  |  11  ----------------------------<  140<H>  4.8   |  25  |  0.97    Ca    8.6      19 Nov 2019 07:18    TPro  5.9<L>  /  Alb  3.1<L>  /  TBili  0.5  /  DBili  x   /  AST  15  /  ALT  22  /  AlkPhos  108  11-18      PT/INR - ( 17 Nov 2019 20:23 )   PT: 12.8 sec;   INR: 1.12 ratio         PTT - ( 17 Nov 2019 20:23 )  PTT:29.4 sec    Vancomycin Level, Trough: 16.2 ug/mL (11-19 @ 08:01)    MICROBIOLOGY:    Culture - Blood (collected 17 Nov 2019 23:09)  Source: .Blood Blood  Preliminary Report (19 Nov 2019 01:07):    No growth to date.    Culture - Blood (collected 17 Nov 2019 23:09)  Source: .Blood Blood  Preliminary Report (19 Nov 2019 01:07):    No growth to date.    RADIOLOGY & ADDITIONAL STUDIES:  EXAM:  CT CHEST                            PROCEDURE DATE:  11/18/2019          INTERPRETATION:  CLINICAL INFORMATION: Lupus recorder and chest location   to the impression the abscess    COMPARISON: 6/29/2019    PROCEDURE:   CT of the Chest was performed without intravenous contrast.  Sagittal and coronal reformats were performed.      FINDINGS:    LUNGS AND AIRWAYS: Patent central airways.  Minimal bronchiectasis.   Chronic changes in the periphery of the bilateral lower lobes. Minimal   biapical pleural parenchymal scarring. No definite segmental   consolidations. No definite discrete nodules.    PLEURA: No pleural effusion.    MEDIASTINUM AND ALTA: Small hiatal hernia. No significant mediastinal or   hilar adenopathy.    VESSELS: Atherosclerotic changes of the aorta and coronary vasculature.   Prior valve replacement and question of ascending thoracic aortic repair.   No definite aortic aneurysm.    HEART: Heart size is normal. Small right paracardiac fluid density   decrease in size from prior exam.    CHEST WALL AND LOWER NECK: Skin thickening to the right of the midline in   the mid to upper chest with underlying subcutaneous edema. Additional   focal round fluid density measuring 1.3 cm with droplets of air seen   superiorlyand inferiorly likely representing reported partially drained   fluid collection. Skin thickening and subcutaneous edema compatible   cellulitis. A loop recorder is seen to the left of the midline. Minimal   infiltration of the subcutaneous fat to the right of the loop recorder   however this is 90 proximity to the partially drained fluid density.    VISUALIZED UPPER ABDOMEN: Gallstones. Mild elevation of the right   hemidiaphragm.    BONES: Degenerative changes. Prior sternotomy. Mild kyphosis.    IMPRESSION:     Anterior mid to upper chest just to the right of midline cellulitis with   additional findings suggesting partially drains fluid density. Loop   recorder seen to the left of the midline not in proximity to the   partially drained fluid density. Subcutaneous infiltrative changes extend   to the level of the loop recorder.    No segmental pulmonary consolidations.      Assessment :   Pt is 57 y/o male with PMH of HTN, DM, Aortic valve replacement 2015, ascending aneurysm repair 2015, s/p 2 stents 3/19 Admitted with iInfected sebaceous cyst with abscess and chest wall cellulitis. SP I&D in OR pod 1. Angely Booth. Had wound bleed william reinforced     Plan :   Off Vancomycin and Zosyn as no IV access  Zyvox for now  Fu cultures  Trend temps and wbc    D/w Hospitalist and Dr Valdivia      Continue with present regiment.  Appropriate use of antibiotics and adverse effects reviewed.      I have discussed the above plan of care with patient/ family in detail. They expressed understanding of the  treatment plan . Risks, benefits and alternatives discussed in detail. I have asked if they have any questions or concerns and appropriately addressed them to the best of my ability .    > 35 minutes were spent in direct patient care reviewing notes, medications ,labs data/ imaging , discussion with multidisciplinary team.    Thank you for allowing me to participate in care of your patient .    Gaston Blanco MD  Infectious Disease  [phone]

## 2019-11-20 LAB
-  AMIKACIN: SIGNIFICANT CHANGE UP
-  AMOXICILLIN/CLAVULANIC ACID: SIGNIFICANT CHANGE UP
-  AMPICILLIN/SULBACTAM: SIGNIFICANT CHANGE UP
-  AMPICILLIN: SIGNIFICANT CHANGE UP
-  AZTREONAM: SIGNIFICANT CHANGE UP
-  CEFAZOLIN: SIGNIFICANT CHANGE UP
-  CEFEPIME: SIGNIFICANT CHANGE UP
-  CEFOXITIN: SIGNIFICANT CHANGE UP
-  CEFTRIAXONE: SIGNIFICANT CHANGE UP
-  CIPROFLOXACIN: SIGNIFICANT CHANGE UP
-  ERTAPENEM: SIGNIFICANT CHANGE UP
-  GENTAMICIN: SIGNIFICANT CHANGE UP
-  LEVOFLOXACIN: SIGNIFICANT CHANGE UP
-  MEROPENEM: SIGNIFICANT CHANGE UP
-  PIPERACILLIN/TAZOBACTAM: SIGNIFICANT CHANGE UP
-  TOBRAMYCIN: SIGNIFICANT CHANGE UP
-  TRIMETHOPRIM/SULFAMETHOXAZOLE: SIGNIFICANT CHANGE UP
ANION GAP SERPL CALC-SCNC: 7 MMOL/L — SIGNIFICANT CHANGE UP (ref 5–17)
BUN SERPL-MCNC: 14 MG/DL — SIGNIFICANT CHANGE UP (ref 7–23)
CALCIUM SERPL-MCNC: 8.9 MG/DL — SIGNIFICANT CHANGE UP (ref 8.5–10.1)
CHLORIDE SERPL-SCNC: 106 MMOL/L — SIGNIFICANT CHANGE UP (ref 96–108)
CO2 SERPL-SCNC: 29 MMOL/L — SIGNIFICANT CHANGE UP (ref 22–31)
CREAT SERPL-MCNC: 1.1 MG/DL — SIGNIFICANT CHANGE UP (ref 0.5–1.3)
GLUCOSE SERPL-MCNC: 113 MG/DL — HIGH (ref 70–99)
HCT VFR BLD CALC: 34 % — LOW (ref 39–50)
HGB BLD-MCNC: 10.7 G/DL — LOW (ref 13–17)
MCHC RBC-ENTMCNC: 26.5 PG — LOW (ref 27–34)
MCHC RBC-ENTMCNC: 31.5 GM/DL — LOW (ref 32–36)
MCV RBC AUTO: 84.2 FL — SIGNIFICANT CHANGE UP (ref 80–100)
METHOD TYPE: SIGNIFICANT CHANGE UP
NRBC # BLD: 0 /100 WBCS — SIGNIFICANT CHANGE UP (ref 0–0)
PLATELET # BLD AUTO: 144 K/UL — LOW (ref 150–400)
POTASSIUM SERPL-MCNC: 3.6 MMOL/L — SIGNIFICANT CHANGE UP (ref 3.5–5.3)
POTASSIUM SERPL-SCNC: 3.6 MMOL/L — SIGNIFICANT CHANGE UP (ref 3.5–5.3)
RBC # BLD: 4.04 M/UL — LOW (ref 4.2–5.8)
RBC # FLD: 14.3 % — SIGNIFICANT CHANGE UP (ref 10.3–14.5)
SODIUM SERPL-SCNC: 142 MMOL/L — SIGNIFICANT CHANGE UP (ref 135–145)
SURGICAL PATHOLOGY STUDY: SIGNIFICANT CHANGE UP
WBC # BLD: 3.08 K/UL — LOW (ref 3.8–10.5)
WBC # FLD AUTO: 3.08 K/UL — LOW (ref 3.8–10.5)

## 2019-11-20 PROCEDURE — 99232 SBSQ HOSP IP/OBS MODERATE 35: CPT

## 2019-11-20 RX ORDER — POLYETHYLENE GLYCOL 3350 17 G/17G
17 POWDER, FOR SOLUTION ORAL DAILY
Refills: 0 | Status: DISCONTINUED | OUTPATIENT
Start: 2019-11-21 | End: 2019-11-21

## 2019-11-20 RX ORDER — CEFPODOXIME PROXETIL 100 MG
200 TABLET ORAL EVERY 12 HOURS
Refills: 0 | Status: DISCONTINUED | OUTPATIENT
Start: 2019-11-20 | End: 2019-11-21

## 2019-11-20 RX ORDER — ACETAMINOPHEN 500 MG
650 TABLET ORAL EVERY 6 HOURS
Refills: 0 | Status: DISCONTINUED | OUTPATIENT
Start: 2019-11-20 | End: 2019-11-21

## 2019-11-20 RX ORDER — SENNA PLUS 8.6 MG/1
2 TABLET ORAL AT BEDTIME
Refills: 0 | Status: DISCONTINUED | OUTPATIENT
Start: 2019-11-20 | End: 2019-11-21

## 2019-11-20 RX ORDER — POLYETHYLENE GLYCOL 3350 17 G/17G
17 POWDER, FOR SOLUTION ORAL ONCE
Refills: 0 | Status: COMPLETED | OUTPATIENT
Start: 2019-11-20 | End: 2019-11-20

## 2019-11-20 RX ADMIN — Medication 25 MILLIGRAM(S): at 06:43

## 2019-11-20 RX ADMIN — Medication 650 MILLIGRAM(S): at 23:17

## 2019-11-20 RX ADMIN — Medication 200 MILLIGRAM(S): at 18:20

## 2019-11-20 RX ADMIN — ATORVASTATIN CALCIUM 80 MILLIGRAM(S): 80 TABLET, FILM COATED ORAL at 23:17

## 2019-11-20 RX ADMIN — SENNA PLUS 2 TABLET(S): 8.6 TABLET ORAL at 23:17

## 2019-11-20 RX ADMIN — CLOPIDOGREL BISULFATE 75 MILLIGRAM(S): 75 TABLET, FILM COATED ORAL at 12:46

## 2019-11-20 RX ADMIN — POLYETHYLENE GLYCOL 3350 17 GRAM(S): 17 POWDER, FOR SOLUTION ORAL at 12:57

## 2019-11-20 RX ADMIN — Medication 650 MILLIGRAM(S): at 17:50

## 2019-11-20 RX ADMIN — GABAPENTIN 600 MILLIGRAM(S): 400 CAPSULE ORAL at 18:20

## 2019-11-20 RX ADMIN — PANTOPRAZOLE SODIUM 40 MILLIGRAM(S): 20 TABLET, DELAYED RELEASE ORAL at 06:43

## 2019-11-20 RX ADMIN — Medication 81 MILLIGRAM(S): at 12:47

## 2019-11-20 RX ADMIN — LINEZOLID 600 MILLIGRAM(S): 600 INJECTION, SOLUTION INTRAVENOUS at 12:46

## 2019-11-20 RX ADMIN — Medication 650 MILLIGRAM(S): at 16:49

## 2019-11-20 RX ADMIN — GABAPENTIN 600 MILLIGRAM(S): 400 CAPSULE ORAL at 06:43

## 2019-11-20 NOTE — PROGRESS NOTE ADULT - SUBJECTIVE AND OBJECTIVE BOX
CHIEF COMPLAINT/INTERVAL HISTORY:  Pt. seen and evaluated for cellulitis of the chest wall.  Pt. is in no distress.  Tolerating oral antibiotics.  Reports pain from I&D site is much better today.  No gross bleeding from site today.     REVIEW OF SYSTEMS:  No fever, CP, SOB, or abdominal pain.     Vital Signs Last 24 Hrs  T(C): 36.7 (20 Nov 2019 08:00), Max: 37.1 (19 Nov 2019 23:40)  T(F): 98.1 (20 Nov 2019 08:00), Max: 98.7 (19 Nov 2019 23:40)  HR: 64 (20 Nov 2019 08:42) (62 - 77)  BP: 142/70 (20 Nov 2019 08:42) (116/72 - 173/92)  BP(mean): --  RR: 18 (20 Nov 2019 08:00) (17 - 18)  SpO2: 98% (20 Nov 2019 08:00) (96% - 98%)    PHYSICAL EXAM:  GENERAL: NAD  HEENT: EOMI, hearing normal, conjunctiva and sclera clear  Chest: CTA bilaterally, no wheezing, dressing is clean and dry.    CV: S1S2, RRR,   GI: soft, +BS, NT/ND  Musculoskeletal: no edema  Psychiatric: affect nL, mood nL  Skin: warm and dry    LABS:                        10.7   3.08  )-----------( 144      ( 20 Nov 2019 06:57 )             34.0     11-20    142  |  106  |  14  ----------------------------<  113<H>  3.6   |  29  |  1.10    Ca    8.9      20 Nov 2019 06:57      Assessment and Plan:  -Cellulitis of chest wall:  s/p I&D of infected sebaceous cyst.  Surgical site culture +Proteus.  Blood cx NGTD.  Continue Zyvox 600mg PO Q12h and Vantin 200mg PO Q12h.  Surgical site care per surgery.  ID f/u  -CAD with stent in March:  continue ASA 81mg PO daily, Plavix 75mg PO daily, Metoprolol 25mg PO daily, and Lipitor 80mg PO QHS  -HTN:  continue Metoprolol 25mg PO daily  -HLD:  continue statin therapy  -Type 2 DM:  continue humalog sliding scale  -GERD:  continue Protonix 40mg PO daily  -VTE ppx: SCD

## 2019-11-20 NOTE — PROGRESS NOTE ADULT - SUBJECTIVE AND OBJECTIVE BOX
[facility] -- Merry Ordoñez, Effie Goddard Pannella, Patel, Savella  Office # [phone]      Follow Up:    CAD    Subjective/Observations:   No events overnight resting comfortably in bed.  No complaints of chest pain, dyspnea, or palpitations reported. No signs of orthopnea or PND.      REVIEW OF SYSTEMS: All other review of systems is negative unless indicated above    PAST MEDICAL & SURGICAL HISTORY:  S/P aneurysm repair  H/O aortic valve repair  Diabetes mellitus  HTN (hypertension)  Cardiac tamponade  Aortic valve replaced  Leg weakness  GERD (gastroesophageal reflux disease)  AAA (abdominal aortic aneurysm): dx 2012  Hypertension  Diabetes mellitus  H/O aortic valve repair  S/P aortic aneurysm repair  S/P AAA repair: Repaired 3/2015  Aortic valve replaced      MEDICATIONS  (STANDING):  aspirin  chewable 81 milliGRAM(s) Oral daily  atorvastatin 80 milliGRAM(s) Oral at bedtime  cefpodoxime 200 milliGRAM(s) Oral every 12 hours  clopidogrel Tablet 75 milliGRAM(s) Oral daily  dextrose 5%. 1000 milliLiter(s) (50 mL/Hr) IV Continuous <Continuous>  dextrose 50% Injectable 12.5 Gram(s) IV Push once  gabapentin 600 milliGRAM(s) Oral two times a day  insulin lispro (HumaLOG) corrective regimen sliding scale   SubCutaneous three times a day before meals  insulin lispro (HumaLOG) corrective regimen sliding scale   SubCutaneous at bedtime  linezolid    Tablet 600 milliGRAM(s) Oral every 12 hours  metoprolol tartrate 25 milliGRAM(s) Oral daily  pantoprazole    Tablet 40 milliGRAM(s) Oral before breakfast  polyethylene glycol 3350 17 Gram(s) Oral once  senna 2 Tablet(s) Oral at bedtime    MEDICATIONS  (PRN):  dextrose 40% Gel 15 Gram(s) Oral once PRN Blood Glucose LESS THAN 70 milliGRAM(s)/deciliter  glucagon  Injectable 1 milliGRAM(s) IntraMuscular once PRN Glucose LESS THAN 70 milligrams/deciliter      Allergies    Normodyne (Faint)  Normodyne (Unknown)    Intolerances        Vital Signs Last 24 Hrs  T(C): 36.7 (20 Nov 2019 08:00), Max: 37.1 (19 Nov 2019 23:40)  T(F): 98.1 (20 Nov 2019 08:00), Max: 98.7 (19 Nov 2019 23:40)  HR: 64 (20 Nov 2019 08:42) (62 - 77)  BP: 142/70 (20 Nov 2019 08:42) (116/72 - 173/92)  BP(mean): --  RR: 18 (20 Nov 2019 08:00) (17 - 18)  SpO2: 98% (20 Nov 2019 08:00) (96% - 98%)    I&O's Summary    19 Nov 2019 07:01  -  20 Nov 2019 07:00  --------------------------------------------------------  IN: 840 mL / OUT: 0 mL / NET: 840 mL          PHYSICAL EXAM:  TELE: not on tele   Constitutional: NAD, awake and alert, well-developed  HEENT: Moist Mucous Membranes, Anicteric  Pulmonary: Non-labored, breath sounds are clear bilaterally, No wheezing, crackles or rhonchi  Cardiovascular: Regular, S1 and S2 nl, No murmurs, rubs, gallops or clicks  Gastrointestinal: Bowel Sounds present, soft, nontender.   Lymph: No lymphadenopathy. No peripheral edema.  Skin: ACW with dressing c/d/i  Psych:  Mood & affect appropriate    LABS: All Labs Reviewed:                        10.7   3.08  )-----------( 144      ( 20 Nov 2019 06:57 )             34.0                         11.5   4.98  )-----------( 155      ( 19 Nov 2019 07:18 )             36.7                         11.1   5.49  )-----------( 125      ( 18 Nov 2019 06:44 )             34.9     20 Nov 2019 06:57    142    |  106    |  14     ----------------------------<  113    3.6     |  29     |  1.10   19 Nov 2019 07:18    136    |  105    |  11     ----------------------------<  140    4.8     |  25     |  0.97   18 Nov 2019 06:44    140    |  109    |  14     ----------------------------<  112    4.1     |  23     |  0.98     Ca    8.9        20 Nov 2019 06:57  Ca    8.6        19 Nov 2019 07:18  Ca    8.1        18 Nov 2019 06:44    TPro  5.9    /  Alb  3.1    /  TBili  0.5    /  DBili  x      /  AST  15     /  ALT  22     /  AlkPhos  108    18 Nov 2019 06:44  TPro  7.4    /  Alb  4.1    /  TBili  0.8    /  DBili  x      /  AST  19     /  ALT  30     /  AlkPhos  131    17 Nov 2019 20:23             ECG:  < from: 12 Lead ECG (11.17.19 @ 20:25) >    Atrial Rate 86 BPM    P-R Interval 146 ms    QRS Duration 100 ms    Q-T Interval 372 ms    QTC Calculation(Bezet) 445 ms    P Axis -4 degrees    R Axis -39 degrees    T Axis 41 degrees    Diagnosis Line Normal sinus rhythm  Left axis deviation  Incomplete right bundle branch block  Voltage criteria for left ventricular hypertrophy  Abnormal ECG  No previous ECGs available    < end of copied text >        Echo:    < from: TTE Echo Complete w/Doppler (07.01.19 @ 13:16) >    Summary:   1. Left ventricular ejection fraction, by visual estimation, is 45 to   50%.   2. Mildly decreased global left ventricular systolic function.   3. Apical inferior segment, mid and apical anterior wall, mid inferior   segment, and apex are abnormal as described above.   4. The left ventricular diastolic function could not be assessed in this   study.   5. There is no evidence of pericardial effusion.   6. Bioprosthesis in the aortic position.   7. Endocardial visualization was enhanced with intravenous echo contrast.   8. Peak aortic valve gradient is 19.2 mmHg and the mean gradient is 10.2   mmHg, which is probably normal in the setting of a prosthetic aortic  valve.    < end of copied text >    Radiology:  < from: Xray Chest 1 View AP/PA (11.17.19 @ 20:08) >      INTERPRETATION:  AP semierect chest on November 17, 2019 at 7:52 PM.   Patient has chest cellulitis.    Heart suggest normal size. Sternotomy and loop recorder over the heart   again noted.    No lung consolidations or layering effusions are evident.    Chest is similar to February 23, 2016.    IMPRESSION: Sternotomy and loop recorder again noted.    < end of copied text >

## 2019-11-20 NOTE — PROGRESS NOTE ADULT - SUBJECTIVE AND OBJECTIVE BOX
Postoperative Day #: 2    58y Male admitted with Cellulitis of chest wall  S/P I&D of infected sebaceous cyst of chest wall  .    Patient seen and examined bedside resting comfortably.  PT complaining of pain at cauterization areas.  Is tolerating PO intake.  Currently on PO antibiotics due to poor IV access.  Packing to be removed today. Will re-evaluate for bleeding at site.  Denies fevers, chills, SOB, back pain, lower leg/calf tenderness.     T(F): 98.1 (11-20-19 @ 08:00), Max: 98.7 (11-19-19 @ 23:40)  HR: 64 (11-20-19 @ 08:42) (62 - 77)  BP: 142/70 (11-20-19 @ 08:42) (116/72 - 173/92)  RR: 18 (11-20-19 @ 08:00) (17 - 18)  SpO2: 98% (11-20-19 @ 08:00) (96% - 98%)  Wt(kg): --  CAPILLARY BLOOD GLUCOSE      POCT Blood Glucose.: 131 mg/dL (19 Nov 2019 21:19)  POCT Blood Glucose.: 98 mg/dL (19 Nov 2019 16:37)  POCT Blood Glucose.: 108 mg/dL (19 Nov 2019 11:47)      PHYSICAL EXAM:  General: NAD, WDWN.   Neuro:  Alert & oriented x 3  CHest:  Bandage removed- currently without active bleeding.  Packing in place.  Will remove.    Extremities: no pedal edema or calf tenderness noted       LABS:                        10.7   3.08  )-----------( 144      ( 20 Nov 2019 06:57 )             34.0     11-20    142  |  106  |  14  ----------------------------<  113<H>  3.6   |  29  |  1.10    Ca    8.9      20 Nov 2019 06:57        I&O's Detail    19 Nov 2019 07:01  -  20 Nov 2019 07:00  --------------------------------------------------------  IN:    Oral Fluid: 840 mL  Total IN: 840 mL    OUT:  Total OUT: 0 mL    Total NET: 840 mL          Impression: 58y Male admitted with Cellulitis of chest wall    PMH S/P aneurysm repair  H/O aortic valve repair  Diabetes mellitus  HTN (hypertension)  Cardiac tamponade  Aortic valve replaced  Leg weakness  GERD (gastroesophageal reflux disease)  AAA (abdominal aortic aneurysm)  Hypertension  Diabetes mellitus      Plan:  -continue VTE prophylaxis -ASA, plavic  -Increase activity with PT, OOB, Ambulate  -Will remove packing, change dressing  -continue local wound care  -D/C Planning  -Discussed with Dr. Valdivia

## 2019-11-20 NOTE — PROGRESS NOTE ADULT - ASSESSMENT
58m HTN, DM, bicuspid AV s/p bio AVR and aortoplasty 2015.  CAD s/p pci of a diagonal branch and rpda in 6-7/19, for which he has remained on dapt.  Has not followed with a cardiologist in several months.  He presents to ED c/o chest redness and pain for the past 5 days now sp I & D of chest     CAD  - s/p recent pci diag and rpda in 6-7/2019  - Continue DAPT  - Continue BB  - Continue statin  - No evidence of significant arrhythmia.  - No evidence for meaningful  volume overload.    Mild global LVSD  - TTE in 7/2019 showed EF 45-50% with SWMA, bioprosthesis in the aortic position  - No need to repeat at this time    Chest wall cellulitis  - s/p I/D and cyst excision  - Care per Sx  - Pain control  - Continue abx IV    Radha Up FNP-C  Cardiology NP  [id number] [phone]

## 2019-11-20 NOTE — PROGRESS NOTE ADULT - ASSESSMENT
Addendum: Pt had dressing changed.  Packing removed.  Wound is clean dry.  No active bleeding.  Area inspected.  Dressing of 4x4s and tape applied over .

## 2019-11-20 NOTE — PROGRESS NOTE ADULT - SUBJECTIVE AND OBJECTIVE BOX
STRICKLANDDUSTY GERBER is a 58yMale , patient examined and chart reviewed.    INTERVAL HPI/ OVERNIGHT EVENTS:   Surgical site had to be cauterized due to bleed.  Feeling better. Afebrile.    PAST MEDICAL & SURGICAL HISTORY:  S/P aneurysm repair  H/O aortic valve repair  Diabetes mellitus  HTN (hypertension)  Cardiac tamponade  Aortic valve replaced  Leg weakness  GERD (gastroesophageal reflux disease)  AAA (abdominal aortic aneurysm): dx 2012  Hypertension  Diabetes mellitus  H/O aortic valve repair  S/P aortic aneurysm repair  S/P AAA repair: Repaired 3/2015  Aortic valve replaced      For details regarding the patient's social history, family history, and other miscellaneous elements, please refer the initial infectious diseases consultation and/or the admitting history and physical examination for this admission.    ROS:  CONSTITUTIONAL:  Negative fever or chills  EYES:  Negative  blurry vision or double vision  CARDIOVASCULAR:  Negative for chest pain or palpitations  RESPIRATORY:  Negative for cough, wheezing, or SOB   GASTROINTESTINAL:  Negative for nausea, vomiting, diarrhea, constipation, or abdominal pain  GENITOURINARY:  Negative frequency, urgency or dysuria  NEUROLOGIC:  No headache, confusion, dizziness, lightheadedness  All other systems were reviewed and are negative     ALLERGIES:  Normodyne (Faint)     Current inpatient medications :    ANTIBIOTICS/RELEVANT:  linezolid    Tablet 600 milliGRAM(s) Oral every 12 hours  cefpodoxime 200 milliGRAM(s) Oral every 12 hours    MEDICATIONS  (STANDING):  aspirin  chewable 81 milliGRAM(s) Oral daily  atorvastatin 80 milliGRAM(s) Oral at bedtime  cefpodoxime 200 milliGRAM(s) Oral every 12 hours  clopidogrel Tablet 75 milliGRAM(s) Oral daily  dextrose 5%. 1000 milliLiter(s) (50 mL/Hr) IV Continuous <Continuous>  dextrose 50% Injectable 12.5 Gram(s) IV Push once  gabapentin 600 milliGRAM(s) Oral two times a day  insulin lispro (HumaLOG) corrective regimen sliding scale   SubCutaneous three times a day before meals  insulin lispro (HumaLOG) corrective regimen sliding scale   SubCutaneous at bedtime  metoprolol tartrate 25 milliGRAM(s) Oral daily  pantoprazole    Tablet 40 milliGRAM(s) Oral before breakfast  senna 2 Tablet(s) Oral at bedtime    MEDICATIONS  (PRN):  dextrose 40% Gel 15 Gram(s) Oral once PRN Blood Glucose LESS THAN 70 milliGRAM(s)/deciliter  glucagon  Injectable 1 milliGRAM(s) IntraMuscular once PRN Glucose LESS THAN 70 milligrams/deciliter      Objective:  Vital Signs Last 24 Hrs  T(C): 36.7 (20 Nov 2019 08:00), Max: 37.1 (19 Nov 2019 23:40)  T(F): 98.1 (20 Nov 2019 08:00), Max: 98.7 (19 Nov 2019 23:40)  HR: 64 (20 Nov 2019 08:42) (62 - 77)  BP: 142/70 (20 Nov 2019 08:42) (116/72 - 173/92)  RR: 18 (20 Nov 2019 08:00) (17 - 18)  SpO2: 98% (20 Nov 2019 08:00) (96% - 98%)      Physical Exam:  General:  no acute distress  Eyes: sclera anicteric, pupils equal and reactive to light  ENMT: buccal mucosa moist, pharynx not injected  Neck: supple, trachea midline  Lungs: clear, no wheeze/rhonchi Chest wall drsg c/d/i erythema improved  Cardiovascular: regular rate and rhythm, S1 S2  Abdomen: soft, nontender, no organomegaly present, bowel sounds normal  Neurological: alert and oriented x3, Cranial Nerves II-XII grossly intact  Skin: no increased ecchymosis/petechiae/purpura  Lymph Nodes: no palpable cervical/supraclavicular lymph nodes enlargements  Extremities: no cyanosis/clubbing/edema      LABS:                        10.7   3.08  )-----------( 144      ( 20 Nov 2019 06:57 )             34.0   11-20    142  |  106  |  14  ----------------------------<  113<H>  3.6   |  29  |  1.10    Ca    8.9      20 Nov 2019 06:57      MICROBIOLOGY:    Culture - Surgical Swab (collected 18 Nov 2019 20:29)  Source: .Surgical Swab abscess right chest wall  Preliminary Report (19 Nov 2019 19:29):    Few Proteus mirabilis    Culture - Blood (collected 17 Nov 2019 23:09)  Source: .Blood Blood  Preliminary Report (19 Nov 2019 01:07):    No growth to date.    Culture - Blood (collected 17 Nov 2019 23:09)  Source: .Blood Blood  Preliminary Report (19 Nov 2019 01:07):    No growth to date.    RADIOLOGY & ADDITIONAL STUDIES:  EXAM:  CT CHEST                            PROCEDURE DATE:  11/18/2019          INTERPRETATION:  CLINICAL INFORMATION: Lupus recorder and chest location   to the impression the abscess    COMPARISON: 6/29/2019    PROCEDURE:   CT of the Chest was performed without intravenous contrast.  Sagittal and coronal reformats were performed.      FINDINGS:    LUNGS AND AIRWAYS: Patent central airways.  Minimal bronchiectasis.   Chronic changes in the periphery of the bilateral lower lobes. Minimal   biapical pleural parenchymal scarring. No definite segmental   consolidations. No definite discrete nodules.    PLEURA: No pleural effusion.    MEDIASTINUM AND ALTA: Small hiatal hernia. No significant mediastinal or   hilar adenopathy.    VESSELS: Atherosclerotic changes of the aorta and coronary vasculature.   Prior valve replacement and question of ascending thoracic aortic repair.   No definite aortic aneurysm.    HEART: Heart size is normal. Small right paracardiac fluid density   decrease in size from prior exam.    CHEST WALL AND LOWER NECK: Skin thickening to the right of the midline in   the mid to upper chest with underlying subcutaneous edema. Additional   focal round fluid density measuring 1.3 cm with droplets of air seen   superiorlyand inferiorly likely representing reported partially drained   fluid collection. Skin thickening and subcutaneous edema compatible   cellulitis. A loop recorder is seen to the left of the midline. Minimal   infiltration of the subcutaneous fat to the right of the loop recorder   however this is 90 proximity to the partially drained fluid density.    VISUALIZED UPPER ABDOMEN: Gallstones. Mild elevation of the right   hemidiaphragm.    BONES: Degenerative changes. Prior sternotomy. Mild kyphosis.    IMPRESSION:     Anterior mid to upper chest just to the right of midline cellulitis with   additional findings suggesting partially drains fluid density. Loop   recorder seen to the left of the midline not in proximity to the   partially drained fluid density. Subcutaneous infiltrative changes extend   to the level of the loop recorder.    No segmental pulmonary consolidations.      Assessment :   Pt is 57 y/o male with PMH of HTN, DM, Aortic valve replacement 2015, ascending aneurysm repair 2015, s/p 2 stents 3/19 Admitted with iInfected sebaceous cyst with abscess and chest wall cellulitis. SP I&D in OR pod 2. Course in hospital complicated by bleed sp cauterization. No IV access.    Plan :   Zyvox and Vantin for now  Fu final cultures    D/w Dr Garcia    Continue with present regiment.  Appropriate use of antibiotics and adverse effects reviewed.      I have discussed the above plan of care with patient/ family in detail. They expressed understanding of the  treatment plan . Risks, benefits and alternatives discussed in detail. I have asked if they have any questions or concerns and appropriately addressed them to the best of my ability .    > 35 minutes were spent in direct patient care reviewing notes, medications ,labs data/ imaging , discussion with multidisciplinary team.    Thank you for allowing me to participate in care of your patient .    Gaston Blanco MD  Infectious Disease  [phone]

## 2019-11-21 ENCOUNTER — TRANSCRIPTION ENCOUNTER (OUTPATIENT)
Age: 59
End: 2019-11-21

## 2019-11-21 VITALS
RESPIRATION RATE: 18 BRPM | OXYGEN SATURATION: 99 % | TEMPERATURE: 98 F | DIASTOLIC BLOOD PRESSURE: 80 MMHG | HEART RATE: 63 BPM | SYSTOLIC BLOOD PRESSURE: 139 MMHG

## 2019-11-21 LAB
ANION GAP SERPL CALC-SCNC: 7 MMOL/L — SIGNIFICANT CHANGE UP (ref 5–17)
BUN SERPL-MCNC: 17 MG/DL — SIGNIFICANT CHANGE UP (ref 7–23)
CALCIUM SERPL-MCNC: 8.8 MG/DL — SIGNIFICANT CHANGE UP (ref 8.5–10.1)
CHLORIDE SERPL-SCNC: 105 MMOL/L — SIGNIFICANT CHANGE UP (ref 96–108)
CO2 SERPL-SCNC: 28 MMOL/L — SIGNIFICANT CHANGE UP (ref 22–31)
CREAT SERPL-MCNC: 0.98 MG/DL — SIGNIFICANT CHANGE UP (ref 0.5–1.3)
GLUCOSE SERPL-MCNC: 124 MG/DL — HIGH (ref 70–99)
HCT VFR BLD CALC: 35.1 % — LOW (ref 39–50)
HGB BLD-MCNC: 11.2 G/DL — LOW (ref 13–17)
MAGNESIUM SERPL-MCNC: 1.8 MG/DL — SIGNIFICANT CHANGE UP (ref 1.6–2.6)
MCHC RBC-ENTMCNC: 26.7 PG — LOW (ref 27–34)
MCHC RBC-ENTMCNC: 31.9 GM/DL — LOW (ref 32–36)
MCV RBC AUTO: 83.8 FL — SIGNIFICANT CHANGE UP (ref 80–100)
NRBC # BLD: 0 /100 WBCS — SIGNIFICANT CHANGE UP (ref 0–0)
PLATELET # BLD AUTO: 152 K/UL — SIGNIFICANT CHANGE UP (ref 150–400)
POTASSIUM SERPL-MCNC: 3.7 MMOL/L — SIGNIFICANT CHANGE UP (ref 3.5–5.3)
POTASSIUM SERPL-SCNC: 3.7 MMOL/L — SIGNIFICANT CHANGE UP (ref 3.5–5.3)
RBC # BLD: 4.19 M/UL — LOW (ref 4.2–5.8)
RBC # FLD: 13.9 % — SIGNIFICANT CHANGE UP (ref 10.3–14.5)
SODIUM SERPL-SCNC: 140 MMOL/L — SIGNIFICANT CHANGE UP (ref 135–145)
WBC # BLD: 2.96 K/UL — LOW (ref 3.8–10.5)
WBC # FLD AUTO: 2.96 K/UL — LOW (ref 3.8–10.5)

## 2019-11-21 PROCEDURE — 85610 PROTHROMBIN TIME: CPT

## 2019-11-21 PROCEDURE — 99232 SBSQ HOSP IP/OBS MODERATE 35: CPT

## 2019-11-21 PROCEDURE — 93005 ELECTROCARDIOGRAM TRACING: CPT

## 2019-11-21 PROCEDURE — 71250 CT THORAX DX C-: CPT

## 2019-11-21 PROCEDURE — 96365 THER/PROPH/DIAG IV INF INIT: CPT

## 2019-11-21 PROCEDURE — 87070 CULTURE OTHR SPECIMN AEROBIC: CPT

## 2019-11-21 PROCEDURE — 83036 HEMOGLOBIN GLYCOSYLATED A1C: CPT

## 2019-11-21 PROCEDURE — 87186 SC STD MICRODIL/AGAR DIL: CPT

## 2019-11-21 PROCEDURE — 99285 EMERGENCY DEPT VISIT HI MDM: CPT | Mod: 25

## 2019-11-21 PROCEDURE — 96367 TX/PROPH/DG ADDL SEQ IV INF: CPT

## 2019-11-21 PROCEDURE — 85027 COMPLETE CBC AUTOMATED: CPT

## 2019-11-21 PROCEDURE — 82962 GLUCOSE BLOOD TEST: CPT

## 2019-11-21 PROCEDURE — 36415 COLL VENOUS BLD VENIPUNCTURE: CPT

## 2019-11-21 PROCEDURE — 83735 ASSAY OF MAGNESIUM: CPT

## 2019-11-21 PROCEDURE — 88304 TISSUE EXAM BY PATHOLOGIST: CPT

## 2019-11-21 PROCEDURE — 99239 HOSP IP/OBS DSCHRG MGMT >30: CPT

## 2019-11-21 PROCEDURE — 85730 THROMBOPLASTIN TIME PARTIAL: CPT

## 2019-11-21 PROCEDURE — 83605 ASSAY OF LACTIC ACID: CPT

## 2019-11-21 PROCEDURE — 80202 ASSAY OF VANCOMYCIN: CPT

## 2019-11-21 PROCEDURE — 87040 BLOOD CULTURE FOR BACTERIA: CPT

## 2019-11-21 PROCEDURE — 96375 TX/PRO/DX INJ NEW DRUG ADDON: CPT

## 2019-11-21 PROCEDURE — 80048 BASIC METABOLIC PNL TOTAL CA: CPT

## 2019-11-21 PROCEDURE — 80053 COMPREHEN METABOLIC PANEL: CPT

## 2019-11-21 PROCEDURE — 71045 X-RAY EXAM CHEST 1 VIEW: CPT

## 2019-11-21 RX ORDER — METFORMIN HYDROCHLORIDE 850 MG/1
1 TABLET ORAL
Qty: 0 | Refills: 0 | DISCHARGE

## 2019-11-21 RX ORDER — METOPROLOL TARTRATE 50 MG
1 TABLET ORAL
Qty: 0 | Refills: 0 | DISCHARGE
Start: 2019-11-21

## 2019-11-21 RX ORDER — CEFPODOXIME PROXETIL 100 MG
1 TABLET ORAL
Qty: 14 | Refills: 0
Start: 2019-11-21

## 2019-11-21 RX ORDER — ACETAMINOPHEN 500 MG
2 TABLET ORAL
Qty: 0 | Refills: 0 | DISCHARGE
Start: 2019-11-21

## 2019-11-21 RX ADMIN — GABAPENTIN 600 MILLIGRAM(S): 400 CAPSULE ORAL at 06:07

## 2019-11-21 RX ADMIN — LINEZOLID 600 MILLIGRAM(S): 600 INJECTION, SOLUTION INTRAVENOUS at 06:07

## 2019-11-21 RX ADMIN — Medication 200 MILLIGRAM(S): at 06:07

## 2019-11-21 RX ADMIN — Medication 650 MILLIGRAM(S): at 06:08

## 2019-11-21 RX ADMIN — Medication 25 MILLIGRAM(S): at 08:34

## 2019-11-21 RX ADMIN — PANTOPRAZOLE SODIUM 40 MILLIGRAM(S): 20 TABLET, DELAYED RELEASE ORAL at 06:07

## 2019-11-21 NOTE — PROGRESS NOTE ADULT - SUBJECTIVE AND OBJECTIVE BOX
STRICKLANDDUSTY GERBER is a 58yMale , patient examined and chart reviewed.    INTERVAL HPI/ OVERNIGHT EVENTS:   Feeling better. Afebrile.    PAST MEDICAL & SURGICAL HISTORY:  S/P aneurysm repair  H/O aortic valve repair  Diabetes mellitus  HTN (hypertension)  Cardiac tamponade  Aortic valve replaced  Leg weakness  GERD (gastroesophageal reflux disease)  AAA (abdominal aortic aneurysm): dx 2012  Hypertension  Diabetes mellitus  H/O aortic valve repair  S/P aortic aneurysm repair  S/P AAA repair: Repaired 3/2015  Aortic valve replaced      For details regarding the patient's social history, family history, and other miscellaneous elements, please refer the initial infectious diseases consultation and/or the admitting history and physical examination for this admission.    ROS:  CONSTITUTIONAL:  Negative fever or chills  EYES:  Negative  blurry vision or double vision  CARDIOVASCULAR:  Negative for chest pain or palpitations  RESPIRATORY:  Negative for cough, wheezing, or SOB   GASTROINTESTINAL:  Negative for nausea, vomiting, diarrhea, constipation, or abdominal pain  GENITOURINARY:  Negative frequency, urgency or dysuria  NEUROLOGIC:  No headache, confusion, dizziness, lightheadedness  All other systems were reviewed and are negative     ALLERGIES:  Normodyne (Faint)     Current inpatient medications :    ANTIBIOTICS/RELEVANT:  cefpodoxime 200 milliGRAM(s) Oral every 12 hours    MEDICATIONS  (STANDING):  aspirin  chewable 81 milliGRAM(s) Oral daily  atorvastatin 80 milliGRAM(s) Oral at bedtime  clopidogrel Tablet 75 milliGRAM(s) Oral daily  dextrose 5%. 1000 milliLiter(s) (50 mL/Hr) IV Continuous <Continuous>  dextrose 50% Injectable 12.5 Gram(s) IV Push once  gabapentin 600 milliGRAM(s) Oral two times a day  insulin lispro (HumaLOG) corrective regimen sliding scale   SubCutaneous three times a day before meals  insulin lispro (HumaLOG) corrective regimen sliding scale   SubCutaneous at bedtime  metoprolol tartrate 25 milliGRAM(s) Oral daily  pantoprazole    Tablet 40 milliGRAM(s) Oral before breakfast  senna 2 Tablet(s) Oral at bedtime    MEDICATIONS  (PRN):  acetaminophen   Tablet .. 650 milliGRAM(s) Oral every 6 hours PRN Temp greater or equal to 38C (100.4F), Mild Pain (1 - 3)  dextrose 40% Gel 15 Gram(s) Oral once PRN Blood Glucose LESS THAN 70 milliGRAM(s)/deciliter  glucagon  Injectable 1 milliGRAM(s) IntraMuscular once PRN Glucose LESS THAN 70 milligrams/deciliter  polyethylene glycol 3350 17 Gram(s) Oral daily PRN Constipation      MEDICATIONS  (PRN):  dextrose 40% Gel 15 Gram(s) Oral once PRN Blood Glucose LESS THAN 70 milliGRAM(s)/deciliter  glucagon  Injectable 1 milliGRAM(s) IntraMuscular once PRN Glucose LESS THAN 70 milligrams/deciliter      Objective:  Vital Signs Last 24 Hrs  T(C): 36.4 (21 Nov 2019 08:00), Max: 36.5 (20 Nov 2019 15:40)  T(F): 97.5 (21 Nov 2019 08:00), Max: 97.7 (20 Nov 2019 15:40)  HR: 63 (21 Nov 2019 08:00) (49 - 74)  BP: 139/80 (21 Nov 2019 08:00) (139/80 - 174/92)  RR: 18 (21 Nov 2019 08:00) (18 - 18)  SpO2: 99% (21 Nov 2019 08:00) (99% - 99%)    Physical Exam:  General:  no acute distress  Eyes: sclera anicteric, pupils equal and reactive to light  ENMT: buccal mucosa moist, pharynx not injected  Neck: supple, trachea midline  Lungs: clear, no wheeze/rhonchi Chest wall drsg c/d/i erythema improved  Cardiovascular: regular rate and rhythm, S1 S2  Abdomen: soft, nontender, no organomegaly present, bowel sounds normal  Neurological: alert and oriented x3, Cranial Nerves II-XII grossly intact  Skin: no increased ecchymosis/petechiae/purpura  Lymph Nodes: no palpable cervical/supraclavicular lymph nodes enlargements  Extremities: no cyanosis/clubbing/edema      LABS:                        11.2   2.96  )-----------( 152      ( 21 Nov 2019 07:07 )             35.1   11-21    140  |  105  |  17  ----------------------------<  124<H>  3.7   |  28  |  0.98    Ca    8.8      21 Nov 2019 07:07  Mg     1.8     11-21      MICROBIOLOGY:    Culture - Surgical Swab (collected 18 Nov 2019 20:29)  Source: .Surgical Swab abscess right chest wall  Preliminary Report (19 Nov 2019 19:29):    Few Proteus mirabilis  Organism: Proteus mirabilis (20 Nov 2019 19:55)  Organism: Proteus mirabilis (20 Nov 2019 19:55)      -  Amikacin: S <=16      -  Amoxicillin/Clavulanic Acid: S <=8/4      -  Ampicillin: S <=8 These ampicillin results predict results for amoxicillin      -  Ampicillin/Sulbactam: S <=4/2 Enterobacter, Citrobacter, and Serratia may develop resistance during prolonged therapy (3-4 days)      -  Aztreonam: S 8      -  Cefazolin: S <=2 Enterobacter, Citrobacter, and Serratia may develop resistance during prolonged therapy (3-4 days)      -  Cefepime: S <=2      -  Cefoxitin: S <=8      -  Ceftriaxone: S <=1 Enterobacter, Citrobacter, and Serratia may develop resistance during prolonged therapy      -  Ciprofloxacin: S <=1      -  Ertapenem: S <=0.5      -  Gentamicin: S <=2      -  Levofloxacin: S <=2      -  Meropenem: S <=1      -  Piperacillin/Tazobactam: S <=8      -  Tobramycin: S <=2      -  Trimethoprim/Sulfamethoxazole: S <=2/38      Method Type: SHAMEKA  Culture - Blood (collected 17 Nov 2019 23:09)  Source: .Blood Blood  Preliminary Report (19 Nov 2019 01:07):    No growth to date.    Culture - Blood (collected 17 Nov 2019 23:09)  Source: .Blood Blood  Preliminary Report (19 Nov 2019 01:07):    No growth to date.    RADIOLOGY & ADDITIONAL STUDIES:  EXAM:  CT CHEST                            PROCEDURE DATE:  11/18/2019          INTERPRETATION:  CLINICAL INFORMATION: Lupus recorder and chest location   to the impression the abscess    COMPARISON: 6/29/2019    PROCEDURE:   CT of the Chest was performed without intravenous contrast.  Sagittal and coronal reformats were performed.      FINDINGS:    LUNGS AND AIRWAYS: Patent central airways.  Minimal bronchiectasis.   Chronic changes in the periphery of the bilateral lower lobes. Minimal   biapical pleural parenchymal scarring. No definite segmental   consolidations. No definite discrete nodules.    PLEURA: No pleural effusion.    MEDIASTINUM AND ALTA: Small hiatal hernia. No significant mediastinal or   hilar adenopathy.    VESSELS: Atherosclerotic changes of the aorta and coronary vasculature.   Prior valve replacement and question of ascending thoracic aortic repair.   No definite aortic aneurysm.    HEART: Heart size is normal. Small right paracardiac fluid density   decrease in size from prior exam.    CHEST WALL AND LOWER NECK: Skin thickening to the right of the midline in   the mid to upper chest with underlying subcutaneous edema. Additional   focal round fluid density measuring 1.3 cm with droplets of air seen   superiorlyand inferiorly likely representing reported partially drained   fluid collection. Skin thickening and subcutaneous edema compatible   cellulitis. A loop recorder is seen to the left of the midline. Minimal   infiltration of the subcutaneous fat to the right of the loop recorder   however this is 90 proximity to the partially drained fluid density.    VISUALIZED UPPER ABDOMEN: Gallstones. Mild elevation of the right   hemidiaphragm.    BONES: Degenerative changes. Prior sternotomy. Mild kyphosis.    IMPRESSION:     Anterior mid to upper chest just to the right of midline cellulitis with   additional findings suggesting partially drains fluid density. Loop   recorder seen to the left of the midline not in proximity to the   partially drained fluid density. Subcutaneous infiltrative changes extend   to the level of the loop recorder.    No segmental pulmonary consolidations.      Assessment :   Pt is 59 y/o male with PMH of HTN, DM, Aortic valve replacement 2015, ascending aneurysm repair 2015, s/p 2 stents 3/19 Admitted with iInfected sebaceous cyst with abscess and chest wall cellulitis. SP I&D in OR pod 3. Course in hospital complicated by bleed sp cauterization. Abscess culture with Proteus. Overall doing well.    Plan :   To go home on Vantin x 7 days  Stable from ID standpoint for dc    D/w Dr Garcia    Continue with present regiment.  Appropriate use of antibiotics and adverse effects reviewed.      I have discussed the above plan of care with patient/ family in detail. They expressed understanding of the  treatment plan . Risks, benefits and alternatives discussed in detail. I have asked if they have any questions or concerns and appropriately addressed them to the best of my ability .    > 35 minutes were spent in direct patient care reviewing notes, medications ,labs data/ imaging , discussion with multidisciplinary team.    Thank you for allowing me to participate in care of your patient .    Gaston Blanco MD  Infectious Disease  [phone]

## 2019-11-21 NOTE — PROGRESS NOTE ADULT - SUBJECTIVE AND OBJECTIVE BOX
CHIEF COMPLAINT/INTERVAL HISTORY:  Pt. seen and evaluated for cellulitis of the chest wall.  Pt. is s/p I&D.  Pt. feeling well and in no distress.  Tolerating antibiotic. Reports intermittent discomfort of incision site.  No gross bleeding.     REVIEW OF SYSTEMS:  No fever, CP, SOB, or abdominal pain.     Vital Signs Last 24 Hrs  T(C): 36.4 (21 Nov 2019 08:00), Max: 36.5 (20 Nov 2019 15:40)  T(F): 97.5 (21 Nov 2019 08:00), Max: 97.7 (20 Nov 2019 15:40)  HR: 63 (21 Nov 2019 08:00) (49 - 74)  BP: 139/80 (21 Nov 2019 08:00) (139/80 - 174/92)  BP(mean): --  RR: 18 (21 Nov 2019 08:00) (18 - 18)  SpO2: 99% (21 Nov 2019 08:00) (99% - 99%)    PHYSICAL EXAM:  GENERAL: NAD  HEENT: EOMI, hearing normal, conjunctiva and sclera clear  Chest: CTA bilaterally, no wheezing, dressing over I&D site is clean and dry.   CV: S1S2, RRR,   GI: soft, +BS, NT/ND  Musculoskeletal: no edema  Psychiatric: affect nL, mood nL  Skin: warm and dry    LABS:                        11.2   2.96  )-----------( 152      ( 21 Nov 2019 07:07 )             35.1     11-21    140  |  105  |  17  ----------------------------<  124<H>  3.7   |  28  |  0.98    Ca    8.8      21 Nov 2019 07:07  Mg     1.8     11-21      Assessment and Plan:  -Cellulitis of chest wall:  s/p I&D of infected sebaceous cyst.  Surgical site culture +Proteus.  Blood cx NGTD.  Continue Vantin 200mg PO Q12h.  Surgical site care per surgery.  ID f/u  -CAD with stent in March:  continue ASA 81mg PO daily, Plavix 75mg PO daily, Metoprolol 25mg PO daily, and Lipitor 80mg PO QHS  -HTN:  continue Metoprolol 25mg PO daily  -HLD:  continue statin therapy  -Type 2 DM:  continue humalog sliding scale  -GERD:  continue Protonix 40mg PO daily  -VTE ppx: SCD

## 2019-11-21 NOTE — DISCHARGE NOTE NURSING/CASE MANAGEMENT/SOCIAL WORK - PATIENT PORTAL LINK FT
You can access the FollowMyHealth Patient Portal offered by [facility] by registering at the following website: [email or web address]. By joining [email or web address]’s FollowMyHealth portal, you will also be able to view your health information using other applications (apps) compatible with our system.

## 2019-11-21 NOTE — PROGRESS NOTE ADULT - REASON FOR ADMISSION
Skin redness

## 2019-11-21 NOTE — PROGRESS NOTE ADULT - SUBJECTIVE AND OBJECTIVE BOX
58y Male admitted with Cellulitis of chest wall  patient seen walking the halls this morning. no complaints or new events reported      T(F): 97.5 (11-21-19 @ 08:00), Max: 97.7 (11-20-19 @ 15:40)  HR: 63 (11-21-19 @ 08:00) (49 - 74)  BP: 139/80 (11-21-19 @ 08:00) (139/80 - 174/92)  RR: 18 (11-21-19 @ 08:00) (18 - 18)  SpO2: 99% (11-21-19 @ 08:00) (99% - 99%)  Wt(kg): --  CAPILLARY BLOOD GLUCOSE      POCT Blood Glucose.: 122 mg/dL (21 Nov 2019 07:55)  POCT Blood Glucose.: 137 mg/dL (20 Nov 2019 21:27)  POCT Blood Glucose.: 148 mg/dL (20 Nov 2019 16:33)  POCT Blood Glucose.: 125 mg/dL (20 Nov 2019 12:01)      PHYSICAL EXAM:  General: NAD, WDWN.   Neuro:  Alert & oriented x 3  HEENT: NCAT, EOMI, conjunctiva clear  chest:  wound clean; no exudates , minimal cellulitic skin changes resolving  CV: +S1+S2 regular rate and rhythm  Lung: clear to ausculation bilaterally, respirations nonlabored, good inspiratory effort  Abdomen: soft, NTND. Normactive BS  Extremities: no pedal edema or calf tenderness noted       LABS:                        11.2   2.96  )-----------( 152      ( 21 Nov 2019 07:07 )             35.1     11-21    140  |  105  |  17  ----------------------------<  124<H>  3.7   |  28  |  0.98    Ca    8.8      21 Nov 2019 07:07  Mg     1.8     11-21        I&O's Detail      Impression: 58y Male admitted with Cellulitis of chest wall    PMH S/P aneurysm repair  H/O aortic valve repair  Diabetes mellitus  HTN (hypertension)  Cardiac tamponade  Aortic valve replaced  Leg weakness  GERD (gastroesophageal reflux disease)  AAA (abdominal aortic aneurysm)  Hypertension  Diabetes mellitus      Plan:  -final wound cultures noted   will d/w medicine service  re discharge

## 2019-11-21 NOTE — PROGRESS NOTE ADULT - SUBJECTIVE AND OBJECTIVE BOX
[facility] -- Merry Ordoñez, Rupesh, Effie, Yoan Stevenson Savella  Office # [phone]      Follow Up:    CAD  Subjective/Observations:   No events overnight resting comfortably in bed.  No complaints of chest pain, dyspnea, or palpitations reported. No signs of orthopnea or PND.     REVIEW OF SYSTEMS: All other review of systems is negative unless indicated above    PAST MEDICAL & SURGICAL HISTORY:  S/P aneurysm repair  H/O aortic valve repair  Diabetes mellitus  HTN (hypertension)  Cardiac tamponade  Aortic valve replaced  Leg weakness  GERD (gastroesophageal reflux disease)  AAA (abdominal aortic aneurysm): dx 2012  Hypertension  Diabetes mellitus  H/O aortic valve repair  S/P aortic aneurysm repair  S/P AAA repair: Repaired 3/2015  Aortic valve replaced      MEDICATIONS  (STANDING):  aspirin  chewable 81 milliGRAM(s) Oral daily  atorvastatin 80 milliGRAM(s) Oral at bedtime  cefpodoxime 200 milliGRAM(s) Oral every 12 hours  clopidogrel Tablet 75 milliGRAM(s) Oral daily  dextrose 5%. 1000 milliLiter(s) (50 mL/Hr) IV Continuous <Continuous>  dextrose 50% Injectable 12.5 Gram(s) IV Push once  gabapentin 600 milliGRAM(s) Oral two times a day  insulin lispro (HumaLOG) corrective regimen sliding scale   SubCutaneous three times a day before meals  insulin lispro (HumaLOG) corrective regimen sliding scale   SubCutaneous at bedtime  metoprolol tartrate 25 milliGRAM(s) Oral daily  pantoprazole    Tablet 40 milliGRAM(s) Oral before breakfast  senna 2 Tablet(s) Oral at bedtime    MEDICATIONS  (PRN):  acetaminophen   Tablet .. 650 milliGRAM(s) Oral every 6 hours PRN Temp greater or equal to 38C (100.4F), Mild Pain (1 - 3)  dextrose 40% Gel 15 Gram(s) Oral once PRN Blood Glucose LESS THAN 70 milliGRAM(s)/deciliter  glucagon  Injectable 1 milliGRAM(s) IntraMuscular once PRN Glucose LESS THAN 70 milligrams/deciliter  polyethylene glycol 3350 17 Gram(s) Oral daily PRN Constipation      Allergies    Normodyne (Faint)  Normodyne (Unknown)    Intolerances        Vital Signs Last 24 Hrs  T(C): 36.4 (21 Nov 2019 08:00), Max: 36.5 (20 Nov 2019 15:40)  T(F): 97.5 (21 Nov 2019 08:00), Max: 97.7 (20 Nov 2019 15:40)  HR: 63 (21 Nov 2019 08:00) (49 - 74)  BP: 139/80 (21 Nov 2019 08:00) (139/80 - 174/92)  BP(mean): --  RR: 18 (21 Nov 2019 08:00) (18 - 18)  SpO2: 99% (21 Nov 2019 08:00) (99% - 99%)    I&O's Summary        PHYSICAL EXAM:  TELE: Off tele   Constitutional: NAD, awake and alert, well-developed  HEENT: Moist Mucous Membranes, Anicteric  Pulmonary: Non-labored, breath sounds are clear bilaterally, No wheezing, crackles or rhonchi  Cardiovascular: Regular, S1 and S2 nl, No murmurs, rubs, gallops or clicks  Gastrointestinal: Bowel Sounds present, soft, nontender.   Lymph: No lymphadenopathy. No peripheral edema.  Skin: No visible rashes or ulcers.  Psych:  Mood & affect appropriate    LABS: All Labs Reviewed:                        11.2   2.96  )-----------( 152      ( 21 Nov 2019 07:07 )             35.1                         10.7   3.08  )-----------( 144      ( 20 Nov 2019 06:57 )             34.0                         11.5   4.98  )-----------( 155      ( 19 Nov 2019 07:18 )             36.7     21 Nov 2019 07:07    140    |  105    |  17     ----------------------------<  124    3.7     |  28     |  0.98   20 Nov 2019 06:57    142    |  106    |  14     ----------------------------<  113    3.6     |  29     |  1.10   19 Nov 2019 07:18    136    |  105    |  11     ----------------------------<  140    4.8     |  25     |  0.97     Ca    8.8        21 Nov 2019 07:07  Ca    8.9        20 Nov 2019 06:57  Ca    8.6        19 Nov 2019 07:18  Mg     1.8       21 Nov 2019 07:07        ECG:  < from: 12 Lead ECG (11.17.19 @ 20:25) >    Atrial Rate 86 BPM    P-R Interval 146 ms    QRS Duration 100 ms    Q-T Interval 372 ms    QTC Calculation(Bezet) 445 ms    P Axis -4 degrees    R Axis -39 degrees    T Axis 41 degrees    Diagnosis Line Normal sinus rhythm  Left axis deviation  Incomplete right bundle branch block  Voltage criteria for left ventricular hypertrophy  Abnormal ECG  No previous ECGs available    < end of copied text >        Echo:    < from: TTE Echo Complete w/Doppler (07.01.19 @ 13:16) >    Summary:   1. Left ventricular ejection fraction, by visual estimation, is 45 to   50%.   2. Mildly decreased global left ventricular systolic function.   3. Apical inferior segment, mid and apical anterior wall, mid inferior   segment, and apex are abnormal as described above.   4. The left ventricular diastolic function could not be assessed in this   study.   5. There is no evidence of pericardial effusion.   6. Bioprosthesis in the aortic position.   7. Endocardial visualization was enhanced with intravenous echo contrast.   8. Peak aortic valve gradient is 19.2 mmHg and the mean gradient is 10.2   mmHg, which is probably normal in the setting of a prosthetic aortic  valve.    < end of copied text >    Radiology:  < from: Xray Chest 1 View AP/PA (11.17.19 @ 20:08) >      INTERPRETATION:  AP semierect chest on November 17, 2019 at 7:52 PM.   Patient has chest cellulitis.    Heart suggest normal size. Sternotomy and loop recorder over the heart   again noted.    No lung consolidations or layering effusions are evident.    Chest is similar to February 23, 2016.    IMPRESSION: Sternotomy and loop recorder again noted.    < end of copied text >

## 2019-11-21 NOTE — PROGRESS NOTE ADULT - ASSESSMENT
58m HTN, DM, bicuspid AV s/p bio AVR and aortoplasty 2015.  CAD s/p pci of a diagonal branch and rpda in 6-7/19, for which he has remained on dapt.  Has not followed with a cardiologist in several months.  He presents to ED c/o chest redness and pain for the past 5 days now sp I & D of chest     CAD  - s/p recent pci diag and rpda in 6-7/2019  - Continue DAPT  - Continue BB  - Continue statin  - No evidence of significant arrhythmia.  - No evidence for meaningful  volume overload.    Mild global LVSD  - TTE in 7/2019 showed EF 45-50% with SWMA, bioprosthesis in the aortic position  - No need to repeat at this time    Chest wall cellulitis  - s/p I/D and cyst excision  - Care per Sx  - Pain control      -From a cardiac standpoint the patient may be discharged home    Rashad Mac [facility]  Cardiology   Spectra #[phone] 58m HTN, DM, bicuspid AV s/p bio AVR and aortoplasty 2015.  CAD s/p pci of a diagonal branch and rpda in 6-7/19, for which he has remained on dapt.  Has not followed with a cardiologist in several months.  He presents to ED c/o chest redness and pain for the past 5 days now sp I & D of chest     CAD  - s/p recent pci diag and rpda in 6-7/2019  - Continue DAPT  - Continue BB  - Continue statin  - No evidence of significant arrhythmia.  - No evidence for meaningful  volume overload.    Mild global LVSD  - TTE in 7/2019 showed EF 45-50% with SWMA, bioprosthesis in the aortic position  - No need to repeat at this time    Chest wall cellulitis  - s/p I/D and cyst excision  - Care per Sx  - Pain control    -From a cardiac standpoint the patient may be discharged home    Rashad Mac [facility]  Cardiology   Spectra #[phone]

## 2019-11-21 NOTE — PROGRESS NOTE ADULT - ATTENDING COMMENTS
Chart reviewed    Patient seen and examined    Agree with plan as outlined above
Seen/examined. agree with above.
I saw and examined the patient personally. Spoke with above provider regarding this case. I reviewed the above findings completely.  I agree with the above history, physical, and plan which I have edited where appropriate.

## 2019-11-23 LAB
CULTURE RESULTS: SIGNIFICANT CHANGE UP
CULTURE RESULTS: SIGNIFICANT CHANGE UP
SPECIMEN SOURCE: SIGNIFICANT CHANGE UP
SPECIMEN SOURCE: SIGNIFICANT CHANGE UP

## 2019-11-24 LAB
CULTURE RESULTS: SIGNIFICANT CHANGE UP
ORGANISM # SPEC MICROSCOPIC CNT: SIGNIFICANT CHANGE UP
ORGANISM # SPEC MICROSCOPIC CNT: SIGNIFICANT CHANGE UP
SPECIMEN SOURCE: SIGNIFICANT CHANGE UP

## 2019-12-02 ENCOUNTER — RX RENEWAL (OUTPATIENT)
Age: 59
End: 2019-12-02

## 2020-04-03 ENCOUNTER — TRANSCRIPTION ENCOUNTER (OUTPATIENT)
Age: 60
End: 2020-04-03

## 2020-04-10 ENCOUNTER — APPOINTMENT (OUTPATIENT)
Dept: CARDIOLOGY | Facility: CLINIC | Age: 60
End: 2020-04-10
Payer: COMMERCIAL

## 2020-04-10 ENCOUNTER — NON-APPOINTMENT (OUTPATIENT)
Age: 60
End: 2020-04-10

## 2020-04-10 VITALS
WEIGHT: 241 LBS | HEIGHT: 75 IN | OXYGEN SATURATION: 96 % | SYSTOLIC BLOOD PRESSURE: 212 MMHG | DIASTOLIC BLOOD PRESSURE: 96 MMHG | HEART RATE: 61 BPM | BODY MASS INDEX: 29.97 KG/M2

## 2020-04-10 VITALS — DIASTOLIC BLOOD PRESSURE: 90 MMHG | SYSTOLIC BLOOD PRESSURE: 148 MMHG

## 2020-04-10 PROCEDURE — 93000 ELECTROCARDIOGRAM COMPLETE: CPT

## 2020-04-10 PROCEDURE — 99215 OFFICE O/P EST HI 40 MIN: CPT

## 2020-04-10 NOTE — REASON FOR VISIT
[Chest Pain] : chest pain [Coronary Artery Disease] : coronary artery disease [Hyperlipidemia] : hyperlipidemia [Hypertension] : hypertension

## 2020-05-06 ENCOUNTER — APPOINTMENT (OUTPATIENT)
Dept: CARDIOLOGY | Facility: CLINIC | Age: 60
End: 2020-05-06
Payer: COMMERCIAL

## 2020-05-06 PROCEDURE — A9500: CPT

## 2020-05-06 PROCEDURE — 78452 HT MUSCLE IMAGE SPECT MULT: CPT

## 2020-05-06 PROCEDURE — 93015 CV STRESS TEST SUPVJ I&R: CPT

## 2020-05-12 ENCOUNTER — APPOINTMENT (OUTPATIENT)
Dept: CARDIOLOGY | Facility: CLINIC | Age: 60
End: 2020-05-12
Payer: COMMERCIAL

## 2020-05-12 PROCEDURE — 93306 TTE W/DOPPLER COMPLETE: CPT

## 2020-05-20 ENCOUNTER — APPOINTMENT (OUTPATIENT)
Dept: CARDIOLOGY | Facility: CLINIC | Age: 60
End: 2020-05-20
Payer: COMMERCIAL

## 2020-05-20 ENCOUNTER — NON-APPOINTMENT (OUTPATIENT)
Age: 60
End: 2020-05-20

## 2020-05-20 VITALS
HEART RATE: 65 BPM | SYSTOLIC BLOOD PRESSURE: 179 MMHG | OXYGEN SATURATION: 97 % | HEIGHT: 75 IN | BODY MASS INDEX: 30.21 KG/M2 | WEIGHT: 243 LBS | DIASTOLIC BLOOD PRESSURE: 98 MMHG

## 2020-05-20 VITALS — DIASTOLIC BLOOD PRESSURE: 90 MMHG | SYSTOLIC BLOOD PRESSURE: 140 MMHG

## 2020-05-20 DIAGNOSIS — R07.89 OTHER CHEST PAIN: ICD-10-CM

## 2020-05-20 PROCEDURE — 93000 ELECTROCARDIOGRAM COMPLETE: CPT

## 2020-05-20 PROCEDURE — 99215 OFFICE O/P EST HI 40 MIN: CPT

## 2020-05-26 ENCOUNTER — APPOINTMENT (OUTPATIENT)
Dept: CARDIOLOGY | Facility: CLINIC | Age: 60
End: 2020-05-26
Payer: COMMERCIAL

## 2020-05-26 PROCEDURE — 93880 EXTRACRANIAL BILAT STUDY: CPT

## 2020-06-16 ENCOUNTER — APPOINTMENT (OUTPATIENT)
Dept: INTERNAL MEDICINE | Facility: CLINIC | Age: 60
End: 2020-06-16

## 2020-06-18 ENCOUNTER — RX RENEWAL (OUTPATIENT)
Age: 60
End: 2020-06-18

## 2020-06-29 ENCOUNTER — APPOINTMENT (OUTPATIENT)
Dept: CARDIOLOGY | Facility: CLINIC | Age: 60
End: 2020-06-29

## 2020-07-06 ENCOUNTER — RX RENEWAL (OUTPATIENT)
Age: 60
End: 2020-07-06

## 2020-08-12 ENCOUNTER — APPOINTMENT (OUTPATIENT)
Dept: INTERNAL MEDICINE | Facility: CLINIC | Age: 60
End: 2020-08-12
Payer: COMMERCIAL

## 2020-08-12 VITALS
HEART RATE: 73 BPM | OXYGEN SATURATION: 97 % | DIASTOLIC BLOOD PRESSURE: 99 MMHG | TEMPERATURE: 98.4 F | WEIGHT: 245 LBS | SYSTOLIC BLOOD PRESSURE: 194 MMHG | BODY MASS INDEX: 30.46 KG/M2 | HEIGHT: 75 IN

## 2020-08-12 DIAGNOSIS — Z23 ENCOUNTER FOR IMMUNIZATION: ICD-10-CM

## 2020-08-12 DIAGNOSIS — Z86.79 PERSONAL HISTORY OF OTHER DISEASES OF THE CIRCULATORY SYSTEM: ICD-10-CM

## 2020-08-12 PROCEDURE — 99396 PREV VISIT EST AGE 40-64: CPT | Mod: 25

## 2020-08-12 PROCEDURE — 36415 COLL VENOUS BLD VENIPUNCTURE: CPT

## 2020-08-12 NOTE — HEALTH RISK ASSESSMENT
[Good] : ~his/her~  mood as  good [Intercurrent ED visits] : went to ED [Yes] : Yes [2 - 4 times a month (2 pts)] : 2-4 times a month (2 points) [1 or 2 (0 pts)] : 1 or 2 (0 points) [Never (0 pts)] : Never (0 points) [No falls in past year] : Patient reported no falls in the past year [0] : 2) Feeling down, depressed, or hopeless: Not at all (0) [None] : None [With Family] : lives with family [] : No [[id number]] : 0 [Change in mental status noted] : No change in mental status noted [Language] : denies difficulty with language [Behavior] : denies difficulty with behavior [Learning/Retaining New Information] : denies difficulty learning/retaining new information [Handling Complex Tasks] : denies difficulty handling complex tasks [Reasoning] : denies difficulty with reasoning [Spatial Ability and Orientation] : denies difficulty with spatial ability and orientation

## 2020-08-12 NOTE — HISTORY OF PRESENT ILLNESS
[FreeTextEntry1] : Annual exam [de-identified] : Annual exam\par decline vaccine\par need colon history of Aortic valve replacement  pig valve\par march 2015\par last stent june 30 2019\par \par had pericarditis may to june 2020\par treated with time\par \par \par

## 2020-08-12 NOTE — PLAN
[FreeTextEntry1] : Annual exam\par decline vaccine\par will schedule colon\par \par s/p avr\par s/pt stent\par diabetes need opth\par neuropathy\par some mild urinary symptoms\par

## 2020-08-13 LAB
25(OH)D3 SERPL-MCNC: 31.3 NG/ML
ALBUMIN SERPL ELPH-MCNC: 4.5 G/DL
ALP BLD-CCNC: 113 U/L
ALT SERPL-CCNC: 27 U/L
ANION GAP SERPL CALC-SCNC: 16 MMOL/L
AST SERPL-CCNC: 21 U/L
BASOPHILS # BLD AUTO: 0.02 K/UL
BASOPHILS NFR BLD AUTO: 0.4 %
BILIRUB SERPL-MCNC: 0.4 MG/DL
BUN SERPL-MCNC: 17 MG/DL
CALCIUM SERPL-MCNC: 9.5 MG/DL
CHLORIDE SERPL-SCNC: 102 MMOL/L
CHOLEST SERPL-MCNC: 143 MG/DL
CHOLEST/HDLC SERPL: 3.2 RATIO
CO2 SERPL-SCNC: 21 MMOL/L
CREAT SERPL-MCNC: 1.06 MG/DL
CRP SERPL-MCNC: <0.1 MG/DL
EOSINOPHIL # BLD AUTO: 0.08 K/UL
EOSINOPHIL NFR BLD AUTO: 1.6 %
ERYTHROCYTE [SEDIMENTATION RATE] IN BLOOD BY WESTERGREN METHOD: 7 MM/HR
ESTIMATED AVERAGE GLUCOSE: 151 MG/DL
GLUCOSE SERPL-MCNC: 150 MG/DL
HBA1C MFR BLD HPLC: 6.9 %
HCT VFR BLD CALC: 39.5 %
HDLC SERPL-MCNC: 45 MG/DL
HGB BLD-MCNC: 11.9 G/DL
IMM GRANULOCYTES NFR BLD AUTO: 0.6 %
LDLC SERPL CALC-MCNC: 51 MG/DL
LYMPHOCYTES # BLD AUTO: 0.61 K/UL
LYMPHOCYTES NFR BLD AUTO: 12.1 %
MAN DIFF?: NORMAL
MCHC RBC-ENTMCNC: 26 PG
MCHC RBC-ENTMCNC: 30.1 GM/DL
MCV RBC AUTO: 86.4 FL
MONOCYTES # BLD AUTO: 0.43 K/UL
MONOCYTES NFR BLD AUTO: 8.5 %
NEUTROPHILS # BLD AUTO: 3.89 K/UL
NEUTROPHILS NFR BLD AUTO: 76.8 %
PLATELET # BLD AUTO: 153 K/UL
POTASSIUM SERPL-SCNC: 4.2 MMOL/L
PROT SERPL-MCNC: 6.3 G/DL
PSA SERPL-MCNC: 1.39 NG/ML
RBC # BLD: 4.57 M/UL
RBC # FLD: 15.9 %
SODIUM SERPL-SCNC: 139 MMOL/L
T4 FREE SERPL-MCNC: 1.4 NG/DL
TRIGL SERPL-MCNC: 235 MG/DL
TSH SERPL-ACNC: 0.72 UIU/ML
WBC # FLD AUTO: 5.06 K/UL

## 2020-08-25 ENCOUNTER — RX RENEWAL (OUTPATIENT)
Age: 60
End: 2020-08-25

## 2020-09-19 ENCOUNTER — EMERGENCY (EMERGENCY)
Facility: HOSPITAL | Age: 60
LOS: 0 days | Discharge: HOME | End: 2020-09-19
Attending: STUDENT IN AN ORGANIZED HEALTH CARE EDUCATION/TRAINING PROGRAM
Payer: COMMERCIAL

## 2020-09-19 VITALS
OXYGEN SATURATION: 99 % | RESPIRATION RATE: 17 BRPM | SYSTOLIC BLOOD PRESSURE: 214 MMHG | HEART RATE: 81 BPM | HEIGHT: 75 IN | DIASTOLIC BLOOD PRESSURE: 100 MMHG | WEIGHT: 199.96 LBS

## 2020-09-19 VITALS
OXYGEN SATURATION: 97 % | HEART RATE: 83 BPM | DIASTOLIC BLOOD PRESSURE: 76 MMHG | RESPIRATION RATE: 15 BRPM | SYSTOLIC BLOOD PRESSURE: 185 MMHG

## 2020-09-19 DIAGNOSIS — Z79.84 LONG TERM (CURRENT) USE OF ORAL HYPOGLYCEMIC DRUGS: ICD-10-CM

## 2020-09-19 DIAGNOSIS — M62.81 MUSCLE WEAKNESS (GENERALIZED): ICD-10-CM

## 2020-09-19 DIAGNOSIS — Z79.82 LONG TERM (CURRENT) USE OF ASPIRIN: ICD-10-CM

## 2020-09-19 DIAGNOSIS — M54.5 LOW BACK PAIN: ICD-10-CM

## 2020-09-19 DIAGNOSIS — I10 ESSENTIAL (PRIMARY) HYPERTENSION: ICD-10-CM
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
## 2020-09-19 DIAGNOSIS — S39.012A STRAIN OF MUSCLE, FASCIA AND TENDON OF LOWER BACK, INITIAL ENCOUNTER: ICD-10-CM

## 2020-09-19 DIAGNOSIS — Z98.890 OTHER SPECIFIED POSTPROCEDURAL STATES: Chronic | ICD-10-CM

## 2020-09-19 DIAGNOSIS — Z88.8 ALLERGY STATUS TO OTHER DRUGS, MEDICAMENTS AND BIOLOGICAL SUBSTANCES: ICD-10-CM
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
## 2020-09-19 DIAGNOSIS — Z98.89 OTHER SPECIFIED POSTPROCEDURAL STATES: Chronic | ICD-10-CM

## 2020-09-19 DIAGNOSIS — I71.4 ABDOMINAL AORTIC ANEURYSM, WITHOUT RUPTURE: ICD-10-CM

## 2020-09-19 DIAGNOSIS — Z95.4 PRESENCE OF OTHER HEART-VALVE REPLACEMENT: Chronic | ICD-10-CM

## 2020-09-19 DIAGNOSIS — I31.4 CARDIAC TAMPONADE: ICD-10-CM

## 2020-09-19 DIAGNOSIS — K21.9 GASTRO-ESOPHAGEAL REFLUX DISEASE WITHOUT ESOPHAGITIS: ICD-10-CM

## 2020-09-19 DIAGNOSIS — Z98.890 OTHER SPECIFIED POSTPROCEDURAL STATES: ICD-10-CM

## 2020-09-19 DIAGNOSIS — E11.9 TYPE 2 DIABETES MELLITUS WITHOUT COMPLICATIONS: ICD-10-CM

## 2020-09-19 DIAGNOSIS — Z79.02 LONG TERM (CURRENT) USE OF ANTITHROMBOTICS/ANTIPLATELETS: ICD-10-CM

## 2020-09-19 PROCEDURE — 99284 EMERGENCY DEPT VISIT MOD MDM: CPT

## 2020-09-19 PROCEDURE — 93010 ELECTROCARDIOGRAM REPORT: CPT

## 2020-09-19 PROCEDURE — 72100 X-RAY EXAM L-S SPINE 2/3 VWS: CPT | Mod: 26

## 2020-09-19 RX ORDER — METHOCARBAMOL 500 MG/1
2 TABLET, FILM COATED ORAL
Qty: 30 | Refills: 0
Start: 2020-09-19 | End: 2020-09-23

## 2020-09-19 NOTE — ED ADULT TRIAGE NOTE - NS ED NURSE BANDS TYPE
Name band;
16 y/o male presents to ED via EMS c/o laceration to chin. Patient reports he fell out of 4 foot high bed and hit chin off edge of desk at 0300. Patient reports he bit his tongue at that time. Patient's chin has laceration approximately 1 in x 2 inches with dried blood on it. Patient states bleeding stopped by applying pressure. Patient's tongue has dried blood, but no visible lacerations. No broken/chipped teeth noted. Patient denies back/neck pain, n/v/d, LOC/hitting head, SOB, CP, recent illness/travel. Patient A&Ox4, breathing spontaneously, airway patent, b/l clear lungs, abdomen nontender, +pulses, cap refill <2 seconds. States he had tetanus shot for school recently, unsure of date. Patient resting in bed, side rails up, call bell within reach.
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
## 2020-09-19 NOTE — ED PROVIDER NOTE - CONSTITUTIONAL, MLM
normal... Well appearing, awake, alert, oriented to person, place, time/situation and in no apparent distress, pt refused medication for pain
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
## 2020-09-19 NOTE — ED ADULT NURSE NOTE - NSIMPLEMENTINTERV_GEN_ALL_ED
Implemented All Universal Safety Interventions:  [place] to call system. Call bell, personal items and telephone within reach. Instruct patient to call for assistance. Room bathroom lighting operational. Non-slip footwear when patient is off stretcher. Physically safe environment: no spills, clutter or unnecessary equipment. Stretcher in lowest position, wheels locked, appropriate side rails in place.

## 2020-09-19 NOTE — ED ADULT NURSE NOTE - PSH
Aortic valve replaced    H/O aortic valve repair    S/P AAA repair  Repaired 3/2015  S/P aortic aneurysm repair

## 2020-09-21 ENCOUNTER — RX RENEWAL (OUTPATIENT)
Age: 60
End: 2020-09-21

## 2020-09-30 ENCOUNTER — APPOINTMENT (OUTPATIENT)
Dept: INTERNAL MEDICINE | Facility: CLINIC | Age: 60
End: 2020-09-30
Payer: COMMERCIAL

## 2020-09-30 DIAGNOSIS — Z11.59 ENCOUNTER FOR SCREENING FOR OTHER VIRAL DISEASES: ICD-10-CM

## 2020-09-30 DIAGNOSIS — Z95.5 PRESENCE OF CORONARY ANGIOPLASTY IMPLANT AND GRAFT: ICD-10-CM

## 2020-09-30 PROCEDURE — 99442: CPT

## 2020-09-30 RX ORDER — AMOXICILLIN 500 MG/1
500 CAPSULE ORAL
Qty: 20 | Refills: 2 | Status: ACTIVE | COMMUNITY
Start: 2020-09-30 | End: 1900-01-01

## 2020-09-30 NOTE — PLAN
[FreeTextEntry1] : Will use miraalx prep\par hlold plavix 5 day\par continue aspirin\par with Valve replacemtn take 4 amoxil am of procedure\par to take metoprolol am of procedure and not to take lsinopril or other meds\par

## 2020-09-30 NOTE — HISTORY OF PRESENT ILLNESS
[Medical Office: ([facility])___] : at the medical office located in  [Home] : at home, [unfilled] , at the time of the visit. [FreeTextEntry1] : pre colon/egd [Verbal consent obtained from patient] : the patient, [unfilled] [de-identified] : Oral consent\par Last colon 5 years with some change in bowels\par occasional gerd need egd\par history of stent on asa and plavix\par history of aortic valvle replacement\par no sob or cp\par diabetic on metformin with a1c 6.9\par on lisinopril and metoprolol\par

## 2020-10-12 ENCOUNTER — TRANSCRIPTION ENCOUNTER (OUTPATIENT)
Age: 60
End: 2020-10-12

## 2020-10-14 ENCOUNTER — LABORATORY RESULT (OUTPATIENT)
Age: 60
End: 2020-10-14

## 2020-10-15 ENCOUNTER — APPOINTMENT (OUTPATIENT)
Dept: INTERNAL MEDICINE | Facility: CLINIC | Age: 60
End: 2020-10-15
Payer: COMMERCIAL

## 2020-10-15 DIAGNOSIS — R19.4 CHANGE IN BOWEL HABIT: ICD-10-CM

## 2020-10-15 DIAGNOSIS — K31.89 OTHER DISEASES OF STOMACH AND DUODENUM: ICD-10-CM

## 2020-10-15 DIAGNOSIS — K44.9 DIAPHRAGMATIC HERNIA W/OUT OBSTRUCTION OR GANGRENE: ICD-10-CM

## 2020-10-15 PROCEDURE — 43239 EGD BIOPSY SINGLE/MULTIPLE: CPT | Mod: 52

## 2020-10-15 PROCEDURE — 45378 DIAGNOSTIC COLONOSCOPY: CPT

## 2020-12-08 NOTE — PRE-OP CHECKLIST - INTERNAL PROSTHESES
[FreeTextEntry1] : Viral illness?  Not acutely ill.  No fever.  No purulence.  Chest clear. No active cough or SOB. Low risk for COVID exposure.  Stayed home for the last 15 days except for groceries, pharmacy. Perhaps does not need ABX.  \par Ventral abd hernia large. \par Note pt never had colonoscopy but FIT was neg.  Consider abd CT if it continues to bother her.  \par IBS.  No signif change in bowel habit.    \par HCVD   stable.   \par Edema cellulitis.  Controlled\par L shoulder severe change on MRI.  Told to have surg.  Patient afraid to. \par R shoulder pain.  Going to see Dr Wilson\par R 3rd trigger finger worse despite surg.  Going to see Dr Wilson\par CHELSEA is severe and uses CPAP every night including naps. \par DM.  5.9.\par Dysphagia and GERD.  Saw ENT in [place] in the past.   \par Anxiety   Buspar at home for PRN.  \par OA knees.  Uses Voltaren gel prn\par Shingrix form completed to place order\par \par 
loop recorder

## 2020-12-09 ENCOUNTER — NON-APPOINTMENT (OUTPATIENT)
Age: 60
End: 2020-12-09

## 2020-12-09 ENCOUNTER — APPOINTMENT (OUTPATIENT)
Dept: CARDIOLOGY | Facility: CLINIC | Age: 60
End: 2020-12-09
Payer: COMMERCIAL

## 2020-12-09 VITALS
OXYGEN SATURATION: 100 % | WEIGHT: 238 LBS | SYSTOLIC BLOOD PRESSURE: 196 MMHG | HEIGHT: 75 IN | HEART RATE: 68 BPM | BODY MASS INDEX: 29.59 KG/M2 | DIASTOLIC BLOOD PRESSURE: 85 MMHG

## 2020-12-09 DIAGNOSIS — R07.89 OTHER CHEST PAIN: ICD-10-CM

## 2020-12-09 PROCEDURE — 93000 ELECTROCARDIOGRAM COMPLETE: CPT

## 2020-12-09 PROCEDURE — 99215 OFFICE O/P EST HI 40 MIN: CPT

## 2020-12-09 PROCEDURE — 99072 ADDL SUPL MATRL&STAF TM PHE: CPT

## 2021-01-09 ENCOUNTER — OUTPATIENT (OUTPATIENT)
Dept: OUTPATIENT SERVICES | Facility: HOSPITAL | Age: 61
LOS: 1 days | End: 2021-01-09
Payer: COMMERCIAL

## 2021-01-09 DIAGNOSIS — Z20.828 CONTACT WITH AND (SUSPECTED) EXPOSURE TO OTHER VIRAL COMMUNICABLE DISEASES: ICD-10-CM

## 2021-01-09 DIAGNOSIS — Z98.890 OTHER SPECIFIED POSTPROCEDURAL STATES: Chronic | ICD-10-CM

## 2021-01-09 DIAGNOSIS — Z98.89 OTHER SPECIFIED POSTPROCEDURAL STATES: Chronic | ICD-10-CM

## 2021-01-09 DIAGNOSIS — Z95.4 PRESENCE OF OTHER HEART-VALVE REPLACEMENT: Chronic | ICD-10-CM

## 2021-01-09 LAB — SARS-COV-2 RNA SPEC QL NAA+PROBE: DETECTED

## 2021-01-09 PROCEDURE — U0005: CPT

## 2021-01-09 PROCEDURE — U0003: CPT

## 2021-01-09 PROCEDURE — C9803: CPT

## 2021-01-10 DIAGNOSIS — Z20.828 CONTACT WITH AND (SUSPECTED) EXPOSURE TO OTHER VIRAL COMMUNICABLE DISEASES: ICD-10-CM

## 2021-01-25 ENCOUNTER — APPOINTMENT (OUTPATIENT)
Dept: CARDIOLOGY | Facility: CLINIC | Age: 61
End: 2021-01-25

## 2021-02-26 ENCOUNTER — RX RENEWAL (OUTPATIENT)
Age: 61
End: 2021-02-26

## 2021-04-20 NOTE — ED ADULT NURSE NOTE - NS ED NOTE  TALK SOMEONE YN
Pt s/p extubation at present.  Pt was eating breakfast when she developed some choking and respiratory distress as per H & P.  As per daughter, pt's height used to be 5'5" years ago and that pt is shorter now, estimated height ~5'3".
No

## 2021-06-27 ENCOUNTER — RX RENEWAL (OUTPATIENT)
Age: 61
End: 2021-06-27
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
## 2021-08-18 ENCOUNTER — NON-APPOINTMENT (OUTPATIENT)
Age: 61
End: 2021-08-18

## 2021-08-18 ENCOUNTER — APPOINTMENT (OUTPATIENT)
Dept: INTERNAL MEDICINE | Facility: CLINIC | Age: 61
End: 2021-08-18
Payer: COMMERCIAL

## 2021-08-18 VITALS
WEIGHT: 232 LBS | TEMPERATURE: 98 F | BODY MASS INDEX: 28.85 KG/M2 | OXYGEN SATURATION: 97 % | SYSTOLIC BLOOD PRESSURE: 153 MMHG | HEIGHT: 75 IN | HEART RATE: 85 BPM | DIASTOLIC BLOOD PRESSURE: 82 MMHG

## 2021-08-18 DIAGNOSIS — D21.4 BENIGN NEOPLASM OF CONNECTIVE AND OTHER SOFT TISSUE OF ABDOMEN: ICD-10-CM

## 2021-08-18 PROCEDURE — 36415 COLL VENOUS BLD VENIPUNCTURE: CPT

## 2021-08-18 PROCEDURE — 99396 PREV VISIT EST AGE 40-64: CPT | Mod: 25

## 2021-08-18 NOTE — HISTORY OF PRESENT ILLNESS
[FreeTextEntry1] : Annual exam [de-identified] : Annual exam\par had covid and covid shot\par recent colon\par  \par s/p open heart and valve\par diabetes on metformin\par GIST recent endoscopy?\par feel well\par s/p colon\par gastrics leiomyoma\par \par

## 2021-08-18 NOTE — HEALTH RISK ASSESSMENT
[Very Good] : ~his/her~  mood as very good [Yes] : Yes [Never (0 pts)] : Never (0 points) [No falls in past year] : Patient reported no falls in the past year [0] : 2) Feeling down, depressed, or hopeless: Not at all (0) [None] : None [With Family] : lives with family [Retired] : retired [High School] : high school [] :  [Feels Safe at Home] : Feels safe at home [Fully functional (bathing, dressing, toileting, transferring, walking, feeding)] : Fully functional (bathing, dressing, toileting, transferring, walking, feeding) [Fully functional (using the telephone, shopping, preparing meals, housekeeping, doing laundry, using] : Fully functional and needs no help or supervision to perform IADLs (using the telephone, shopping, preparing meals, housekeeping, doing laundry, using transportation, managing medications and managing finances) [[id number]] : 0 [Change in mental status noted] : No change in mental status noted [Language] : denies difficulty with language [Behavior] : denies difficulty with behavior [Learning/Retaining New Information] : denies difficulty learning/retaining new information [Handling Complex Tasks] : denies difficulty handling complex tasks [Reasoning] : denies difficulty with reasoning [Spatial Ability and Orientation] : denies difficulty with spatial ability and orientation [Reports changes in hearing] : Reports no changes in hearing [Reports changes in vision] : Reports no changes in vision [Reports changes in dental health] : Reports no changes in dental health

## 2021-08-18 NOTE — PLAN
[FreeTextEntry1] : Annual exam\par decline vaccine other than covid shot\par \par s/p ohs with valve replacement and ascending aorta repair\par oct 2020 upper endoscopy submucosal lesion  small  ? gist\par will get f/u ct of abdomen\par feel well\par routine blood\par f/u cardiologist\par on statin check lipid and lft\par bp good

## 2021-09-09 ENCOUNTER — NON-APPOINTMENT (OUTPATIENT)
Age: 61
End: 2021-09-09

## 2021-09-09 ENCOUNTER — TRANSCRIPTION ENCOUNTER (OUTPATIENT)
Age: 61
End: 2021-09-09

## 2021-09-10 ENCOUNTER — INPATIENT (INPATIENT)
Facility: HOSPITAL | Age: 61
LOS: 7 days | Discharge: HOME | DRG: 603 | End: 2021-09-18
Attending: INTERNAL MEDICINE | Admitting: STUDENT IN AN ORGANIZED HEALTH CARE EDUCATION/TRAINING PROGRAM
Payer: COMMERCIAL

## 2021-09-10 VITALS
SYSTOLIC BLOOD PRESSURE: 184 MMHG | HEIGHT: 75 IN | DIASTOLIC BLOOD PRESSURE: 86 MMHG | OXYGEN SATURATION: 98 % | TEMPERATURE: 99 F | WEIGHT: 220.46 LBS | RESPIRATION RATE: 16 BRPM | HEART RATE: 86 BPM

## 2021-09-10 DIAGNOSIS — Z98.890 OTHER SPECIFIED POSTPROCEDURAL STATES: Chronic | ICD-10-CM

## 2021-09-10 DIAGNOSIS — Z95.4 PRESENCE OF OTHER HEART-VALVE REPLACEMENT: Chronic | ICD-10-CM

## 2021-09-10 DIAGNOSIS — Z98.89 OTHER SPECIFIED POSTPROCEDURAL STATES: Chronic | ICD-10-CM

## 2021-09-10 PROCEDURE — 99285 EMERGENCY DEPT VISIT HI MDM: CPT

## 2021-09-10 NOTE — ED ADULT NURSE NOTE - OBJECTIVE STATEMENT
Patient A/Ox4 with a steady gait. C/o RLE erythema, pain and swelling. RLE hot to touch. Call light in reach. Patient A/Ox4 with a steady gait. C/o RLE erythema, pain and swelling that has increased over the last few days. Has pain with dorsi flexion. Says he takes 81mg ASA daily for heart condition. RLE hot to touch. Call light in reach. Wife at bedside. Patient A/Ox4 with a steady gait. C/o RLE erythema, pain and swelling that has increased over the last few days. Has pain with dorsi flexion. Has chronic right great toe wound 2/2 DM. Appears to have scab at this time. Says he takes 81mg ASA daily for heart condition. RLE hot to touch. Call light in reach. Wife at bedside.

## 2021-09-11 ENCOUNTER — TRANSCRIPTION ENCOUNTER (OUTPATIENT)
Age: 61
End: 2021-09-11

## 2021-09-11 DIAGNOSIS — L03.90 CELLULITIS, UNSPECIFIED: ICD-10-CM

## 2021-09-11 DIAGNOSIS — E11.621 TYPE 2 DIABETES MELLITUS WITH FOOT ULCER: ICD-10-CM

## 2021-09-11 DIAGNOSIS — K21.9 GASTRO-ESOPHAGEAL REFLUX DISEASE WITHOUT ESOPHAGITIS: ICD-10-CM

## 2021-09-11 DIAGNOSIS — I10 ESSENTIAL (PRIMARY) HYPERTENSION: ICD-10-CM

## 2021-09-11 DIAGNOSIS — R42 DIZZINESS AND GIDDINESS: ICD-10-CM

## 2021-09-11 DIAGNOSIS — E11.9 TYPE 2 DIABETES MELLITUS WITHOUT COMPLICATIONS: ICD-10-CM

## 2021-09-11 DIAGNOSIS — I25.10 ATHEROSCLEROTIC HEART DISEASE OF NATIVE CORONARY ARTERY WITHOUT ANGINA PECTORIS: ICD-10-CM

## 2021-09-11 DIAGNOSIS — Z29.9 ENCOUNTER FOR PROPHYLACTIC MEASURES, UNSPECIFIED: ICD-10-CM

## 2021-09-11 DIAGNOSIS — E78.5 HYPERLIPIDEMIA, UNSPECIFIED: ICD-10-CM

## 2021-09-11 DIAGNOSIS — A08.4 VIRAL INTESTINAL INFECTION, UNSPECIFIED: ICD-10-CM

## 2021-09-11 DIAGNOSIS — Z98.890 OTHER SPECIFIED POSTPROCEDURAL STATES: Chronic | ICD-10-CM

## 2021-09-11 LAB
A1C WITH ESTIMATED AVERAGE GLUCOSE RESULT: 7 % — HIGH (ref 4–5.6)
ALBUMIN SERPL ELPH-MCNC: 3.8 G/DL — SIGNIFICANT CHANGE UP (ref 3.3–5)
ALP SERPL-CCNC: 93 U/L — SIGNIFICANT CHANGE UP (ref 40–120)
ALT FLD-CCNC: 42 U/L — SIGNIFICANT CHANGE UP (ref 12–78)
ANION GAP SERPL CALC-SCNC: 5 MMOL/L — SIGNIFICANT CHANGE UP (ref 5–17)
ANION GAP SERPL CALC-SCNC: 7 MMOL/L — SIGNIFICANT CHANGE UP (ref 5–17)
APTT BLD: 27.5 SEC — SIGNIFICANT CHANGE UP (ref 27.5–35.5)
AST SERPL-CCNC: 25 U/L — SIGNIFICANT CHANGE UP (ref 15–37)
BASOPHILS # BLD AUTO: 0.02 K/UL — SIGNIFICANT CHANGE UP (ref 0–0.2)
BASOPHILS # BLD AUTO: 0.02 K/UL — SIGNIFICANT CHANGE UP (ref 0–0.2)
BASOPHILS NFR BLD AUTO: 0.3 % — SIGNIFICANT CHANGE UP (ref 0–2)
BASOPHILS NFR BLD AUTO: 0.4 % — SIGNIFICANT CHANGE UP (ref 0–2)
BILIRUB SERPL-MCNC: 1 MG/DL — SIGNIFICANT CHANGE UP (ref 0.2–1.2)
BUN SERPL-MCNC: 16 MG/DL — SIGNIFICANT CHANGE UP (ref 7–23)
BUN SERPL-MCNC: 17 MG/DL — SIGNIFICANT CHANGE UP (ref 7–23)
CALCIUM SERPL-MCNC: 9 MG/DL — SIGNIFICANT CHANGE UP (ref 8.5–10.1)
CALCIUM SERPL-MCNC: 9.5 MG/DL — SIGNIFICANT CHANGE UP (ref 8.5–10.1)
CHLORIDE SERPL-SCNC: 107 MMOL/L — SIGNIFICANT CHANGE UP (ref 96–108)
CHLORIDE SERPL-SCNC: 107 MMOL/L — SIGNIFICANT CHANGE UP (ref 96–108)
CO2 SERPL-SCNC: 26 MMOL/L — SIGNIFICANT CHANGE UP (ref 22–31)
CO2 SERPL-SCNC: 27 MMOL/L — SIGNIFICANT CHANGE UP (ref 22–31)
CREAT SERPL-MCNC: 1 MG/DL — SIGNIFICANT CHANGE UP (ref 0.5–1.3)
CREAT SERPL-MCNC: 1 MG/DL — SIGNIFICANT CHANGE UP (ref 0.5–1.3)
EOSINOPHIL # BLD AUTO: 0.05 K/UL — SIGNIFICANT CHANGE UP (ref 0–0.5)
EOSINOPHIL # BLD AUTO: 0.08 K/UL — SIGNIFICANT CHANGE UP (ref 0–0.5)
EOSINOPHIL NFR BLD AUTO: 1 % — SIGNIFICANT CHANGE UP (ref 0–6)
EOSINOPHIL NFR BLD AUTO: 1.3 % — SIGNIFICANT CHANGE UP (ref 0–6)
ESTIMATED AVERAGE GLUCOSE: 154 MG/DL — HIGH (ref 68–114)
GLUCOSE SERPL-MCNC: 121 MG/DL — HIGH (ref 70–99)
GLUCOSE SERPL-MCNC: 134 MG/DL — HIGH (ref 70–99)
HCT VFR BLD CALC: 35.9 % — LOW (ref 39–50)
HCT VFR BLD CALC: 37.9 % — LOW (ref 39–50)
HGB BLD-MCNC: 11.3 G/DL — LOW (ref 13–17)
HGB BLD-MCNC: 11.8 G/DL — LOW (ref 13–17)
IMM GRANULOCYTES NFR BLD AUTO: 0.2 % — SIGNIFICANT CHANGE UP (ref 0–1.5)
IMM GRANULOCYTES NFR BLD AUTO: 0.6 % — SIGNIFICANT CHANGE UP (ref 0–1.5)
INR BLD: 1.22 RATIO — HIGH (ref 0.88–1.16)
LACTATE SERPL-SCNC: 1.2 MMOL/L — SIGNIFICANT CHANGE UP (ref 0.7–2)
LYMPHOCYTES # BLD AUTO: 0.45 K/UL — LOW (ref 1–3.3)
LYMPHOCYTES # BLD AUTO: 0.5 K/UL — LOW (ref 1–3.3)
LYMPHOCYTES # BLD AUTO: 7.8 % — LOW (ref 13–44)
LYMPHOCYTES # BLD AUTO: 8.9 % — LOW (ref 13–44)
MCHC RBC-ENTMCNC: 26.3 PG — LOW (ref 27–34)
MCHC RBC-ENTMCNC: 26.8 PG — LOW (ref 27–34)
MCHC RBC-ENTMCNC: 31.1 GM/DL — LOW (ref 32–36)
MCHC RBC-ENTMCNC: 31.5 GM/DL — LOW (ref 32–36)
MCV RBC AUTO: 84.4 FL — SIGNIFICANT CHANGE UP (ref 80–100)
MCV RBC AUTO: 85.3 FL — SIGNIFICANT CHANGE UP (ref 80–100)
MONOCYTES # BLD AUTO: 0.58 K/UL — SIGNIFICANT CHANGE UP (ref 0–0.9)
MONOCYTES # BLD AUTO: 0.6 K/UL — SIGNIFICANT CHANGE UP (ref 0–0.9)
MONOCYTES NFR BLD AUTO: 11.9 % — SIGNIFICANT CHANGE UP (ref 2–14)
MONOCYTES NFR BLD AUTO: 9.1 % — SIGNIFICANT CHANGE UP (ref 2–14)
MRSA PCR RESULT.: SIGNIFICANT CHANGE UP
NEUTROPHILS # BLD AUTO: 3.9 K/UL — SIGNIFICANT CHANGE UP (ref 1.8–7.4)
NEUTROPHILS # BLD AUTO: 5.18 K/UL — SIGNIFICANT CHANGE UP (ref 1.8–7.4)
NEUTROPHILS NFR BLD AUTO: 77.6 % — HIGH (ref 43–77)
NEUTROPHILS NFR BLD AUTO: 80.9 % — HIGH (ref 43–77)
NRBC # BLD: 0 /100 WBCS — SIGNIFICANT CHANGE UP (ref 0–0)
NRBC # BLD: 0 /100 WBCS — SIGNIFICANT CHANGE UP (ref 0–0)
PLATELET # BLD AUTO: 116 K/UL — LOW (ref 150–400)
PLATELET # BLD AUTO: 125 K/UL — LOW (ref 150–400)
POTASSIUM SERPL-MCNC: 3.5 MMOL/L — SIGNIFICANT CHANGE UP (ref 3.5–5.3)
POTASSIUM SERPL-MCNC: 3.6 MMOL/L — SIGNIFICANT CHANGE UP (ref 3.5–5.3)
POTASSIUM SERPL-SCNC: 3.5 MMOL/L — SIGNIFICANT CHANGE UP (ref 3.5–5.3)
POTASSIUM SERPL-SCNC: 3.6 MMOL/L — SIGNIFICANT CHANGE UP (ref 3.5–5.3)
PROT SERPL-MCNC: 7.9 G/DL — SIGNIFICANT CHANGE UP (ref 6–8.3)
PROTHROM AB SERPL-ACNC: 14.1 SEC — HIGH (ref 10.6–13.6)
RAPID RVP RESULT: SIGNIFICANT CHANGE UP
RBC # BLD: 4.21 M/UL — SIGNIFICANT CHANGE UP (ref 4.2–5.8)
RBC # BLD: 4.49 M/UL — SIGNIFICANT CHANGE UP (ref 4.2–5.8)
RBC # FLD: 14.9 % — HIGH (ref 10.3–14.5)
RBC # FLD: 15 % — HIGH (ref 10.3–14.5)
S AUREUS DNA NOSE QL NAA+PROBE: DETECTED
SARS-COV-2 RNA SPEC QL NAA+PROBE: SIGNIFICANT CHANGE UP
SODIUM SERPL-SCNC: 139 MMOL/L — SIGNIFICANT CHANGE UP (ref 135–145)
SODIUM SERPL-SCNC: 140 MMOL/L — SIGNIFICANT CHANGE UP (ref 135–145)
WBC # BLD: 5.03 K/UL — SIGNIFICANT CHANGE UP (ref 3.8–10.5)
WBC # BLD: 6.4 K/UL — SIGNIFICANT CHANGE UP (ref 3.8–10.5)
WBC # FLD AUTO: 5.03 K/UL — SIGNIFICANT CHANGE UP (ref 3.8–10.5)
WBC # FLD AUTO: 6.4 K/UL — SIGNIFICANT CHANGE UP (ref 3.8–10.5)

## 2021-09-11 PROCEDURE — 93971 EXTREMITY STUDY: CPT | Mod: 26,RT

## 2021-09-11 PROCEDURE — 12345: CPT | Mod: NC,GC

## 2021-09-11 PROCEDURE — 99221 1ST HOSP IP/OBS SF/LOW 40: CPT

## 2021-09-11 PROCEDURE — 73630 X-RAY EXAM OF FOOT: CPT | Mod: 26,RT

## 2021-09-11 PROCEDURE — 99222 1ST HOSP IP/OBS MODERATE 55: CPT | Mod: GC

## 2021-09-11 PROCEDURE — 93010 ELECTROCARDIOGRAM REPORT: CPT

## 2021-09-11 RX ORDER — ONDANSETRON 8 MG/1
4 TABLET, FILM COATED ORAL EVERY 8 HOURS
Refills: 0 | Status: DISCONTINUED | OUTPATIENT
Start: 2021-09-11 | End: 2021-09-18

## 2021-09-11 RX ORDER — SODIUM CHLORIDE 9 MG/ML
1000 INJECTION, SOLUTION INTRAVENOUS
Refills: 0 | Status: DISCONTINUED | OUTPATIENT
Start: 2021-09-11 | End: 2021-09-18

## 2021-09-11 RX ORDER — METOPROLOL TARTRATE 50 MG
1 TABLET ORAL
Qty: 0 | Refills: 0 | DISCHARGE

## 2021-09-11 RX ORDER — ACETAMINOPHEN 500 MG
650 TABLET ORAL EVERY 6 HOURS
Refills: 0 | Status: DISCONTINUED | OUTPATIENT
Start: 2021-09-11 | End: 2021-09-14

## 2021-09-11 RX ORDER — PANTOPRAZOLE SODIUM 20 MG/1
40 TABLET, DELAYED RELEASE ORAL
Refills: 0 | Status: DISCONTINUED | OUTPATIENT
Start: 2021-09-11 | End: 2021-09-18

## 2021-09-11 RX ORDER — VANCOMYCIN HCL 1 G
1500 VIAL (EA) INTRAVENOUS EVERY 12 HOURS
Refills: 0 | Status: DISCONTINUED | OUTPATIENT
Start: 2021-09-11 | End: 2021-09-11

## 2021-09-11 RX ORDER — DEXTROSE 50 % IN WATER 50 %
25 SYRINGE (ML) INTRAVENOUS ONCE
Refills: 0 | Status: DISCONTINUED | OUTPATIENT
Start: 2021-09-11 | End: 2021-09-18

## 2021-09-11 RX ORDER — TRAMADOL HYDROCHLORIDE 50 MG/1
50 TABLET ORAL EVERY 6 HOURS
Refills: 0 | Status: DISCONTINUED | OUTPATIENT
Start: 2021-09-11 | End: 2021-09-13

## 2021-09-11 RX ORDER — HYDROCHLOROTHIAZIDE 25 MG
25 TABLET ORAL DAILY
Refills: 0 | Status: DISCONTINUED | OUTPATIENT
Start: 2021-09-11 | End: 2021-09-15

## 2021-09-11 RX ORDER — METOPROLOL TARTRATE 50 MG
25 TABLET ORAL
Refills: 0 | Status: DISCONTINUED | OUTPATIENT
Start: 2021-09-11 | End: 2021-09-18

## 2021-09-11 RX ORDER — TRAMADOL HYDROCHLORIDE 50 MG/1
75 TABLET ORAL EVERY 6 HOURS
Refills: 0 | Status: DISCONTINUED | OUTPATIENT
Start: 2021-09-11 | End: 2021-09-14

## 2021-09-11 RX ORDER — DEXTROSE 50 % IN WATER 50 %
12.5 SYRINGE (ML) INTRAVENOUS ONCE
Refills: 0 | Status: DISCONTINUED | OUTPATIENT
Start: 2021-09-11 | End: 2021-09-18

## 2021-09-11 RX ORDER — MECLIZINE HCL 12.5 MG
1 TABLET ORAL
Qty: 0 | Refills: 0 | DISCHARGE

## 2021-09-11 RX ORDER — ENOXAPARIN SODIUM 100 MG/ML
40 INJECTION SUBCUTANEOUS DAILY
Refills: 0 | Status: DISCONTINUED | OUTPATIENT
Start: 2021-09-11 | End: 2021-09-18

## 2021-09-11 RX ORDER — ASPIRIN/CALCIUM CARB/MAGNESIUM 324 MG
81 TABLET ORAL DAILY
Refills: 0 | Status: DISCONTINUED | OUTPATIENT
Start: 2021-09-11 | End: 2021-09-18

## 2021-09-11 RX ORDER — CEFEPIME 1 G/1
2000 INJECTION, POWDER, FOR SOLUTION INTRAMUSCULAR; INTRAVENOUS EVERY 8 HOURS
Refills: 0 | Status: DISCONTINUED | OUTPATIENT
Start: 2021-09-11 | End: 2021-09-11

## 2021-09-11 RX ORDER — CEFTRIAXONE 500 MG/1
1000 INJECTION, POWDER, FOR SOLUTION INTRAMUSCULAR; INTRAVENOUS EVERY 24 HOURS
Refills: 0 | Status: DISCONTINUED | OUTPATIENT
Start: 2021-09-11 | End: 2021-09-14

## 2021-09-11 RX ORDER — CLOPIDOGREL BISULFATE 75 MG/1
75 TABLET, FILM COATED ORAL DAILY
Refills: 0 | Status: DISCONTINUED | OUTPATIENT
Start: 2021-09-11 | End: 2021-09-18

## 2021-09-11 RX ORDER — SACCHAROMYCES BOULARDII 250 MG
250 POWDER IN PACKET (EA) ORAL
Refills: 0 | Status: DISCONTINUED | OUTPATIENT
Start: 2021-09-11 | End: 2021-09-18

## 2021-09-11 RX ORDER — INSULIN LISPRO 100/ML
VIAL (ML) SUBCUTANEOUS
Refills: 0 | Status: DISCONTINUED | OUTPATIENT
Start: 2021-09-11 | End: 2021-09-18

## 2021-09-11 RX ORDER — CEFAZOLIN SODIUM 1 G
2000 VIAL (EA) INJECTION EVERY 8 HOURS
Refills: 0 | Status: DISCONTINUED | OUTPATIENT
Start: 2021-09-11 | End: 2021-09-11

## 2021-09-11 RX ORDER — DEXTROSE 50 % IN WATER 50 %
15 SYRINGE (ML) INTRAVENOUS ONCE
Refills: 0 | Status: DISCONTINUED | OUTPATIENT
Start: 2021-09-11 | End: 2021-09-18

## 2021-09-11 RX ORDER — MECLIZINE HCL 12.5 MG
12.5 TABLET ORAL THREE TIMES A DAY
Refills: 0 | Status: DISCONTINUED | OUTPATIENT
Start: 2021-09-11 | End: 2021-09-18

## 2021-09-11 RX ORDER — LISINOPRIL 2.5 MG/1
40 TABLET ORAL DAILY
Refills: 0 | Status: DISCONTINUED | OUTPATIENT
Start: 2021-09-11 | End: 2021-09-18

## 2021-09-11 RX ORDER — METFORMIN HYDROCHLORIDE 850 MG/1
1 TABLET ORAL
Qty: 0 | Refills: 0 | DISCHARGE

## 2021-09-11 RX ORDER — GLUCAGON INJECTION, SOLUTION 0.5 MG/.1ML
1 INJECTION, SOLUTION SUBCUTANEOUS ONCE
Refills: 0 | Status: DISCONTINUED | OUTPATIENT
Start: 2021-09-11 | End: 2021-09-18

## 2021-09-11 RX ORDER — CEFAZOLIN SODIUM 1 G
VIAL (EA) INJECTION
Refills: 0 | Status: DISCONTINUED | OUTPATIENT
Start: 2021-09-11 | End: 2021-09-11

## 2021-09-11 RX ORDER — METFORMIN HYDROCHLORIDE 850 MG/1
1000 TABLET ORAL
Qty: 0 | Refills: 0 | DISCHARGE

## 2021-09-11 RX ORDER — PIPERACILLIN AND TAZOBACTAM 4; .5 G/20ML; G/20ML
3.38 INJECTION, POWDER, LYOPHILIZED, FOR SOLUTION INTRAVENOUS EVERY 8 HOURS
Refills: 0 | Status: DISCONTINUED | OUTPATIENT
Start: 2021-09-11 | End: 2021-09-11

## 2021-09-11 RX ORDER — LISINOPRIL 2.5 MG/1
1 TABLET ORAL
Qty: 0 | Refills: 0 | DISCHARGE

## 2021-09-11 RX ORDER — ATORVASTATIN CALCIUM 80 MG/1
80 TABLET, FILM COATED ORAL AT BEDTIME
Refills: 0 | Status: DISCONTINUED | OUTPATIENT
Start: 2021-09-11 | End: 2021-09-18

## 2021-09-11 RX ORDER — PIPERACILLIN AND TAZOBACTAM 4; .5 G/20ML; G/20ML
3.38 INJECTION, POWDER, LYOPHILIZED, FOR SOLUTION INTRAVENOUS ONCE
Refills: 0 | Status: COMPLETED | OUTPATIENT
Start: 2021-09-11 | End: 2021-09-11

## 2021-09-11 RX ORDER — CEFAZOLIN SODIUM 1 G
2000 VIAL (EA) INJECTION ONCE
Refills: 0 | Status: COMPLETED | OUTPATIENT
Start: 2021-09-11 | End: 2021-09-11

## 2021-09-11 RX ORDER — INSULIN LISPRO 100/ML
VIAL (ML) SUBCUTANEOUS AT BEDTIME
Refills: 0 | Status: DISCONTINUED | OUTPATIENT
Start: 2021-09-11 | End: 2021-09-18

## 2021-09-11 RX ORDER — GABAPENTIN 400 MG/1
1 CAPSULE ORAL
Qty: 0 | Refills: 0 | DISCHARGE

## 2021-09-11 RX ORDER — GABAPENTIN 400 MG/1
600 CAPSULE ORAL
Refills: 0 | Status: DISCONTINUED | OUTPATIENT
Start: 2021-09-11 | End: 2021-09-14

## 2021-09-11 RX ORDER — LANOLIN ALCOHOL/MO/W.PET/CERES
3 CREAM (GRAM) TOPICAL AT BEDTIME
Refills: 0 | Status: DISCONTINUED | OUTPATIENT
Start: 2021-09-11 | End: 2021-09-18

## 2021-09-11 RX ADMIN — Medication 650 MILLIGRAM(S): at 03:10

## 2021-09-11 RX ADMIN — ENOXAPARIN SODIUM 40 MILLIGRAM(S): 100 INJECTION SUBCUTANEOUS at 12:05

## 2021-09-11 RX ADMIN — Medication 0: at 08:01

## 2021-09-11 RX ADMIN — GABAPENTIN 600 MILLIGRAM(S): 400 CAPSULE ORAL at 17:39

## 2021-09-11 RX ADMIN — GABAPENTIN 600 MILLIGRAM(S): 400 CAPSULE ORAL at 12:04

## 2021-09-11 RX ADMIN — Medication 100 MILLIGRAM(S): at 06:00

## 2021-09-11 RX ADMIN — GABAPENTIN 600 MILLIGRAM(S): 400 CAPSULE ORAL at 23:25

## 2021-09-11 RX ADMIN — TRAMADOL HYDROCHLORIDE 75 MILLIGRAM(S): 50 TABLET ORAL at 10:49

## 2021-09-11 RX ADMIN — PANTOPRAZOLE SODIUM 40 MILLIGRAM(S): 20 TABLET, DELAYED RELEASE ORAL at 05:47

## 2021-09-11 RX ADMIN — Medication 300 MILLIGRAM(S): at 07:00

## 2021-09-11 RX ADMIN — Medication 100 MILLIGRAM(S): at 02:15

## 2021-09-11 RX ADMIN — Medication 25 MILLIGRAM(S): at 05:48

## 2021-09-11 RX ADMIN — TRAMADOL HYDROCHLORIDE 75 MILLIGRAM(S): 50 TABLET ORAL at 21:11

## 2021-09-11 RX ADMIN — PIPERACILLIN AND TAZOBACTAM 200 GRAM(S): 4; .5 INJECTION, POWDER, LYOPHILIZED, FOR SOLUTION INTRAVENOUS at 06:29

## 2021-09-11 RX ADMIN — CLOPIDOGREL BISULFATE 75 MILLIGRAM(S): 75 TABLET, FILM COATED ORAL at 12:04

## 2021-09-11 RX ADMIN — Medication 81 MILLIGRAM(S): at 12:04

## 2021-09-11 RX ADMIN — TRAMADOL HYDROCHLORIDE 75 MILLIGRAM(S): 50 TABLET ORAL at 11:28

## 2021-09-11 RX ADMIN — LISINOPRIL 40 MILLIGRAM(S): 2.5 TABLET ORAL at 05:47

## 2021-09-11 RX ADMIN — Medication 250 MILLIGRAM(S): at 18:09

## 2021-09-11 RX ADMIN — CEFTRIAXONE 100 MILLIGRAM(S): 500 INJECTION, POWDER, FOR SOLUTION INTRAMUSCULAR; INTRAVENOUS at 17:41

## 2021-09-11 RX ADMIN — PIPERACILLIN AND TAZOBACTAM 25 GRAM(S): 4; .5 INJECTION, POWDER, LYOPHILIZED, FOR SOLUTION INTRAVENOUS at 13:43

## 2021-09-11 RX ADMIN — Medication 650 MILLIGRAM(S): at 21:11

## 2021-09-11 RX ADMIN — Medication 650 MILLIGRAM(S): at 20:11

## 2021-09-11 RX ADMIN — Medication 650 MILLIGRAM(S): at 02:39

## 2021-09-11 RX ADMIN — Medication 3 MILLIGRAM(S): at 21:12

## 2021-09-11 RX ADMIN — Medication 25 MILLIGRAM(S): at 17:40

## 2021-09-11 RX ADMIN — TRAMADOL HYDROCHLORIDE 75 MILLIGRAM(S): 50 TABLET ORAL at 20:11

## 2021-09-11 RX ADMIN — Medication 1: at 17:40

## 2021-09-11 RX ADMIN — GABAPENTIN 600 MILLIGRAM(S): 400 CAPSULE ORAL at 05:47

## 2021-09-11 RX ADMIN — ATORVASTATIN CALCIUM 80 MILLIGRAM(S): 80 TABLET, FILM COATED ORAL at 21:12

## 2021-09-11 NOTE — H&P ADULT - NSICDXPASTMEDICALHX_GEN_ALL_CORE_FT
PAST MEDICAL HISTORY:  AAA (abdominal aortic aneurysm) dx 2012    Aortic valve replaced     Cardiac tamponade     Diabetes mellitus     Diabetes mellitus     GERD (gastroesophageal reflux disease)     H/O aortic valve repair     HTN (hypertension)     Hypertension     Leg weakness     S/P aneurysm repair      PAST MEDICAL HISTORY:  AAA (abdominal aortic aneurysm) dx 2012    Aortic valve replaced     Cardiac tamponade     Diabetes mellitus     GERD (gastroesophageal reflux disease)     H/O aortic valve repair     HTN (hypertension)     Hypertension     Leg weakness     S/P aneurysm repair

## 2021-09-11 NOTE — H&P ADULT - PROBLEM SELECTOR PLAN 8
chronic, s/p 2 stents in 2019  -on asa and plavix at home, continue  -recent TTE in 2020 showing normal LVEF but mod diastolic dysfunction

## 2021-09-11 NOTE — CONSULT NOTE ADULT - ASSESSMENT
Grade 1 Diabetic ulcer Right hallux    Will order Bone Scan to r/o OM of the right hallux, unable to get MRI     Patient doesnt want dressing on it because its healed     Podiatry team will continue to follow while in house    Recommend vascular consultation, patient has non palpable pulses and arterial calcifications on the xrays

## 2021-09-11 NOTE — PROGRESS NOTE ADULT - PROBLEM SELECTOR PLAN 5
chronic, not on insulin, complicated by neuropathy  -on metformin at home, hold in hospital  -low dose corrective scale, accuchecks, hypoglycemia protocol  -f/u HbA1c in AM  -Cont home gabapentin for neuropathy  -Podiatry consulted for R foot ulcer, foot xray ordered as per podiatry resident

## 2021-09-11 NOTE — H&P ADULT - NSHPPHYSICALEXAM_GEN_ALL_CORE
T(C): 37.1 (09-11-21 @ 02:39), Max: 37.1 (09-11-21 @ 02:39)  HR: 73 (09-11-21 @ 02:39) (73 - 86)  BP: 165/93 (09-11-21 @ 02:39) (165/93 - 184/86)  RR: 18 (09-11-21 @ 02:39) (16 - 18)  SpO2: 98% (09-11-21 @ 02:39) (98% - 98%)    GENERAL: obese male in NAD  EYES: sclera clear, no exudates  ENMT: oropharynx clear without erythema, no exudates, moist mucous membranes  NECK: supple, soft, no thyromegaly noted  LUNGS: good air entry bilaterally, clear to auscultation, symmetric breath sounds, no wheezing or rhonchi appreciated  HEART: soft S1/S2, regular rate and rhythm, no murmurs noted  GASTROINTESTINAL: abdomen is soft, nontender, nondistended, normoactive bowel sounds  NEUROLOGIC: awake, alert, oriented x3, good muscle tone in 4 extremities, no obvious sensory deficits  PSYCHIATRIC: mood is good, affect is congruent, linear and logical thought process  EXTREMITIES: RLE warmth and nonpitting edema with erythema extending up to the knee, tender to palpation, healing ulcer on R big toe

## 2021-09-11 NOTE — ED PROVIDER NOTE - PHYSICAL EXAMINATION
Gen: Alert, NAD  Head/eyes: NC/AT, PERRL  ENT: airway patent  Neck: supple, no tenderness/meningismus/JVD, Trachea midline  Pulm/lung: Bilateral clear BS, normal resp effort, no wheeze/stridor/retractions  CV/heart: RRR, no M/R/G, +2 dist pulses (radial, pedal DP/PT, popliteal)  GI/Abd: soft, NT/ND, +BS, no guarding/rebound tenderness  Musculoskeletal: no edema/erythema/cyanosis, FROM in all extremities, no C/T/L spine ttp  Skin: right LE +erythema +warmth lower leg, +ttp calf, +mild swelling  Neuro: AAOx3, CN 2-12 intact, normal sensation, 5/5 motor strength in all extremities, normal gait

## 2021-09-11 NOTE — DISCHARGE NOTE PROVIDER - CARE PROVIDERS DIRECT ADDRESSES
,DirectAddress_Unknown,[email or web address] ,DirectAddress_Unknown,[email or web address],[email or web address]

## 2021-09-11 NOTE — CONSULT NOTE ADULT - SUBJECTIVE AND OBJECTIVE BOX
HPI:  61 yo M with PMHx of CAD (with 2 stents in the 1st Diag 7/1/19 and LPDA 7/3/19), bioprosthetic AVR with ascending aortic aneurysm repair on 03/116/15 (for a bicuspid aortic valve), cardiac tamponade (s/p pericardiocentesis 3/26/15), HFpEF, syncope (s/p ILR insertion 2/2/16), atypical chest discomfort, HTN, HLD, T2DM (not on insulin, complicated by peripheral neuropathy), GERD, vertigo, lacunar infarcts (incidentally detected on MRI), ventricular ectopy and mild carotid atherosclerosis presents to the ED c/o right lower extremity swelling, warmth, and pain. States that on Thursday morning, he woke up with chills and began having nausea, vomiting, diarrhea and dizziness. Tmax 101F at home. Took Aleve with relief. Because of his symptoms, he states that he slept about "40 hours". This afternoon, he noticed that he was having redness in his RLE with warmth and swelling. This continued to worsen and his wife is a nurse so she thought that he might have a clot so they came to the ED. Also admits headaches and diaphoresis. 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 Patient is COVID vaccinated with Moderna. Of note, patient has a healing ulcer on his R toe that he was previously treated with two weeks of antibiotics for.     In the ED, VS T98.6F HR 86 /86 -> 165/93 RR 16 SpO2 98% on RA. Labs significant for NO leukocytosis but neutrophilic predominance of 80.9%, lactate 1.2. Blood cultures drawn in the ED.  Received Ancef IV x1 in the ED.  CXR grossly clear, official read pending  EKG pending. (11 Sep 2021 02:42)      60y year old Male seen at \[facility]\"" 3[facility] 364 D1 for Grade 1 diabetic ulcer plantar right hallux. The patient states that it appeared about 5 months ago and he has been going to Dr. Garland his podiarist for treatment. The patient states that the wound is closed and before his admission he was on oral abx for the wound. The patient states that he has diabetic neuropathy and his whole entire right leg is painful and sore. Denies any fever, chills, nausea, vomiting, chest pain, shortness of breath, or calf pain at this time.    REVIEW OF SYSTEMS    PAST MEDICAL & SURGICAL HISTORY:  Hypertension    AAA (abdominal aortic aneurysm)  dx 2012    GERD (gastroesophageal reflux disease)    Leg weakness    Aortic valve replaced    Cardiac tamponade    HTN (hypertension)    Diabetes mellitus    H/O aortic valve repair    S/P aneurysm repair    Aortic valve replaced    S/P AAA repair  Repaired 3/2015    S/P aortic aneurysm repair    H/O aortic valve repair    History of incision and drainage        Allergies    Normodyne (Faint)  Normodyne (Unknown)    Intolerances        MEDICATIONS  (STANDING):  aspirin  chewable 81 milliGRAM(s) Oral daily  atorvastatin 80 milliGRAM(s) Oral at bedtime  cefTRIAXone   IVPB 1000 milliGRAM(s) IV Intermittent every 24 hours  clopidogrel Tablet 75 milliGRAM(s) Oral daily  dextrose 40% Gel 15 Gram(s) Oral once  dextrose 5%. 1000 milliLiter(s) (50 mL/Hr) IV Continuous <Continuous>  dextrose 5%. 1000 milliLiter(s) (100 mL/Hr) IV Continuous <Continuous>  dextrose 50% Injectable 25 Gram(s) IV Push once  dextrose 50% Injectable 12.5 Gram(s) IV Push once  dextrose 50% Injectable 25 Gram(s) IV Push once  enoxaparin Injectable 40 milliGRAM(s) SubCutaneous daily  gabapentin 600 milliGRAM(s) Oral four times a day  glucagon  Injectable 1 milliGRAM(s) IntraMuscular once  hydrochlorothiazide 25 milliGRAM(s) Oral daily  insulin lispro (ADMELOG) corrective regimen sliding scale   SubCutaneous three times a day before meals  insulin lispro (ADMELOG) corrective regimen sliding scale   SubCutaneous at bedtime  lisinopril 40 milliGRAM(s) Oral daily  metoprolol tartrate 25 milliGRAM(s) Oral two times a day  pantoprazole    Tablet 40 milliGRAM(s) Oral before breakfast  saccharomyces boulardii 250 milliGRAM(s) Oral two times a day    MEDICATIONS  (PRN):  acetaminophen   Tablet .. 650 milliGRAM(s) Oral every 6 hours PRN Temp greater or equal to 38C (100.4F), Mild Pain (1 - 3)  meclizine 12.5 milliGRAM(s) Oral three times a day PRN vertigo  melatonin 3 milliGRAM(s) Oral at bedtime PRN Insomnia  ondansetron Injectable 4 milliGRAM(s) IV Push every 8 hours PRN Nausea and/or Vomiting  traMADol 50 milliGRAM(s) Oral every 6 hours PRN Moderate Pain (4 - 6)  traMADol 75 milliGRAM(s) Oral every 6 hours PRN Severe Pain (7 - 10)      Social History:  , lives at home with wife, works as an [employer] , ADLs independent, ambulates independently without the use of assistive devices, former smoker, social alcohol use (last drink 9/3/21), denies recreational drug use (11 Sep 2021 02:42)      FAMILY HISTORY:  Family history of breast cancer    Family history of hypertension    Family history of stroke    Family history of prostate cancer    Family history of COPD (chronic obstructive pulmonary disease) (Grandparent)    FH: HTN (hypertension)        Vital Signs Last 24 Hrs  T(C): 36.6 (11 Sep 2021 10:12), Max: 37.1 (11 Sep 2021 02:39)  T(F): 97.8 (11 Sep 2021 10:12), Max: 98.8 (11 Sep 2021 02:39)  HR: 71 (11 Sep 2021 10:12) (68 - 86)  BP: 169/96 (11 Sep 2021 10:12) (127/75 - 184/86)  BP(mean): --  RR: 15 (11 Sep 2021 10:12) (15 - 18)  SpO2: 98% (11 Sep 2021 10:12) (96% - 98%)    PHYSICAL EXAM:  Vascular: DP faintly palpable on the RIGHT, no palpable on the left  & PT nonpalpable bilaterally, Capillary refill 3 seconds, No Digital hair present bilaterally, diffuse edema and erythema along the RLE and minimally along the right forefoot   Neurological: Loss of protective sensation b/l   Musculoskeletal: 5/5 strength in all quadrants bilaterally, AJ & STJ ROM intact,   Dermatological:   1. Grade 1 ulcer right plantar hallux- size 0.2cmx0.1cm- adherent scab with healthy epithelized underneath , does not probe to bone, no tunneling, no undermining     CBC Full  -  ( 11 Sep 2021 08:18 )  WBC Count : 5.03 K/uL  RBC Count : 4.21 M/uL  Hemoglobin : 11.3 g/dL  Hematocrit : 35.9 %  Platelet Count - Automated : 116 K/uL  Mean Cell Volume : 85.3 fl  Mean Cell Hemoglobin : 26.8 pg  Mean Cell Hemoglobin Concentration : 31.5 gm/dL  Auto Neutrophil # : 3.90 K/uL  Auto Lymphocyte # : 0.45 K/uL  Auto Monocyte # : 0.60 K/uL  Auto Eosinophil # : 0.05 K/uL  Auto Basophil # : 0.02 K/uL  Auto Neutrophil % : 77.6 %  Auto Lymphocyte % : 8.9 %  Auto Monocyte % : 11.9 %  Auto Eosinophil % : 1.0 %  Auto Basophil % : 0.4 %      ----------CHEM PANEL----------    PT/INR - ( 11 Sep 2021 00:20 )   PT: 14.1 sec;   INR: 1.22 ratio         PTT - ( 11 Sep 2021 00:20 )  PTT:27.5 sec        Imaging: ----------

## 2021-09-11 NOTE — H&P ADULT - HISTORY OF PRESENT ILLNESS
59 yo M with PMHx of CAD (with 2 stents in the 1st Diag 7/1/19 and LPDA 7/3/19), bioprosthetic AVR with ascending aortic aneurysm repair on 03/116/15 (for a bicuspid aortic valve), cardiac tamponade (s/p pericardiocentesis 3/26/15), HFpEF, syncope (s/p ILR insertion 2/2/16), atypical chest discomfort, HTN, HLD, T2DM (not on insulin, complicated by peripheral neuropathy), GERD, vertigo, lacunar infarcts (incidentally detected on MRI), ventricular ectopy and mild carotid atherosclerosis presents to the ED c/o right lower extremity swelling, warmth, and pain. States that on Thursday morning, he woke up with chills and began having nausea, vomiting, diarrhea and dizziness. Tmax 101F at home. Took Aleve with relief. Because of his symptoms, he states that he slept about "40 hours". This afternoon, he noticed that he was having redness in his RLE with warmth and swelling. This continued to worsen and his wife is a nurse so she thought that he might have a clot so they came to the ED. Also admits headaches and diaphoresis. 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 Patient is COVID vaccinated with Moderna. Of note, patient has a healing ulcer on his R toe that he was previously treated with two weeks of antibiotics for.     In the ED, VS T98.6F HR 86 /86 -> 165/93 RR 16 SpO2 98% on RA. Labs significant for NO leukocytosis but neutrophilic predominance of 80.9%, lactate 1.2. Blood cultures drawn in the ED.  Received Ancef IV x1 in the ED.  CXR grossly clear, official read pending  EKG pending.

## 2021-09-11 NOTE — H&P ADULT - NSICDXPASTSURGICALHX_GEN_ALL_CORE_FT
PAST SURGICAL HISTORY:  Aortic valve replaced     H/O aortic valve repair     History of incision and drainage     S/P AAA repair Repaired 3/2015    S/P aortic aneurysm repair

## 2021-09-11 NOTE — ED PROVIDER NOTE - NS ED ROS FT
Constitutional: - Fever, - Chills, - Anorexia, - Fatigue, - Night sweats  Eyes: - Discharge, - Irritation, - Redness, - Visual changes, - Light sensitivity, - Pain  EARS: - Ear Pain, - Tinnitus, - Decreased hearing  NOSE: - Congestion, - Bloody nose  MOUTH/THROAT: - Vocal Changes, - Drooling, - Sore throat  NECK: - Lumps, - Stiffness, - Pain  CV: - Palpitations, - Chest Pain, - Edema, - Syncope  RESP:  - Cough, - Shortness of Breath, - Dyspnea on Exertion, - Trouble speaking, - Pleuritic pain - Wheezing  GI: - Diarrhea, - Constipation, - Bloody stools, - Nausea, - Vomiting, - Abdominal Pain  : - Dysuria, -Frequency, - Hematuria, - Hesitancy, - Incontinence, - Saddle Anesthesia, - Abnormal discharge  MSK: - Myalgias, - Arthralgias, - Weakness, - Deformities, +leg pain  SKIN: - Color change, + Rash, - Swelling, - Ecchymosis, - Abrasion, - laceration  NEURO: - Change in behavior, - Dec. Alertness, - Headache, - Dizziness, - Change in speech, - Weakness, - Seizure-like activity, - Difficulty ambulating

## 2021-09-11 NOTE — H&P ADULT - NSHPSOCIALHISTORY_GEN_ALL_CORE
, lives at home with wife, works as an [employer] , ADLs independent, ambulates independently without the use of assistive devices, former smoker, social alcohol use (last drink 9/3/21), denies recreational drug use

## 2021-09-11 NOTE — CONSULT NOTE ADULT - PROBLEM SELECTOR RECOMMENDATION 9
-patient seen and evaluated  -will order Bone scan to r/o OM of the right hallux  - Recommend vascular consultation   - Patient doesn't want any dressing because wound is healed  - follow up with infectious disease recommendations  - podiatry team will continue to follow patient while in house

## 2021-09-11 NOTE — H&P ADULT - NSHPREVIEWOFSYSTEMS_GEN_ALL_CORE
CONSTITUTIONAL: admits fever, chills  HEENT: denies blurred visions, sore throat  SKIN: admits RLE redness and warmth  CARDIOVASCULAR: denies chest pain, chest pressure, palpitations  RESPIRATORY: denies shortness of breath, sputum production  GASTROINTESTINAL: admits nausea, vomiting, diarrhea, denies abdominal pain  GENITOURINARY: denies dysuria, discharge  NEUROLOGICAL: admits headache, neuropathy, denies focal weakness  MUSCULOSKELETAL: admits RLE pain, denies new joint pain  HEMATOLOGIC: denies gross bleeding, bruising  LYMPHATICS: admits RLE swelling, denies enlarged lymph nodes  PSYCHIATRIC: denies recent changes in anxiety, depression  ENDOCRINOLOGIC: admits sweating, denies cold or heat intolerance

## 2021-09-11 NOTE — DISCHARGE NOTE PROVIDER - NSDCFUSCHEDAPPT_GEN_ALL_CORE_FT
DUSTY STRICKLAND ; 09/20/2021 ; [facility] [[email or web address]]r

## 2021-09-11 NOTE — DISCHARGE NOTE PROVIDER - NSDCMRMEDTOKEN_GEN_ALL_CORE_FT
Antivert 12.5 mg oral tablet: 1 tab(s) orally 3 times a day, As Needed  aspirin 81 mg oral tablet, chewable: 1 tab(s) orally once a day  atorvastatin 80 mg oral tablet: 1 tab(s) orally once a day (at bedtime)  clopidogrel 75 mg oral tablet: 1 tab(s) orally once a day  gabapentin 600 mg oral tablet: 1 tab(s) orally 4 times a day  hydroCHLOROthiazide 25 mg oral tablet: 1 tab(s) orally once a day  lisinopril 40 mg oral tablet: 1 tab(s) orally once a day  metFORMIN 1000 mg oral tablet: 1000 milligram(s) orally 2 times a day  Metoprolol Tartrate 25 mg oral tablet: 1 tab(s) orally 2 times a day  Protonix 40 mg oral delayed release tablet: 1 tab(s) orally once a day    aspirin 81 mg oral tablet, chewable: 1 tab(s) orally once a day  atorvastatin 80 mg oral tablet: 1 tab(s) orally once a day (at bedtime)  clopidogrel 75 mg oral tablet: 1 tab(s) orally once a day  gabapentin 600 mg oral tablet: 1 tab(s) orally 4 times a day  hydroCHLOROthiazide 25 mg oral tablet: 1 tab(s) orally once a day  lisinopril 40 mg oral tablet: 1 tab(s) orally once a day  meclizine 12.5 mg oral tablet: 1 tab(s) orally 3 times a day, As needed, vertigo  metoprolol tartrate 25 mg oral tablet: 1 tab(s) orally 2 times a day  pantoprazole 40 mg oral delayed release tablet: 1 tab(s) orally once a day (before a meal)   aspirin 81 mg oral tablet, chewable: 1 tab(s) orally once a day  atorvastatin 80 mg oral tablet: 1 tab(s) orally once a day (at bedtime)  cephalexin 500 mg oral capsule: 1 cap(s) orally every 6 hours  clopidogrel 75 mg oral tablet: 1 tab(s) orally once a day  furosemide 20 mg oral tablet: 1 tab(s) orally once a day  gabapentin 600 mg oral tablet: 1 tab(s) orally 4 times a day  lisinopril 40 mg oral tablet: 1 tab(s) orally once a day  meclizine 12.5 mg oral tablet: 1 tab(s) orally 3 times a day, As needed, vertigo  metFORMIN 1000 mg oral tablet, extended release: 1 tab(s) orally once a day  metoprolol tartrate 25 mg oral tablet: 1 tab(s) orally 2 times a day  pantoprazole 40 mg oral delayed release tablet: 1 tab(s) orally once a day (before a meal)   aspirin 81 mg oral tablet, chewable: 1 tab(s) orally once a day  atorvastatin 80 mg oral tablet: 1 tab(s) orally once a day (at bedtime)  cephalexin 500 mg oral capsule: 1 cap(s) orally every 6 hours for a week starting this evening ~7PM (third dose of the day)  clopidogrel 75 mg oral tablet: 1 tab(s) orally once a day  Florastor 250 mg oral capsule: 1 cap(s) orally 2 times a day for a week  furosemide 20 mg oral tablet: 1 tab(s) orally once a day  gabapentin 600 mg oral tablet: 1 tab(s) orally 4 times a day  lisinopril 40 mg oral tablet: 1 tab(s) orally once a day  meclizine 12.5 mg oral tablet: 1 tab(s) orally 3 times a day, As needed, vertigo  metFORMIN 1000 mg oral tablet, extended release: 1 tab(s) orally once a day  metoprolol tartrate 25 mg oral tablet: 1 tab(s) orally 2 times a day  pantoprazole 40 mg oral delayed release tablet: 1 tab(s) orally once a day (before a meal)

## 2021-09-11 NOTE — CONSULT NOTE ADULT - SUBJECTIVE AND OBJECTIVE BOX
[facility] DIVISION of INFECTIOUS DISEASE  Reid Dos Santos MD PhD, Katherine Garland MD, Viviane Ortega MD, Alexander Milton MD  and providing coverage with Yane Abernathy MD and Gaston Blanco MD  Providing Infectious Disease Consultations at [facility], [facility], [facility], [facility]'s    Office# [phone] to schedule follow up appointments  Answering Service for urgent calls or New Consults [phone]  Cell# to text for urgent issues Reid Dos Santos [phone]     HPI:  59 yo M with PMHx of CAD (with 2 stents in the 1st Diag 7/1/19 and LPDA 7/3/19), bioprosthetic AVR with ascending aortic aneurysm repair on 03/116/15 (for a bicuspid aortic valve), cardiac tamponade (s/p pericardiocentesis 3/26/15), HFpEF, syncope (s/p ILR insertion 2/2/16), atypical chest discomfort, HTN, HLD, T2DM (not on insulin, complicated by peripheral neuropathy), GERD, vertigo, lacunar infarcts (incidentally detected on MRI), ventricular ectopy and mild carotid atherosclerosis presents to the ED c/o right lower extremity swelling, warmth, and pain. States that on Thursday morning, he woke up with chills and began having nausea, vomiting, diarrhea and dizziness. Tmax 101F at home. Took Aleve with relief. Because of his symptoms, he states that he slept about "40 hours". This afternoon, he noticed that he was having redness in his RLE with warmth and swelling. This continued to worsen and his wife is a nurse so she thought that he might have a clot so they came to the ED. Also admits headaches and diaphoresis. 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 Patient is COVID vaccinated with Moderna. Of note, patient has a healing ulcer on his R toe that he was previously treated with two weeks of antibiotics for.     In the ED, VS T98.6F HR 86 /86 -> 165/93 RR 16 SpO2 98% on RA. Labs significant for NO leukocytosis but neutrophilic predominance of 80.9%, lactate 1.2. Blood cultures drawn in the ED.  Received Ancef IV x1 in the ED.  CXR grossly clear, official read pending  EKG pending. (11 Sep 2021 02:42)      PAST MEDICAL & SURGICAL HISTORY:  Hypertension    AAA (abdominal aortic aneurysm)  dx 2012    GERD (gastroesophageal reflux disease)    Leg weakness    Aortic valve replaced    Cardiac tamponade    HTN (hypertension)    Diabetes mellitus    H/O aortic valve repair    S/P aneurysm repair    Aortic valve replaced    S/P AAA repair  Repaired 3/2015    S/P aortic aneurysm repair    H/O aortic valve repair    History of incision and drainage        Antimicrobials  ZOSYN      Immunological      Other  acetaminophen   Tablet .. 650 milliGRAM(s) Oral every 6 hours PRN  aspirin  chewable 81 milliGRAM(s) Oral daily  atorvastatin 80 milliGRAM(s) Oral at bedtime  clopidogrel Tablet 75 milliGRAM(s) Oral daily  dextrose 40% Gel 15 Gram(s) Oral once  dextrose 5%. 1000 milliLiter(s) IV Continuous <Continuous>  dextrose 5%. 1000 milliLiter(s) IV Continuous <Continuous>  dextrose 50% Injectable 25 Gram(s) IV Push once  dextrose 50% Injectable 12.5 Gram(s) IV Push once  dextrose 50% Injectable 25 Gram(s) IV Push once  enoxaparin Injectable 40 milliGRAM(s) SubCutaneous daily  gabapentin 600 milliGRAM(s) Oral four times a day  glucagon  Injectable 1 milliGRAM(s) IntraMuscular once  hydrochlorothiazide 25 milliGRAM(s) Oral daily  insulin lispro (ADMELOG) corrective regimen sliding scale   SubCutaneous three times a day before meals  insulin lispro (ADMELOG) corrective regimen sliding scale   SubCutaneous at bedtime  lisinopril 40 milliGRAM(s) Oral daily  meclizine 12.5 milliGRAM(s) Oral three times a day PRN  melatonin 3 milliGRAM(s) Oral at bedtime PRN  metoprolol tartrate 25 milliGRAM(s) Oral two times a day  ondansetron Injectable 4 milliGRAM(s) IV Push every 8 hours PRN  pantoprazole    Tablet 40 milliGRAM(s) Oral before breakfast  saccharomyces boulardii 250 milliGRAM(s) Oral two times a day  traMADol 50 milliGRAM(s) Oral every 6 hours PRN  traMADol 75 milliGRAM(s) Oral every 6 hours PRN      Allergies    Normodyne (Faint)  Normodyne (Unknown)    Intolerances      SOCIAL HISTORY:  , lives at home with wife, works as an [employer] , ADLs independent, ambulates independently without the use of assistive devices, former smoker, social alcohol use (last drink 9/3/21), denies recreational drug use (11 Sep 2021 02:42)      FAMILY HISTORY:  Family history of breast cancer  Family history of hypertension  Family history of stroke  Family history of prostate cancer    Family history of COPD (chronic obstructive pulmonary disease) (Grandparent)    FH: HTN (hypertension)        ROS:    EYES:  Negative  blurry vision or double vision  GASTROINTESTINAL:  Negative for nausea, vomiting, diarrhea  -otherwise negative except for subjective    Vital Signs Last 24 Hrs  T(C): 36.6 (11 Sep 2021 10:12), Max: 37.1 (11 Sep 2021 02:39)  T(F): 97.8 (11 Sep 2021 10:12), Max: 98.8 (11 Sep 2021 02:39)  HR: 71 (11 Sep 2021 10:12) (68 - 86)  BP: 169/96 (11 Sep 2021 10:12) (127/75 - 184/86)  BP(mean): --  RR: 15 (11 Sep 2021 10:12) (15 - 18)  SpO2: 98% (11 Sep 2021 10:12) (96% - 98%)    PE:  WDWN, obese in no distress  HEENT:  NC, PERRL, sclerae anicteric, conjunctivae clear, EOMI.  Sinuses nontender, no nasal exudate.  No buccal or pharyngeal lesions, erythema or exudate  Neck:  Supple, no adenopathy  Lungs:  No accessory muscle use, bilaterally clear to auscultation  Cor:  distant  Abd:  Symmetric, normoactive BS.  Soft, nontender, no masses, guarding or rebound.  Liver and spleen not enlarged  Extrem:  No cyanosis , RLE with well marginated erythema, warm  Neuro: grossly intact  Musc: moving all limbs freely, no focal deficits        LABS:                        11.3   5.03  )-----------( 116      ( 11 Sep 2021 08:18 )             35.9       WBC Count: 5.03 K/uL (09-11-21 @ 08:18)  WBC Count: 6.40 K/uL (09-11-21 @ 00:20)      09-11    139  |  107  |  16  ----------------------------<  134<H>  3.6   |  27  |  1.00    Ca    9.0      11 Sep 2021 08:18    TPro  7.9  /  Alb  3.8  /  TBili  1.0  /  DBili  x   /  AST  25  /  ALT  42  /  AlkPhos  93  09-11      Creatinine, Serum: 1.00 mg/dL (09-11-21 @ 08:18)  Creatinine, Serum: 1.00 mg/dL (09-11-21 @ 01:15)        MICROBIOLOGY:      RADIOLOGY & ADDITIONAL STUDIES:    --< from: CT Chest No Cont (11.18.19 @ 12:29) >    EXAM:  CT CHEST                            PROCEDURE DATE:  11/18/2019          INTERPRETATION:  CLINICAL INFORMATION: Lupus recorder and chest location   to the impression the abscess    COMPARISON: 6/29/2019    PROCEDURE:   CT of the Chest was performed without intravenous contrast.  Sagittal and coronal reformats were performed.      FINDINGS:    LUNGS AND AIRWAYS: Patent central airways.  Minimal bronchiectasis.   Chronic changes in the periphery of the bilateral lower lobes. Minimal   biapical pleural parenchymal scarring. No definite segmental   consolidations. No definite discrete nodules.    PLEURA: No pleural effusion.    MEDIASTINUM AND ALTA: Small hiatal hernia. No significant mediastinal or   hilar adenopathy.    VESSELS: Atherosclerotic changes of the aorta and coronary vasculature.   Prior valve replacement and question of ascending thoracic aortic repair.   No definite aortic aneurysm.    HEART: Heart size is normal. Small right paracardiac fluid density   decrease in size from prior exam.    CHEST WALL AND LOWER NECK: Skin thickening to the right of the midline in   the mid to upper chest with underlying subcutaneous edema. Additional   focal round fluid density measuring 1.3 cm with droplets of air seen   superiorlyand inferiorly likely representing reported partially drained   fluid collection. Skin thickening and subcutaneous edema compatible   cellulitis. A loop recorder is seen to the left of the midline. Minimal   infiltration of the subcutaneous fat to the right of the loop recorder   however this is 90 proximity to the partially drained fluid density.    VISUALIZED UPPER ABDOMEN: Gallstones. Mild elevation of the right   hemidiaphragm.    BONES: Degenerative changes. Prior sternotomy. Mild kyphosis.    IMPRESSION:     Anterior mid to upper chest just to the right of midline cellulitis with   additional findings suggesting partially drains fluid density. Loop   recorder seen to the left of the midline not in proximity to the   partially drained fluid density. Subcutaneous infiltrative changes extend   to the level of the loop recorder.    No segmental pulmonary consolidations.       [facility] DIVISION of INFECTIOUS DISEASE  Reid Dos Santos MD PhD, Katherine Garland MD, Viviane Ortega MD, Alexander Milton MD  and providing coverage with Yane Abernathy MD and Gaston Blanco MD  Providing Infectious Disease Consultations at [facility], [facility], [facility], [facility]'s    Office# [phone] to schedule follow up appointments  Answering Service for urgent calls or New Consults [phone]  Cell# to text for urgent issues Reid Dos Santos [phone]     HPI:  59 yo M with PMHx of CAD (with 2 stents in the 1st Diag 7/1/19 and LPDA 7/3/19), bioprosthetic AVR with ascending aortic aneurysm repair on 03/116/15 (for a bicuspid aortic valve), cardiac tamponade (s/p pericardiocentesis 3/26/15), HFpEF, syncope (s/p ILR insertion 2/2/16), atypical chest discomfort, HTN, HLD, T2DM (not on insulin, complicated by peripheral neuropathy), GERD, vertigo, lacunar infarcts (incidentally detected on MRI), ventricular ectopy and mild carotid atherosclerosis presents to the ED c/o right lower extremity swelling, warmth, and pain. States that on Thursday morning, he woke up with chills and began having nausea, vomiting, diarrhea and dizziness. Tmax 101F at home. Took Aleve with relief. Because of his symptoms, he states that he slept about "40 hours". This afternoon, he noticed that he was having redness in his RLE with warmth and swelling. This continued to worsen and his wife is a nurse so she thought that he might have a clot so they came to the ED. Also admits headaches and diaphoresis. 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 Patient is COVID vaccinated with Moderna. Of note, patient has a healing ulcer on his R toe that he was previously treated with two weeks of antibiotics for.     In the ED, VS T98.6F HR 86 /86 -> 165/93 RR 16 SpO2 98% on RA. Labs significant for NO leukocytosis but neutrophilic predominance of 80.9%, lactate 1.2. Blood cultures drawn in the ED.  Received Ancef IV x1 in the ED.  CXR grossly clear, official read pending  EKG pending. (11 Sep 2021 02:42)      PAST MEDICAL & SURGICAL HISTORY:  Hypertension    AAA (abdominal aortic aneurysm)  dx 2012    GERD (gastroesophageal reflux disease)    Leg weakness    Aortic valve replaced    Cardiac tamponade    HTN (hypertension)    Diabetes mellitus    H/O aortic valve repair    S/P aneurysm repair    Aortic valve replaced    S/P AAA repair  Repaired 3/2015    S/P aortic aneurysm repair    H/O aortic valve repair    History of incision and drainage        Antimicrobials  CEFEPIME      Immunological      Other  acetaminophen   Tablet .. 650 milliGRAM(s) Oral every 6 hours PRN  aspirin  chewable 81 milliGRAM(s) Oral daily  atorvastatin 80 milliGRAM(s) Oral at bedtime  clopidogrel Tablet 75 milliGRAM(s) Oral daily  dextrose 40% Gel 15 Gram(s) Oral once  dextrose 5%. 1000 milliLiter(s) IV Continuous <Continuous>  dextrose 5%. 1000 milliLiter(s) IV Continuous <Continuous>  dextrose 50% Injectable 25 Gram(s) IV Push once  dextrose 50% Injectable 12.5 Gram(s) IV Push once  dextrose 50% Injectable 25 Gram(s) IV Push once  enoxaparin Injectable 40 milliGRAM(s) SubCutaneous daily  gabapentin 600 milliGRAM(s) Oral four times a day  glucagon  Injectable 1 milliGRAM(s) IntraMuscular once  hydrochlorothiazide 25 milliGRAM(s) Oral daily  insulin lispro (ADMELOG) corrective regimen sliding scale   SubCutaneous three times a day before meals  insulin lispro (ADMELOG) corrective regimen sliding scale   SubCutaneous at bedtime  lisinopril 40 milliGRAM(s) Oral daily  meclizine 12.5 milliGRAM(s) Oral three times a day PRN  melatonin 3 milliGRAM(s) Oral at bedtime PRN  metoprolol tartrate 25 milliGRAM(s) Oral two times a day  ondansetron Injectable 4 milliGRAM(s) IV Push every 8 hours PRN  pantoprazole    Tablet 40 milliGRAM(s) Oral before breakfast  saccharomyces boulardii 250 milliGRAM(s) Oral two times a day  traMADol 50 milliGRAM(s) Oral every 6 hours PRN  traMADol 75 milliGRAM(s) Oral every 6 hours PRN      Allergies    Normodyne (Faint)  Normodyne (Unknown)    Intolerances      SOCIAL HISTORY:  , lives at home with wife, works as an [employer] , ADLs independent, ambulates independently without the use of assistive devices, former smoker, social alcohol use (last drink 9/3/21), denies recreational drug use (11 Sep 2021 02:42)      FAMILY HISTORY:  Family history of breast cancer  Family history of hypertension  Family history of stroke  Family history of prostate cancer    Family history of COPD (chronic obstructive pulmonary disease) (Grandparent)    FH: HTN (hypertension)        ROS:    EYES:  Negative  blurry vision or double vision  GASTROINTESTINAL:  Negative for nausea, vomiting, diarrhea  -otherwise negative except for subjective    Vital Signs Last 24 Hrs  T(C): 36.6 (11 Sep 2021 10:12), Max: 37.1 (11 Sep 2021 02:39)  T(F): 97.8 (11 Sep 2021 10:12), Max: 98.8 (11 Sep 2021 02:39)  HR: 71 (11 Sep 2021 10:12) (68 - 86)  BP: 169/96 (11 Sep 2021 10:12) (127/75 - 184/86)  BP(mean): --  RR: 15 (11 Sep 2021 10:12) (15 - 18)  SpO2: 98% (11 Sep 2021 10:12) (96% - 98%)    PE:  WDWN, obese in no distress  HEENT:  NC, PERRL, sclerae anicteric, conjunctivae clear, EOMI.  Sinuses nontender, no nasal exudate.  No buccal or pharyngeal lesions, erythema or exudate  Neck:  Supple, no adenopathy  Lungs:  No accessory muscle use, bilaterally clear to auscultation  Cor:  distant  Abd:  Symmetric, normoactive BS.  Soft, nontender, no masses, guarding or rebound.  Liver and spleen not enlarged  Extrem:  No cyanosis , RLE with well marginated erythema, warm  Neuro: grossly intact  Musc: moving all limbs freely, no focal deficits        LABS:                        11.3   5.03  )-----------( 116      ( 11 Sep 2021 08:18 )             35.9       WBC Count: 5.03 K/uL (09-11-21 @ 08:18)  WBC Count: 6.40 K/uL (09-11-21 @ 00:20)      09-11    139  |  107  |  16  ----------------------------<  134<H>  3.6   |  27  |  1.00    Ca    9.0      11 Sep 2021 08:18    TPro  7.9  /  Alb  3.8  /  TBili  1.0  /  DBili  x   /  AST  25  /  ALT  42  /  AlkPhos  93  09-11      Creatinine, Serum: 1.00 mg/dL (09-11-21 @ 08:18)  Creatinine, Serum: 1.00 mg/dL (09-11-21 @ 01:15)        MICROBIOLOGY:    Culture - Surgical Swab (11.18.19 @ 20:29)    -  Amikacin: S <=16    -  Amoxicillin/Clavulanic Acid: S <=8/4    -  Ampicillin: S <=8 These ampicillin results predict results for amoxicillin    -  Ampicillin/Sulbactam: S <=4/2 Enterobacter, Citrobacter, and Serratia may develop resistance during prolonged therapy (3-4 days)    -  Aztreonam: S 8    -  Cefazolin: S <=2 Enterobacter, Citrobacter, and Serratia may develop resistance during prolonged therapy (3-4 days)    -  Cefepime: S <=2    -  Cefoxitin: S <=8    -  Ceftriaxone: S <=1 Enterobacter, Citrobacter, and Serratia may develop resistance during prolonged therapy    -  Ciprofloxacin: S <=1    -  Ertapenem: S <=0.5    -  Gentamicin: S <=2    -  Levofloxacin: S <=2    -  Meropenem: S <=1    -  Piperacillin/Tazobactam: S <=8    -  Tobramycin: S <=2    -  Trimethoprim/Sulfamethoxazole: S <=2/38    Specimen Source: .Surgical Swab abscess right chest wall    Culture Results:   Few Proteus mirabilis  Rare Facklamia hominis "Susceptibilities not performed"    Organism Identification: Proteus mirabilis    Organism: Proteus mirabilis    Method Type: SHAMEKA        RADIOLOGY & ADDITIONAL STUDIES:    --< from: CT Chest No Cont (11.18.19 @ 12:29) >    EXAM:  CT CHEST                            PROCEDURE DATE:  11/18/2019          INTERPRETATION:  CLINICAL INFORMATION: Lupus recorder and chest location   to the impression the abscess    COMPARISON: 6/29/2019    PROCEDURE:   CT of the Chest was performed without intravenous contrast.  Sagittal and coronal reformats were performed.      FINDINGS:    LUNGS AND AIRWAYS: Patent central airways.  Minimal bronchiectasis.   Chronic changes in the periphery of the bilateral lower lobes. Minimal   biapical pleural parenchymal scarring. No definite segmental   consolidations. No definite discrete nodules.    PLEURA: No pleural effusion.    MEDIASTINUM AND ALTA: Small hiatal hernia. No significant mediastinal or   hilar adenopathy.    VESSELS: Atherosclerotic changes of the aorta and coronary vasculature.   Prior valve replacement and question of ascending thoracic aortic repair.   No definite aortic aneurysm.    HEART: Heart size is normal. Small right paracardiac fluid density   decrease in size from prior exam.    CHEST WALL AND LOWER NECK: Skin thickening to the right of the midline in   the mid to upper chest with underlying subcutaneous edema. Additional   focal round fluid density measuring 1.3 cm with droplets of air seen   superiorlyand inferiorly likely representing reported partially drained   fluid collection. Skin thickening and subcutaneous edema compatible   cellulitis. A loop recorder is seen to the left of the midline. Minimal   infiltration of the subcutaneous fat to the right of the loop recorder   however this is 90 proximity to the partially drained fluid density.    VISUALIZED UPPER ABDOMEN: Gallstones. Mild elevation of the right   hemidiaphragm.    BONES: Degenerative changes. Prior sternotomy. Mild kyphosis.    IMPRESSION:     Anterior mid to upper chest just to the right of midline cellulitis with   additional findings suggesting partially drains fluid density. Loop   recorder seen to the left of the midline not in proximity to the   partially drained fluid density. Subcutaneous infiltrative changes extend   to the level of the loop recorder.    No segmental pulmonary consolidations.

## 2021-09-11 NOTE — DISCHARGE NOTE PROVIDER - NSDCFUADDAPPT_GEN_ALL_CORE_FT
- Patient and family are responsible to set up all follow up appointments.  - Continue taking your medications as directed.  - If symptoms persist/worsen, please call your PMD or return to the ED.

## 2021-09-11 NOTE — PROGRESS NOTE ADULT - ASSESSMENT
61 yo M with PMHx of CAD (with 2 stents in the 1st Diag 7/1/19 and LPDA 7/3/19), bioprosthetic AVR with ascending aortic aneurysm repair on 03/116/15 (for a bicuspid aortic valve), cardiac tamponade (s/p pericardiocentesis 3/26/15), HFpEF, syncope (s/p ILR insertion 2/2/16), atypical chest discomfort, HTN, HLD, T2DM (not on insulin, complicated by peripheral neuropathy), GERD, vertigo, lacunar infarcts (incidentally detected on MRI), ventricular ectopy and mild carotid atherosclerosis presents to the ED c/o right lower extremity swelling, warmth, and pain, admitted for cellulitis.

## 2021-09-11 NOTE — DISCHARGE NOTE PROVIDER - NSDCCPCAREPLAN_GEN_ALL_CORE_FT
PRINCIPAL DISCHARGE DIAGNOSIS  Diagnosis: Cellulitis  Assessment and Plan of Treatment: You were admitted to the hospital because of cellulitis. You were treated with IV antibiotics and your condition improved.  -Start __ for ____ days.  Please follow up with your PCP within 1 week of discharge.      SECONDARY DISCHARGE DIAGNOSES  Diagnosis: Type 2 diabetes mellitus  Assessment and Plan of Treatment:      PRINCIPAL DISCHARGE DIAGNOSIS  Diagnosis: Cellulitis  Assessment and Plan of Treatment: You were admitted to the hospital because of cellulitis. You were treated with IV antibiotics and your condition improved.  -Start __ for ____ days.  Please follow up with your PCP within 1 week of discharge.      SECONDARY DISCHARGE DIAGNOSES  Diagnosis: Type 2 diabetes mellitus  Assessment and Plan of Treatment:     Diagnosis: Hyperlipidemia  Assessment and Plan of Treatment: You were previously diagnosed with Hyperlipidemia. Please continue your atorvastatin.    Diagnosis: Vertigo  Assessment and Plan of Treatment:     Diagnosis: GERD (gastroesophageal reflux disease)  Assessment and Plan of Treatment:     Diagnosis: CAD (coronary artery disease)  Assessment and Plan of Treatment:     Diagnosis: Hypertension  Assessment and Plan of Treatment:      PRINCIPAL DISCHARGE DIAGNOSIS  Diagnosis: Cellulitis  Assessment and Plan of Treatment: You were admitted to the hospital because of cellulitis. You were treated with IV antibiotics and your condition improved.  -Start KEFFLEX 500mg every 6 hours for 7 days.  Please follow up with your PCP within 1 week of discharge.      SECONDARY DISCHARGE DIAGNOSES  Diagnosis: Type 2 diabetes mellitus  Assessment and Plan of Treatment:     Diagnosis: Vertigo  Assessment and Plan of Treatment:     Diagnosis: CAD (coronary artery disease)  Assessment and Plan of Treatment:     Diagnosis: Hypertension  Assessment and Plan of Treatment:      PRINCIPAL DISCHARGE DIAGNOSIS  Diagnosis: Cellulitis  Assessment and Plan of Treatment: You were admitted to the hospital because of cellulitis. You were treated with IV antibiotics and your condition improved.  -Indium scan negative for osteomylitis (infection of the bone)  -Start KEFFLEX 500mg every 6 hours for 7 days, next dose tonight at 8pm  Please follow up with your PCP within 1 week of discharge.  WOUND CARE INSTRUCTIONS   1. Cleanse wound with sterile saline solution and gently pat dry.  2. Dress wound with Aquacel ag and dry, sterile dressing.  3. Change dressings daily and monitor for any signs of infection. You were given supplies for wife who is RN to do dressing changes until you see Dr. Michele/Sonia  4. Patient is to follow up [facility] with Dr. Scott or Dr. Michele within 1 week upon discharge.      SECONDARY DISCHARGE DIAGNOSES  Diagnosis: Diabetic ulcer of toe  Assessment and Plan of Treatment: Vascular consulted, vascular studies showed atherosclerosis and questionable stenosis in right posterior and anterior tibial arteries. No surgical intervention during admission but you must follow up with Dr. Dyson outpatient.    Diagnosis: Hypertension  Assessment and Plan of Treatment: You were taken off home hydrochlorothiazide and placed on lasix 20mg  Continue lasix 20mg daily, as well as metoprolol and lisinopril  Stop hydrochlorothiazide  Follow up with PCP to monitor blood pressures    Diagnosis: Vertigo  Assessment and Plan of Treatment:     Diagnosis: CAD (coronary artery disease)  Assessment and Plan of Treatment:      PRINCIPAL DISCHARGE DIAGNOSIS  Diagnosis: Cellulitis  Assessment and Plan of Treatment: You were admitted to the hospital because of cellulitis. You were treated with IV antibiotics and your condition improved.  -Indium scan did not have any evidence of osteomyelitis (infection of the bone)  -Complete antibiotics with the prescribed cephalexin (Keflex).   -Start KEFFLEX 500mg every 6 hours for 7 days, next dose tonight at ~7pm (third dose of the day)  Please follow up with your PCP within 1 week of discharge.  You were seen by podiatry for your foot wound. Please follow these wound care instructions.  WOUND CARE INSTRUCTIONS   1. Cleanse wound with sterile saline solution and gently pat dry.  2. Dress wound with Aquacel Ag (silver) and dry, sterile dressing.  3. Change dressings daily and monitor for any signs of infection. You were given supplies for your wife who is RN and you have chosen to do dressing changes until you see Dr. Michele or Dr. Scott  4. Please follow up in [facility] with Dr. Scott or Dr. Michele within 1 week upon discharge.      SECONDARY DISCHARGE DIAGNOSES  Diagnosis: Peripheral arterial disease  Assessment and Plan of Treatment: You were found to have significant arterial disease and stenosis in your right leg. Vascular surgeon consulted on your case and recommends you follow up in the office in a week to set up an angiogram and possible stent placement.  Continue to take aspirin, plavix (aka clopidogrel) and atorvastatin.   Follow up with Dr. Karis lambert (number below).    Diagnosis: Hypertension  Assessment and Plan of Treatment: You were taken off home hydrochlorothiazide and continued on lasix (aka furosemide) 20mg daily (which you mentioned you have been taking already.  Continue lasix 20mg daily, as well as, your metoprolol and lisinopril  Stop hydrochlorothiazide for now.   Monitor your blood pressure and make a log of BP values for 1 week. Show those values to your PCP to help the doctor decide whether any adjustments to medicaitons are needed.   Follow up with PCP in a week.    Diagnosis: Vertigo  Assessment and Plan of Treatment:     Diagnosis: CAD (coronary artery disease)  Assessment and Plan of Treatment:      PRINCIPAL DISCHARGE DIAGNOSIS  Diagnosis: Cellulitis  Assessment and Plan of Treatment: You were admitted to the hospital because of cellulitis. You were treated with IV antibiotics and your condition improved.  -Indium scan did not have any evidence of osteomyelitis (infection of the bone)  -Complete antibiotics with the prescribed cephalexin (Keflex).   -Start KEFFLEX 500mg every 6 hours for 7 days, next dose tonight at ~7pm (third dose of the day)  -Take a probiotic like the prescribed Florastor for a week. If your pharmacy does not have that probiotic, you may take another one that they carry.  Please follow up with your PCP within 1 week of discharge.  You were seen by podiatry for your foot wound. Please follow these wound care instructions.  WOUND CARE INSTRUCTIONS   1. Cleanse wound with sterile saline solution and gently pat dry.  2. Dress wound with Aquacel Ag (silver) and dry, sterile dressing.  3. Change dressings daily and monitor for any signs of infection. You were given supplies for your wife who is RN and you have chosen to do dressing changes until you see Dr. Michele or Dr. Scott  4. Please follow up in [facility] with Dr. Scott or Dr. Michele within 1 week upon discharge.      SECONDARY DISCHARGE DIAGNOSES  Diagnosis: Peripheral arterial disease  Assessment and Plan of Treatment: You were found to have significant arterial disease and stenosis in your right leg. Vascular surgeon consulted on your case and recommends you follow up in the office in a week to set up an angiogram and possible stent placement.  Continue to take aspirin, plavix (aka clopidogrel) and atorvastatin.   Follow up with Dr. Karis lambert (number below).  If you have significant leg pain, cold foot, or new loss of sensation, come back to the ER.    Diagnosis: Hypertension  Assessment and Plan of Treatment: You were taken off home hydrochlorothiazide and continued on lasix (aka furosemide) 20mg daily (which you mentioned you have been taking already.  Continue lasix 20mg daily, as well as, your metoprolol and lisinopril  Stop hydrochlorothiazide for now.   Monitor your blood pressure and make a log of BP values for 1 week. Show those values to your PCP to help the doctor decide whether any adjustments to medicaitons are needed.   Follow up with PCP in a week.    Diagnosis: Vertigo  Assessment and Plan of Treatment:     Diagnosis: CAD (coronary artery disease)  Assessment and Plan of Treatment:

## 2021-09-11 NOTE — PROGRESS NOTE ADULT - PROBLEM SELECTOR PLAN 3
labile BP, initial /86 likely 2/2 pain  -on metoprolol, lisinopril, hctz at home, continue with hold parameters  -monitor routine hemodynamics

## 2021-09-11 NOTE — DISCHARGE NOTE PROVIDER - NSDCQMAMI_CARD_ALL_CORE
Hattie Jack  : 1995  MRN: [id number]  CSN: [id number]    Postpartum Day #1  Subjective   Her pain is well controlled. Vaginal bleeding is normal in amount. She is ambulating without difficulty. She is voiding without difficulty. She is breast feeding and is having troubles. Her baby is doing well.     Objective     Min/max vitals past 24 hours:   Temp  Min: 97.5 °F (36.4 °C)  Max: 98.4 °F (36.9 °C)  BP  Min: 102/59  Max: 156/67  Pulse  Min: 72  Max: 115  Resp  Min: 16  Max: 18        General: well developed; well nourished  no acute distress   Abdomen: fundus firm and non-tender   Pelvic: Not performed   Ext: Calves NT     Lab Results   Component Value Date    WBC 11.96 (H) 2019    HGB 9.1 (L) 2019    HCT 29.6 (L) 2019    MCV 78.5 (L) 2019     2019    RH Positive 2019    HEPBSAG Negative 2018        Assessment   1. Postpartum Day #1 S/P vaginal delivery  2. Doing well     Plan   1. Continue routine postpartum care   2. Mild range pressures noted intrapartum, normotensive since; continue to monitor  3. HCT 30 - AM labs pending  4. Rh positive  5. Zoloft 50mg QD for anxiety/depression    Yusuf Puente MD  3/21/2019  7:43 AM          No

## 2021-09-11 NOTE — CONSULT NOTE ADULT - ASSESSMENT
59 yo M with PMHx of CAD (with 2 stents in the 1st Diag 7/1/19 and LPDA 7/3/19), bioprosthetic AVR with ascending aortic aneurysm repair on 03/116/15 (for a bicuspid aortic valve), cardiac tamponade (s/p pericardiocentesis 3/26/15), HFpEF, syncope (s/p ILR insertion 2/2/16), atypical chest discomfort, HTN, HLD, T2DM (not on insulin, complicated by peripheral neuropathy), GERD, vertigo, lacunar infarcts (incidentally detected on MRI), ventricular ectopy and mild carotid atherosclerosis presents to the ED c/o right lower extremity swelling, warmth, and pain. Patient is COVID vaccinated with Moderna.     RECOMMENDATIONS    Nonpurulent cellulitis of the RLE have stopped Vanco and highest suspicion is for this being driven by strept but with this being LE also GNRs in differential. We probably do not need an antipseudomonal beta-lactam so will look at potential de-escalation to ceftriaxone as pt improves    -will coordinate with medicine on other issues    Thank you for consulting us and involving us in the management of this most interesting and challenging case.  We will follow along in the care of this patient. Please call us at [phone] or text me directly on my cell# at [phone] with any concerns.   61 yo M with PMHx of CAD (with 2 stents in the 1st Diag 7/1/19 and LPDA 7/3/19), bioprosthetic AVR with ascending aortic aneurysm repair on 03/116/15 (for a bicuspid aortic valve), cardiac tamponade (s/p pericardiocentesis 3/26/15), HFpEF, syncope (s/p ILR insertion 2/2/16), atypical chest discomfort, HTN, HLD, T2DM (not on insulin, complicated by peripheral neuropathy), GERD, vertigo, lacunar infarcts (incidentally detected on MRI), ventricular ectopy and mild carotid atherosclerosis presents to the ED c/o right lower extremity swelling, warmth, and pain. Patient is COVID vaccinated with Moderna.     RECOMMENDATIONS    Nonpurulent cellulitis of the RLE have stopped Vanco and highest suspicion is for this being driven by strept but with this being LE also GNRs in differential. Noted prior infection with  pan-sensitive proteus mirabilis.    We probably do not need an antipseudomonal beta-lactam so will look at potential de-escalation to ceftriaxone as pt improves    -will coordinate with medicine on other issues    Thank you for consulting us and involving us in the management of this most interesting and challenging case.  We will follow along in the care of this patient. Please call us at [phone] or text me directly on my cell# at [phone] with any concerns.

## 2021-09-11 NOTE — PROGRESS NOTE ADULT - ATTENDING COMMENTS
Pt. seen and evaluated for right LE cellulitis.  Pt. is in no distress.  Tolerating IV antibiotics.  Physical examination and plan as above.  If any additional episodes of diarrhea will check GI PCR and O&P.  Check blood cx. Pt. seen and evaluated for right LE cellulitis.  Pt. is in no distress.  Tolerating IV antibiotics.  Physical examination and plan as above.  If any additional episodes of diarrhea will check GI PCR and O&P.  Check blood cx and MRSA/MSSA PCR.

## 2021-09-11 NOTE — ED PROVIDER NOTE - OBJECTIVE STATEMENT
59 yo male hx of cad on plavix c/o right lower leg redness and calf pain, nonradiating, mild to moderate, no medications taken for this. was not feeling well the past few days had low grade fever, was bed bound.

## 2021-09-11 NOTE — H&P ADULT - PROBLEM SELECTOR PLAN 1
presented with RLE warmth, erythema, swelling and pain, doppler negative for DVT  -nonpurulent cellulitis, continue IV Ancef (check MRSA PCR due to questionable hospital exposure)  -f/u blood cx  -trend wbc and fever curve  -pain control  -if cellulitis does not improve with antibiotics, consider repeat doppler (unable to visualize all veins) presented with RLE warmth, erythema, swelling and pain, doppler negative for DVT  - no known recent trauma or insect bite . pt with partially healed wound on great toe   -nonpurulent cellulitis, continue IV Ancef , check MRSA swab  -f/u blood cx  -trend wbc and fever curve  -pain control  -if cellulitis does not improve with antibiotics, consider repeat doppler (unable to visualize all veins)  - ID Consult Dr. Blanco

## 2021-09-11 NOTE — ED ADULT NURSE REASSESSMENT NOTE - NS ED NURSE REASSESS COMMENT FT1
pt aox4, " rt lower leg redness x 2 days" hot to touch, good cap refill, FROM, lungs clear, abd soft, + sounds, IVL patent, awaiting for Med Bed

## 2021-09-11 NOTE — DISCHARGE NOTE PROVIDER - PROVIDER TOKENS
PROVIDER:[TOKEN:[[id number]]],PROVIDER:[TOKEN:[[id number]]] PROVIDER:[TOKEN:[[id number]]],PROVIDER:[TOKEN:[[id number]]],PROVIDER:[TOKEN:[[id number]]],PROVIDER:[TOKEN:[[id number]]]

## 2021-09-11 NOTE — PROGRESS NOTE ADULT - PROBLEM SELECTOR PLAN 2
-Denies further ep of n/v/d today  -No leukocytosis. tmax 101F at home yesterday  -RVP neg, COVID PCR neg  -check GI stool PCR, ova and parasites  -Start probiotic  -Zofran PRN for nausea  -encourage PO intake  - trend electrolytes

## 2021-09-11 NOTE — H&P ADULT - PROBLEM SELECTOR PLAN 5
chronic, not on insulin, complicated by neuropathy  -on metformin at home, hold in hospital  -low dose corrective scale, accuchecks, hypoglycemia protocol  -f/u HbA1c in AM  -on gabapentin at home for neuropathy, continue  -wound care RN for R big toe ulcer

## 2021-09-11 NOTE — PROGRESS NOTE ADULT - PROBLEM SELECTOR PLAN 8
chronic, s/p 2 stents in 2019  -Cont home asa and plavix   -recent TTE in 2020 showing normal LVEF but mod diastolic dysfunction chronic, s/p 2 stents in 2019  -Cont home asa and plavix   -recent TTE in 2020 showing normal LVEF but mod diastolic dysfunction    VTE ppx:  Lovenox 40mg SQ daily

## 2021-09-11 NOTE — PROGRESS NOTE ADULT - SUBJECTIVE AND OBJECTIVE BOX
Patient is a 60y old  Male who presents with a chief complaint of celllulitis (11 Sep 2021 02:42)      INTERVAL HPI/OVERNIGHT EVENTS: Patient seen and examined at bedside. No acute overnight events occurred. Patient endorses 4/10 pain and warmth of RLE. Denies any episodes of vomiting, diarrhea today. Otherwise he denies fevers, chills, headache, lightheadedness, chest pain, dyspnea.    MEDICATIONS  (STANDING):  aspirin  chewable 81 milliGRAM(s) Oral daily  atorvastatin 80 milliGRAM(s) Oral at bedtime  clopidogrel Tablet 75 milliGRAM(s) Oral daily  dextrose 40% Gel 15 Gram(s) Oral once  dextrose 5%. 1000 milliLiter(s) (50 mL/Hr) IV Continuous <Continuous>  dextrose 5%. 1000 milliLiter(s) (100 mL/Hr) IV Continuous <Continuous>  dextrose 50% Injectable 25 Gram(s) IV Push once  dextrose 50% Injectable 12.5 Gram(s) IV Push once  dextrose 50% Injectable 25 Gram(s) IV Push once  enoxaparin Injectable 40 milliGRAM(s) SubCutaneous daily  gabapentin 600 milliGRAM(s) Oral four times a day  glucagon  Injectable 1 milliGRAM(s) IntraMuscular once  hydrochlorothiazide 25 milliGRAM(s) Oral daily  insulin lispro (ADMELOG) corrective regimen sliding scale   SubCutaneous three times a day before meals  insulin lispro (ADMELOG) corrective regimen sliding scale   SubCutaneous at bedtime  lisinopril 40 milliGRAM(s) Oral daily  metoprolol tartrate 25 milliGRAM(s) Oral two times a day  pantoprazole    Tablet 40 milliGRAM(s) Oral before breakfast  piperacillin/tazobactam IVPB.. 3.375 Gram(s) IV Intermittent every 8 hours  saccharomyces boulardii 250 milliGRAM(s) Oral two times a day  vancomycin  IVPB 1500 milliGRAM(s) IV Intermittent every 12 hours    MEDICATIONS  (PRN):  acetaminophen   Tablet .. 650 milliGRAM(s) Oral every 6 hours PRN Temp greater or equal to 38C (100.4F), Mild Pain (1 - 3)  meclizine 12.5 milliGRAM(s) Oral three times a day PRN vertigo  melatonin 3 milliGRAM(s) Oral at bedtime PRN Insomnia  ondansetron Injectable 4 milliGRAM(s) IV Push every 8 hours PRN Nausea and/or Vomiting  traMADol 50 milliGRAM(s) Oral every 6 hours PRN Moderate Pain (4 - 6)  traMADol 75 milliGRAM(s) Oral every 6 hours PRN Severe Pain (7 - 10)      Allergies    Normodyne (Faint)  Normodyne (Unknown)    Intolerances        REVIEW OF SYSTEMS:  CONSTITUTIONAL: No fever or chills  HEENT:  No headache, no sore throat  RESPIRATORY: No cough, wheezing, or shortness of breath  CARDIOVASCULAR: No chest pain, palpitations  GASTROINTESTINAL: No nausea, vomiting, or diarrhea  GENITOURINARY: No dysuria, frequency, or hematuria  NEUROLOGICAL: no focal weakness or dizziness  MUSCULOSKELETAL: +RLE pain     Vital Signs Last 24 Hrs  T(C): 36.6 (11 Sep 2021 10:12), Max: 37.1 (11 Sep 2021 02:39)  T(F): 97.8 (11 Sep 2021 10:12), Max: 98.8 (11 Sep 2021 02:39)  HR: 71 (11 Sep 2021 10:12) (68 - 86)  BP: 169/96 (11 Sep 2021 10:12) (127/75 - 184/86)  BP(mean): --  RR: 15 (11 Sep 2021 10:12) (15 - 18)  SpO2: 98% (11 Sep 2021 10:12) (96% - 98%)    PHYSICAL EXAM:  GENERAL: NAD  HEENT:  anicteric, moist mucous membranes  CHEST/LUNG:  CTA b/l, no rales, wheezes, or rhonchi  HEART:  RRR, S1, S2  ABDOMEN:  BS+, soft, nontender, nondistended  EXTREMITIES: RLE warmth and nonpitting edema with erythema extending up to the knee, no extension past demarcation, tender to palpation, healing ulcer on R 1st toe  NERVOUS SYSTEM: answers questions and follows commands appropriately    LABS:                        11.3   5.03  )-----------( 116      ( 11 Sep 2021 08:18 )             35.9     CBC Full  -  ( 11 Sep 2021 08:18 )  WBC Count : 5.03 K/uL  Hemoglobin : 11.3 g/dL  Hematocrit : 35.9 %  Platelet Count - Automated : 116 K/uL  Mean Cell Volume : 85.3 fl  Mean Cell Hemoglobin : 26.8 pg  Mean Cell Hemoglobin Concentration : 31.5 gm/dL  Auto Neutrophil # : 3.90 K/uL  Auto Lymphocyte # : 0.45 K/uL  Auto Monocyte # : 0.60 K/uL  Auto Eosinophil # : 0.05 K/uL  Auto Basophil # : 0.02 K/uL  Auto Neutrophil % : 77.6 %  Auto Lymphocyte % : 8.9 %  Auto Monocyte % : 11.9 %  Auto Eosinophil % : 1.0 %  Auto Basophil % : 0.4 %    11 Sep 2021 08:18    139    |  107    |  16     ----------------------------<  134    3.6     |  27     |  1.00     Ca    9.0        11 Sep 2021 08:18    TPro  7.9    /  Alb  3.8    /  TBili  1.0    /  DBili  x      /  AST  25     /  ALT  42     /  AlkPhos  93     11 Sep 2021 01:15    PT/INR - ( 11 Sep 2021 00:20 )   PT: 14.1 sec;   INR: 1.22 ratio         PTT - ( 11 Sep 2021 00:20 )  PTT:27.5 sec    CAPILLARY BLOOD GLUCOSE      POCT Blood Glucose.: 138 mg/dL (11 Sep 2021 11:43)  POCT Blood Glucose.: 137 mg/dL (11 Sep 2021 07:22)          RADIOLOGY & ADDITIONAL TESTS:    Personally reviewed.     Consultant(s) Notes Reviewed:  [x] YES  [ ] NO

## 2021-09-11 NOTE — DISCHARGE NOTE PROVIDER - HOSPITAL COURSE
HPI:  61 yo M with PMHx of CAD (with 2 stents in the 1st Diag 7/1/19 and LPDA 7/3/19), bioprosthetic AVR with ascending aortic aneurysm repair on 03/116/15 (for a bicuspid aortic valve), cardiac tamponade (s/p pericardiocentesis 3/26/15), HFpEF, syncope (s/p ILR insertion 2/2/16), atypical chest discomfort, HTN, HLD, T2DM (not on insulin, complicated by peripheral neuropathy), GERD, vertigo, lacunar infarcts (incidentally detected on MRI), ventricular ectopy and mild carotid atherosclerosis presents to the ED c/o right lower extremity swelling, warmth, and pain. States that on Thursday morning, he woke up with chills and began having nausea, vomiting, diarrhea and dizziness. Tmax 101F at home. Took Aleve with relief. Because of his symptoms, he states that he slept about "40 hours". This afternoon, he noticed that he was having redness in his RLE with warmth and swelling. This continued to worsen and his wife is a nurse so she thought that he might have a clot so they came to the ED. Also admits headaches and diaphoresis. 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 Patient is COVID vaccinated with Moderna. Of note, patient has a healing ulcer on his R toe that he was previously treated with two weeks of antibiotics for.     In the ED, VS T98.6F HR 86 /86 -> 165/93 RR 16 SpO2 98% on RA. Labs significant for NO leukocytosis but neutrophilic predominance of 80.9%, lactate 1.2. Blood cultures drawn in the ED.  Received Ancef IV x1 in the ED.  CXR grossly clear, official read pending  EKG pending. (11 Sep 2021 02:42)      ---  HOSPITAL COURSE:   Patient was admitted for cellulitis, likely due to diabetic foot ulcer. ID Dr. Blanco was consulted who recommended IV antibiotics with ____ and oral ____ upon discharge. Podiatry was consulted for diabetic foot ulcer care. Patient's symptoms clinically improved, remained afebrile, no leukocytosis. Patient was medically optimized and seen and examined on the day of discharge.    T(C): 36.6 (09-11-21 @ 10:12), Max: 37.1 (09-11-21 @ 02:39)  HR: 71 (09-11-21 @ 10:12) (68 - 86)  BP: 169/96 (09-11-21 @ 10:12) (127/75 - 184/86)  RR: 15 (09-11-21 @ 10:12) (15 - 18)  SpO2: 98% (09-11-21 @ 10:12) (96% - 98%)  ---  CONSULTANTS:   REJI Blanco  Podiatry Dr. Scott  ---  TIME SPENT:  I, the attending physician, was physically present for the key portions of the evaluation and management (E/M) service provided. The total amount of time spent reviewing the hospital notes, laboratory values, imaging findings, assessing/counseling the patient, discussing with consultant physicians, social work, nursing staff was -- minutes    ---  Primary care provider was made aware of plan for discharge:      [  ] NO     [  ] YES   HPI:  59 yo M with PMHx of CAD (with 2 stents in the 1st Diag 7/1/19 and LPDA 7/3/19), bioprosthetic AVR with ascending aortic aneurysm repair on 03/116/15 (for a bicuspid aortic valve), cardiac tamponade (s/p pericardiocentesis 3/26/15), HFpEF, syncope (s/p ILR insertion 2/2/16), atypical chest discomfort, HTN, HLD, T2DM (not on insulin, complicated by peripheral neuropathy), GERD, vertigo, lacunar infarcts (incidentally detected on MRI), ventricular ectopy and mild carotid atherosclerosis presents to the ED c/o right lower extremity swelling, warmth, and pain. States that on Thursday morning, he woke up with chills and began having nausea, vomiting, diarrhea and dizziness. Tmax 101F at home. Took Aleve with relief. Because of his symptoms, he states that he slept about "40 hours". This afternoon, he noticed that he was having redness in his RLE with warmth and swelling. This continued to worsen and his wife is a nurse so she thought that he might have a clot so they came to the ED. Also admits headaches and diaphoresis. 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 Patient is COVID vaccinated with Moderna. Of note, patient has a healing ulcer on his R toe that he was previously treated with two weeks of antibiotics for.     In the ED, VS T98.6F HR 86 /86 -> 165/93 RR 16 SpO2 98% on RA. Labs significant for NO leukocytosis but neutrophilic predominance of 80.9%, lactate 1.2. Blood cultures drawn in the ED.  Received Ancef IV x1 in the ED.  CXR grossly clear, official read pending  EKG pending. (11 Sep 2021 02:42)      ---  HOSPITAL COURSE:   Patient was admitted for cellulitis, likely due to diabetic foot ulcer. Started on vancomycin and zosyn, transitioned to Rocephin on 09/12. US doppler LE on admission negative for DVT.   Podiatry was consulted for diabetic foot ulcer care, NM showed ---------------------------  Patient's symptoms clinically improved, remained afebrile, no leukocytosis. Patient was medically optimized and seen and examined on the day of discharge.      ---  CONSULTANTS:   REJI Blanco  Podiatry Dr. Scott  ---  TIME SPENT:  I, the attending physician, was physically present for the key portions of the evaluation and management (E/M) service provided. The total amount of time spent reviewing the hospital notes, laboratory values, imaging findings, assessing/counseling the patient, discussing with consultant physicians, social work, nursing staff was -- minutes    ---  Primary care provider was made aware of plan for discharge:      [  ] NO     [  ] YES   HPI:  61 yo M with PMHx of CAD (with 2 stents in the 1st Diag 7/1/19 and LPDA 7/3/19), bioprosthetic AVR with ascending aortic aneurysm repair on 03/116/15 (for a bicuspid aortic valve), cardiac tamponade (s/p pericardiocentesis 3/26/15), HFpEF, syncope (s/p ILR insertion 2/2/16), atypical chest discomfort, HTN, HLD, T2DM (not on insulin, complicated by peripheral neuropathy), GERD, vertigo, lacunar infarcts (incidentally detected on MRI), ventricular ectopy and mild carotid atherosclerosis presents to the ED c/o right lower extremity swelling, warmth, and pain. States that on Thursday morning, he woke up with chills and began having nausea, vomiting, diarrhea and dizziness. Tmax 101F at home. Took Aleve with relief. Because of his symptoms, he states that he slept about "40 hours". This afternoon, he noticed that he was having redness in his RLE with warmth and swelling. This continued to worsen and his wife is a nurse so she thought that he might have a clot so they came to the ED. Also admits headaches and diaphoresis. 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 Patient is COVID vaccinated with Moderna. Of note, patient has a healing ulcer on his R toe that he was previously treated with two weeks of antibiotics for.     In the ED, VS T98.6F HR 86 /86 -> 165/93 RR 16 SpO2 98% on RA. Labs significant for NO leukocytosis but neutrophilic predominance of 80.9%, lactate 1.2. Blood cultures drawn in the ED.  Received Ancef IV x1 in the ED.  CXR grossly clear, official read pending  EKG pending. (11 Sep 2021 02:42)      ---  HOSPITAL COURSE:   Patient was admitted for RLE cellulitis. Started on vancomycin and zosyn, transitioned to Rocephin on 09/12 as per REJI Blanco's recommendations. US doppler LE on admission negative for DVT.    Podiatry was consulted for diabetic foot ulcer care, NM showed ---------------------------  Vascular Dr. Goncalves was consulted to check if patient had optimal vascularity. Vascular tests revealed_______  Patient's symptoms clinically improved, remained afebrile, no leukocytosis. Patient was medically optimized and seen and examined on the day of discharge.      ---  CONSULTANTS:   REJI Blanco  Podiatry Dr. Scott  Vascular Dr. Goncalves   ---  TIME SPENT:  I, the attending physician, was physically present for the key portions of the evaluation and management (E/M) service provided. The total amount of time spent reviewing the hospital notes, laboratory values, imaging findings, assessing/counseling the patient, discussing with consultant physicians, social work, nursing staff was -- minutes    ---  Primary care provider was made aware of plan for discharge:      [  ] NO     [  ] YES   HPI:  59 yo M with PMHx of CAD (with 2 stents in the 1st Diag 7/1/19 and LPDA 7/3/19), bioprosthetic AVR with ascending aortic aneurysm repair on 03/116/15 (for a bicuspid aortic valve), cardiac tamponade (s/p pericardiocentesis 3/26/15), HFpEF, syncope (s/p ILR insertion 2/2/16), atypical chest discomfort, HTN, HLD, T2DM (not on insulin, complicated by peripheral neuropathy), GERD, vertigo, lacunar infarcts (incidentally detected on MRI), ventricular ectopy and mild carotid atherosclerosis presents to the ED c/o right lower extremity swelling, warmth, and pain. States that on Thursday morning, he woke up with chills and began having nausea, vomiting, diarrhea and dizziness. Tmax 101F at home. Took Aleve with relief. Because of his symptoms, he states that he slept about "40 hours". This afternoon, he noticed that he was having redness in his RLE with warmth and swelling. This continued to worsen and his wife is a nurse so she thought that he might have a clot so they came to the ED. Also admits headaches and diaphoresis. 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 Patient is COVID vaccinated with Moderna. Of note, patient has a healing ulcer on his R toe that he was previously treated with two weeks of antibiotics for.     In the ED, VS T98.6F HR 86 /86 -> 165/93 RR 16 SpO2 98% on RA. Labs significant for NO leukocytosis but neutrophilic predominance of 80.9%, lactate 1.2. Blood cultures drawn in the ED.  Received Ancef IV x1 in the ED.  CXR grossly clear, official read pending  EKG pending. (11 Sep 2021 02:42)      ---  HOSPITAL COURSE:   Patient was admitted for RLE cellulitis. Started on vancomycin and zosyn, transitioned to Rocephin on 09/12 as per ID Dr. Blanco's recommendations. US doppler LE on admission negative for DVT.    Podiatry was consulted for diabetic foot ulcer care, NM scan showed no evidence of osteomyelitis.   Vascular Dr. Goncalves was consulted to check if patient had optimal vascularity. Vascular tests revealed scattered atherosclerotic plaque with questionable areas of stenosis in the right posterior tibial and anterior tibial arteries. Patient to have outpatient angiogram of his RLE with vascular.   Patient's cellulitis improved over the course of his hospitalization, remained afebrile, no leukocytosis. Patient was medically optimized and seen and examined on the day of discharge.      ---  CONSULTANTS:   REJI Blanco  Podiatry Dr. Scott  Vascular Dr. Goncalves   ---  TIME SPENT:  I, the attending physician, was physically present for the key portions of the evaluation and management (E/M) service provided. The total amount of time spent reviewing the hospital notes, laboratory values, imaging findings, assessing/counseling the patient, discussing with consultant physicians, social work, nursing staff was -- minutes    ---  Primary care provider was made aware of plan for discharge:      [  ] NO     [  ] YES   HPI:  61 yo M with PMHx of CAD (with 2 stents in the 1st Diag 7/1/19 and LPDA 7/3/19), bioprosthetic AVR with ascending aortic aneurysm repair on 03/116/15 (for a bicuspid aortic valve), cardiac tamponade (s/p pericardiocentesis 3/26/15), HFpEF, syncope (s/p ILR insertion 2/2/16), atypical chest discomfort, HTN, HLD, T2DM (not on insulin, complicated by peripheral neuropathy), GERD, vertigo, lacunar infarcts (incidentally detected on MRI), ventricular ectopy and mild carotid atherosclerosis presents to the ED c/o right lower extremity swelling, warmth, and pain. States that on Thursday morning, he woke up with chills and began having nausea, vomiting, diarrhea and dizziness. Tmax 101F at home. Took Aleve with relief. Because of his symptoms, he states that he slept about "40 hours". This afternoon, he noticed that he was having redness in his RLE with warmth and swelling. This continued to worsen and his wife is a nurse so she thought that he might have a clot so they came to the ED. Also admits headaches and diaphoresis. 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 Patient is COVID vaccinated with Moderna. Of note, patient has a healing ulcer on his R toe that he was previously treated with two weeks of antibiotics for.     In the ED, VS T98.6F HR 86 /86 -> 165/93 RR 16 SpO2 98% on RA. Labs significant for NO leukocytosis but neutrophilic predominance of 80.9%, lactate 1.2. Blood cultures drawn in the ED.  Received Ancef IV x1 in the ED.  CXR grossly clear, official read pending  EKG pending. (11 Sep 2021 02:42)    ---  HOSPITAL COURSE:   Patient was admitted for RLE cellulitis. Started on vancomycin and zosyn, transitioned to Rocephin on 09/12 as per ID Dr. Blanco's recommendations. US doppler LE on admission negative for DVT.    Podiatry was consulted for diabetic foot ulcer care, NM scan showed no evidence of osteomyelitis.   Vascular Dr. Goncalves was consulted to check if patient had optimal vascularity. Vascular tests revealed scattered atherosclerotic plaque with questionable areas of stenosis in the right posterior tibial and anterior tibial arteries. Patient to have outpatient angiogram of his RLE with vascular.   Patient's cellulitis improved over the course of his hospitalization, remained afebrile, no leukocytosis. Patient was medically optimized and seen and examined on the day of discharge.    Vital Signs (24 Hrs):  T(C): 36.4 (09-18-21 @ 13:08), Max: 36.4 (09-18-21 @ 05:42)  HR: 62 (09-18-21 @ 13:08) (62 - 82)  BP: 147/70 (09-18-21 @ 13:08) (126/76 - 147/70)  RR: 18 (09-18-21 @ 13:08) (17 - 18)  SpO2: 96% (09-18-21 @ 13:08) (94% - 97%)  Wt(kg): --  Daily     Daily     I&O's Summary    17 Sep 2021 07:01  -  18 Sep 2021 07:00  --------------------------------------------------------  IN: 480 mL / OUT: 0 mL / NET: 480 mL        PHYSICAL EXAM:  GENERAL: NAD  HEENT:  anicteric, moist mucous membranes  CHEST/LUNG:  CTA b/l, no rales, wheezes, or rhonchi  HEART:  RRR, S1, S2  ABDOMEN:  BS+, soft, nontender, nondistended  EXTREMITIES: lower R LE with mild erythema, swelling, tenderness;  no cyanosis, or clubbing  NERVOUS SYSTEM: answers questions and follows commands appropriately      ---  CONSULTANTS:   REJI Blanco  Podiatry Dr. Scott  Vascular Dr. Goncalves   ---  TIME SPENT:  I, the attending physician, was physically present for the key portions of the evaluation and management (E/M) service provided. The total amount of time spent reviewing the hospital notes, laboratory values, imaging findings, assessing/counseling the patient, discussing with consultant physicians, social work, nursing staff was -- minutes    ---  Primary care provider was made aware of plan for discharge:      [  ] NO     [  ] YES   HPI:  61 yo M with PMHx of CAD (with 2 stents in the 1st Diag 7/1/19 and LPDA 7/3/19), bioprosthetic AVR with ascending aortic aneurysm repair on 03/116/15 (for a bicuspid aortic valve), cardiac tamponade (s/p pericardiocentesis 3/26/15), HFpEF, syncope (s/p ILR insertion 2/2/16), atypical chest discomfort, HTN, HLD, T2DM (not on insulin, complicated by peripheral neuropathy), GERD, vertigo, lacunar infarcts (incidentally detected on MRI), ventricular ectopy and mild carotid atherosclerosis presents to the ED c/o right lower extremity swelling, warmth, and pain. States that on Thursday morning, he woke up with chills and began having nausea, vomiting, diarrhea and dizziness. Tmax 101F at home. Took Aleve with relief. Because of his symptoms, he states that he slept about "40 hours". This afternoon, he noticed that he was having redness in his RLE with warmth and swelling. This continued to worsen and his wife is a nurse so she thought that he might have a clot so they came to the ED. Also admits headaches and diaphoresis. 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 Patient is COVID vaccinated with Moderna. Of note, patient has a healing ulcer on his R toe that he was previously treated with two weeks of antibiotics for.     In the ED, VS T98.6F HR 86 /86 -> 165/93 RR 16 SpO2 98% on RA. Labs significant for NO leukocytosis but neutrophilic predominance of 80.9%, lactate 1.2. Blood cultures drawn in the ED.  Received Ancef IV x1 in the ED.  CXR grossly clear, official read pending  EKG pending. (11 Sep 2021 02:42)    ---  HOSPITAL COURSE:   Patient was admitted for RLE cellulitis. Started on vancomycin and zosyn, transitioned to Rocephin on 09/12 as per ID Dr. Blacno's recommendations. US doppler LE on admission negative for DVT.    Podiatry was consulted for diabetic foot ulcer care, NM scan showed no evidence of osteomyelitis.   Vascular Dr. Goncalves was consulted to check if patient had optimal vascularity. Vascular tests revealed scattered atherosclerotic plaque with questionable areas of stenosis in the right posterior tibial and anterior tibial arteries. Patient to have outpatient angiogram of his RLE with vascular.   Patient's cellulitis improved over the course of his hospitalization, remained afebrile, no leukocytosis. Patient was medically optimized and seen and examined on the day of discharge.    Vital Signs (24 Hrs):  T(C): 36.4 (09-18-21 @ 13:08), Max: 36.4 (09-18-21 @ 05:42)  HR: 62 (09-18-21 @ 13:08) (62 - 82)  BP: 147/70 (09-18-21 @ 13:08) (126/76 - 147/70)  RR: 18 (09-18-21 @ 13:08) (17 - 18)  SpO2: 96% (09-18-21 @ 13:08) (94% - 97%)  Wt(kg): --  Daily     Daily     I&O's Summary    17 Sep 2021 07:01  -  18 Sep 2021 07:00  --------------------------------------------------------  IN: 480 mL / OUT: 0 mL / NET: 480 mL        PHYSICAL EXAM:  GENERAL: NAD  HEENT:  anicteric, moist mucous membranes  CHEST/LUNG:  CTA b/l, no rales, wheezes, or rhonchi  HEART:  RRR, S1, S2  ABDOMEN:  BS+, soft, nontender, nondistended  EXTREMITIES: lower R LE with slight erythema, swelling, no tenderness;  no cyanosis, or clubbing  NERVOUS SYSTEM: answers questions and follows commands appropriately      ---  CONSULTANTS:   REJI Blanco  Podiatry Dr. Scott  Vascular Dr. Goncalves   ---  TIME SPENT: 40min

## 2021-09-11 NOTE — PROGRESS NOTE ADULT - PROBLEM SELECTOR PLAN 1
-Will continue IV Ancef for now  -MRSA swab pending  -f/u blood cx  -Check tickborne panel, r/o possible bugbite reaction  -trend wbc and fever curve  -doppler negative for DVT  -pain control: tylenol q6 mild, tramadol 50mg q6 moderate, tramadol 75mg q6 severe  -if cellulitis does not improve with antibiotics, consider repeat doppler (unable to visualize all veins)  -ID Consult Dr. Blanco, appreciate recs  -Podiatry consulted, foot xray ordered as per podiatry resident -Will continue IV Zosyn and Vancomycin for now  -MRSA swab pending  -f/u blood cx  -trend wbc and fever curve  -doppler negative for DVT  -pain control: tylenol q6 mild, tramadol 50mg q6 moderate, tramadol 75mg q6 severe  -if cellulitis does not improve with antibiotics, consider repeat doppler (unable to visualize all veins)  -ID Consult Dr. Blanco, appreciate recs  -Podiatry consulted, foot xray ordered as per podiatry resident

## 2021-09-11 NOTE — H&P ADULT - PROBLEM SELECTOR PLAN 2
also p/w nausea, vomiting, and diarrhea with possible sick contact  -check GI stool PCR  -zofran PRN for nausea  -gentle IVF and encourage PO intake also p/w nausea, vomiting, and diarrhea with possible sick contact  -check GI stool PCR  -zofran PRN for nausea  -encourage PO intake also p/w nausea, vomiting, and diarrhea with possible sick contact  - ?systemic S&S of acute infection vs viral illness   - RVP neg, covid pcr neg.   - no leukocytosis. tmax 101F yesterday  -check GI stool PCR, ova and parasites  -zofran PRN for nausea  -encourage PO intake  - trend electrolytes

## 2021-09-11 NOTE — H&P ADULT - PROBLEM SELECTOR PLAN 3
labile BP, initial /86 likely 2/2 pain  -on metoprolol, lisinopril, hctz at home, continue  -monitor routine hemodynamics

## 2021-09-11 NOTE — DISCHARGE NOTE PROVIDER - CARE PROVIDER_API CALL
Hortencia Machado)  Vascular Surgery  [address] [place] [address]  Phone: [phone]  Fax: [phone]  Follow Up Time:     Florin King)  Gastroenterology; Internal Medicine  [address] [place] [address] [address]  [place] [address]  Phone: [phone]  Fax: [phone]  Follow Up Time:    Hortencia Machado)  Vascular Surgery  [address] [place] [address]  Phone: [phone]  Fax: [phone]  Follow Up Time:     Florin King)  Gastroenterology; Internal Medicine  [address] [place] [address] [address]  [place] [address]  Phone: [phone]  Fax: [phone]  Follow Up Time:     Hamilton Sousa)  Cardiovascular Disease; Internal Medicine  [address] [place] [address]  Phone: [phone]  Fax: [phone]  Follow Up Time:     John Michele (DP)  [id number] [place] [address]  Phone: [phone]  Fax: [phone]  Follow Up Time:

## 2021-09-11 NOTE — H&P ADULT - ASSESSMENT
61 yo M with PMHx of CAD (with 2 stents in the 1st Diag 7/1/19 and LPDA 7/3/19), bioprosthetic AVR with ascending aortic aneurysm repair on 03/116/15 (for a bicuspid aortic valve), cardiac tamponade (s/p pericardiocentesis 3/26/15), HFpEF, syncope (s/p ILR insertion 2/2/16), atypical chest discomfort, HTN, HLD, T2DM (not on insulin, complicated by peripheral neuropathy), GERD, vertigo, lacunar infarcts (incidentally detected on MRI), ventricular ectopy and mild carotid atherosclerosis presents to the ED c/o right lower extremity swelling, warmth, and pain, admitted for cellulitis

## 2021-09-12 LAB
ANION GAP SERPL CALC-SCNC: 8 MMOL/L — SIGNIFICANT CHANGE UP (ref 5–17)
BASOPHILS # BLD AUTO: 0.01 K/UL — SIGNIFICANT CHANGE UP (ref 0–0.2)
BASOPHILS NFR BLD AUTO: 0.1 % — SIGNIFICANT CHANGE UP (ref 0–2)
BUN SERPL-MCNC: 23 MG/DL — SIGNIFICANT CHANGE UP (ref 7–23)
CALCIUM SERPL-MCNC: 8.7 MG/DL — SIGNIFICANT CHANGE UP (ref 8.5–10.1)
CHLORIDE SERPL-SCNC: 103 MMOL/L — SIGNIFICANT CHANGE UP (ref 96–108)
CO2 SERPL-SCNC: 25 MMOL/L — SIGNIFICANT CHANGE UP (ref 22–31)
COVID-19 SPIKE DOMAIN AB INTERP: POSITIVE
COVID-19 SPIKE DOMAIN ANTIBODY RESULT: >250 U/ML — HIGH
CREAT SERPL-MCNC: 1.2 MG/DL — SIGNIFICANT CHANGE UP (ref 0.5–1.3)
EOSINOPHIL # BLD AUTO: 0.05 K/UL — SIGNIFICANT CHANGE UP (ref 0–0.5)
EOSINOPHIL NFR BLD AUTO: 0.7 % — SIGNIFICANT CHANGE UP (ref 0–6)
GLUCOSE SERPL-MCNC: 140 MG/DL — HIGH (ref 70–99)
HCT VFR BLD CALC: 35.5 % — LOW (ref 39–50)
HGB BLD-MCNC: 11.4 G/DL — LOW (ref 13–17)
IMM GRANULOCYTES NFR BLD AUTO: 0.3 % — SIGNIFICANT CHANGE UP (ref 0–1.5)
LYMPHOCYTES # BLD AUTO: 0.37 K/UL — LOW (ref 1–3.3)
LYMPHOCYTES # BLD AUTO: 5.5 % — LOW (ref 13–44)
MAGNESIUM SERPL-MCNC: 1.6 MG/DL — SIGNIFICANT CHANGE UP (ref 1.6–2.6)
MCHC RBC-ENTMCNC: 27.1 PG — SIGNIFICANT CHANGE UP (ref 27–34)
MCHC RBC-ENTMCNC: 32.1 GM/DL — SIGNIFICANT CHANGE UP (ref 32–36)
MCV RBC AUTO: 84.3 FL — SIGNIFICANT CHANGE UP (ref 80–100)
MONOCYTES # BLD AUTO: 0.57 K/UL — SIGNIFICANT CHANGE UP (ref 0–0.9)
MONOCYTES NFR BLD AUTO: 8.5 % — SIGNIFICANT CHANGE UP (ref 2–14)
NEUTROPHILS # BLD AUTO: 5.67 K/UL — SIGNIFICANT CHANGE UP (ref 1.8–7.4)
NEUTROPHILS NFR BLD AUTO: 84.9 % — HIGH (ref 43–77)
NRBC # BLD: 0 /100 WBCS — SIGNIFICANT CHANGE UP (ref 0–0)
PLATELET # BLD AUTO: 158 K/UL — SIGNIFICANT CHANGE UP (ref 150–400)
POTASSIUM SERPL-MCNC: 4 MMOL/L — SIGNIFICANT CHANGE UP (ref 3.5–5.3)
POTASSIUM SERPL-SCNC: 4 MMOL/L — SIGNIFICANT CHANGE UP (ref 3.5–5.3)
RBC # BLD: 4.21 M/UL — SIGNIFICANT CHANGE UP (ref 4.2–5.8)
RBC # FLD: 14.7 % — HIGH (ref 10.3–14.5)
SARS-COV-2 IGG+IGM SERPL QL IA: >250 U/ML — HIGH
SARS-COV-2 IGG+IGM SERPL QL IA: POSITIVE
SODIUM SERPL-SCNC: 136 MMOL/L — SIGNIFICANT CHANGE UP (ref 135–145)
WBC # BLD: 6.69 K/UL — SIGNIFICANT CHANGE UP (ref 3.8–10.5)
WBC # FLD AUTO: 6.69 K/UL — SIGNIFICANT CHANGE UP (ref 3.8–10.5)

## 2021-09-12 PROCEDURE — 99232 SBSQ HOSP IP/OBS MODERATE 35: CPT | Mod: GC

## 2021-09-12 RX ORDER — METFORMIN HYDROCHLORIDE 850 MG/1
1000 TABLET ORAL
Qty: 0 | Refills: 0 | DISCHARGE

## 2021-09-12 RX ORDER — GABAPENTIN 400 MG/1
1 CAPSULE ORAL
Qty: 0 | Refills: 0 | DISCHARGE

## 2021-09-12 RX ORDER — LISINOPRIL 2.5 MG/1
1 TABLET ORAL
Qty: 0 | Refills: 0 | DISCHARGE
Start: 2021-09-12

## 2021-09-12 RX ORDER — CLOPIDOGREL BISULFATE 75 MG/1
1 TABLET, FILM COATED ORAL
Qty: 0 | Refills: 0 | DISCHARGE
Start: 2021-09-12

## 2021-09-12 RX ORDER — METOPROLOL TARTRATE 50 MG
1 TABLET ORAL
Qty: 0 | Refills: 0 | DISCHARGE

## 2021-09-12 RX ORDER — ATORVASTATIN CALCIUM 80 MG/1
1 TABLET, FILM COATED ORAL
Qty: 0 | Refills: 0 | DISCHARGE
Start: 2021-09-12

## 2021-09-12 RX ORDER — GABAPENTIN 400 MG/1
1 CAPSULE ORAL
Qty: 0 | Refills: 0 | DISCHARGE
Start: 2021-09-12

## 2021-09-12 RX ORDER — METOPROLOL TARTRATE 50 MG
1 TABLET ORAL
Qty: 0 | Refills: 0 | DISCHARGE
Start: 2021-09-12

## 2021-09-12 RX ORDER — POLYETHYLENE GLYCOL 3350 17 G/17G
17 POWDER, FOR SOLUTION ORAL DAILY
Refills: 0 | Status: DISCONTINUED | OUTPATIENT
Start: 2021-09-12 | End: 2021-09-15

## 2021-09-12 RX ORDER — SENNA PLUS 8.6 MG/1
2 TABLET ORAL AT BEDTIME
Refills: 0 | Status: DISCONTINUED | OUTPATIENT
Start: 2021-09-12 | End: 2021-09-18

## 2021-09-12 RX ORDER — ASPIRIN/CALCIUM CARB/MAGNESIUM 324 MG
1 TABLET ORAL
Qty: 0 | Refills: 0 | DISCHARGE
Start: 2021-09-12

## 2021-09-12 RX ORDER — MECLIZINE HCL 12.5 MG
1 TABLET ORAL
Qty: 0 | Refills: 0 | DISCHARGE
Start: 2021-09-12

## 2021-09-12 RX ORDER — PANTOPRAZOLE SODIUM 20 MG/1
1 TABLET, DELAYED RELEASE ORAL
Qty: 0 | Refills: 0 | DISCHARGE
Start: 2021-09-12

## 2021-09-12 RX ORDER — MECLIZINE HCL 12.5 MG
1 TABLET ORAL
Qty: 0 | Refills: 0 | DISCHARGE

## 2021-09-12 RX ORDER — LISINOPRIL 2.5 MG/1
1 TABLET ORAL
Qty: 0 | Refills: 0 | DISCHARGE

## 2021-09-12 RX ADMIN — TRAMADOL HYDROCHLORIDE 75 MILLIGRAM(S): 50 TABLET ORAL at 20:55

## 2021-09-12 RX ADMIN — SENNA PLUS 2 TABLET(S): 8.6 TABLET ORAL at 21:09

## 2021-09-12 RX ADMIN — ATORVASTATIN CALCIUM 80 MILLIGRAM(S): 80 TABLET, FILM COATED ORAL at 21:09

## 2021-09-12 RX ADMIN — Medication 250 MILLIGRAM(S): at 06:00

## 2021-09-12 RX ADMIN — GABAPENTIN 600 MILLIGRAM(S): 400 CAPSULE ORAL at 17:19

## 2021-09-12 RX ADMIN — POLYETHYLENE GLYCOL 3350 17 GRAM(S): 17 POWDER, FOR SOLUTION ORAL at 12:29

## 2021-09-12 RX ADMIN — LISINOPRIL 40 MILLIGRAM(S): 2.5 TABLET ORAL at 06:00

## 2021-09-12 RX ADMIN — GABAPENTIN 600 MILLIGRAM(S): 400 CAPSULE ORAL at 11:50

## 2021-09-12 RX ADMIN — ENOXAPARIN SODIUM 40 MILLIGRAM(S): 100 INJECTION SUBCUTANEOUS at 11:50

## 2021-09-12 RX ADMIN — Medication 1: at 08:31

## 2021-09-12 RX ADMIN — TRAMADOL HYDROCHLORIDE 50 MILLIGRAM(S): 50 TABLET ORAL at 07:14

## 2021-09-12 RX ADMIN — Medication 1: at 12:29

## 2021-09-12 RX ADMIN — TRAMADOL HYDROCHLORIDE 50 MILLIGRAM(S): 50 TABLET ORAL at 06:14

## 2021-09-12 RX ADMIN — Medication 650 MILLIGRAM(S): at 06:04

## 2021-09-12 RX ADMIN — CLOPIDOGREL BISULFATE 75 MILLIGRAM(S): 75 TABLET, FILM COATED ORAL at 11:50

## 2021-09-12 RX ADMIN — Medication 650 MILLIGRAM(S): at 19:55

## 2021-09-12 RX ADMIN — Medication 25 MILLIGRAM(S): at 06:00

## 2021-09-12 RX ADMIN — Medication 3 MILLIGRAM(S): at 21:09

## 2021-09-12 RX ADMIN — GABAPENTIN 600 MILLIGRAM(S): 400 CAPSULE ORAL at 06:00

## 2021-09-12 RX ADMIN — CEFTRIAXONE 100 MILLIGRAM(S): 500 INJECTION, POWDER, FOR SOLUTION INTRAMUSCULAR; INTRAVENOUS at 17:19

## 2021-09-12 RX ADMIN — Medication 81 MILLIGRAM(S): at 11:50

## 2021-09-12 RX ADMIN — Medication 650 MILLIGRAM(S): at 07:04

## 2021-09-12 RX ADMIN — Medication 25 MILLIGRAM(S): at 17:19

## 2021-09-12 RX ADMIN — TRAMADOL HYDROCHLORIDE 75 MILLIGRAM(S): 50 TABLET ORAL at 19:53

## 2021-09-12 RX ADMIN — PANTOPRAZOLE SODIUM 40 MILLIGRAM(S): 20 TABLET, DELAYED RELEASE ORAL at 06:00

## 2021-09-12 RX ADMIN — Medication 250 MILLIGRAM(S): at 17:19

## 2021-09-12 NOTE — PROGRESS NOTE ADULT - SUBJECTIVE AND OBJECTIVE BOX
Patient is a 60y old  Male who presents with a chief complaint of celllulitis (11 Sep 2021 16:09)      Subjective:  INTERVAL HPI/OVERNIGHT EVENTS: Patient seen and examined at bedside. No overnight events occurred. Patient has no complaints at this time. Denies fevers, chills, headache, lightheadedness, chest pain, dyspnea, abdominal pain, n/v/d/c.    MEDICATIONS  (STANDING):  aspirin  chewable 81 milliGRAM(s) Oral daily  atorvastatin 80 milliGRAM(s) Oral at bedtime  cefTRIAXone   IVPB 1000 milliGRAM(s) IV Intermittent every 24 hours  clopidogrel Tablet 75 milliGRAM(s) Oral daily  dextrose 40% Gel 15 Gram(s) Oral once  dextrose 5%. 1000 milliLiter(s) (50 mL/Hr) IV Continuous <Continuous>  dextrose 5%. 1000 milliLiter(s) (100 mL/Hr) IV Continuous <Continuous>  dextrose 50% Injectable 25 Gram(s) IV Push once  dextrose 50% Injectable 12.5 Gram(s) IV Push once  dextrose 50% Injectable 25 Gram(s) IV Push once  enoxaparin Injectable 40 milliGRAM(s) SubCutaneous daily  gabapentin 600 milliGRAM(s) Oral four times a day  glucagon  Injectable 1 milliGRAM(s) IntraMuscular once  hydrochlorothiazide 25 milliGRAM(s) Oral daily  insulin lispro (ADMELOG) corrective regimen sliding scale   SubCutaneous three times a day before meals  insulin lispro (ADMELOG) corrective regimen sliding scale   SubCutaneous at bedtime  lisinopril 40 milliGRAM(s) Oral daily  metoprolol tartrate 25 milliGRAM(s) Oral two times a day  pantoprazole    Tablet 40 milliGRAM(s) Oral before breakfast  polyethylene glycol 3350 17 Gram(s) Oral daily  saccharomyces boulardii 250 milliGRAM(s) Oral two times a day  senna 2 Tablet(s) Oral at bedtime    MEDICATIONS  (PRN):  acetaminophen   Tablet .. 650 milliGRAM(s) Oral every 6 hours PRN Temp greater or equal to 38C (100.4F), Mild Pain (1 - 3)  meclizine 12.5 milliGRAM(s) Oral three times a day PRN vertigo  melatonin 3 milliGRAM(s) Oral at bedtime PRN Insomnia  ondansetron Injectable 4 milliGRAM(s) IV Push every 8 hours PRN Nausea and/or Vomiting  traMADol 50 milliGRAM(s) Oral every 6 hours PRN Moderate Pain (4 - 6)  traMADol 75 milliGRAM(s) Oral every 6 hours PRN Severe Pain (7 - 10)      Allergies    Normodyne (Faint)  Normodyne (Unknown)    Intolerances      REVIEW OF SYSTEMS:  CONSTITUTIONAL: No fever or chills  HEENT:  No headache, no sore throat  RESPIRATORY: No cough, wheezing, or shortness of breath  CARDIOVASCULAR: No chest pain, palpitations  GASTROINTESTINAL: No abd pain, nausea, vomiting, or diarrhea, admits constipation   GENITOURINARY: No dysuria, frequency, or hematuria  NEUROLOGICAL: no focal weakness or dizziness  MUSCULOSKELETAL: admits burning pain in the anterior and posterior leg     Objective:  Vital Signs Last 24 Hrs  T(C): 36.8 (12 Sep 2021 05:54), Max: 36.9 (11 Sep 2021 20:40)  T(F): 98.3 (12 Sep 2021 05:54), Max: 98.4 (11 Sep 2021 20:40)  HR: 67 (12 Sep 2021 05:54) (65 - 80)  BP: 131/87 (12 Sep 2021 05:54) (131/87 - 148/84)  BP(mean): --  RR: 17 (12 Sep 2021 05:54) (16 - 17)  SpO2: 90% (12 Sep 2021 05:54) (90% - 97%)    GENERAL: NAD, lying in bed comfortably  HEAD:  Atraumatic, Normocephalic  EYES: EOMI, PERRLA, conjunctiva and sclera clear  ENT: Moist mucous membranes  NECK: Supple, No JVD  CHEST/LUNG: Clear to auscultation bilaterally; No rales, rhonchi, wheezing, or rubs. Unlabored respirations  HEART: Regular rate and rhythm; No murmurs, rubs, or gallops  ABDOMEN: Bowel sounds present; Soft, Nontender, Nondistended. No hepatomegaly  EXTREMITIES:  RLE tenderness, warmth with erythema in the anterior tibia region and posterior calf, below knee. Grade 1 ulcer right plantar hallux  NERVOUS SYSTEM:  Alert & Oriented X3, speech clear. No deficits     LABS:                        11.4   6.69  )-----------( 158      ( 12 Sep 2021 09:33 )             35.5     CBC Full  -  ( 12 Sep 2021 09:33 )  WBC Count : 6.69 K/uL  Hemoglobin : 11.4 g/dL  Hematocrit : 35.5 %  Platelet Count - Automated : 158 K/uL  Mean Cell Volume : 84.3 fl  Mean Cell Hemoglobin : 27.1 pg  Mean Cell Hemoglobin Concentration : 32.1 gm/dL  Auto Neutrophil # : 5.67 K/uL  Auto Lymphocyte # : 0.37 K/uL  Auto Monocyte # : 0.57 K/uL  Auto Eosinophil # : 0.05 K/uL  Auto Basophil # : 0.01 K/uL  Auto Neutrophil % : 84.9 %  Auto Lymphocyte % : 5.5 %  Auto Monocyte % : 8.5 %  Auto Eosinophil % : 0.7 %  Auto Basophil % : 0.1 %    12 Sep 2021 09:33    136    |  103    |  23     ----------------------------<  140    4.0     |  25     |  1.20     Ca    8.7        12 Sep 2021 09:33  Mg     1.6       12 Sep 2021 09:33      PT/INR - ( 11 Sep 2021 00:20 )   PT: 14.1 sec;   INR: 1.22 ratio         PTT - ( 11 Sep 2021 00:20 )  PTT:27.5 sec    CAPILLARY BLOOD GLUCOSE      POCT Blood Glucose.: 188 mg/dL (12 Sep 2021 11:44)  POCT Blood Glucose.: 155 mg/dL (12 Sep 2021 08:14)  POCT Blood Glucose.: 155 mg/dL (11 Sep 2021 21:10)  POCT Blood Glucose.: 152 mg/dL (11 Sep 2021 17:21)        Culture - Blood (collected 09-11-21 @ 03:04)  Source: .Blood Blood  Preliminary Report (09-12-21 @ 06:15):    No growth to date.    Culture - Blood (collected 09-11-21 @ 03:04)  Source: .Blood Blood  Preliminary Report (09-12-21 @ 06:15):    No growth to date.        RADIOLOGY & ADDITIONAL TESTS:    Personally reviewed.     Consultant(s) Notes Reviewed:  [x] YES  [ ] NO     Patient is a 60y old  Male who presents with a chief complaint of celllulitis (11 Sep 2021 16:09)      Subjective:  INTERVAL HPI/OVERNIGHT EVENTS: Patient seen and examined at bedside. No overnight events occurred. Patient reports having some burning pain in right LE. Denies fevers, chills, headache, lightheadedness, chest pain, dyspnea, abdominal pain, n/v/d/c.    MEDICATIONS  (STANDING):  aspirin  chewable 81 milliGRAM(s) Oral daily  atorvastatin 80 milliGRAM(s) Oral at bedtime  cefTRIAXone   IVPB 1000 milliGRAM(s) IV Intermittent every 24 hours  clopidogrel Tablet 75 milliGRAM(s) Oral daily  dextrose 40% Gel 15 Gram(s) Oral once  dextrose 5%. 1000 milliLiter(s) (50 mL/Hr) IV Continuous <Continuous>  dextrose 5%. 1000 milliLiter(s) (100 mL/Hr) IV Continuous <Continuous>  dextrose 50% Injectable 25 Gram(s) IV Push once  dextrose 50% Injectable 12.5 Gram(s) IV Push once  dextrose 50% Injectable 25 Gram(s) IV Push once  enoxaparin Injectable 40 milliGRAM(s) SubCutaneous daily  gabapentin 600 milliGRAM(s) Oral four times a day  glucagon  Injectable 1 milliGRAM(s) IntraMuscular once  hydrochlorothiazide 25 milliGRAM(s) Oral daily  insulin lispro (ADMELOG) corrective regimen sliding scale   SubCutaneous three times a day before meals  insulin lispro (ADMELOG) corrective regimen sliding scale   SubCutaneous at bedtime  lisinopril 40 milliGRAM(s) Oral daily  metoprolol tartrate 25 milliGRAM(s) Oral two times a day  pantoprazole    Tablet 40 milliGRAM(s) Oral before breakfast  polyethylene glycol 3350 17 Gram(s) Oral daily  saccharomyces boulardii 250 milliGRAM(s) Oral two times a day  senna 2 Tablet(s) Oral at bedtime    MEDICATIONS  (PRN):  acetaminophen   Tablet .. 650 milliGRAM(s) Oral every 6 hours PRN Temp greater or equal to 38C (100.4F), Mild Pain (1 - 3)  meclizine 12.5 milliGRAM(s) Oral three times a day PRN vertigo  melatonin 3 milliGRAM(s) Oral at bedtime PRN Insomnia  ondansetron Injectable 4 milliGRAM(s) IV Push every 8 hours PRN Nausea and/or Vomiting  traMADol 50 milliGRAM(s) Oral every 6 hours PRN Moderate Pain (4 - 6)  traMADol 75 milliGRAM(s) Oral every 6 hours PRN Severe Pain (7 - 10)      Allergies    Normodyne (Faint)  Normodyne (Unknown)    Intolerances      REVIEW OF SYSTEMS:  CONSTITUTIONAL: No fever or chills  HEENT:  No headache, no sore throat  RESPIRATORY: No cough, wheezing, or shortness of breath  CARDIOVASCULAR: No chest pain, palpitations  GASTROINTESTINAL: No abd pain, nausea, vomiting, or diarrhea, admits constipation   GENITOURINARY: No dysuria, frequency, or hematuria  NEUROLOGICAL: no focal weakness or dizziness  MUSCULOSKELETAL: admits burning pain in the anterior and posterior leg     Objective:  Vital Signs Last 24 Hrs  T(C): 36.8 (12 Sep 2021 05:54), Max: 36.9 (11 Sep 2021 20:40)  T(F): 98.3 (12 Sep 2021 05:54), Max: 98.4 (11 Sep 2021 20:40)  HR: 67 (12 Sep 2021 05:54) (65 - 80)  BP: 131/87 (12 Sep 2021 05:54) (131/87 - 148/84)  BP(mean): --  RR: 17 (12 Sep 2021 05:54) (16 - 17)  SpO2: 90% (12 Sep 2021 05:54) (90% - 97%)    GENERAL: NAD, lying in bed comfortably  HEAD:  Atraumatic, Normocephalic  EYES: EOMI, PERRLA, conjunctiva and sclera clear  ENT: Moist mucous membranes  NECK: Supple, No JVD  CHEST/LUNG: Clear to auscultation bilaterally; No rales, rhonchi, wheezing, or rubs. Unlabored respirations  HEART: Regular rate and rhythm; No murmurs, rubs, or gallops  ABDOMEN: Bowel sounds present; Soft, Nontender, Nondistended. No hepatomegaly  EXTREMITIES:  RLE tenderness, warmth with erythema in the anterior tibia region and posterior calf, below knee. Grade 1 ulcer right plantar hallux  NERVOUS SYSTEM:  Alert & Oriented X3, speech clear. No deficits     LABS:                        11.4   6.69  )-----------( 158      ( 12 Sep 2021 09:33 )             35.5     CBC Full  -  ( 12 Sep 2021 09:33 )  WBC Count : 6.69 K/uL  Hemoglobin : 11.4 g/dL  Hematocrit : 35.5 %  Platelet Count - Automated : 158 K/uL  Mean Cell Volume : 84.3 fl  Mean Cell Hemoglobin : 27.1 pg  Mean Cell Hemoglobin Concentration : 32.1 gm/dL  Auto Neutrophil # : 5.67 K/uL  Auto Lymphocyte # : 0.37 K/uL  Auto Monocyte # : 0.57 K/uL  Auto Eosinophil # : 0.05 K/uL  Auto Basophil # : 0.01 K/uL  Auto Neutrophil % : 84.9 %  Auto Lymphocyte % : 5.5 %  Auto Monocyte % : 8.5 %  Auto Eosinophil % : 0.7 %  Auto Basophil % : 0.1 %    12 Sep 2021 09:33    136    |  103    |  23     ----------------------------<  140    4.0     |  25     |  1.20     Ca    8.7        12 Sep 2021 09:33  Mg     1.6       12 Sep 2021 09:33      PT/INR - ( 11 Sep 2021 00:20 )   PT: 14.1 sec;   INR: 1.22 ratio         PTT - ( 11 Sep 2021 00:20 )  PTT:27.5 sec    CAPILLARY BLOOD GLUCOSE      POCT Blood Glucose.: 188 mg/dL (12 Sep 2021 11:44)  POCT Blood Glucose.: 155 mg/dL (12 Sep 2021 08:14)  POCT Blood Glucose.: 155 mg/dL (11 Sep 2021 21:10)  POCT Blood Glucose.: 152 mg/dL (11 Sep 2021 17:21)        Culture - Blood (collected 09-11-21 @ 03:04)  Source: .Blood Blood  Preliminary Report (09-12-21 @ 06:15):    No growth to date.    Culture - Blood (collected 09-11-21 @ 03:04)  Source: .Blood Blood  Preliminary Report (09-12-21 @ 06:15):    No growth to date.        RADIOLOGY & ADDITIONAL TESTS:    Personally reviewed.     Consultant(s) Notes Reviewed:  [x] YES  [ ] NO

## 2021-09-12 NOTE — PROGRESS NOTE ADULT - ATTENDING COMMENTS
Pt. seen and evaluated for right LE cellulitis.  Pt. is in  no distress.  Reported having some right LE "burning" pain.  Tolerating IV antibiotic.  Physical examination and plan as above.  Will continue IV antibiotic per ID.  Blood cx NGTD.  Check WBC scan.  Continue analgesics PRN.

## 2021-09-12 NOTE — PROGRESS NOTE ADULT - ASSESSMENT
59 yo M with PMHx of CAD (with 2 stents in the 1st Diag 7/1/19 and LPDA 7/3/19), bioprosthetic AVR with ascending aortic aneurysm repair on 03/116/15 (for a bicuspid aortic valve), cardiac tamponade (s/p pericardiocentesis 3/26/15), HFpEF, syncope (s/p ILR insertion 2/2/16), atypical chest discomfort, HTN, HLD, T2DM (not on insulin, complicated by peripheral neuropathy), GERD, vertigo, lacunar infarcts (incidentally detected on MRI), ventricular ectopy and mild carotid atherosclerosis presents to the ED c/o right lower extremity swelling, warmth, and pain. Patient is COVID vaccinated with Moderna.     RECOMMENDATIONS    Nonpurulent cellulitis of the RLE have stopped Vanco and highest suspicion is for this being driven by strept but with this being LE also GNRs in differential. Noted prior infection with  pan-sensitive proteus mirabilis. Inflammatory nature suggesting GNR cellulitis    agree withl de-escalation to ceftriaxone  -pt improving so now can look at timing of change to PO such as Vantin 200mg PO BID  -will coordinate with medicine on other issues    We will follow along in the care of this patient. Please call us at [phone] or text me directly on my cell# at [phone] with any concerns.

## 2021-09-12 NOTE — PROGRESS NOTE ADULT - SUBJECTIVE AND OBJECTIVE BOX
[facility] DIVISION of INFECTIOUS DISEASE  Reid Dos Santos MD PhD, Katherine Garland MD, Viviane Ortega MD, Alexander Milton MD  and providing coverage with Yane Abernathy MD and Gaston Blanco MD  Providing Infectious Disease Consultations at [facility], [facility], [facility]    Office# [phone] to schedule follow up appointments  Answering Service for urgent calls or New Consults [phone]  Cell# to text for urgent issues Reid Dos Santos [phone]     infectious diseases progress note:    DUSTY STRICKLAND is a 60y y. o. Male patient    No concerning overnight events    Allergies    Normodyne (Faint)  Normodyne (Unknown)    Intolerances        ANTIBIOTICS/RELEVANT:  antimicrobials  cefTRIAXone   IVPB 1000 milliGRAM(s) IV Intermittent every 24 hours    immunologic:    OTHER:  acetaminophen   Tablet .. 650 milliGRAM(s) Oral every 6 hours PRN  aspirin  chewable 81 milliGRAM(s) Oral daily  atorvastatin 80 milliGRAM(s) Oral at bedtime  clopidogrel Tablet 75 milliGRAM(s) Oral daily  dextrose 40% Gel 15 Gram(s) Oral once  dextrose 5%. 1000 milliLiter(s) IV Continuous <Continuous>  dextrose 5%. 1000 milliLiter(s) IV Continuous <Continuous>  dextrose 50% Injectable 25 Gram(s) IV Push once  dextrose 50% Injectable 25 Gram(s) IV Push once  dextrose 50% Injectable 12.5 Gram(s) IV Push once  enoxaparin Injectable 40 milliGRAM(s) SubCutaneous daily  gabapentin 600 milliGRAM(s) Oral four times a day  glucagon  Injectable 1 milliGRAM(s) IntraMuscular once  hydrochlorothiazide 25 milliGRAM(s) Oral daily  insulin lispro (ADMELOG) corrective regimen sliding scale   SubCutaneous three times a day before meals  insulin lispro (ADMELOG) corrective regimen sliding scale   SubCutaneous at bedtime  lisinopril 40 milliGRAM(s) Oral daily  meclizine 12.5 milliGRAM(s) Oral three times a day PRN  melatonin 3 milliGRAM(s) Oral at bedtime PRN  metoprolol tartrate 25 milliGRAM(s) Oral two times a day  ondansetron Injectable 4 milliGRAM(s) IV Push every 8 hours PRN  pantoprazole    Tablet 40 milliGRAM(s) Oral before breakfast  polyethylene glycol 3350 17 Gram(s) Oral daily  saccharomyces boulardii 250 milliGRAM(s) Oral two times a day  senna 2 Tablet(s) Oral at bedtime  traMADol 50 milliGRAM(s) Oral every 6 hours PRN  traMADol 75 milliGRAM(s) Oral every 6 hours PRN      Objective:  Vital Signs Last 24 Hrs  T(C): 36.8 (12 Sep 2021 12:26), Max: 36.9 (11 Sep 2021 20:40)  T(F): 98.2 (12 Sep 2021 12:26), Max: 98.4 (11 Sep 2021 20:40)  HR: 67 (12 Sep 2021 05:54) (65 - 80)  BP: 149/88 (12 Sep 2021 12:26) (131/87 - 149/88)  BP(mean): --  RR: 18 (12 Sep 2021 12:26) (16 - 18)  SpO2: 93% (12 Sep 2021 12:26) (90% - 97%)    T(C): 36.8 (09-12-21 @ 12:26), Max: 37.1 (09-11-21 @ 02:39)  T(C): 36.8 (09-12-21 @ 12:26), Max: 37.1 (09-11-21 @ 02:39)  T(C): 36.8 (09-12-21 @ 12:26), Max: 37.1 (09-11-21 @ 02:39)    PHYSICAL EXAM:  HEENT: NC atraumatic  Neck: supple  Respiratory: no accessory muscle use, breathing comfortably  Cardiovascular: distant  Gastrointestinal: normal appearing, nondistended  Extremities: no clubbing, no cyanosis,        LABS:                          11.4   6.69  )-----------( 158      ( 12 Sep 2021 09:33 )             35.5       6.69 09-12 @ 09:33  5.03 09-11 @ 08:18  6.40 09-11 @ 00:20      09-12    136  |  103  |  23  ----------------------------<  140<H>  4.0   |  25  |  1.20    Ca    8.7      12 Sep 2021 09:33  Mg     1.6     09-12    TPro  7.9  /  Alb  3.8  /  TBili  1.0  /  DBili  x   /  AST  25  /  ALT  42  /  AlkPhos  93  09-11      Creatinine, Serum: 1.20 mg/dL (09-12-21 @ 09:33)  Creatinine, Serum: 1.00 mg/dL (09-11-21 @ 08:18)  Creatinine, Serum: 1.00 mg/dL (09-11-21 @ 01:15)      PT/INR - ( 11 Sep 2021 00:20 )   PT: 14.1 sec;   INR: 1.22 ratio         PTT - ( 11 Sep 2021 00:20 )  PTT:27.5 sec          INFLAMMATORY MARKERS  Auto Neutrophil #: 5.67 K/uL (09-12-21 @ 09:33)  Auto Lymphocyte #: 0.37 K/uL (09-12-21 @ 09:33)  Auto Neutrophil #: 3.90 K/uL (09-11-21 @ 08:18)  Auto Lymphocyte #: 0.45 K/uL (09-11-21 @ 08:18)  Auto Neutrophil #: 5.18 K/uL (09-11-21 @ 00:20)  Auto Lymphocyte #: 0.50 K/uL (09-11-21 @ 00:20)    Lactate, Blood: 1.2 mmol/L (09-11-21 @ 00:20)    Auto Eosinophil #: 0.05 K/uL (09-12-21 @ 09:33)  Auto Eosinophil #: 0.05 K/uL (09-11-21 @ 08:18)  Auto Eosinophil #: 0.08 K/uL (09-11-21 @ 00:20)                        INR: 1.22 ratio (09-11-21 @ 00:20)  Activated Partial Thromboplastin Time: 27.5 sec (09-11-21 @ 00:20)          MICROBIOLOGY:              RADIOLOGY & ADDITIONAL STUDIES:

## 2021-09-12 NOTE — PROGRESS NOTE ADULT - ASSESSMENT
61 yo M with PMHx of CAD (with 2 stents in the 1st Diag 7/1/19 and LPDA 7/3/19), bioprosthetic AVR with ascending aortic aneurysm repair on 03/116/15 (for a bicuspid aortic valve), cardiac tamponade (s/p pericardiocentesis 3/26/15), HFpEF, syncope (s/p ILR insertion 2/2/16), atypical chest discomfort, HTN, HLD, T2DM (not on insulin, complicated by peripheral neuropathy), GERD, vertigo, lacunar infarcts (incidentally detected on MRI), ventricular ectopy and mild carotid atherosclerosis presents to the ED c/o right lower extremity swelling, warmth, and pain, admitted for cellulitis.      # Right LE Cellulitis.   - Afebrile, no evidence of leukocytosis   - s/p IV Zosyn and Vancomycin transitioned to Rocephin on 09/11  - Blood culture NGTD   - On 09/11 doppler negative for DVT    - Pain control: Tylenol q6 mild, tramadol 50mg q6 moderate, tramadol 75mg q6 severe  - ID following  - trend wbc and fever curve  - Podiatry following   - trend wbc and fever curve    # Diabetic toe ulcer.   - F/up NM Inflammatory Loc Whole body WBC, ordered to rule out OM in the right hallux per podiatry   - s/p IV Zosyn and Vancomycin transitioned to Rocephin on 09/11  - Continue home gabapentin for neuropathic pain   - Podiatry following     # Hypertension.   - Chronic, stable   -on metoprolol, lisinopril, hctz at home, continue with hold parameters  -monitor routine hemodynamics.    # Hyperlipidemia.    -cont atorvastatin at home.    #  Type 2 diabetes mellitus.   -chronic, not on insulin, complicated by neuropathy  - HbA1c 7.0   -on metformin at home  -low dose corrective scale, accuchecks, hypoglycemia protocol    # GERD (gastroesophageal reflux disease).   - chronic  -Cont home protonix.    # Vertigo.   - chronic, worsening recently  -Cont home PRN antivert.    # CAD (coronary artery disease).   - chronic, s/p 2 stents in 2019  -Cont home asa and plavix   -recent TTE in 2020 showing normal LVEF but mod diastolic dysfunction    VTE ppx:  Lovenox 40mg SQ daily.   59 yo M with PMHx of CAD (with 2 stents in the 1st Diag 7/1/19 and LPDA 7/3/19), bioprosthetic AVR with ascending aortic aneurysm repair on 03/116/15 (for a bicuspid aortic valve), cardiac tamponade (s/p pericardiocentesis 3/26/15), HFpEF, syncope (s/p ILR insertion 2/2/16), atypical chest discomfort, HTN, HLD, T2DM (not on insulin, complicated by peripheral neuropathy), GERD, vertigo, lacunar infarcts (incidentally detected on MRI), ventricular ectopy and mild carotid atherosclerosis presents to the ED c/o right lower extremity swelling, warmth, and pain, admitted for cellulitis.      # Right LE Cellulitis.   - Afebrile, no evidence of leukocytosis   - s/p IV Zosyn and Vancomycin transitioned to Rocephin on 09/11  - Blood culture NGTD   - On 09/11 doppler negative for DVT  - Pain control: Tylenol q6 mild, tramadol 50mg q6 moderate, tramadol 75mg q6 severe  - ID following  - trend wbc and fever curve  - Podiatry following     # Diabetic toe ulcer.   - F/up NM Inflammatory Loc Whole body WBC, ordered to rule out OM in the right hallux per podiatry   - s/p IV Zosyn and Vancomycin transitioned to Rocephin on 09/11  - Continue home gabapentin for neuropathic pain   - Podiatry following     # Hypertension.   - Chronic, stable   -on metoprolol, lisinopril, hctz at home, continue with hold parameters  -monitor routine hemodynamics.    # Hyperlipidemia.    -cont atorvastatin at home.    #  Type 2 diabetes mellitus.   -chronic, not on insulin, complicated by neuropathy  - HbA1c 7.0   -on metformin at home  -low dose corrective scale, accuchecks, hypoglycemia protocol    # GERD (gastroesophageal reflux disease).   - chronic  -Cont home protonix.    # Vertigo.   - chronic, worsening recently  -Cont home PRN antivert.    # CAD (coronary artery disease).   - chronic, s/p 2 stents in 2019  -Cont home asa and plavix, B-blocker, and statin  -recent TTE in 2020 showing normal LVEF but mod diastolic dysfunction    VTE ppx:  Lovenox 40mg SQ daily.

## 2021-09-13 ENCOUNTER — NON-APPOINTMENT (OUTPATIENT)
Age: 61
End: 2021-09-13

## 2021-09-13 LAB
ANION GAP SERPL CALC-SCNC: 8 MMOL/L — SIGNIFICANT CHANGE UP (ref 5–17)
BASOPHILS # BLD AUTO: 0.03 K/UL — SIGNIFICANT CHANGE UP (ref 0–0.2)
BASOPHILS NFR BLD AUTO: 0.6 % — SIGNIFICANT CHANGE UP (ref 0–2)
BUN SERPL-MCNC: 19 MG/DL — SIGNIFICANT CHANGE UP (ref 7–23)
CALCIUM SERPL-MCNC: 9.3 MG/DL — SIGNIFICANT CHANGE UP (ref 8.5–10.1)
CHLORIDE SERPL-SCNC: 102 MMOL/L — SIGNIFICANT CHANGE UP (ref 96–108)
CO2 SERPL-SCNC: 24 MMOL/L — SIGNIFICANT CHANGE UP (ref 22–31)
CREAT SERPL-MCNC: 0.97 MG/DL — SIGNIFICANT CHANGE UP (ref 0.5–1.3)
EOSINOPHIL # BLD AUTO: 0.12 K/UL — SIGNIFICANT CHANGE UP (ref 0–0.5)
EOSINOPHIL NFR BLD AUTO: 2.4 % — SIGNIFICANT CHANGE UP (ref 0–6)
GLUCOSE SERPL-MCNC: 115 MG/DL — HIGH (ref 70–99)
HCT VFR BLD CALC: 39.5 % — SIGNIFICANT CHANGE UP (ref 39–50)
HGB BLD-MCNC: 12.3 G/DL — LOW (ref 13–17)
IMM GRANULOCYTES NFR BLD AUTO: 0.6 % — SIGNIFICANT CHANGE UP (ref 0–1.5)
LYMPHOCYTES # BLD AUTO: 0.66 K/UL — LOW (ref 1–3.3)
LYMPHOCYTES # BLD AUTO: 13 % — SIGNIFICANT CHANGE UP (ref 13–44)
MCHC RBC-ENTMCNC: 26.6 PG — LOW (ref 27–34)
MCHC RBC-ENTMCNC: 31.1 GM/DL — LOW (ref 32–36)
MCV RBC AUTO: 85.3 FL — SIGNIFICANT CHANGE UP (ref 80–100)
MONOCYTES # BLD AUTO: 0.57 K/UL — SIGNIFICANT CHANGE UP (ref 0–0.9)
MONOCYTES NFR BLD AUTO: 11.2 % — SIGNIFICANT CHANGE UP (ref 2–14)
NEUTROPHILS # BLD AUTO: 3.67 K/UL — SIGNIFICANT CHANGE UP (ref 1.8–7.4)
NEUTROPHILS NFR BLD AUTO: 72.2 % — SIGNIFICANT CHANGE UP (ref 43–77)
NRBC # BLD: 0 /100 WBCS — SIGNIFICANT CHANGE UP (ref 0–0)
PLATELET # BLD AUTO: 163 K/UL — SIGNIFICANT CHANGE UP (ref 150–400)
POTASSIUM SERPL-MCNC: 3.8 MMOL/L — SIGNIFICANT CHANGE UP (ref 3.5–5.3)
POTASSIUM SERPL-SCNC: 3.8 MMOL/L — SIGNIFICANT CHANGE UP (ref 3.5–5.3)
RBC # BLD: 4.63 M/UL — SIGNIFICANT CHANGE UP (ref 4.2–5.8)
RBC # FLD: 14.6 % — HIGH (ref 10.3–14.5)
SODIUM SERPL-SCNC: 134 MMOL/L — LOW (ref 135–145)
WBC # BLD: 5.08 K/UL — SIGNIFICANT CHANGE UP (ref 3.8–10.5)
WBC # FLD AUTO: 5.08 K/UL — SIGNIFICANT CHANGE UP (ref 3.8–10.5)

## 2021-09-13 PROCEDURE — 99232 SBSQ HOSP IP/OBS MODERATE 35: CPT | Mod: GC

## 2021-09-13 PROCEDURE — 99222 1ST HOSP IP/OBS MODERATE 55: CPT

## 2021-09-13 PROCEDURE — 99232 SBSQ HOSP IP/OBS MODERATE 35: CPT

## 2021-09-13 RX ORDER — MAGNESIUM HYDROXIDE 400 MG/1
30 TABLET, CHEWABLE ORAL DAILY
Refills: 0 | Status: DISCONTINUED | OUTPATIENT
Start: 2021-09-13 | End: 2021-09-18

## 2021-09-13 RX ADMIN — GABAPENTIN 600 MILLIGRAM(S): 400 CAPSULE ORAL at 11:28

## 2021-09-13 RX ADMIN — GABAPENTIN 600 MILLIGRAM(S): 400 CAPSULE ORAL at 23:35

## 2021-09-13 RX ADMIN — ATORVASTATIN CALCIUM 80 MILLIGRAM(S): 80 TABLET, FILM COATED ORAL at 21:07

## 2021-09-13 RX ADMIN — Medication 650 MILLIGRAM(S): at 20:04

## 2021-09-13 RX ADMIN — GABAPENTIN 600 MILLIGRAM(S): 400 CAPSULE ORAL at 17:27

## 2021-09-13 RX ADMIN — Medication 81 MILLIGRAM(S): at 11:29

## 2021-09-13 RX ADMIN — GABAPENTIN 600 MILLIGRAM(S): 400 CAPSULE ORAL at 05:38

## 2021-09-13 RX ADMIN — Medication 650 MILLIGRAM(S): at 21:32

## 2021-09-13 RX ADMIN — SENNA PLUS 2 TABLET(S): 8.6 TABLET ORAL at 21:07

## 2021-09-13 RX ADMIN — LISINOPRIL 40 MILLIGRAM(S): 2.5 TABLET ORAL at 05:39

## 2021-09-13 RX ADMIN — CEFTRIAXONE 100 MILLIGRAM(S): 500 INJECTION, POWDER, FOR SOLUTION INTRAMUSCULAR; INTRAVENOUS at 17:25

## 2021-09-13 RX ADMIN — MAGNESIUM HYDROXIDE 30 MILLILITER(S): 400 TABLET, CHEWABLE ORAL at 06:28

## 2021-09-13 RX ADMIN — Medication 25 MILLIGRAM(S): at 05:38

## 2021-09-13 RX ADMIN — Medication 250 MILLIGRAM(S): at 17:27

## 2021-09-13 RX ADMIN — Medication 3 MILLIGRAM(S): at 21:07

## 2021-09-13 RX ADMIN — CLOPIDOGREL BISULFATE 75 MILLIGRAM(S): 75 TABLET, FILM COATED ORAL at 11:30

## 2021-09-13 RX ADMIN — TRAMADOL HYDROCHLORIDE 50 MILLIGRAM(S): 50 TABLET ORAL at 21:32

## 2021-09-13 RX ADMIN — Medication 250 MILLIGRAM(S): at 05:42

## 2021-09-13 RX ADMIN — TRAMADOL HYDROCHLORIDE 75 MILLIGRAM(S): 50 TABLET ORAL at 11:30

## 2021-09-13 RX ADMIN — TRAMADOL HYDROCHLORIDE 50 MILLIGRAM(S): 50 TABLET ORAL at 20:36

## 2021-09-13 RX ADMIN — Medication 1: at 12:42

## 2021-09-13 RX ADMIN — PANTOPRAZOLE SODIUM 40 MILLIGRAM(S): 20 TABLET, DELAYED RELEASE ORAL at 05:41

## 2021-09-13 RX ADMIN — Medication 25 MILLIGRAM(S): at 17:27

## 2021-09-13 RX ADMIN — TRAMADOL HYDROCHLORIDE 75 MILLIGRAM(S): 50 TABLET ORAL at 10:37

## 2021-09-13 RX ADMIN — ENOXAPARIN SODIUM 40 MILLIGRAM(S): 100 INJECTION SUBCUTANEOUS at 11:30

## 2021-09-13 RX ADMIN — Medication 25 MILLIGRAM(S): at 05:39

## 2021-09-13 NOTE — PROGRESS NOTE ADULT - SUBJECTIVE AND OBJECTIVE BOX
STRICKLANDDUSTY GERBER is a 60yMale , patient examined and chart reviewed.    INTERVAL HPI/ OVERNIGHT EVENTS:   Afebrile. C/o Right Leg pain.     PAST MEDICAL & SURGICAL HISTORY:  Hypertension  AAA (abdominal aortic aneurysm)  dx 2012  GERD (gastroesophageal reflux disease)  Leg weakness  Aortic valve replaced  Cardiac tamponade  HTN (hypertension)  Diabetes mellitus  H/O aortic valve repair  S/P aneurysm repair  Aortic valve replaced  S/P AAA repair  Repaired 3/2015  S/P aortic aneurysm repair  H/O aortic valve repair  History of incision and drainage      For details regarding the patient's social history, family history, and other miscellaneous elements, please refer the initial infectious diseases consultation and/or the admitting history and physical examination for this admission.    ROS:  CONSTITUTIONAL:  Negative fever or chills  EYES:  Negative  blurry vision or double vision  CARDIOVASCULAR:  Negative for chest pain or palpitations  RESPIRATORY:  Negative for cough, wheezing, or SOB   GASTROINTESTINAL:  Negative for nausea, vomiting, diarrhea, constipation, or abdominal pain  GENITOURINARY:  Negative frequency, urgency or dysuria  NEUROLOGIC:  No headache, confusion, dizziness, lightheadedness  All other systems were reviewed and are negative     ALLERGIES:  Normodyne (Faint)  Normodyne (Unknown)      Current inpatient medications :    ANTIBIOTICS/RELEVANT:  cefTRIAXone   IVPB 1000 milliGRAM(s) IV Intermittent every 24 hours  saccharomyces boulardii 250 milliGRAM(s) Oral two times a day      acetaminophen   Tablet .. 650 milliGRAM(s) Oral every 6 hours PRN  aspirin  chewable 81 milliGRAM(s) Oral daily  atorvastatin 80 milliGRAM(s) Oral at bedtime  clopidogrel Tablet 75 milliGRAM(s) Oral daily  dextrose 40% Gel 15 Gram(s) Oral once  dextrose 5%. 1000 milliLiter(s) IV Continuous <Continuous>  dextrose 5%. 1000 milliLiter(s) IV Continuous <Continuous>  dextrose 50% Injectable 25 Gram(s) IV Push once  dextrose 50% Injectable 12.5 Gram(s) IV Push once  dextrose 50% Injectable 25 Gram(s) IV Push once  enoxaparin Injectable 40 milliGRAM(s) SubCutaneous daily  gabapentin 600 milliGRAM(s) Oral four times a day  glucagon  Injectable 1 milliGRAM(s) IntraMuscular once  hydrochlorothiazide 25 milliGRAM(s) Oral daily  insulin lispro (ADMELOG) corrective regimen sliding scale   SubCutaneous three times a day before meals  insulin lispro (ADMELOG) corrective regimen sliding scale   SubCutaneous at bedtime  lisinopril 40 milliGRAM(s) Oral daily  magnesium hydroxide Suspension 30 milliLiter(s) Oral daily PRN  meclizine 12.5 milliGRAM(s) Oral three times a day PRN  melatonin 3 milliGRAM(s) Oral at bedtime PRN  metoprolol tartrate 25 milliGRAM(s) Oral two times a day  ondansetron Injectable 4 milliGRAM(s) IV Push every 8 hours PRN  pantoprazole    Tablet 40 milliGRAM(s) Oral before breakfast  polyethylene glycol 3350 17 Gram(s) Oral daily  senna 2 Tablet(s) Oral at bedtime  traMADol 50 milliGRAM(s) Oral every 6 hours PRN  traMADol 75 milliGRAM(s) Oral every 6 hours PRN      Objective:    T(C): 36.7 (09-13-21 @ 13:12), Max: 36.7 (09-13-21 @ 13:12)  HR: 74 (09-13-21 @ 17:21) (61 - 74)  BP: 177/94 (09-13-21 @ 17:21) (144/81 - 177/94)  RR: 18 (09-13-21 @ 13:12) (18 - 19)  SpO2: 91% (09-13-21 @ 13:12) (91% - 92%)      Physical Exam:  General:  no acute distress  Neck: supple, trachea midline  Lungs: clear, no wheeze/rhonchi  Cardiovascular: regular rate and rhythm, S1 S2  Abdomen: soft, nontender,  bowel sounds normal  Neurological: alert and oriented x3  Skin: no rash  Extremities: no edema      LABS:                        12.3   5.08  )-----------( 163      ( 13 Sep 2021 09:54 )             39.5       09-13    134<L>  |  102  |  19  ----------------------------<  115<H>  3.8   |  24  |  0.97    Ca    9.3      13 Sep 2021 09:54  Mg     1.6     09-12        MICROBIOLOGY:    Culture - Blood (collected 11 Sep 2021 03:04)  Source: .Blood Blood  Preliminary Report (12 Sep 2021 06:15):    No growth to date.    Culture - Blood (collected 11 Sep 2021 03:04)  Source: .Blood Blood  Preliminary Report (12 Sep 2021 06:15):    No growth to date.    RADIOLOGY & ADDITIONAL STUDIES:        Assessment :  59 yo M with PMHx of CAD (with 2 stents in the 1st Diag 7/1/19 and LPDA 7/3/19), bioprosthetic AVR with ascending aortic aneurysm repair on 03/116/15 (for a bicuspid aortic valve), cardiac tamponade (s/p pericardiocentesis 3/26/15), HFpEF, syncope (s/p ILR insertion 2/2/16), HTN, HLD, T2DM (not on insulin, complicated by peripheral neuropathy), admitted with severe Right LE cellulitis with lymphangitis  Still with leg pain and swelling  Seen by vascular    Plan:  Cont Rocephin  Trend temps and cbc  Elevate leg  Vascular and podiatry on case    Continue with present regiment.  Appropriate use of antibiotics and adverse effects reviewed.      > 35 minutes were spent in direct patient care reviewing notes, medications ,labs data/ imaging , discussion with multidisciplinary team.    Thank you for allowing me to participate in care of your patient .    Gaston Blanco MD  Infectious Disease  [phone]

## 2021-09-13 NOTE — PROGRESS NOTE ADULT - ATTENDING COMMENTS
Pt. seen and evaluated for right LE cellulitis.  Pt. reports having pain in right LE.  Tolerating IV antibiotic.  Physical examination and plan as above.  Reported having 2 episodes of diarrhea today.  Will check GI PCR and O&P.  Check WBC scan to rule out osteomyelitis of right hallux.

## 2021-09-13 NOTE — CONSULT NOTE ADULT - ATTENDING COMMENTS
Patient evaluated at the bedside. Bone scan and US reviewed. Patient has a chronic R 1st toe wound x 4 m. US shows evidence of PVD.   Patient states he is ready for DC this Saturday. Will arrange for outpatient angio.

## 2021-09-13 NOTE — PROGRESS NOTE ADULT - ASSESSMENT
59 yo M with PMHx of CAD (with 2 stents in the 1st Diag 7/1/19 and LPDA 7/3/19), bioprosthetic AVR with ascending aortic aneurysm repair on 03/116/15 (for a bicuspid aortic valve), cardiac tamponade (s/p pericardiocentesis 3/26/15), HFpEF, syncope (s/p ILR insertion 2/2/16), atypical chest discomfort, HTN, HLD, T2DM (not on insulin, complicated by peripheral neuropathy), GERD, vertigo, lacunar infarcts (incidentally detected on MRI), ventricular ectopy and mild carotid atherosclerosis presents to the ED c/o right lower extremity swelling, warmth, and pain, admitted for cellulitis. Patient has completed his course of IV zosyn and is now on IV rocephin.       # Right LE Cellulitis.   - s/p IV Zosyn and Vancomycin transitioned to Rocephin on 09/11  - Blood culture NGTD   - On 09/11 doppler negative for DVT  - Pain control: Tylenol q6 mild, tramadol 50mg q6 moderate, tramadol 75mg q6 severe  - ID following  - trend wbc and fever curve, WBC today 5.08, afebrile   - Podiatry following     # Diabetic toe ulcer.   - F/up NM Inflammatory Loc Whole body WBC, ordered to rule out OM in the right hallux per podiatry   - s/p IV Zosyn and Vancomycin transitioned to Rocephin on 09/11  - Continue home gabapentin for neuropathic pain   - Podiatry following   - Vascular consulted, patient has poor pedal pulses     # Hypertension.   - Chronic, stable   -on metoprolol, lisinopril, hctz at home, continue with hold parameters  -monitor routine hemodynamics.    # Hyperlipidemia.    -cont atorvastatin at home.    #  Type 2 diabetes mellitus.   -chronic, not on insulin, complicated by neuropathy  - HbA1c 7.0   -on metformin at home  -low dose corrective scale, accuchecks, hypoglycemia protocol    # GERD (gastroesophageal reflux disease).   - chronic  -Cont home protonix.    # Vertigo.   - chronic  -Cont home PRN antivert.    # CAD (coronary artery disease).   - chronic, s/p 2 stents in 2019  -Cont home asa and plavix, B-blocker, and statin  -recent TTE in 2020 showing normal LVEF but mod diastolic dysfunction    VTE ppx:  Lovenox 40mg SQ daily.

## 2021-09-13 NOTE — CONSULT NOTE ADULT - ASSESSMENT
vascular consult was obtained for 61 yo diabetic male with cellulitis of RLE admitted for IV antibiotics and a r/o  DVT on exam with strong audible LE pulses  will obtain vascular studies to ensure optimal vascularity.  will discuss w attending

## 2021-09-13 NOTE — CONSULT NOTE ADULT - SUBJECTIVE AND OBJECTIVE BOX
VASCULAR SURGERY CONSULT NOTE    Was consulted to see this 59 yo male admitted to medical service due to cellulitis of the right LE since Friday and now healed right hallux wound which was treated with PO keflex 6 months ago and "closed"   patient has been followed by his podiatrist Dr Garland. patient does not recall undergoing vascular studies and denies sob, claudication ,orthopnea or pain at rest/ dependency.      HPI:  59 yo M with PMHx of CAD (with 2 stents in the 1st Diag 7/1/19 and LPDA 7/3/19), bioprosthetic AVR with ascending aortic aneurysm repair on 03/116/15 (for a bicuspid aortic valve), cardiac tamponade (s/p pericardiocentesis 3/26/15), HFpEF, syncope (s/p ILR insertion 2/2/16), atypical chest discomfort, HTN, HLD, T2DM (not on insulin, complicated by peripheral neuropathy), GERD, vertigo, lacunar infarcts (incidentally detected on MRI), ventricular ectopy and mild carotid atherosclerosis presents to the ED c/o right lower extremity swelling, warmth, and pain. States that on Thursday morning, he woke up with chills and began having nausea, vomiting, diarrhea and dizziness. Tmax 101F at home. Took Aleve with relief. Because of his symptoms, he states that he slept about "40 hours". This afternoon, he noticed that he was having redness in his RLE with warmth and swelling. This continued to worsen and his wife is a nurse so she thought that he might have a clot so they came to the ED. Also admits headaches and diaphoresis. 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 Patient is COVID vaccinated with Moderna. Of note, patient has a healing ulcer on his R toe that he was previously treated with two weeks of antibiotics for.     In the ED, VS T98.6F HR 86 /86 -> 165/93 RR 16 SpO2 98% on RA. Labs significant for NO leukocytosis but neutrophilic predominance of 80.9%, lactate 1.2. Blood cultures drawn in the ED.      PAST MEDICAL & SURGICAL HISTORY:  Hypertension    AAA (abdominal aortic aneurysm)  dx 2012    GERD (gastroesophageal reflux disease)    Leg weakness    Aortic valve replaced    Cardiac tamponade    HTN (hypertension)    Diabetes mellitus    H/O aortic valve repair    S/P aneurysm repair    Aortic valve replaced    S/P AAA repair  Repaired 3/2015    S/P aortic aneurysm repair    H/O aortic valve repair    History of incision and drainage        Review of Systems:    I have reviewed 9 systems with the patient and the only positive findings were as above      MEDICATIONS  (STANDING):  aspirin  chewable 81 milliGRAM(s) Oral daily  atorvastatin 80 milliGRAM(s) Oral at bedtime  cefTRIAXone   IVPB 1000 milliGRAM(s) IV Intermittent every 24 hours  clopidogrel Tablet 75 milliGRAM(s) Oral daily  dextrose 40% Gel 15 Gram(s) Oral once  dextrose 5%. 1000 milliLiter(s) (50 mL/Hr) IV Continuous <Continuous>  dextrose 5%. 1000 milliLiter(s) (100 mL/Hr) IV Continuous <Continuous>  dextrose 50% Injectable 25 Gram(s) IV Push once  dextrose 50% Injectable 12.5 Gram(s) IV Push once  dextrose 50% Injectable 25 Gram(s) IV Push once  enoxaparin Injectable 40 milliGRAM(s) SubCutaneous daily  gabapentin 600 milliGRAM(s) Oral four times a day  glucagon  Injectable 1 milliGRAM(s) IntraMuscular once  hydrochlorothiazide 25 milliGRAM(s) Oral daily  insulin lispro (ADMELOG) corrective regimen sliding scale   SubCutaneous three times a day before meals  insulin lispro (ADMELOG) corrective regimen sliding scale   SubCutaneous at bedtime  lisinopril 40 milliGRAM(s) Oral daily  metoprolol tartrate 25 milliGRAM(s) Oral two times a day  pantoprazole    Tablet 40 milliGRAM(s) Oral before breakfast  polyethylene glycol 3350 17 Gram(s) Oral daily  saccharomyces boulardii 250 milliGRAM(s) Oral two times a day  senna 2 Tablet(s) Oral at bedtime    MEDICATIONS  (PRN):  acetaminophen   Tablet .. 650 milliGRAM(s) Oral every 6 hours PRN Temp greater or equal to 38C (100.4F), Mild Pain (1 - 3)  magnesium hydroxide Suspension 30 milliLiter(s) Oral daily PRN Constipation  meclizine 12.5 milliGRAM(s) Oral three times a day PRN vertigo  melatonin 3 milliGRAM(s) Oral at bedtime PRN Insomnia  ondansetron Injectable 4 milliGRAM(s) IV Push every 8 hours PRN Nausea and/or Vomiting  traMADol 50 milliGRAM(s) Oral every 6 hours PRN Moderate Pain (4 - 6)  traMADol 75 milliGRAM(s) Oral every 6 hours PRN Severe Pain (7 - 10)      Allergies    Normodyne (Faint)  Normodyne (Unknown)    Intolerances        SOCIAL HISTORY          Smoking: Yes [ ]  No [x ]   ______pk yrs          ETOH  Yes [ ]  No [ ]  Social [ ]          DRUGS:  Yes [ ]  No [ ]  if so what______________    FAMILY HISTORY:  Family history of breast cancer    Family history of hypertension    Family history of stroke    Family history of prostate cancer    Family history of COPD (chronic obstructive pulmonary disease) (Grandparent)    FH: HTN (hypertension)        Vital Signs Last 24 Hrs  T(C): 36.6 (13 Sep 2021 05:15), Max: 36.6 (12 Sep 2021 21:01)  T(F): 97.8 (13 Sep 2021 05:15), Max: 97.8 (12 Sep 2021 21:01)  HR: 62 (13 Sep 2021 05:15) (61 - 64)  BP: 145/93 (13 Sep 2021 05:15) (117/68 - 145/93)  BP(mean): --  < from: US Duplex Venous Lower Ext Ltd, Right (09.11.21 @ 00:41) >  here is normal compressibility of the right common femoral, femoral and popliteal veins.  The contralateral common femoral vein is patent.  Doppler examination showsnormal spontaneous and phasic flow. The right posterior tibial vein is patent. Remaining calf veins are not visualized.    IMPRESSION:  No evidence of right lower extremity deep venous thrombosis in the visualized veins.        < end of copied text >  RR: 19 (13 Sep 2021 05:15) (18 - 19)  SpO2: 92% (13 Sep 2021 05:15) (92% - 92%)    Physical Exam:    General:  Appears stated age, well-groomed, well-nourished, no distress  Eyes : JALEN  HENT:  WNL, no JVD  Chest:  clear breath sounds good inspiratory effort   Cardiovascular:  Regular rate & rhythm, mid chest scar   Abdomen:  soft NT ND  Extremities:  RLE with mid calf cellulitis, tender to palpation  , parameters marked and noted to have remained stable ,strong audible AT, PT, palpable popliteal and femoral, right hallux punctuated closed wound with no drainage   Musculoskeletal:  normal strength  Neuro/Psych:  Alert, oriented tp time, place and person       LABS:                        12.3   5.08  )-----------( 163      ( 13 Sep 2021 09:54 )             39.5     09-13    134<L>  |  102  |  19  ----------------------------<  115<H>  3.8   |  24  |  0.97    Ca    9.3      13 Sep 2021 09:54  Mg     1.6     09-12      radiology:  < from: US Duplex Venous Lower Ext Ltd, Right (09.11.21 @ 00:41) >  here is normal compressibility of the right common femoral, femoral and popliteal veins.  The contralateral common femoral vein is patent.  Doppler examination showsnormal spontaneous and phasic flow. The right posterior tibial vein is patent. Remaining calf veins are not visualized.    IMPRESSION:  No evidence of right lower extremity deep venous thrombosis in the visualized veins.        < end of copied text >

## 2021-09-13 NOTE — PROGRESS NOTE ADULT - SUBJECTIVE AND OBJECTIVE BOX
Patient is a 60y old  Male who presents with a chief complaint of celllulitis (12 Sep 2021 13:21)      INTERVAL HPI/OVERNIGHT EVENTS: Patient seen and examined at bedside. No overnight events occurred. Patient continues to complain of RLE pain, patient states he is unable to walk and is upset because he enjoys being able to ambulate. Patient denies fevers, chills, headache, lightheadedness, chest pain, dyspnea, abdominal pain. Patient does endorse nausea and at least 2 episodes of watery non-bloody diarrhea.     MEDICATIONS  (STANDING):  aspirin  chewable 81 milliGRAM(s) Oral daily  atorvastatin 80 milliGRAM(s) Oral at bedtime  cefTRIAXone   IVPB 1000 milliGRAM(s) IV Intermittent every 24 hours  clopidogrel Tablet 75 milliGRAM(s) Oral daily  dextrose 40% Gel 15 Gram(s) Oral once  dextrose 5%. 1000 milliLiter(s) (50 mL/Hr) IV Continuous <Continuous>  dextrose 5%. 1000 milliLiter(s) (100 mL/Hr) IV Continuous <Continuous>  dextrose 50% Injectable 25 Gram(s) IV Push once  dextrose 50% Injectable 12.5 Gram(s) IV Push once  dextrose 50% Injectable 25 Gram(s) IV Push once  enoxaparin Injectable 40 milliGRAM(s) SubCutaneous daily  gabapentin 600 milliGRAM(s) Oral four times a day  glucagon  Injectable 1 milliGRAM(s) IntraMuscular once  hydrochlorothiazide 25 milliGRAM(s) Oral daily  insulin lispro (ADMELOG) corrective regimen sliding scale   SubCutaneous three times a day before meals  insulin lispro (ADMELOG) corrective regimen sliding scale   SubCutaneous at bedtime  lisinopril 40 milliGRAM(s) Oral daily  metoprolol tartrate 25 milliGRAM(s) Oral two times a day  pantoprazole    Tablet 40 milliGRAM(s) Oral before breakfast  polyethylene glycol 3350 17 Gram(s) Oral daily  saccharomyces boulardii 250 milliGRAM(s) Oral two times a day  senna 2 Tablet(s) Oral at bedtime    MEDICATIONS  (PRN):  acetaminophen   Tablet .. 650 milliGRAM(s) Oral every 6 hours PRN Temp greater or equal to 38C (100.4F), Mild Pain (1 - 3)  magnesium hydroxide Suspension 30 milliLiter(s) Oral daily PRN Constipation  meclizine 12.5 milliGRAM(s) Oral three times a day PRN vertigo  melatonin 3 milliGRAM(s) Oral at bedtime PRN Insomnia  ondansetron Injectable 4 milliGRAM(s) IV Push every 8 hours PRN Nausea and/or Vomiting  traMADol 50 milliGRAM(s) Oral every 6 hours PRN Moderate Pain (4 - 6)  traMADol 75 milliGRAM(s) Oral every 6 hours PRN Severe Pain (7 - 10)      Allergies    Normodyne (Faint)  Normodyne (Unknown)    Intolerances        REVIEW OF SYSTEMS:  CONSTITUTIONAL: No fever or chills  HEENT:  No headache, no sore throat  RESPIRATORY: No cough, wheezing, or shortness of breath  CARDIOVASCULAR: No chest pain  GASTROINTESTINAL: No abd pain, +nausea, no vomiting, +diarrhea  NEUROLOGICAL: no focal weakness or dizziness  MUSCULOSKELETAL: RLE pain with ambulation     Vital Signs Last 24 Hrs  T(C): 36.6 (13 Sep 2021 05:15), Max: 36.8 (12 Sep 2021 12:26)  T(F): 97.8 (13 Sep 2021 05:15), Max: 98.2 (12 Sep 2021 12:26)  HR: 62 (13 Sep 2021 05:15) (61 - 64)  BP: 145/93 (13 Sep 2021 05:15) (117/68 - 149/88)  BP(mean): --  RR: 19 (13 Sep 2021 05:15) (18 - 19)  SpO2: 92% (13 Sep 2021 05:15) (92% - 93%)    PHYSICAL EXAM:  GENERAL: NAD  HEENT:  anicteric, moist mucous membranes  CHEST/LUNG:  CTA b/l, no rales, wheezes, or rhonchi  HEART:  RRR, S1, S2  ABDOMEN:  hyperactive BS+, soft, nontender, nondistended  EXTREMITIES: No cyanosis, RLE +2 pitting edema, erythematous, tender  NERVOUS SYSTEM: AOx3 answers questions and follows commands appropriately    LABS:                        12.3   5.08  )-----------( 163      ( 13 Sep 2021 09:54 )             39.5     CBC Full  -  ( 13 Sep 2021 09:54 )  WBC Count : 5.08 K/uL  Hemoglobin : 12.3 g/dL  Hematocrit : 39.5 %  Platelet Count - Automated : 163 K/uL  Mean Cell Volume : 85.3 fl  Mean Cell Hemoglobin : 26.6 pg  Mean Cell Hemoglobin Concentration : 31.1 gm/dL  Auto Neutrophil # : 3.67 K/uL  Auto Lymphocyte # : 0.66 K/uL  Auto Monocyte # : 0.57 K/uL  Auto Eosinophil # : 0.12 K/uL  Auto Basophil # : 0.03 K/uL  Auto Neutrophil % : 72.2 %  Auto Lymphocyte % : 13.0 %  Auto Monocyte % : 11.2 %  Auto Eosinophil % : 2.4 %  Auto Basophil % : 0.6 %    13 Sep 2021 09:54    134    |  102    |  19     ----------------------------<  115    3.8     |  24     |  0.97     Ca    9.3        13 Sep 2021 09:54          CAPILLARY BLOOD GLUCOSE      POCT Blood Glucose.: 170 mg/dL (13 Sep 2021 12:14)  POCT Blood Glucose.: 108 mg/dL (13 Sep 2021 07:54)  POCT Blood Glucose.: 170 mg/dL (12 Sep 2021 21:16)  POCT Blood Glucose.: 117 mg/dL (12 Sep 2021 17:10)        Culture - Blood (collected 09-11-21 @ 03:04)  Source: .Blood Blood  Preliminary Report (09-12-21 @ 06:15):    No growth to date.    Culture - Blood (collected 09-11-21 @ 03:04)  Source: .Blood Blood  Preliminary Report (09-12-21 @ 06:15):    No growth to date.        RADIOLOGY & ADDITIONAL TESTS:  No new studies in the past 24 hours.     Consultant(s) Notes Reviewed:  [x] YES  [ ] NO

## 2021-09-13 NOTE — PROGRESS NOTE ADULT - PROBLEM SELECTOR PLAN 1
Pt assessed for right foot cellulitis  Right hallux wound is epithelialized, no dressing needed  Pt will require bone scan to r/o OM  F/u bone scan results  C/w IV abx therapy.  [facility]  Podiatry will continue to follow

## 2021-09-13 NOTE — PROGRESS NOTE ADULT - SUBJECTIVE AND OBJECTIVE BOX
HPI:  59 yo M with PMHx of CAD (with 2 stents in the 1st Diag 7/1/19 and LPDA 7/3/19), bioprosthetic AVR with ascending aortic aneurysm repair on 03/116/15 (for a bicuspid aortic valve), cardiac tamponade (s/p pericardiocentesis 3/26/15), HFpEF, syncope (s/p ILR insertion 2/2/16), atypical chest discomfort, HTN, HLD, T2DM (not on insulin, complicated by peripheral neuropathy), GERD, vertigo, lacunar infarcts (incidentally detected on MRI), ventricular ectopy and mild carotid atherosclerosis presents to the ED c/o right lower extremity swelling, warmth, and pain. States that on Thursday morning, he woke up with chills and began having nausea, vomiting, diarrhea and dizziness. Tmax 101F at home. Took Aleve with relief. Because of his symptoms, he states that he slept about "40 hours". This afternoon, he noticed that he was having redness in his RLE with warmth and swelling. This continued to worsen and his wife is a nurse so she thought that he might have a clot so they came to the ED. Also admits headaches and diaphoresis. 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 Patient is COVID vaccinated with Moderna. Of note, patient has a healing ulcer on his R toe that he was previously treated with two weeks of antibiotics for.     In the ED, VS T98.6F HR 86 /86 -> 165/93 RR 16 SpO2 98% on RA. Labs significant for NO leukocytosis but neutrophilic predominance of 80.9%, lactate 1.2. Blood cultures drawn in the ED.  Received Ancef IV x1 in the ED.  CXR grossly clear, official read pending  EKG pending. (11 Sep 2021 02:42)      60y year old Male seen at [facility] 3WES 364 D1 for Grade 1 diabetic ulcer plantar right hallux. The patient states that it appeared about 5 months ago and he has been going to Dr. Garland his podiarist for treatment. The patient states that the wound is closed and before his admission he was on oral abx for the wound. The patient states that he has diabetic neuropathy and his whole entire right leg is painful and sore. Denies any fever, chills, nausea, vomiting, chest pain, shortness of breath, or calf pain at this time.    REVIEW OF SYSTEMS    PAST MEDICAL & SURGICAL HISTORY:  Hypertension    AAA (abdominal aortic aneurysm)  dx 2012    GERD (gastroesophageal reflux disease)    Leg weakness    Aortic valve replaced    Cardiac tamponade    HTN (hypertension)    Diabetes mellitus    H/O aortic valve repair    S/P aneurysm repair    Aortic valve replaced    S/P AAA repair  Repaired 3/2015    S/P aortic aneurysm repair    H/O aortic valve repair    History of incision and drainage        Allergies    Normodyne (Faint)  Normodyne (Unknown)    Intolerances        MEDICATIONS  (STANDING):  aspirin  chewable 81 milliGRAM(s) Oral daily  atorvastatin 80 milliGRAM(s) Oral at bedtime  cefTRIAXone   IVPB 1000 milliGRAM(s) IV Intermittent every 24 hours  clopidogrel Tablet 75 milliGRAM(s) Oral daily  dextrose 40% Gel 15 Gram(s) Oral once  dextrose 5%. 1000 milliLiter(s) (50 mL/Hr) IV Continuous <Continuous>  dextrose 5%. 1000 milliLiter(s) (100 mL/Hr) IV Continuous <Continuous>  dextrose 50% Injectable 25 Gram(s) IV Push once  dextrose 50% Injectable 12.5 Gram(s) IV Push once  dextrose 50% Injectable 25 Gram(s) IV Push once  enoxaparin Injectable 40 milliGRAM(s) SubCutaneous daily  gabapentin 600 milliGRAM(s) Oral four times a day  glucagon  Injectable 1 milliGRAM(s) IntraMuscular once  hydrochlorothiazide 25 milliGRAM(s) Oral daily  insulin lispro (ADMELOG) corrective regimen sliding scale   SubCutaneous three times a day before meals  insulin lispro (ADMELOG) corrective regimen sliding scale   SubCutaneous at bedtime  lisinopril 40 milliGRAM(s) Oral daily  metoprolol tartrate 25 milliGRAM(s) Oral two times a day  pantoprazole    Tablet 40 milliGRAM(s) Oral before breakfast  saccharomyces boulardii 250 milliGRAM(s) Oral two times a day    MEDICATIONS  (PRN):  acetaminophen   Tablet .. 650 milliGRAM(s) Oral every 6 hours PRN Temp greater or equal to 38C (100.4F), Mild Pain (1 - 3)  meclizine 12.5 milliGRAM(s) Oral three times a day PRN vertigo  melatonin 3 milliGRAM(s) Oral at bedtime PRN Insomnia  ondansetron Injectable 4 milliGRAM(s) IV Push every 8 hours PRN Nausea and/or Vomiting  traMADol 50 milliGRAM(s) Oral every 6 hours PRN Moderate Pain (4 - 6)  traMADol 75 milliGRAM(s) Oral every 6 hours PRN Severe Pain (7 - 10)      Social History:  , lives at home with wife, works as an [employer] , ADLs independent, ambulates independently without the use of assistive devices, former smoker, social alcohol use (last drink 9/3/21), denies recreational drug use (11 Sep 2021 02:42)      FAMILY HISTORY:  Family history of breast cancer    Family history of hypertension    Family history of stroke    Family history of prostate cancer    Family history of COPD (chronic obstructive pulmonary disease) (Grandparent)    FH: HTN (hypertension)        Vital Signs Last 24 Hrs  T(C): 36.6 (11 Sep 2021 10:12), Max: 37.1 (11 Sep 2021 02:39)  T(F): 97.8 (11 Sep 2021 10:12), Max: 98.8 (11 Sep 2021 02:39)  HR: 71 (11 Sep 2021 10:12) (68 - 86)  BP: 169/96 (11 Sep 2021 10:12) (127/75 - 184/86)  BP(mean): --  RR: 15 (11 Sep 2021 10:12) (15 - 18)  SpO2: 98% (11 Sep 2021 10:12) (96% - 98%)    PHYSICAL EXAM:  Vascular: DP faintly palpable on the RIGHT, no palpable on the left  & PT nonpalpable bilaterally, Capillary refill 3 seconds, No Digital hair present bilaterally, diffuse edema and erythema along the RLE and minimally along the right forefoot   Neurological: Loss of protective sensation b/l   Musculoskeletal: 5/5 strength in all quadrants bilaterally, AJ & STJ ROM intact,   Dermatological:   1. Grade 1 ulcer right plantar hallux- size 0.2cmx0.1cm- adherent scab with healthy epithelized underneath , does not probe to bone, no tunneling, no undermining     CBC Full  -  ( 11 Sep 2021 08:18 )  WBC Count : 5.03 K/uL  RBC Count : 4.21 M/uL  Hemoglobin : 11.3 g/dL  Hematocrit : 35.9 %  Platelet Count - Automated : 116 K/uL  Mean Cell Volume : 85.3 fl  Mean Cell Hemoglobin : 26.8 pg  Mean Cell Hemoglobin Concentration : 31.5 gm/dL  Auto Neutrophil # : 3.90 K/uL  Auto Lymphocyte # : 0.45 K/uL  Auto Monocyte # : 0.60 K/uL  Auto Eosinophil # : 0.05 K/uL  Auto Basophil # : 0.02 K/uL  Auto Neutrophil % : 77.6 %  Auto Lymphocyte % : 8.9 %  Auto Monocyte % : 11.9 %  Auto Eosinophil % : 1.0 %  Auto Basophil % : 0.4 %      ----------CHEM PANEL----------    PT/INR - ( 11 Sep 2021 00:20 )   PT: 14.1 sec;   INR: 1.22 ratio         PTT - ( 11 Sep 2021 00:20 )  PTT:27.5 sec        Imaging: ----------

## 2021-09-14 DIAGNOSIS — L03.90 CELLULITIS, UNSPECIFIED: ICD-10-CM

## 2021-09-14 PROCEDURE — 99232 SBSQ HOSP IP/OBS MODERATE 35: CPT

## 2021-09-14 PROCEDURE — 99232 SBSQ HOSP IP/OBS MODERATE 35: CPT | Mod: GC

## 2021-09-14 PROCEDURE — 93926 LOWER EXTREMITY STUDY: CPT | Mod: 26,RT

## 2021-09-14 PROCEDURE — 93923 UPR/LXTR ART STDY 3+ LVLS: CPT | Mod: 26,52

## 2021-09-14 RX ORDER — GABAPENTIN 400 MG/1
800 CAPSULE ORAL
Refills: 0 | Status: DISCONTINUED | OUTPATIENT
Start: 2021-09-14 | End: 2021-09-18

## 2021-09-14 RX ORDER — CEFAZOLIN SODIUM 1 G
2000 VIAL (EA) INJECTION EVERY 8 HOURS
Refills: 0 | Status: DISCONTINUED | OUTPATIENT
Start: 2021-09-14 | End: 2021-09-18

## 2021-09-14 RX ORDER — GABAPENTIN 400 MG/1
800 CAPSULE ORAL
Refills: 0 | Status: DISCONTINUED | OUTPATIENT
Start: 2021-09-14 | End: 2021-09-14

## 2021-09-14 RX ORDER — KETOROLAC TROMETHAMINE 30 MG/ML
30 SYRINGE (ML) INJECTION ONCE
Refills: 0 | Status: DISCONTINUED | OUTPATIENT
Start: 2021-09-14 | End: 2021-09-15

## 2021-09-14 RX ORDER — ACETAMINOPHEN 500 MG
975 TABLET ORAL EVERY 6 HOURS
Refills: 0 | Status: DISCONTINUED | OUTPATIENT
Start: 2021-09-14 | End: 2021-09-18

## 2021-09-14 RX ORDER — ACETAMINOPHEN 500 MG
975 TABLET ORAL EVERY 8 HOURS
Refills: 0 | Status: DISCONTINUED | OUTPATIENT
Start: 2021-09-14 | End: 2021-09-14

## 2021-09-14 RX ORDER — ACETAMINOPHEN 500 MG
1000 TABLET ORAL ONCE
Refills: 0 | Status: COMPLETED | OUTPATIENT
Start: 2021-09-14 | End: 2021-09-14

## 2021-09-14 RX ADMIN — Medication 250 MILLIGRAM(S): at 05:26

## 2021-09-14 RX ADMIN — Medication 100 MILLIGRAM(S): at 21:16

## 2021-09-14 RX ADMIN — POLYETHYLENE GLYCOL 3350 17 GRAM(S): 17 POWDER, FOR SOLUTION ORAL at 12:58

## 2021-09-14 RX ADMIN — TRAMADOL HYDROCHLORIDE 75 MILLIGRAM(S): 50 TABLET ORAL at 13:47

## 2021-09-14 RX ADMIN — Medication 25 MILLIGRAM(S): at 18:48

## 2021-09-14 RX ADMIN — Medication 975 MILLIGRAM(S): at 13:10

## 2021-09-14 RX ADMIN — GABAPENTIN 600 MILLIGRAM(S): 400 CAPSULE ORAL at 05:23

## 2021-09-14 RX ADMIN — TRAMADOL HYDROCHLORIDE 75 MILLIGRAM(S): 50 TABLET ORAL at 05:24

## 2021-09-14 RX ADMIN — Medication 25 MILLIGRAM(S): at 05:23

## 2021-09-14 RX ADMIN — ATORVASTATIN CALCIUM 80 MILLIGRAM(S): 80 TABLET, FILM COATED ORAL at 21:16

## 2021-09-14 RX ADMIN — TRAMADOL HYDROCHLORIDE 75 MILLIGRAM(S): 50 TABLET ORAL at 13:09

## 2021-09-14 RX ADMIN — TRAMADOL HYDROCHLORIDE 75 MILLIGRAM(S): 50 TABLET ORAL at 06:34

## 2021-09-14 RX ADMIN — SENNA PLUS 2 TABLET(S): 8.6 TABLET ORAL at 21:16

## 2021-09-14 RX ADMIN — Medication 975 MILLIGRAM(S): at 13:50

## 2021-09-14 RX ADMIN — Medication 81 MILLIGRAM(S): at 12:57

## 2021-09-14 RX ADMIN — GABAPENTIN 600 MILLIGRAM(S): 400 CAPSULE ORAL at 13:09

## 2021-09-14 RX ADMIN — Medication 975 MILLIGRAM(S): at 19:06

## 2021-09-14 RX ADMIN — Medication 250 MILLIGRAM(S): at 18:48

## 2021-09-14 RX ADMIN — LISINOPRIL 40 MILLIGRAM(S): 2.5 TABLET ORAL at 05:24

## 2021-09-14 RX ADMIN — CLOPIDOGREL BISULFATE 75 MILLIGRAM(S): 75 TABLET, FILM COATED ORAL at 12:57

## 2021-09-14 RX ADMIN — Medication 975 MILLIGRAM(S): at 18:49

## 2021-09-14 RX ADMIN — ENOXAPARIN SODIUM 40 MILLIGRAM(S): 100 INJECTION SUBCUTANEOUS at 12:57

## 2021-09-14 RX ADMIN — PANTOPRAZOLE SODIUM 40 MILLIGRAM(S): 20 TABLET, DELAYED RELEASE ORAL at 05:23

## 2021-09-14 RX ADMIN — GABAPENTIN 800 MILLIGRAM(S): 400 CAPSULE ORAL at 18:44

## 2021-09-14 NOTE — PROGRESS NOTE ADULT - SUBJECTIVE AND OBJECTIVE BOX
60y year old Male seen at [facility] 3WES 364 D1 for Grade 1 diabetic ulcer plantar right hallux. The patient states that it appeared about 5 months ago and he has been going to Dr. Garland his podiarist for treatment. The patient states that the wound is closed and before his admission he was on oral abx for the wound. The patient states that he has diabetic neuropathy and his whole entire right leg is painful and sore. Denies any fever, chills, nausea, vomiting, chest pain, shortness of breath, or calf pain at this time.    REVIEW OF SYSTEMS    PAST MEDICAL & SURGICAL HISTORY:  Hypertension    AAA (abdominal aortic aneurysm)  dx 2012    GERD (gastroesophageal reflux disease)    Leg weakness    Aortic valve replaced    Cardiac tamponade    HTN (hypertension)    Diabetes mellitus    H/O aortic valve repair    S/P aneurysm repair    Aortic valve replaced    S/P AAA repair  Repaired 3/2015    S/P aortic aneurysm repair    H/O aortic valve repair    History of incision and drainage        Allergies    Normodyne (Faint)  Normodyne (Unknown)    Intolerances        MEDICATIONS  (STANDING):  aspirin  chewable 81 milliGRAM(s) Oral daily  atorvastatin 80 milliGRAM(s) Oral at bedtime  cefTRIAXone   IVPB 1000 milliGRAM(s) IV Intermittent every 24 hours  clopidogrel Tablet 75 milliGRAM(s) Oral daily  dextrose 40% Gel 15 Gram(s) Oral once  dextrose 5%. 1000 milliLiter(s) (50 mL/Hr) IV Continuous <Continuous>  dextrose 5%. 1000 milliLiter(s) (100 mL/Hr) IV Continuous <Continuous>  dextrose 50% Injectable 25 Gram(s) IV Push once  dextrose 50% Injectable 12.5 Gram(s) IV Push once  dextrose 50% Injectable 25 Gram(s) IV Push once  enoxaparin Injectable 40 milliGRAM(s) SubCutaneous daily  gabapentin 600 milliGRAM(s) Oral four times a day  glucagon  Injectable 1 milliGRAM(s) IntraMuscular once  hydrochlorothiazide 25 milliGRAM(s) Oral daily  insulin lispro (ADMELOG) corrective regimen sliding scale   SubCutaneous three times a day before meals  insulin lispro (ADMELOG) corrective regimen sliding scale   SubCutaneous at bedtime  lisinopril 40 milliGRAM(s) Oral daily  metoprolol tartrate 25 milliGRAM(s) Oral two times a day  pantoprazole    Tablet 40 milliGRAM(s) Oral before breakfast  saccharomyces boulardii 250 milliGRAM(s) Oral two times a day    MEDICATIONS  (PRN):  acetaminophen   Tablet .. 650 milliGRAM(s) Oral every 6 hours PRN Temp greater or equal to 38C (100.4F), Mild Pain (1 - 3)  meclizine 12.5 milliGRAM(s) Oral three times a day PRN vertigo  melatonin 3 milliGRAM(s) Oral at bedtime PRN Insomnia  ondansetron Injectable 4 milliGRAM(s) IV Push every 8 hours PRN Nausea and/or Vomiting  traMADol 50 milliGRAM(s) Oral every 6 hours PRN Moderate Pain (4 - 6)  traMADol 75 milliGRAM(s) Oral every 6 hours PRN Severe Pain (7 - 10)      Social History:  , lives at home with wife, works as an [employer] , ADLs independent, ambulates independently without the use of assistive devices, former smoker, social alcohol use (last drink 9/3/21), denies recreational drug use (11 Sep 2021 02:42)      FAMILY HISTORY:  Family history of breast cancer    Family history of hypertension    Family history of stroke    Family history of prostate cancer    Family history of COPD (chronic obstructive pulmonary disease) (Grandparent)    FH: HTN (hypertension)      Vital Signs Last 24 Hrs  T(C): 36.7 (14 Sep 2021 13:10), Max: 37.1 (13 Sep 2021 21:08)  T(F): 98.1 (14 Sep 2021 13:10), Max: 98.8 (13 Sep 2021 21:08)  HR: 62 (14 Sep 2021 13:10) (62 - 77)  BP: 139/79 (14 Sep 2021 13:10) (139/79 - 177/94)  BP(mean): --  RR: 17 (14 Sep 2021 13:10) (17 - 17)  SpO2: 94% (14 Sep 2021 13:10) (93% - 95%)    PHYSICAL EXAM:  Vascular: DP faintly palpable on the RIGHT, no palpable on the left  & PT nonpalpable bilaterally, Capillary refill 3 seconds, No Digital hair present bilaterally, diffuse edema and erythema along the RLE and minimally along the right forefoot   Neurological: Loss of protective sensation b/l   Musculoskeletal: 5/5 strength in all quadrants bilaterally, AJ & STJ ROM intact,   Dermatological:   1. Grade 1 ulcer right plantar hallux- size 0.2cmx0.1cm- adherent scab with healthy epithelized underneath , does not probe to bone, no tunneling, no undermining                           12.3   5.08  )-----------( 163      ( 13 Sep 2021 09:54 )             39.5     CBC Full  -  ( 13 Sep 2021 09:54 )  WBC Count : 5.08 K/uL  RBC Count : 4.63 M/uL  Hemoglobin : 12.3 g/dL  Hematocrit : 39.5 %  Platelet Count - Automated : 163 K/uL  Mean Cell Volume : 85.3 fl  Mean Cell Hemoglobin : 26.6 pg  Mean Cell Hemoglobin Concentration : 31.1 gm/dL  Auto Neutrophil # : 3.67 K/uL  Auto Lymphocyte # : 0.66 K/uL  Auto Monocyte # : 0.57 K/uL  Auto Eosinophil # : 0.12 K/uL  Auto Basophil # : 0.03 K/uL  Auto Neutrophil % : 72.2 %  Auto Lymphocyte % : 13.0 %  Auto Monocyte % : 11.2 %  Auto Eosinophil % : 2.4 %  Auto Basophil % : 0.6 %    ----------CHEM PANEL----------    09-13    134<L>  |  102  |  19  ----------------------------<  115<H>  3.8   |  24  |  0.97    Ca    9.3      13 Sep 2021 09:54        PT/INR - ( 11 Sep 2021 00:20 )   PT: 14.1 sec;   INR: 1.22 ratio         PTT - ( 11 Sep 2021 00:20 )  PTT:27.5 sec        Imaging: Awaiting Bone Scan to r/o OM

## 2021-09-14 NOTE — PROGRESS NOTE ADULT - ASSESSMENT
61 yo M with PMHx of CAD (with 2 stents in the 1st Diag 7/1/19 and LPDA 7/3/19), bioprosthetic AVR with ascending aortic aneurysm repair on 03/116/15 (for a bicuspid aortic valve), cardiac tamponade (s/p pericardiocentesis 3/26/15), HFpEF, syncope (s/p ILR insertion 2/2/16), atypical chest discomfort, HTN, HLD, T2DM (not on insulin, complicated by peripheral neuropathy), GERD, vertigo, lacunar infarcts (incidentally detected on MRI), ventricular ectopy and mild carotid atherosclerosis presents to the ED c/o right lower extremity swelling, warmth, and pain, admitted for cellulitis. Patient has completed his course of IV zosyn and is now on IV rocephin.       # Right LE Cellulitis.   - s/p IV Zosyn and Vancomycin transitioned to Rocephin on 09/11  - Blood culture NGTD   - On 09/11 doppler negative for DVT  - Pain control: Tylenol q6 mild, tramadol 50mg q6 moderate, tramadol 75mg q6 severe  - ID following  - trend wbc and fever curve, WBC today 5.08, afebrile   - Podiatry following     # Diabetic toe ulcer.   - F/up NM Inflammatory Loc Whole body WBC, ordered to rule out OM in the right hallux per podiatry   - s/p IV Zosyn and Vancomycin transitioned to Rocephin on 09/11  - Continue home gabapentin for neuropathic pain   - Podiatry following   - Vascular consulted, patient has poor pedal pulses     # Hypertension.   - Chronic, stable   -on metoprolol, lisinopril, hctz at home, continue with hold parameters  -monitor routine hemodynamics.    # Hyperlipidemia.    -cont atorvastatin at home.    #  Type 2 diabetes mellitus.   -chronic, not on insulin, complicated by neuropathy  - HbA1c 7.0   -on metformin at home  -low dose corrective scale, accuchecks, hypoglycemia protocol    # GERD (gastroesophageal reflux disease).   - chronic  -Cont home protonix.    # Vertigo.   - chronic  -Cont home PRN antivert.    # CAD (coronary artery disease).   - chronic, s/p 2 stents in 2019  -Cont home asa and plavix, B-blocker, and statin  -recent TTE in 2020 showing normal LVEF but mod diastolic dysfunction    VTE ppx:  Lovenox 40mg SQ daily.

## 2021-09-14 NOTE — PROGRESS NOTE ADULT - SUBJECTIVE AND OBJECTIVE BOX
Patient is a 60y old  Male who presents with a chief complaint of celllulitis (13 Sep 2021 15:55)      INTERVAL HPI/OVERNIGHT EVENTS: Patient seen and examined at bedside. No overnight events occurred. Patient continues to complain of intense RLE pain due to the cellulitis, states his pain is not adequately controlled. Patient refused AM labs this morning due to the pain. Patient denies fevers, chills, headache, lightheadedness, chest pain, dyspnea, abdominal pain, n/v/d/c.    MEDICATIONS  (STANDING):  acetaminophen   Tablet .. 975 milliGRAM(s) Oral every 8 hours  aspirin  chewable 81 milliGRAM(s) Oral daily  atorvastatin 80 milliGRAM(s) Oral at bedtime  cefTRIAXone   IVPB 1000 milliGRAM(s) IV Intermittent every 24 hours  clopidogrel Tablet 75 milliGRAM(s) Oral daily  dextrose 40% Gel 15 Gram(s) Oral once  dextrose 5%. 1000 milliLiter(s) (50 mL/Hr) IV Continuous <Continuous>  dextrose 5%. 1000 milliLiter(s) (100 mL/Hr) IV Continuous <Continuous>  dextrose 50% Injectable 25 Gram(s) IV Push once  dextrose 50% Injectable 12.5 Gram(s) IV Push once  dextrose 50% Injectable 25 Gram(s) IV Push once  enoxaparin Injectable 40 milliGRAM(s) SubCutaneous daily  gabapentin 600 milliGRAM(s) Oral four times a day  glucagon  Injectable 1 milliGRAM(s) IntraMuscular once  hydrochlorothiazide 25 milliGRAM(s) Oral daily  insulin lispro (ADMELOG) corrective regimen sliding scale   SubCutaneous three times a day before meals  insulin lispro (ADMELOG) corrective regimen sliding scale   SubCutaneous at bedtime  lisinopril 40 milliGRAM(s) Oral daily  metoprolol tartrate 25 milliGRAM(s) Oral two times a day  pantoprazole    Tablet 40 milliGRAM(s) Oral before breakfast  polyethylene glycol 3350 17 Gram(s) Oral daily  saccharomyces boulardii 250 milliGRAM(s) Oral two times a day  senna 2 Tablet(s) Oral at bedtime    MEDICATIONS  (PRN):  magnesium hydroxide Suspension 30 milliLiter(s) Oral daily PRN Constipation  meclizine 12.5 milliGRAM(s) Oral three times a day PRN vertigo  melatonin 3 milliGRAM(s) Oral at bedtime PRN Insomnia  ondansetron Injectable 4 milliGRAM(s) IV Push every 8 hours PRN Nausea and/or Vomiting  traMADol 50 milliGRAM(s) Oral every 6 hours PRN Moderate Pain (4 - 6)  traMADol 75 milliGRAM(s) Oral every 6 hours PRN Severe Pain (7 - 10)      Allergies    Normodyne (Faint)  Normodyne (Unknown)    Intolerances        REVIEW OF SYSTEMS:  CONSTITUTIONAL: No fever or chills  HEENT:  No headache  RESPIRATORY: No cough, wheezing, or shortness of breath  CARDIOVASCULAR: No chest pain  GASTROINTESTINAL: No abd pain, nausea, vomiting, or diarrhea  GENITOURINARY: No dysuria, frequency, or hematuria  NEUROLOGICAL: no focal weakness or dizziness  MUSCULOSKELETAL: RLE pain     Vital Signs Last 24 Hrs  T(C): 36.7 (14 Sep 2021 13:10), Max: 37.1 (13 Sep 2021 21:08)  T(F): 98.1 (14 Sep 2021 13:10), Max: 98.8 (13 Sep 2021 21:08)  HR: 62 (14 Sep 2021 13:10) (62 - 77)  BP: 139/79 (14 Sep 2021 13:10) (139/79 - 177/94)  BP(mean): --  RR: 17 (14 Sep 2021 13:10) (17 - 17)  SpO2: 94% (14 Sep 2021 13:10) (93% - 95%)    PHYSICAL EXAM:  GENERAL: NAD  HEENT:  anicteric, moist mucous membranes  CHEST/LUNG:  CTA b/l, no rales, wheezes, or rhonchi  HEART:  RRR, S1, S2  ABDOMEN:  BS+, soft, nontender, nondistended  EXTREMITIES: RLE cellulitis improving  NERVOUS SYSTEM: answers questions and follows commands appropriately    LABS:    CBC Full  -  ( 13 Sep 2021 09:54 )  WBC Count : 5.08 K/uL  Hemoglobin : 12.3 g/dL  Hematocrit : 39.5 %  Platelet Count - Automated : 163 K/uL  Mean Cell Volume : 85.3 fl  Mean Cell Hemoglobin : 26.6 pg  Mean Cell Hemoglobin Concentration : 31.1 gm/dL  Auto Neutrophil # : 3.67 K/uL  Auto Lymphocyte # : 0.66 K/uL  Auto Monocyte # : 0.57 K/uL  Auto Eosinophil # : 0.12 K/uL  Auto Basophil # : 0.03 K/uL  Auto Neutrophil % : 72.2 %  Auto Lymphocyte % : 13.0 %  Auto Monocyte % : 11.2 %  Auto Eosinophil % : 2.4 %  Auto Basophil % : 0.6 %      Ca    9.3        13 Sep 2021 09:54          CAPILLARY BLOOD GLUCOSE      POCT Blood Glucose.: 149 mg/dL (14 Sep 2021 12:09)  POCT Blood Glucose.: 136 mg/dL (14 Sep 2021 08:03)  POCT Blood Glucose.: 179 mg/dL (13 Sep 2021 21:19)  POCT Blood Glucose.: 141 mg/dL (13 Sep 2021 16:59)        Culture - Blood (collected 09-11-21 @ 03:04)  Source: .Blood Blood  Preliminary Report (09-12-21 @ 06:15):    No growth to date.    Culture - Blood (collected 09-11-21 @ 03:04)  Source: .Blood Blood  Preliminary Report (09-12-21 @ 06:15):    No growth to date.        RADIOLOGY & ADDITIONAL TESTS:    Personally reviewed.     Consultant(s) Notes Reviewed:  [x] YES  [ ] NO     Patient is a 60y old  Male who presents with a chief complaint of celllulitis (13 Sep 2021 15:55)      INTERVAL HPI/OVERNIGHT EVENTS: Patient seen and examined at bedside. No overnight events occurred. Patient continues to complain of intense RLE pain due to the cellulitis, states his pain is not adequately controlled. Patient refused AM labs this morning due to the pain. Patient denies fevers, chills, headache, lightheadedness, chest pain, dyspnea, abdominal pain, n/v/d/c.    MEDICATIONS  (STANDING):  acetaminophen   Tablet .. 975 milliGRAM(s) Oral every 8 hours  aspirin  chewable 81 milliGRAM(s) Oral daily  atorvastatin 80 milliGRAM(s) Oral at bedtime  cefTRIAXone   IVPB 1000 milliGRAM(s) IV Intermittent every 24 hours  clopidogrel Tablet 75 milliGRAM(s) Oral daily  dextrose 40% Gel 15 Gram(s) Oral once  dextrose 5%. 1000 milliLiter(s) (50 mL/Hr) IV Continuous <Continuous>  dextrose 5%. 1000 milliLiter(s) (100 mL/Hr) IV Continuous <Continuous>  dextrose 50% Injectable 25 Gram(s) IV Push once  dextrose 50% Injectable 12.5 Gram(s) IV Push once  dextrose 50% Injectable 25 Gram(s) IV Push once  enoxaparin Injectable 40 milliGRAM(s) SubCutaneous daily  gabapentin 600 milliGRAM(s) Oral four times a day  glucagon  Injectable 1 milliGRAM(s) IntraMuscular once  hydrochlorothiazide 25 milliGRAM(s) Oral daily  insulin lispro (ADMELOG) corrective regimen sliding scale   SubCutaneous three times a day before meals  insulin lispro (ADMELOG) corrective regimen sliding scale   SubCutaneous at bedtime  lisinopril 40 milliGRAM(s) Oral daily  metoprolol tartrate 25 milliGRAM(s) Oral two times a day  pantoprazole    Tablet 40 milliGRAM(s) Oral before breakfast  polyethylene glycol 3350 17 Gram(s) Oral daily  saccharomyces boulardii 250 milliGRAM(s) Oral two times a day  senna 2 Tablet(s) Oral at bedtime    MEDICATIONS  (PRN):  magnesium hydroxide Suspension 30 milliLiter(s) Oral daily PRN Constipation  meclizine 12.5 milliGRAM(s) Oral three times a day PRN vertigo  melatonin 3 milliGRAM(s) Oral at bedtime PRN Insomnia  ondansetron Injectable 4 milliGRAM(s) IV Push every 8 hours PRN Nausea and/or Vomiting  traMADol 50 milliGRAM(s) Oral every 6 hours PRN Moderate Pain (4 - 6)  traMADol 75 milliGRAM(s) Oral every 6 hours PRN Severe Pain (7 - 10)      Allergies    Normodyne (Faint)  Normodyne (Unknown)    Intolerances        REVIEW OF SYSTEMS:  CONSTITUTIONAL: No fever or chills  HEENT:  No headache  RESPIRATORY: No cough, wheezing, or shortness of breath  CARDIOVASCULAR: No chest pain  GASTROINTESTINAL: No abd pain, nausea, vomiting, or diarrhea  GENITOURINARY: No dysuria, frequency, or hematuria  NEUROLOGICAL: no focal weakness or dizziness  MUSCULOSKELETAL: RLE pain     Vital Signs Last 24 Hrs  T(C): 36.7 (14 Sep 2021 13:10), Max: 37.1 (13 Sep 2021 21:08)  T(F): 98.1 (14 Sep 2021 13:10), Max: 98.8 (13 Sep 2021 21:08)  HR: 62 (14 Sep 2021 13:10) (62 - 77)  BP: 139/79 (14 Sep 2021 13:10) (139/79 - 177/94)  BP(mean): --  RR: 17 (14 Sep 2021 13:10) (17 - 17)  SpO2: 94% (14 Sep 2021 13:10) (93% - 95%)    PHYSICAL EXAM:  GENERAL: NAD  HEENT:  anicteric, moist mucous membranes  CHEST/LUNG:  CTA b/l, no rales, wheezes, or rhonchi  HEART:  RRR, S1, S2  ABDOMEN:  BS+, soft, nontender, nondistended  EXTREMITIES: RLE cellulitis improving  NERVOUS SYSTEM: answers questions and follows commands appropriately    LABS:    CBC Full  -  ( 13 Sep 2021 09:54 )  WBC Count : 5.08 K/uL  Hemoglobin : 12.3 g/dL  Hematocrit : 39.5 %  Platelet Count - Automated : 163 K/uL  Mean Cell Volume : 85.3 fl  Mean Cell Hemoglobin : 26.6 pg  Mean Cell Hemoglobin Concentration : 31.1 gm/dL  Auto Neutrophil # : 3.67 K/uL  Auto Lymphocyte # : 0.66 K/uL  Auto Monocyte # : 0.57 K/uL  Auto Eosinophil # : 0.12 K/uL  Auto Basophil # : 0.03 K/uL  Auto Neutrophil % : 72.2 %  Auto Lymphocyte % : 13.0 %  Auto Monocyte % : 11.2 %  Auto Eosinophil % : 2.4 %  Auto Basophil % : 0.6 %      Ca    9.3        13 Sep 2021 09:54          CAPILLARY BLOOD GLUCOSE      POCT Blood Glucose.: 149 mg/dL (14 Sep 2021 12:09)  POCT Blood Glucose.: 136 mg/dL (14 Sep 2021 08:03)  POCT Blood Glucose.: 179 mg/dL (13 Sep 2021 21:19)  POCT Blood Glucose.: 141 mg/dL (13 Sep 2021 16:59)        Culture - Blood (collected 09-11-21 @ 03:04)  Source: .Blood Blood  Preliminary Report (09-12-21 @ 06:15):    No growth to date.    Culture - Blood (collected 09-11-21 @ 03:04)  Source: .Blood Blood  Preliminary Report (09-12-21 @ 06:15):    No growth to date.        RADIOLOGY & ADDITIONAL TESTS:  No new studies in the past 24 hours.       Consultant(s) Notes Reviewed:  [x] YES  [ ] NO

## 2021-09-14 NOTE — PROGRESS NOTE ADULT - PROBLEM SELECTOR PLAN 1
- patient seen and evaluated  - Awaiting Bone scan to r/o OM of the right hallux  - Recommend vascular consultation   - Patient doesn't want any dressing because wound is healed  - follow up with infectious disease recommendations  - podiatry team will continue to follow patient while in house.

## 2021-09-14 NOTE — PROGRESS NOTE ADULT - ASSESSMENT
Grade 1 Diabetic ulcer Right hallux    Waiting on  Bone Scan to r/o OM of the right hallux, unable to get MRI     Patient doesnt want dressing on it because its healed     Podiatry team will continue to follow while in house    Recommend vascular consultation, patient has non palpable pulses and arterial calcifications on the xrays  Grade 1 Diabetic ulcer Right hallux    Waiting on  Bone Scan to r/o OM of the right hallux, unable to get MRI     Patient doesnt want dressing on it because its healed     Podiatry team will continue to follow while in house    Follow up with vascular recommendations , patient has non palpable pulses and arterial calcifications on the xrays

## 2021-09-14 NOTE — PROGRESS NOTE ADULT - SUBJECTIVE AND OBJECTIVE BOX
DUSTY STRICKLAND is a 60yMale , patient examined and chart reviewed.    INTERVAL HPI/ OVERNIGHT EVENTS:   Afebrile. Still with Right Leg pain.     PAST MEDICAL & SURGICAL HISTORY:  Hypertension  AAA (abdominal aortic aneurysm)  dx 2012  GERD (gastroesophageal reflux disease)  Leg weakness  Aortic valve replaced  Cardiac tamponade  HTN (hypertension)  Diabetes mellitus  H/O aortic valve repair  S/P aneurysm repair  Aortic valve replaced  S/P AAA repair  Repaired 3/2015  S/P aortic aneurysm repair  H/O aortic valve repair  History of incision and drainage      For details regarding the patient's social history, family history, and other miscellaneous elements, please refer the initial infectious diseases consultation and/or the admitting history and physical examination for this admission.    ROS:  CONSTITUTIONAL:  Negative fever or chills  EYES:  Negative  blurry vision or double vision  CARDIOVASCULAR:  Negative for chest pain or palpitations  RESPIRATORY:  Negative for cough, wheezing, or SOB   GASTROINTESTINAL:  Negative for nausea, vomiting, diarrhea, constipation, or abdominal pain  GENITOURINARY:  Negative frequency, urgency or dysuria  NEUROLOGIC:  No headache, confusion, dizziness, lightheadedness  All other systems were reviewed and are negative     ALLERGIES:  Normodyne (Faint)  Normodyne (Unknown)      Current inpatient medications :    ANTIBIOTICS/RELEVANT:  cefTRIAXone   IVPB 1000 milliGRAM(s) IV Intermittent every 24 hours    MEDICATIONS  (STANDING):  acetaminophen   Tablet .. 975 milliGRAM(s) Oral every 6 hours  acetaminophen  IVPB .. 1000 milliGRAM(s) IV Intermittent once  aspirin  chewable 81 milliGRAM(s) Oral daily  atorvastatin 80 milliGRAM(s) Oral at bedtim  clopidogrel Tablet 75 milliGRAM(s) Oral daily  dextrose 40% Gel 15 Gram(s) Oral once  dextrose 5%. 1000 milliLiter(s) (50 mL/Hr) IV Continuous <Continuous>  dextrose 5%. 1000 milliLiter(s) (100 mL/Hr) IV Continuous <Continuous>  dextrose 50% Injectable 25 Gram(s) IV Push once  dextrose 50% Injectable 12.5 Gram(s) IV Push once  dextrose 50% Injectable 25 Gram(s) IV Push once  enoxaparin Injectable 40 milliGRAM(s) SubCutaneous daily  gabapentin 800 milliGRAM(s) Oral four times a day  glucagon  Injectable 1 milliGRAM(s) IntraMuscular once  hydrochlorothiazide 25 milliGRAM(s) Oral daily  insulin lispro (ADMELOG) corrective regimen sliding scale   SubCutaneous three times a day before meals  insulin lispro (ADMELOG) corrective regimen sliding scale   SubCutaneous at bedtime  ketorolac   Injectable 30 milliGRAM(s) IV Push once  lisinopril 40 milliGRAM(s) Oral daily  metoprolol tartrate 25 milliGRAM(s) Oral two times a day  pantoprazole    Tablet 40 milliGRAM(s) Oral before breakfast  polyethylene glycol 3350 17 Gram(s) Oral daily  saccharomyces boulardii 250 milliGRAM(s) Oral two times a day  senna 2 Tablet(s) Oral at bedtime    MEDICATIONS  (PRN):  magnesium hydroxide Suspension 30 milliLiter(s) Oral daily PRN Constipation  meclizine 12.5 milliGRAM(s) Oral three times a day PRN vertigo  melatonin 3 milliGRAM(s) Oral at bedtime PRN Insomnia  ondansetron Injectable 4 milliGRAM(s) IV Push every 8 hours PRN Nausea and/or Vomiting  traMADol 50 milliGRAM(s) Oral every 6 hours PRN Moderate Pain (4 - 6)  traMADol 75 milliGRAM(s) Oral every 6 hours PRN Severe Pain (7 - 10)        Objective:  Vital Signs Last 24 Hrs  T(C): 36.7 (14 Sep 2021 13:10), Max: 37.1 (13 Sep 2021 21:08)  T(F): 98.1 (14 Sep 2021 13:10), Max: 98.8 (13 Sep 2021 21:08)  HR: 62 (14 Sep 2021 13:10) (62 - 77)  BP: 139/79 (14 Sep 2021 13:10) (139/79 - 156/82)  RR: 17 (14 Sep 2021 13:10) (17 - 17)  SpO2: 94% (14 Sep 2021 13:10) (93% - 95%)      Physical Exam:  General:  no acute distress  Neck: supple, trachea midline  Lungs: clear, no wheeze/rhonchi  Cardiovascular: regular rate and rhythm, S1 S2  Abdomen: soft, nontender,  bowel sounds normal  Neurological: alert and oriented x3  Extremities: Right LE swelling redness and pain      LABS:                        12.3   5.08  )-----------( 163      ( 13 Sep 2021 09:54 )             39.5   09-13    134<L>  |  102  |  19  ----------------------------<  115<H>  3.8   |  24  |  0.97    Ca    9.3      13 Sep 2021 09:54      MICROBIOLOGY:    Culture - Blood (collected 11 Sep 2021 03:04)  Source: .Blood Blood  Preliminary Report (12 Sep 2021 06:15):    No growth to date.    Culture - Blood (collected 11 Sep 2021 03:04)  Source: .Blood Blood  Preliminary Report (12 Sep 2021 06:15):    No growth to date.    RADIOLOGY & ADDITIONAL STUDIES:    EXAM:  DUPLEX LOW ARTERIES UNI LTD RT                            PROCEDURE DATE:  09/14/2021          INTERPRETATION:  HISTORY: Right lower extremity cellulitis.    Right lower extremity arterial Doppler was performed using grayscale, color and spectral Doppler. There are no prior studies available for comparison.    Findings:    RIGHT: Scattered atherosclerotic plaque. There is biphasic flow within the common femoral, superficial femoral and popliteal arteries. Abnormal monophasic low resistance flow within the calf and dorsalis pedis arteries.  Peak systolic velocities are as follows (in CM/sec):  CFA: 135  DFA: 50  SFA: (Proximal, mid, distal): 77, 82, 70  Popliteal: 71  PTA: 127  Peroneal: Not visualized  JESSICA: 131  DPA: 43    IMPRESSION: Nonvisualization of the right peroneal artery. Scattered atherosclerotic plaque with questional areas of stenosis in the right posterior tibial and anterior tibial arteries.        Assessment :  59 yo M with PMHx of CAD (with 2 stents in the 1st Diag 7/1/19 and LPDA 7/3/19), bioprosthetic AVR with ascending aortic aneurysm repair on 03/116/15 (for a bicuspid aortic valve), cardiac tamponade (s/p pericardiocentesis 3/26/15), HFpEF, syncope (s/p ILR insertion 2/2/16), HTN, HLD, T2DM (not on insulin, complicated by peripheral neuropathy), admitted with severe Right LE cellulitis with lymphangitis Has right hallux healed ulcer.  Still with leg pain and swelling  PVD  Vascular following    Plan:  Change Rocephin to Ancef  Trend temps and cbc  Elevate leg  Fu bone scan r/o OM  Vascular and podiatry on case    Continue with present regiment.  Appropriate use of antibiotics and adverse effects reviewed.      > 35 minutes were spent in direct patient care reviewing notes, medications ,labs data/ imaging , discussion with multidisciplinary team.    Thank you for allowing me to participate in care of your patient .    Gaston Blanco MD  Infectious Disease  [phone]

## 2021-09-14 NOTE — PROGRESS NOTE ADULT - SUBJECTIVE AND OBJECTIVE BOX
SUBJECTIVE:  Patient seen and examined at bedside.  Patient c/o persistent burning pain to his RLE that worsens with ambulation.  Patient denies any fevers, chills, chest pain, shortness of breath, dizziness, lightheadedness, or n/v/d.    Vital Signs Last 24 Hrs  T(C): 36.7 (14 Sep 2021 13:10), Max: 37.1 (13 Sep 2021 21:08)  T(F): 98.1 (14 Sep 2021 13:10), Max: 98.8 (13 Sep 2021 21:08)  HR: 62 (14 Sep 2021 13:10) (62 - 77)  BP: 139/79 (14 Sep 2021 13:10) (139/79 - 177/94)  BP(mean): --  RR: 17 (14 Sep 2021 13:10) (17 - 17)  SpO2: 94% (14 Sep 2021 13:10) (93% - 95%)    PHYSICAL EXAM:  GENERAL: No acute distress, well-developed  HEAD:  Atraumatic, Normocephalic  EXTREMITIES: DP & PT revealed via Doppler bilaterally; R dorsal hallux wound dry. Cellulitic rash present to dorsum of right foot and spreads to inferior the knee and circumferentially, tender and warm to palpation. R ankle and dorsum edematous. Motor, strength and sensation intact  NEUROLOGY: A&O x 3, no focal deficits    I&O's Summary    MEDICATIONS  (STANDING):  acetaminophen   Tablet .. 975 milliGRAM(s) Oral every 6 hours  aspirin  chewable 81 milliGRAM(s) Oral daily  atorvastatin 80 milliGRAM(s) Oral at bedtime  cefTRIAXone   IVPB 1000 milliGRAM(s) IV Intermittent every 24 hours  clopidogrel Tablet 75 milliGRAM(s) Oral daily  dextrose 40% Gel 15 Gram(s) Oral once  dextrose 5%. 1000 milliLiter(s) (50 mL/Hr) IV Continuous <Continuous>  dextrose 5%. 1000 milliLiter(s) (100 mL/Hr) IV Continuous <Continuous>  dextrose 50% Injectable 25 Gram(s) IV Push once  dextrose 50% Injectable 12.5 Gram(s) IV Push once  dextrose 50% Injectable 25 Gram(s) IV Push once  enoxaparin Injectable 40 milliGRAM(s) SubCutaneous daily  gabapentin 600 milliGRAM(s) Oral four times a day  glucagon  Injectable 1 milliGRAM(s) IntraMuscular once  hydrochlorothiazide 25 milliGRAM(s) Oral daily  insulin lispro (ADMELOG) corrective regimen sliding scale   SubCutaneous three times a day before meals  insulin lispro (ADMELOG) corrective regimen sliding scale   SubCutaneous at bedtime  ketorolac   Injectable 30 milliGRAM(s) IV Push once  lisinopril 40 milliGRAM(s) Oral daily  metoprolol tartrate 25 milliGRAM(s) Oral two times a day  pantoprazole    Tablet 40 milliGRAM(s) Oral before breakfast  polyethylene glycol 3350 17 Gram(s) Oral daily  saccharomyces boulardii 250 milliGRAM(s) Oral two times a day  senna 2 Tablet(s) Oral at bedtime    MEDICATIONS  (PRN):  magnesium hydroxide Suspension 30 milliLiter(s) Oral daily PRN Constipation  meclizine 12.5 milliGRAM(s) Oral three times a day PRN vertigo  melatonin 3 milliGRAM(s) Oral at bedtime PRN Insomnia  ondansetron Injectable 4 milliGRAM(s) IV Push every 8 hours PRN Nausea and/or Vomiting  traMADol 50 milliGRAM(s) Oral every 6 hours PRN Moderate Pain (4 - 6)  traMADol 75 milliGRAM(s) Oral every 6 hours PRN Severe Pain (7 - 10)    LABS:                        12.3   5.08  )-----------( 163      ( 13 Sep 2021 09:54 )             39.5     09-13    134<L>  |  102  |  19  ----------------------------<  115<H>  3.8   |  24  |  0.97    Ca    9.3      13 Sep 2021 09:54    RADIOLOGY & ADDITIONAL STUDIES:  < from: [facility] Physiol Extremity Lower 3+ Level, BI (09.14.21 @ 15:44) >  FINDINGS: Thereis biphasic flow bilaterally without discernible waveforms at the level of the digits. Patient could not tolerate compression for segmental pressures in the right lower extremity. Therefore, right IMELDA could not be calculated.    Left IMELDA = 1.32    IMPRESSION: Limited exam. Right IMELDA could not be calculated. Left IMELDA at the upper limits of normal.      ASSESSMENT  60y Male admitted for R hallux wound and RLE cellulitis. Patient c/o persistent warmth and pain to RLE. ABIs from today incomplete as patient was unable to tolerate exam on the R side. WBC normal and patient afebrile at present.   - F/up official read on arterial duplex  - Awaiting results of bone scan to r/o OM  - cont care as per podiatry. No dressing over wound as pt requests  - IV abx as per ID  -Case discussed with Dr. Waldron    Surgical Team Contact Information  Spectralink: Ext: [phone] or [phone]  Pager: [phone]

## 2021-09-14 NOTE — PROGRESS NOTE ADULT - ATTENDING COMMENTS
61 yo M with PMHx of CAD (with 2 stents in the 1st Diag 7/1/19 and LPDA 7/3/19), bioprosthetic AVR with ascending aortic aneurysm repair on 03/116/15 (for a bicuspid aortic valve), cardiac tamponade (s/p pericardiocentesis 3/26/15), HFpEF, syncope (s/p ILR insertion 2/2/16), atypical chest discomfort, HTN, HLD, T2DM (not on insulin, complicated by peripheral neuropathy), GERD, vertigo, lacunar infarcts (incidentally detected on MRI), ventricular ectopy and mild carotid atherosclerosis presents to the ED c/o right lower extremity swelling, warmth, and pain, admitted for cellulitis. Patient has completed his course of IV zosyn and is now on IV rocephin    Pt feel his pain is still not adequately controlled in his RLE.  Increased Tylenol to 975mg po q6, also ordered for toradol 30mg IV x 1 dose, and will cont tramadol; may need to give further doses of toradol.  Cont IV ceftriaxone for RLE cellulitis.  Pt has a toe ulcer, does not appear infected at this time, will f/u on WBC scan results to localize infection and to assess for osteomyelitis.  Role of MRI?  Will try to assess if cards can clear pt with ILR, if MRI required.      Uvaldo Kern, Attending Physician

## 2021-09-15 LAB
ANION GAP SERPL CALC-SCNC: 8 MMOL/L — SIGNIFICANT CHANGE UP (ref 5–17)
BASOPHILS # BLD AUTO: 0.04 K/UL — SIGNIFICANT CHANGE UP (ref 0–0.2)
BASOPHILS NFR BLD AUTO: 0.7 % — SIGNIFICANT CHANGE UP (ref 0–2)
BUN SERPL-MCNC: 18 MG/DL — SIGNIFICANT CHANGE UP (ref 7–23)
CALCIUM SERPL-MCNC: 9.6 MG/DL — SIGNIFICANT CHANGE UP (ref 8.5–10.1)
CHLORIDE SERPL-SCNC: 97 MMOL/L — SIGNIFICANT CHANGE UP (ref 96–108)
CO2 SERPL-SCNC: 29 MMOL/L — SIGNIFICANT CHANGE UP (ref 22–31)
CREAT SERPL-MCNC: 1.1 MG/DL — SIGNIFICANT CHANGE UP (ref 0.5–1.3)
EOSINOPHIL # BLD AUTO: 0.12 K/UL — SIGNIFICANT CHANGE UP (ref 0–0.5)
EOSINOPHIL NFR BLD AUTO: 2 % — SIGNIFICANT CHANGE UP (ref 0–6)
GLUCOSE SERPL-MCNC: 139 MG/DL — HIGH (ref 70–99)
HCT VFR BLD CALC: 40.8 % — SIGNIFICANT CHANGE UP (ref 39–50)
HGB BLD-MCNC: 13.1 G/DL — SIGNIFICANT CHANGE UP (ref 13–17)
IMM GRANULOCYTES NFR BLD AUTO: 1.2 % — SIGNIFICANT CHANGE UP (ref 0–1.5)
LYMPHOCYTES # BLD AUTO: 0.71 K/UL — LOW (ref 1–3.3)
LYMPHOCYTES # BLD AUTO: 12.1 % — LOW (ref 13–44)
MAGNESIUM SERPL-MCNC: 1.8 MG/DL — SIGNIFICANT CHANGE UP (ref 1.6–2.6)
MCHC RBC-ENTMCNC: 26.9 PG — LOW (ref 27–34)
MCHC RBC-ENTMCNC: 32.1 GM/DL — SIGNIFICANT CHANGE UP (ref 32–36)
MCV RBC AUTO: 83.8 FL — SIGNIFICANT CHANGE UP (ref 80–100)
MONOCYTES # BLD AUTO: 0.54 K/UL — SIGNIFICANT CHANGE UP (ref 0–0.9)
MONOCYTES NFR BLD AUTO: 9.2 % — SIGNIFICANT CHANGE UP (ref 2–14)
NEUTROPHILS # BLD AUTO: 4.39 K/UL — SIGNIFICANT CHANGE UP (ref 1.8–7.4)
NEUTROPHILS NFR BLD AUTO: 74.8 % — SIGNIFICANT CHANGE UP (ref 43–77)
NRBC # BLD: 0 /100 WBCS — SIGNIFICANT CHANGE UP (ref 0–0)
PLATELET # BLD AUTO: 189 K/UL — SIGNIFICANT CHANGE UP (ref 150–400)
POTASSIUM SERPL-MCNC: 3.7 MMOL/L — SIGNIFICANT CHANGE UP (ref 3.5–5.3)
POTASSIUM SERPL-SCNC: 3.7 MMOL/L — SIGNIFICANT CHANGE UP (ref 3.5–5.3)
RBC # BLD: 4.87 M/UL — SIGNIFICANT CHANGE UP (ref 4.2–5.8)
RBC # FLD: 14.1 % — SIGNIFICANT CHANGE UP (ref 10.3–14.5)
SODIUM SERPL-SCNC: 134 MMOL/L — LOW (ref 135–145)
WBC # BLD: 5.87 K/UL — SIGNIFICANT CHANGE UP (ref 3.8–10.5)
WBC # FLD AUTO: 5.87 K/UL — SIGNIFICANT CHANGE UP (ref 3.8–10.5)

## 2021-09-15 PROCEDURE — 36573 INSJ PICC RS&I 5 YR+: CPT | Mod: 53

## 2021-09-15 PROCEDURE — 78802 RP LOCLZJ TUM WHBDY 1 D IMG: CPT | Mod: 26

## 2021-09-15 PROCEDURE — 76937 US GUIDE VASCULAR ACCESS: CPT | Mod: 26

## 2021-09-15 PROCEDURE — 99232 SBSQ HOSP IP/OBS MODERATE 35: CPT

## 2021-09-15 PROCEDURE — 99233 SBSQ HOSP IP/OBS HIGH 50: CPT | Mod: GC

## 2021-09-15 RX ORDER — POLYETHYLENE GLYCOL 3350 17 G/17G
17 POWDER, FOR SOLUTION ORAL DAILY
Refills: 0 | Status: DISCONTINUED | OUTPATIENT
Start: 2021-09-15 | End: 2021-09-15

## 2021-09-15 RX ORDER — POLYETHYLENE GLYCOL 3350 17 G/17G
17 POWDER, FOR SOLUTION ORAL DAILY
Refills: 0 | Status: DISCONTINUED | OUTPATIENT
Start: 2021-09-15 | End: 2021-09-18

## 2021-09-15 RX ORDER — FUROSEMIDE 40 MG
20 TABLET ORAL DAILY
Refills: 0 | Status: DISCONTINUED | OUTPATIENT
Start: 2021-09-15 | End: 2021-09-18

## 2021-09-15 RX ADMIN — PANTOPRAZOLE SODIUM 40 MILLIGRAM(S): 20 TABLET, DELAYED RELEASE ORAL at 06:21

## 2021-09-15 RX ADMIN — Medication 5 MILLIGRAM(S): at 17:21

## 2021-09-15 RX ADMIN — Medication 975 MILLIGRAM(S): at 07:19

## 2021-09-15 RX ADMIN — Medication 250 MILLIGRAM(S): at 06:22

## 2021-09-15 RX ADMIN — Medication 100 MILLIGRAM(S): at 21:18

## 2021-09-15 RX ADMIN — ENOXAPARIN SODIUM 40 MILLIGRAM(S): 100 INJECTION SUBCUTANEOUS at 14:12

## 2021-09-15 RX ADMIN — GABAPENTIN 800 MILLIGRAM(S): 400 CAPSULE ORAL at 17:20

## 2021-09-15 RX ADMIN — SENNA PLUS 2 TABLET(S): 8.6 TABLET ORAL at 21:18

## 2021-09-15 RX ADMIN — Medication 1: at 17:20

## 2021-09-15 RX ADMIN — Medication 975 MILLIGRAM(S): at 14:02

## 2021-09-15 RX ADMIN — Medication 250 MILLIGRAM(S): at 17:21

## 2021-09-15 RX ADMIN — Medication 81 MILLIGRAM(S): at 14:02

## 2021-09-15 RX ADMIN — CLOPIDOGREL BISULFATE 75 MILLIGRAM(S): 75 TABLET, FILM COATED ORAL at 14:13

## 2021-09-15 RX ADMIN — ATORVASTATIN CALCIUM 80 MILLIGRAM(S): 80 TABLET, FILM COATED ORAL at 21:18

## 2021-09-15 RX ADMIN — MAGNESIUM HYDROXIDE 30 MILLILITER(S): 400 TABLET, CHEWABLE ORAL at 08:42

## 2021-09-15 RX ADMIN — GABAPENTIN 800 MILLIGRAM(S): 400 CAPSULE ORAL at 14:13

## 2021-09-15 RX ADMIN — GABAPENTIN 800 MILLIGRAM(S): 400 CAPSULE ORAL at 06:21

## 2021-09-15 RX ADMIN — Medication 975 MILLIGRAM(S): at 17:21

## 2021-09-15 RX ADMIN — Medication 975 MILLIGRAM(S): at 06:22

## 2021-09-15 RX ADMIN — Medication 25 MILLIGRAM(S): at 17:21

## 2021-09-15 RX ADMIN — Medication 975 MILLIGRAM(S): at 16:10

## 2021-09-15 RX ADMIN — Medication 25 MILLIGRAM(S): at 06:22

## 2021-09-15 RX ADMIN — Medication 100 MILLIGRAM(S): at 14:02

## 2021-09-15 RX ADMIN — LISINOPRIL 40 MILLIGRAM(S): 2.5 TABLET ORAL at 06:22

## 2021-09-15 RX ADMIN — Medication 25 MILLIGRAM(S): at 06:21

## 2021-09-15 NOTE — PROGRESS NOTE ADULT - PROBLEM SELECTOR PLAN 1
- patient seen and evaluated  - Awaiting Bone scan   - Recommend vascular consultation   - Patient doesn't want any dressing because wound is healed  - follow up with infectious disease recommendations  - podiatry team will continue to follow patient while in house  - Wound Care Instructions below  - Podiatry stable   WOUND CARE INSTRUCTIONS (IF PATIENT DECIDES HE WOULD LIKE A WOUND DRESSING )  1. Cleanse wound with sterile saline solution and gently pat dry.  2. Dress wound with Aquacel ag and dry, sterile dressing.  3. Change dressings daily and monitor for any signs of infection.  4. Patient is to follow up [facility] with Dr. Scott or Dr. Michele within 1 week upon discharge.

## 2021-09-15 NOTE — PROGRESS NOTE ADULT - ASSESSMENT
Grade 1 Diabetic ulcer Right hallux    Bone Scan- no OM of the right hallux    No podiatric surgical intervention, no OM    Patient doesnt want dressing on it because its healed     Podiatry team will continue to follow while in house    Follow up with vascular recommendations , patient has non palpable pulses and arterial calcifications on the xrays

## 2021-09-15 NOTE — PROGRESS NOTE ADULT - SUBJECTIVE AND OBJECTIVE BOX
SUBJECTIVE:  Patient seen and examined at bedside. Patient states that the pain has decreased to his RLE, but the burning sensation is persistent. Patient denies any fevers, chills, chest pain, shortness of breath, n/v/d.    Vital Signs Last 24 Hrs  T(C): 36.7 (15 Sep 2021 05:30), Max: 36.8 (14 Sep 2021 21:52)  T(F): 98.1 (15 Sep 2021 05:30), Max: 98.2 (14 Sep 2021 21:52)  HR: 70 (15 Sep 2021 05:30) (60 - 75)  BP: 142/87 (15 Sep 2021 05:30) (139/79 - 146/77)  BP(mean): --  RR: 17 (15 Sep 2021 05:30) (17 - 18)  SpO2: 95% (15 Sep 2021 05:30) (93% - 97%)    PHYSICAL EXAM:  GENERAL: No acute distress, well-developed  HEAD:  Atraumatic, Normocephalic  EXTREMITIES: DP & PT palpated. R dorsal hallux wound dry. Cellulitic rash present to dorsum of right foot and spreads to inferior the knee and circumferentially, warm to palpation, less tender than yesterday. R ankle and dorsum edematous. Motor, strength and sensation intact  NEUROLOGY: A&O x 3, no focal deficits    I&O's Summary    MEDICATIONS  (STANDING):  acetaminophen   Tablet .. 975 milliGRAM(s) Oral every 6 hours  aspirin  chewable 81 milliGRAM(s) Oral daily  atorvastatin 80 milliGRAM(s) Oral at bedtime  ceFAZolin   IVPB 2000 milliGRAM(s) IV Intermittent every 8 hours  clopidogrel Tablet 75 milliGRAM(s) Oral daily  dextrose 40% Gel 15 Gram(s) Oral once  dextrose 5%. 1000 milliLiter(s) (50 mL/Hr) IV Continuous <Continuous>  dextrose 5%. 1000 milliLiter(s) (100 mL/Hr) IV Continuous <Continuous>  dextrose 50% Injectable 25 Gram(s) IV Push once  dextrose 50% Injectable 12.5 Gram(s) IV Push once  dextrose 50% Injectable 25 Gram(s) IV Push once  enoxaparin Injectable 40 milliGRAM(s) SubCutaneous daily  gabapentin 800 milliGRAM(s) Oral four times a day  glucagon  Injectable 1 milliGRAM(s) IntraMuscular once  hydrochlorothiazide 25 milliGRAM(s) Oral daily  insulin lispro (ADMELOG) corrective regimen sliding scale   SubCutaneous three times a day before meals  insulin lispro (ADMELOG) corrective regimen sliding scale   SubCutaneous at bedtime  ketorolac   Injectable 30 milliGRAM(s) IV Push once  lisinopril 40 milliGRAM(s) Oral daily  metoprolol tartrate 25 milliGRAM(s) Oral two times a day  pantoprazole    Tablet 40 milliGRAM(s) Oral before breakfast  polyethylene glycol 3350 17 Gram(s) Oral daily  saccharomyces boulardii 250 milliGRAM(s) Oral two times a day  senna 2 Tablet(s) Oral at bedtime    MEDICATIONS  (PRN):  magnesium hydroxide Suspension 30 milliLiter(s) Oral daily PRN Constipation  meclizine 12.5 milliGRAM(s) Oral three times a day PRN vertigo  melatonin 3 milliGRAM(s) Oral at bedtime PRN Insomnia  ondansetron Injectable 4 milliGRAM(s) IV Push every 8 hours PRN Nausea and/or Vomiting  traMADol 50 milliGRAM(s) Oral every 6 hours PRN Moderate Pain (4 - 6)  traMADol 75 milliGRAM(s) Oral every 6 hours PRN Severe Pain (7 - 10)    LABS:                        13.1   5.87  )-----------( 189      ( 15 Sep 2021 08:38 )             40.8     09-15    134<L>  |  97  |  18  ----------------------------<  139<H>  3.7   |  29  |  1.10    Ca    9.6      15 Sep 2021 08:38  Mg     1.8     09-15    RADIOLOGY & ADDITIONAL STUDIES:  < from: [facility] Duplex Low Ext Arterial, Ltd, Right (09.14.21 @ 15:43) >  Findings:    RIGHT: Scattered atherosclerotic plaque. There is biphasic flow within the common femoral, superficial femoral and popliteal arteries. Abnormal monophasic low resistance flow within the calf and dorsalis pedis arteries.  Peak systolic velocities are as follows (in CM/sec):  CFA: 135  DFA: 50  SFA: (Proximal, mid, distal): 77, 82, 70  Popliteal: 71  PTA: 127  Peroneal: Not visualized  JESSICA: 131  DPA: 43    IMPRESSION: Nonvisualization of the right peroneal artery. Scattered atherosclerotic plaque with questional areas of stenosis in the right posterior tibial and anterior tibial arteries.

## 2021-09-15 NOTE — PROGRESS NOTE ADULT - SUBJECTIVE AND OBJECTIVE BOX
DUSTY STRICKLAND is a 60yMale , patient examined and chart reviewed.    INTERVAL HPI/ OVERNIGHT EVENTS:   Afebrile. Right leg pain better but still feels burning sensation.     PAST MEDICAL & SURGICAL HISTORY:  Hypertension  AAA (abdominal aortic aneurysm)  dx 2012  GERD (gastroesophageal reflux disease)  Leg weakness  Aortic valve replaced  Cardiac tamponade  HTN (hypertension)  Diabetes mellitus  H/O aortic valve repair  S/P aneurysm repair  Aortic valve replaced  S/P AAA repair  Repaired 3/2015  S/P aortic aneurysm repair  H/O aortic valve repair  History of incision and drainage      For details regarding the patient's social history, family history, and other miscellaneous elements, please refer the initial infectious diseases consultation and/or the admitting history and physical examination for this admission.    ROS:  CONSTITUTIONAL:  Negative fever or chills  EYES:  Negative  blurry vision or double vision  CARDIOVASCULAR:  Negative for chest pain or palpitations  RESPIRATORY:  Negative for cough, wheezing, or SOB   GASTROINTESTINAL:  Negative for nausea, vomiting, diarrhea, constipation, or abdominal pain  GENITOURINARY:  Negative frequency, urgency or dysuria  NEUROLOGIC:  No headache, confusion, dizziness, lightheadedness  All other systems were reviewed and are negative     ALLERGIES:  Normodyne (Faint)  Normodyne (Unknown)      Current inpatient medications :    ANTIBIOTICS/RELEVANT:  ceFAZolin   IVPB 2000 milliGRAM(s) IV Intermittent every 8 hours    MEDICATIONS  (STANDING):  acetaminophen   Tablet .. 975 milliGRAM(s) Oral every 6 hours  aspirin  chewable 81 milliGRAM(s) Oral daily  atorvastatin 80 milliGRAM(s) Oral at bedtime  clopidogrel Tablet 75 milliGRAM(s) Oral daily  dextrose 40% Gel 15 Gram(s) Oral once  dextrose 5%. 1000 milliLiter(s) (50 mL/Hr) IV Continuous <Continuous>  dextrose 5%. 1000 milliLiter(s) (100 mL/Hr) IV Continuous <Continuous>  dextrose 50% Injectable 25 Gram(s) IV Push once  dextrose 50% Injectable 12.5 Gram(s) IV Push once  dextrose 50% Injectable 25 Gram(s) IV Push once  enoxaparin Injectable 40 milliGRAM(s) SubCutaneous daily  furosemide    Tablet 20 milliGRAM(s) Oral daily  gabapentin 800 milliGRAM(s) Oral four times a day  glucagon  Injectable 1 milliGRAM(s) IntraMuscular once  insulin lispro (ADMELOG) corrective regimen sliding scale   SubCutaneous three times a day before meals  insulin lispro (ADMELOG) corrective regimen sliding scale   SubCutaneous at bedtime  lisinopril 40 milliGRAM(s) Oral daily  metoprolol tartrate 25 milliGRAM(s) Oral two times a day  pantoprazole    Tablet 40 milliGRAM(s) Oral before breakfast  polyethylene glycol 3350 17 Gram(s) Oral daily  saccharomyces boulardii 250 milliGRAM(s) Oral two times a day  senna 2 Tablet(s) Oral at bedtime    MEDICATIONS  (PRN):  magnesium hydroxide Suspension 30 milliLiter(s) Oral daily PRN Constipation  meclizine 12.5 milliGRAM(s) Oral three times a day PRN vertigo  melatonin 3 milliGRAM(s) Oral at bedtime PRN Insomnia  ondansetron Injectable 4 milliGRAM(s) IV Push every 8 hours PRN Nausea and/or Vomiting  traMADol 50 milliGRAM(s) Oral every 6 hours PRN Moderate Pain (4 - 6)  traMADol 75 milliGRAM(s) Oral every 6 hours PRN Severe Pain (7 - 10)        Objective:  Vital Signs Last 24 Hrs  T(C): 36.4 (15 Sep 2021 20:03), Max: 36.7 (15 Sep 2021 05:30)  T(F): 97.6 (15 Sep 2021 20:03), Max: 98.1 (15 Sep 2021 05:30)  HR: 77 (15 Sep 2021 20:03) (70 - 77)  BP: 143/82 (15 Sep 2021 20:03) (142/87 - 154/84)  RR: 17 (15 Sep 2021 20:03) (17 - 17)  SpO2: 92% (15 Sep 2021 20:03) (92% - 95%)    Physical Exam:  General:  no acute distress  Neck: supple, trachea midline  Lungs: clear, no wheeze/rhonchi  Cardiovascular: regular rate and rhythm, S1 S2  Abdomen: soft, nontender,  bowel sounds normal  Neurological: alert and oriented x3  Extremities: Right LE swelling redness and pain      LABS:                        13.1   5.87  )-----------( 189      ( 15 Sep 2021 08:38 )             40.8   09-15    134<L>  |  97  |  18  ----------------------------<  139<H>  3.7   |  29  |  1.10    Ca    9.6      15 Sep 2021 08:38  Mg     1.8     09-15        MICROBIOLOGY:    Culture - Blood (collected 11 Sep 2021 03:04)  Source: .Blood Blood  Preliminary Report (12 Sep 2021 06:15):    No growth to date.    Culture - Blood (collected 11 Sep 2021 03:04)  Source: .Blood Blood  Preliminary Report (12 Sep 2021 06:15):    No growth to date.    RADIOLOGY & ADDITIONAL STUDIES:    EXAM:  DUPLEX LOW ARTERIES UNI LTD RT                            PROCEDURE DATE:  09/14/2021          INTERPRETATION:  HISTORY: Right lower extremity cellulitis.    Right lower extremity arterial Doppler was performed using grayscale, color and spectral Doppler. There are no prior studies available for comparison.    Findings:    RIGHT: Scattered atherosclerotic plaque. There is biphasic flow within the common femoral, superficial femoral and popliteal arteries. Abnormal monophasic low resistance flow within the calf and dorsalis pedis arteries.  Peak systolic velocities are as follows (in CM/sec):  CFA: 135  DFA: 50  SFA: (Proximal, mid, distal): 77, 82, 70  Popliteal: 71  PTA: 127  Peroneal: Not visualized  JESSICA: 131  DPA: 43    IMPRESSION: Nonvisualization of the right peroneal artery. Scattered atherosclerotic plaque with questional areas of stenosis in the right posterior tibial and anterior tibial arteries.      EXAM:  NM MULTI DAY PROCEDURE                          EXAM:  NM INFLAMM LOC WBC WB SD                            PROCEDURE DATE:  09/15/2021          INTERPRETATION:  RADIOPHARMACEUTICAL: 11 mCi In-111 labeled autologous leukocytes, I.V.    CLINICAL STATEMENT: 60-year-old diabetic male with cellulitis in the right hallux referred to evaluate for osteomyelitis.    TECHNIQUE:  Static images of bilateral feet were obtained approximately 20 hours after the administration of the labeled leukocyte. Anterior and posterior abdominal images were obtained for quality is all-purpose.    FINDINGS: There is faint labeled WBC activity in the right hallux seen only on the plantar image compatible with soft tissue infection. There is physiologic labeled WBC activity in the remainder of the bilateral feet.    IMPRESSION: Tc 99m labeled leukocyte scan demonstrates:    Soft tissue infection in the plantar aspect of the right great toe. No scan evidence of osteomyelitis.      Assessment :  61 yo M with PMHx of CAD (with 2 stents in the 1st Diag 7/1/19 and LPDA 7/3/19), bioprosthetic AVR with ascending aortic aneurysm repair on 03/116/15 (for a bicuspid aortic valve), cardiac tamponade (s/p pericardiocentesis 3/26/15), HFpEF, syncope (s/p ILR insertion 2/2/16), HTN, HLD, T2DM (not on insulin, complicated by peripheral neuropathy), admitted with severe Right LE cellulitis with lymphangitis Has right hallux healed ulcer.  Still with leg pain and swelling  PVD- arterial doppler results noted  Vascular following  Bone scan - Neg for OM    Plan:  Cont Ancef  Trend temps and cbc  Elevate leg  Will need revascularization  Vascular and podiatry on case    D/w Dr Powers    Continue with present regiment.  Appropriate use of antibiotics and adverse effects reviewed.      > 35 minutes were spent in direct patient care reviewing notes, medications ,labs data/ imaging , discussion with multidisciplinary team.    Thank you for allowing me to participate in care of your patient .    Gaston Blanco MD  Infectious Disease  [phone]

## 2021-09-15 NOTE — PROCEDURE NOTE - PROCEDURE FINDINGS AND DETAILS
12cc bard power midline inserted into patent right basilic vein under sterile conditions and ultrasound guidance.

## 2021-09-15 NOTE — PROGRESS NOTE ADULT - SUBJECTIVE AND OBJECTIVE BOX
60y year old Male seen at [facility] 3WES 364 D1 for Grade 1 diabetic ulcer plantar right hallux. The patient states that it appeared about 5 months ago and he has been going to Dr. Garland his podiarist for treatment. The patient states that the wound is closed and before his admission he was on oral abx for the wound. The patient states that he has diabetic neuropathy and his whole entire right leg is painful and sore. Denies any fever, chills, nausea, vomiting, chest pain, shortness of breath, or calf pain at this time.    REVIEW OF SYSTEMS    PAST MEDICAL & SURGICAL HISTORY:  Hypertension    AAA (abdominal aortic aneurysm)  dx 2012    GERD (gastroesophageal reflux disease)    Leg weakness    Aortic valve replaced    Cardiac tamponade    HTN (hypertension)    Diabetes mellitus    H/O aortic valve repair    S/P aneurysm repair    Aortic valve replaced    S/P AAA repair  Repaired 3/2015    S/P aortic aneurysm repair    H/O aortic valve repair    History of incision and drainage        Allergies    Normodyne (Faint)  Normodyne (Unknown)    Intolerances        MEDICATIONS  (STANDING):  aspirin  chewable 81 milliGRAM(s) Oral daily  atorvastatin 80 milliGRAM(s) Oral at bedtime  cefTRIAXone   IVPB 1000 milliGRAM(s) IV Intermittent every 24 hours  clopidogrel Tablet 75 milliGRAM(s) Oral daily  dextrose 40% Gel 15 Gram(s) Oral once  dextrose 5%. 1000 milliLiter(s) (50 mL/Hr) IV Continuous <Continuous>  dextrose 5%. 1000 milliLiter(s) (100 mL/Hr) IV Continuous <Continuous>  dextrose 50% Injectable 25 Gram(s) IV Push once  dextrose 50% Injectable 12.5 Gram(s) IV Push once  dextrose 50% Injectable 25 Gram(s) IV Push once  enoxaparin Injectable 40 milliGRAM(s) SubCutaneous daily  gabapentin 600 milliGRAM(s) Oral four times a day  glucagon  Injectable 1 milliGRAM(s) IntraMuscular once  hydrochlorothiazide 25 milliGRAM(s) Oral daily  insulin lispro (ADMELOG) corrective regimen sliding scale   SubCutaneous three times a day before meals  insulin lispro (ADMELOG) corrective regimen sliding scale   SubCutaneous at bedtime  lisinopril 40 milliGRAM(s) Oral daily  metoprolol tartrate 25 milliGRAM(s) Oral two times a day  pantoprazole    Tablet 40 milliGRAM(s) Oral before breakfast  saccharomyces boulardii 250 milliGRAM(s) Oral two times a day    MEDICATIONS  (PRN):  acetaminophen   Tablet .. 650 milliGRAM(s) Oral every 6 hours PRN Temp greater or equal to 38C (100.4F), Mild Pain (1 - 3)  meclizine 12.5 milliGRAM(s) Oral three times a day PRN vertigo  melatonin 3 milliGRAM(s) Oral at bedtime PRN Insomnia  ondansetron Injectable 4 milliGRAM(s) IV Push every 8 hours PRN Nausea and/or Vomiting  traMADol 50 milliGRAM(s) Oral every 6 hours PRN Moderate Pain (4 - 6)  traMADol 75 milliGRAM(s) Oral every 6 hours PRN Severe Pain (7 - 10)      Social History:  , lives at home with wife, works as an [employer] , ADLs independent, ambulates independently without the use of assistive devices, former smoker, social alcohol use (last drink 9/3/21), denies recreational drug use (11 Sep 2021 02:42)      FAMILY HISTORY:  Family history of breast cancer    Family history of hypertension    Family history of stroke    Family history of prostate cancer    Family history of COPD (chronic obstructive pulmonary disease) (Grandparent)    FH: HTN (hypertension)      Vital Signs Last 24 Hrs  T(C): 36.4 (15 Sep 2021 14:15), Max: 36.8 (14 Sep 2021 21:52)  T(F): 97.5 (15 Sep 2021 14:15), Max: 98.2 (14 Sep 2021 21:52)  HR: 74 (15 Sep 2021 14:15) (60 - 75)  BP: 154/84 (15 Sep 2021 14:15) (142/87 - 154/84)  BP(mean): --  RR: 17 (15 Sep 2021 14:15) (17 - 18)  SpO2: 95% (15 Sep 2021 14:15) (93% - 97%)    PHYSICAL EXAM:  Vascular: DP faintly palpable on the RIGHT, no palpable on the left  & PT nonpalpable bilaterally, Capillary refill 3 seconds, No Digital hair present bilaterally, diffuse edema and erythema along the RLE and minimally along the right forefoot   Neurological: Loss of protective sensation b/l   Musculoskeletal: 5/5 strength in all quadrants bilaterally, AJ & STJ ROM intact,   Dermatological:   1. Grade 1 ulcer right plantar hallux- size 0.2cmx0.1cm- adherent scab with healthy epithelized underneath , does not probe to bone, no tunneling, no undermining                           12.3   5.08  )-----------( 163      ( 13 Sep 2021 09:54 )             39.5     CBC Full  -  ( 15 Sep 2021 08:38 )  WBC Count : 5.87 K/uL  RBC Count : 4.87 M/uL  Hemoglobin : 13.1 g/dL  Hematocrit : 40.8 %  Platelet Count - Automated : 189 K/uL  Mean Cell Volume : 83.8 fl  Mean Cell Hemoglobin : 26.9 pg  Mean Cell Hemoglobin Concentration : 32.1 gm/dL  Auto Neutrophil # : 4.39 K/uL  Auto Lymphocyte # : 0.71 K/uL  Auto Monocyte # : 0.54 K/uL  Auto Eosinophil # : 0.12 K/uL  Auto Basophil # : 0.04 K/uL  Auto Neutrophil % : 74.8 %  Auto Lymphocyte % : 12.1 %  Auto Monocyte % : 9.2 %  Auto Eosinophil % : 2.0 %  Auto Basophil % : 0.7 %    ----------CHEM PANEL----------  09-15    134<L>  |  97  |  18  ----------------------------<  139<H>  3.7   |  29  |  1.10    Ca    9.6      15 Sep 2021 08:38  Mg     1.8     09-15            PT/INR - ( 11 Sep 2021 00:20 )   PT: 14.1 sec;   INR: 1.22 ratio         PTT - ( 11 Sep 2021 00:20 )  PTT:27.5 sec        Imaging: Awaiting Bone Scan to r/o OM

## 2021-09-15 NOTE — PROGRESS NOTE ADULT - SUBJECTIVE AND OBJECTIVE BOX
Patient is a 60y old  Male who presents with a chief complaint of celllulitis (15 Sep 2021 09:29)      INTERVAL HPI/OVERNIGHT EVENTS: Patient seen and examined at bedside. No overnight events occurred. Patient continues to complain of RLE pain, however, states it has improved from yesterday. Patient also continues to complain of lightheadedness when he is upright. Denies fevers, chills, headache, lightheadedness, chest pain, dyspnea, abdominal pain, n/v.    MEDICATIONS  (STANDING):  acetaminophen   Tablet .. 975 milliGRAM(s) Oral every 6 hours  aspirin  chewable 81 milliGRAM(s) Oral daily  atorvastatin 80 milliGRAM(s) Oral at bedtime  ceFAZolin   IVPB 2000 milliGRAM(s) IV Intermittent every 8 hours  clopidogrel Tablet 75 milliGRAM(s) Oral daily  dextrose 40% Gel 15 Gram(s) Oral once  dextrose 5%. 1000 milliLiter(s) (50 mL/Hr) IV Continuous <Continuous>  dextrose 5%. 1000 milliLiter(s) (100 mL/Hr) IV Continuous <Continuous>  dextrose 50% Injectable 25 Gram(s) IV Push once  dextrose 50% Injectable 12.5 Gram(s) IV Push once  dextrose 50% Injectable 25 Gram(s) IV Push once  enoxaparin Injectable 40 milliGRAM(s) SubCutaneous daily  furosemide    Tablet 20 milliGRAM(s) Oral daily  gabapentin 800 milliGRAM(s) Oral four times a day  glucagon  Injectable 1 milliGRAM(s) IntraMuscular once  insulin lispro (ADMELOG) corrective regimen sliding scale   SubCutaneous three times a day before meals  insulin lispro (ADMELOG) corrective regimen sliding scale   SubCutaneous at bedtime  ketorolac   Injectable 30 milliGRAM(s) IV Push once  lisinopril 40 milliGRAM(s) Oral daily  metoprolol tartrate 25 milliGRAM(s) Oral two times a day  pantoprazole    Tablet 40 milliGRAM(s) Oral before breakfast  saccharomyces boulardii 250 milliGRAM(s) Oral two times a day  senna 2 Tablet(s) Oral at bedtime    MEDICATIONS  (PRN):  magnesium hydroxide Suspension 30 milliLiter(s) Oral daily PRN Constipation  meclizine 12.5 milliGRAM(s) Oral three times a day PRN vertigo  melatonin 3 milliGRAM(s) Oral at bedtime PRN Insomnia  ondansetron Injectable 4 milliGRAM(s) IV Push every 8 hours PRN Nausea and/or Vomiting  polyethylene glycol 3350 17 Gram(s) Oral daily PRN Constipation  traMADol 50 milliGRAM(s) Oral every 6 hours PRN Moderate Pain (4 - 6)  traMADol 75 milliGRAM(s) Oral every 6 hours PRN Severe Pain (7 - 10)      Allergies    Normodyne (Faint)  Normodyne (Unknown)    Intolerances        REVIEW OF SYSTEMS:  CONSTITUTIONAL: No fever or chills  HEENT:  No headache, no sore throat  RESPIRATORY: No cough, wheezing, or shortness of breath  CARDIOVASCULAR: No chest pain, palpitations  GASTROINTESTINAL: No abd pain, nausea, vomiting, or diarrhea  GENITOURINARY: No dysuria, frequency, or hematuria  NEUROLOGICAL: no focal weakness or dizziness  MUSCULOSKELETAL: no myalgias     Vital Signs Last 24 Hrs  T(C): 36.7 (15 Sep 2021 05:30), Max: 36.8 (14 Sep 2021 21:52)  T(F): 98.1 (15 Sep 2021 05:30), Max: 98.2 (14 Sep 2021 21:52)  HR: 70 (15 Sep 2021 05:30) (60 - 75)  BP: 142/87 (15 Sep 2021 05:30) (142/87 - 146/77)  BP(mean): --  RR: 17 (15 Sep 2021 05:30) (17 - 18)  SpO2: 95% (15 Sep 2021 05:30) (93% - 97%)    PHYSICAL EXAM:  GENERAL: NAD  HEENT:  anicteric, moist mucous membranes  CHEST/LUNG:  CTA b/l, no rales, wheezes, or rhonchi  HEART:  RRR, S1, S2  ABDOMEN:  BS+, soft, nontender, nondistended  EXTREMITIES: no edema, cyanosis, or calf tenderness  NERVOUS SYSTEM: answers questions and follows commands appropriately    LABS:                        13.1   5.87  )-----------( 189      ( 15 Sep 2021 08:38 )             40.8     CBC Full  -  ( 15 Sep 2021 08:38 )  WBC Count : 5.87 K/uL  Hemoglobin : 13.1 g/dL  Hematocrit : 40.8 %  Platelet Count - Automated : 189 K/uL  Mean Cell Volume : 83.8 fl  Mean Cell Hemoglobin : 26.9 pg  Mean Cell Hemoglobin Concentration : 32.1 gm/dL  Auto Neutrophil # : 4.39 K/uL  Auto Lymphocyte # : 0.71 K/uL  Auto Monocyte # : 0.54 K/uL  Auto Eosinophil # : 0.12 K/uL  Auto Basophil # : 0.04 K/uL  Auto Neutrophil % : 74.8 %  Auto Lymphocyte % : 12.1 %  Auto Monocyte % : 9.2 %  Auto Eosinophil % : 2.0 %  Auto Basophil % : 0.7 %    15 Sep 2021 08:38    134    |  97     |  18     ----------------------------<  139    3.7     |  29     |  1.10     Ca    9.6        15 Sep 2021 08:38  Mg     1.8       15 Sep 2021 08:38          CAPILLARY BLOOD GLUCOSE      POCT Blood Glucose.: 142 mg/dL (15 Sep 2021 08:14)  POCT Blood Glucose.: 161 mg/dL (14 Sep 2021 21:21)  POCT Blood Glucose.: 148 mg/dL (14 Sep 2021 17:01)        Culture - Blood (collected 09-11-21 @ 03:04)  Source: .Blood Blood  Preliminary Report (09-12-21 @ 06:15):    No growth to date.    Culture - Blood (collected 09-11-21 @ 03:04)  Source: .Blood Blood  Preliminary Report (09-12-21 @ 06:15):    No growth to date.        RADIOLOGY & ADDITIONAL TESTS:  < from: NM Multiple Day Procedure (09.15.21 @ 11:00) >  EXAM:  NM MULTI DAY PROCEDURE                          EXAM:  NM INFLAMM LOC WBC WB SD                            PROCEDURE DATE:  09/15/2021          INTERPRETATION:  RADIOPHARMACEUTICAL: 11 mCi In-111 labeled autologous leukocytes, I.V.    CLINICAL STATEMENT: 60-year-old diabetic male with cellulitis in the right hallux referred to evaluate for osteomyelitis.    TECHNIQUE:  Static images of bilateral feet were obtained approximately 20 hours after the administration of the labeled leukocyte. Anterior and posterior abdominal images were obtained for quality is all-purpose.    FINDINGS: There is faint labeled WBC activity in the right hallux seen only on the plantar image compatible with soft tissue infection. There is physiologic labeled WBC activity in the remainder of the bilateral feet.    IMPRESSION: Tc 99m labeled leukocyte scan demonstrates:    Soft tissue infection in the plantar aspect of the right great toe. No scan evidence of osteomyelitis.      --- End of Report ---      Personally reviewed.     Consultant(s) Notes Reviewed:  [x] YES  [ ] NO     Patient is a 60y old  Male who presents with a chief complaint of R LE redness and pain.       INTERVAL HPI/OVERNIGHT EVENTS: Patient seen and examined at bedside. No acute overnight events occurred. Patient continues to complain of RLE pain, however, states it has improved from yesterday. Patient also had some brief lightheadedness. Denies fevers, chills, headache, chest pain, dyspnea, abdominal pain, n/v.    MEDICATIONS  (STANDING):  acetaminophen   Tablet .. 975 milliGRAM(s) Oral every 6 hours  aspirin  chewable 81 milliGRAM(s) Oral daily  atorvastatin 80 milliGRAM(s) Oral at bedtime  ceFAZolin   IVPB 2000 milliGRAM(s) IV Intermittent every 8 hours  clopidogrel Tablet 75 milliGRAM(s) Oral daily  dextrose 40% Gel 15 Gram(s) Oral once  dextrose 5%. 1000 milliLiter(s) (50 mL/Hr) IV Continuous <Continuous>  dextrose 5%. 1000 milliLiter(s) (100 mL/Hr) IV Continuous <Continuous>  dextrose 50% Injectable 25 Gram(s) IV Push once  dextrose 50% Injectable 12.5 Gram(s) IV Push once  dextrose 50% Injectable 25 Gram(s) IV Push once  enoxaparin Injectable 40 milliGRAM(s) SubCutaneous daily  furosemide    Tablet 20 milliGRAM(s) Oral daily  gabapentin 800 milliGRAM(s) Oral four times a day  glucagon  Injectable 1 milliGRAM(s) IntraMuscular once  insulin lispro (ADMELOG) corrective regimen sliding scale   SubCutaneous three times a day before meals  insulin lispro (ADMELOG) corrective regimen sliding scale   SubCutaneous at bedtime  ketorolac   Injectable 30 milliGRAM(s) IV Push once  lisinopril 40 milliGRAM(s) Oral daily  metoprolol tartrate 25 milliGRAM(s) Oral two times a day  pantoprazole    Tablet 40 milliGRAM(s) Oral before breakfast  saccharomyces boulardii 250 milliGRAM(s) Oral two times a day  senna 2 Tablet(s) Oral at bedtime    MEDICATIONS  (PRN):  magnesium hydroxide Suspension 30 milliLiter(s) Oral daily PRN Constipation  meclizine 12.5 milliGRAM(s) Oral three times a day PRN vertigo  melatonin 3 milliGRAM(s) Oral at bedtime PRN Insomnia  ondansetron Injectable 4 milliGRAM(s) IV Push every 8 hours PRN Nausea and/or Vomiting  polyethylene glycol 3350 17 Gram(s) Oral daily PRN Constipation  traMADol 50 milliGRAM(s) Oral every 6 hours PRN Moderate Pain (4 - 6)  traMADol 75 milliGRAM(s) Oral every 6 hours PRN Severe Pain (7 - 10)      Allergies    Normodyne (Faint)  Normodyne (Unknown)    Intolerances        REVIEW OF SYSTEMS:  CONSTITUTIONAL: No fever or chills  HEENT:  No headache, no sore throat  RESPIRATORY: No cough, wheezing, or shortness of breath  CARDIOVASCULAR: No chest pain, palpitations  GASTROINTESTINAL: No abd pain, nausea, vomiting, or diarrhea  GENITOURINARY: No dysuria, frequency, or hematuria  NEUROLOGICAL: no focal weakness or dizziness  MUSCULOSKELETAL: +right foot pain/swelling/redness (gradually imrpoving); no myalgias     Vital Signs Last 24 Hrs  T(C): 36.7 (15 Sep 2021 05:30), Max: 36.8 (14 Sep 2021 21:52)  T(F): 98.1 (15 Sep 2021 05:30), Max: 98.2 (14 Sep 2021 21:52)  HR: 70 (15 Sep 2021 05:30) (60 - 75)  BP: 142/87 (15 Sep 2021 05:30) (142/87 - 146/77)  BP(mean): --  RR: 17 (15 Sep 2021 05:30) (17 - 18)  SpO2: 95% (15 Sep 2021 05:30) (93% - 97%)    PHYSICAL EXAM:  GENERAL: NAD  HEENT:  anicteric, moist mucous membranes  CHEST/LUNG:  CTA b/l, no rales, wheezes, or rhonchi  HEART:  RRR, S1, S2  ABDOMEN:  BS+, soft, nontender, nondistended  EXTREMITIES: lower R LE with erythema, swelling, tenderness;  no cyanosis, or clubbing  NERVOUS SYSTEM: answers questions and follows commands appropriately    LABS:                        13.1   5.87  )-----------( 189      ( 15 Sep 2021 08:38 )             40.8     CBC Full  -  ( 15 Sep 2021 08:38 )  WBC Count : 5.87 K/uL  Hemoglobin : 13.1 g/dL  Hematocrit : 40.8 %  Platelet Count - Automated : 189 K/uL  Mean Cell Volume : 83.8 fl  Mean Cell Hemoglobin : 26.9 pg  Mean Cell Hemoglobin Concentration : 32.1 gm/dL  Auto Neutrophil # : 4.39 K/uL  Auto Lymphocyte # : 0.71 K/uL  Auto Monocyte # : 0.54 K/uL  Auto Eosinophil # : 0.12 K/uL  Auto Basophil # : 0.04 K/uL  Auto Neutrophil % : 74.8 %  Auto Lymphocyte % : 12.1 %  Auto Monocyte % : 9.2 %  Auto Eosinophil % : 2.0 %  Auto Basophil % : 0.7 %    15 Sep 2021 08:38    134    |  97     |  18     ----------------------------<  139    3.7     |  29     |  1.10     Ca    9.6        15 Sep 2021 08:38  Mg     1.8       15 Sep 2021 08:38          CAPILLARY BLOOD GLUCOSE      POCT Blood Glucose.: 142 mg/dL (15 Sep 2021 08:14)  POCT Blood Glucose.: 161 mg/dL (14 Sep 2021 21:21)  POCT Blood Glucose.: 148 mg/dL (14 Sep 2021 17:01)        Culture - Blood (collected 09-11-21 @ 03:04)  Source: .Blood Blood  Preliminary Report (09-12-21 @ 06:15):    No growth to date.    Culture - Blood (collected 09-11-21 @ 03:04)  Source: .Blood Blood  Preliminary Report (09-12-21 @ 06:15):    No growth to date.        RADIOLOGY & ADDITIONAL TESTS:  < from: NM Multiple Day Procedure (09.15.21 @ 11:00) >  EXAM:  NM MULTI DAY PROCEDURE                          EXAM:  NM INFLAMM LOC WBC WB SD                            PROCEDURE DATE:  09/15/2021          INTERPRETATION:  RADIOPHARMACEUTICAL: 11 mCi In-111 labeled autologous leukocytes, I.V.    CLINICAL STATEMENT: 60-year-old diabetic male with cellulitis in the right hallux referred to evaluate for osteomyelitis.    TECHNIQUE:  Static images of bilateral feet were obtained approximately 20 hours after the administration of the labeled leukocyte. Anterior and posterior abdominal images were obtained for quality is all-purpose.    FINDINGS: There is faint labeled WBC activity in the right hallux seen only on the plantar image compatible with soft tissue infection. There is physiologic labeled WBC activity in the remainder of the bilateral feet.    IMPRESSION: Tc 99m labeled leukocyte scan demonstrates:    Soft tissue infection in the plantar aspect of the right great toe. No scan evidence of osteomyelitis.      --- End of Report ---      Personally reviewed.     Consultant(s) Notes Reviewed:  [x] YES  [ ] NO

## 2021-09-15 NOTE — PROGRESS NOTE ADULT - PROBLEM SELECTOR PLAN 1
60y Male admitted for R hallux wound and RLE cellulitis. Patient c/o less pain to RLE, but persistent burning sensation. Arterial duplex w/ nonvisualization of R peroneal artery & questionable stenosis in R anterior and posterial tibial arteries. ABIs incomplete as patient was unable to tolerate exam on RLE. WBC normal and patient afebrile at present.   - Will discuss results of arterial duplex with Dr. Waldron and f/up with official recommendations  - Awaiting results of bone scan to r/o OM  - cont care as per podiatry. No dressing over wound as pt requests  - IV abx as per ID      Surgical Team Contact Information  Spectralink: Ext: [phone] or [phone]  Pager: [phone]

## 2021-09-16 LAB
ANION GAP SERPL CALC-SCNC: 8 MMOL/L — SIGNIFICANT CHANGE UP (ref 5–17)
BUN SERPL-MCNC: 26 MG/DL — HIGH (ref 7–23)
CALCIUM SERPL-MCNC: 10.1 MG/DL — SIGNIFICANT CHANGE UP (ref 8.5–10.1)
CHLORIDE SERPL-SCNC: 95 MMOL/L — LOW (ref 96–108)
CO2 SERPL-SCNC: 31 MMOL/L — SIGNIFICANT CHANGE UP (ref 22–31)
CREAT SERPL-MCNC: 1.3 MG/DL — SIGNIFICANT CHANGE UP (ref 0.5–1.3)
CULTURE RESULTS: SIGNIFICANT CHANGE UP
CULTURE RESULTS: SIGNIFICANT CHANGE UP
GLUCOSE SERPL-MCNC: 166 MG/DL — HIGH (ref 70–99)
HCT VFR BLD CALC: 42 % — SIGNIFICANT CHANGE UP (ref 39–50)
HGB BLD-MCNC: 13.3 G/DL — SIGNIFICANT CHANGE UP (ref 13–17)
MCHC RBC-ENTMCNC: 26.7 PG — LOW (ref 27–34)
MCHC RBC-ENTMCNC: 31.7 GM/DL — LOW (ref 32–36)
MCV RBC AUTO: 84.3 FL — SIGNIFICANT CHANGE UP (ref 80–100)
NRBC # BLD: 0 /100 WBCS — SIGNIFICANT CHANGE UP (ref 0–0)
PLATELET # BLD AUTO: 260 K/UL — SIGNIFICANT CHANGE UP (ref 150–400)
POTASSIUM SERPL-MCNC: 3.9 MMOL/L — SIGNIFICANT CHANGE UP (ref 3.5–5.3)
POTASSIUM SERPL-SCNC: 3.9 MMOL/L — SIGNIFICANT CHANGE UP (ref 3.5–5.3)
RBC # BLD: 4.98 M/UL — SIGNIFICANT CHANGE UP (ref 4.2–5.8)
RBC # FLD: 14.5 % — SIGNIFICANT CHANGE UP (ref 10.3–14.5)
SODIUM SERPL-SCNC: 134 MMOL/L — LOW (ref 135–145)
SPECIMEN SOURCE: SIGNIFICANT CHANGE UP
SPECIMEN SOURCE: SIGNIFICANT CHANGE UP
WBC # BLD: 7.73 K/UL — SIGNIFICANT CHANGE UP (ref 3.8–10.5)
WBC # FLD AUTO: 7.73 K/UL — SIGNIFICANT CHANGE UP (ref 3.8–10.5)

## 2021-09-16 PROCEDURE — 99233 SBSQ HOSP IP/OBS HIGH 50: CPT

## 2021-09-16 PROCEDURE — 99232 SBSQ HOSP IP/OBS MODERATE 35: CPT

## 2021-09-16 RX ADMIN — Medication 100 MILLIGRAM(S): at 06:43

## 2021-09-16 RX ADMIN — ENOXAPARIN SODIUM 40 MILLIGRAM(S): 100 INJECTION SUBCUTANEOUS at 12:35

## 2021-09-16 RX ADMIN — Medication 100 MILLIGRAM(S): at 14:09

## 2021-09-16 RX ADMIN — Medication 81 MILLIGRAM(S): at 12:34

## 2021-09-16 RX ADMIN — GABAPENTIN 800 MILLIGRAM(S): 400 CAPSULE ORAL at 12:39

## 2021-09-16 RX ADMIN — POLYETHYLENE GLYCOL 3350 17 GRAM(S): 17 POWDER, FOR SOLUTION ORAL at 12:35

## 2021-09-16 RX ADMIN — GABAPENTIN 800 MILLIGRAM(S): 400 CAPSULE ORAL at 06:41

## 2021-09-16 RX ADMIN — ATORVASTATIN CALCIUM 80 MILLIGRAM(S): 80 TABLET, FILM COATED ORAL at 21:38

## 2021-09-16 RX ADMIN — Medication 1: at 08:24

## 2021-09-16 RX ADMIN — Medication 250 MILLIGRAM(S): at 17:42

## 2021-09-16 RX ADMIN — CLOPIDOGREL BISULFATE 75 MILLIGRAM(S): 75 TABLET, FILM COATED ORAL at 12:34

## 2021-09-16 RX ADMIN — Medication 100 MILLIGRAM(S): at 21:38

## 2021-09-16 RX ADMIN — SENNA PLUS 2 TABLET(S): 8.6 TABLET ORAL at 21:38

## 2021-09-16 RX ADMIN — Medication 3 MILLIGRAM(S): at 21:38

## 2021-09-16 RX ADMIN — Medication 1: at 12:33

## 2021-09-16 RX ADMIN — Medication 975 MILLIGRAM(S): at 07:11

## 2021-09-16 RX ADMIN — Medication 975 MILLIGRAM(S): at 06:41

## 2021-09-16 RX ADMIN — Medication 250 MILLIGRAM(S): at 06:43

## 2021-09-16 RX ADMIN — Medication 25 MILLIGRAM(S): at 17:42

## 2021-09-16 RX ADMIN — PANTOPRAZOLE SODIUM 40 MILLIGRAM(S): 20 TABLET, DELAYED RELEASE ORAL at 06:42

## 2021-09-16 NOTE — PROGRESS NOTE ADULT - ASSESSMENT
59 yo M with PMHx of CAD (with 2 stents in the 1st Diag 7/1/19 and LPDA 7/3/19), bioprosthetic AVR with ascending aortic aneurysm repair on 03/116/15 (for a bicuspid aortic valve), cardiac tamponade (s/p pericardiocentesis 3/26/15), HFpEF, syncope (s/p ILR insertion 2/2/16), atypical chest discomfort, HTN, HLD, T2DM (not on insulin, complicated by peripheral neuropathy), GERD, vertigo, lacunar infarcts (incidentally detected on MRI), ventricular ectopy and mild carotid atherosclerosis presents to the ED c/o right lower extremity swelling, warmth, and pain, admitted for R LE cellulitis.       # Right LE Cellulitis.   - s/p IV Zosyn and Vancomycin transitioned to Ancef on 09/11  - Blood culture NGTD   - On 09/11 doppler negative for DVT  - Pain control: Tylenol q6 mild, tramadol 50mg q6 moderate, tramadol 75mg q6 severe  - ID following ([facility]), recs appreciated  - trend wbc and fever curve, WBC today 5.87, afebrile   - Podiatry following ([facility]), recs appreciated -- wound care per podiatry for foot    # Diabetic toe ulcer.   - indium scan shows no evidence osteomyelitis of the R LE  - Continue home gabapentin for neuropathic pain   - Podiatry following ([facility]), recs appreciated -- wound care per podiatry for foot  - Vascular consulted, Right IMELDA cannot be calculated, Left IMELDA at upper limits, Duplex showed scattered atherosclerotic w/ questionable areas of stenosis in right post. and ant. tibial arteries -- no surgical intervention at this time per [facility] surgery -- will need close outpatient f/up      # PAD:  - Vascular surgery consulted (Julien Frankel), recs appreciated, Right IMELDA cannot be calculated, Left IMELDA at upper limits, Duplex showed scattered atherosclerotic w/ questionable areas of stenosis in right post. and ant. tibial arteries -- no surgical intervention at this time per [facility] surgery -- will need close outpatient f/up  - c/w ASA, plavix, lipitor 80mg QHS    # Hypertension.   -on metoprolol, lisinopril, hctz at home, continue with hold parameters  -Started patient back on his home Lasix 20m pO and discontinued HCTZ for now  -monitor routine hemodynamics.    # Hyperlipidemia.   -cont atorvastatin at home.    # Type 2 diabetes mellitus.   -chronic, not on insulin, complicated by neuropathy  - HbA1c 7.0%  -on metformin at home  -low dose corrective scale, accuchecks, hypoglycemia protocol    # GERD (gastroesophageal reflux disease).   -chronic  -Cont home protonix.    # Vertigo.   -chronic  -Cont home PRN antivert.    # CAD (coronary artery disease).   -chronic, s/p 2 stents in 2019  -Cont asa and plavix, B-blocker, and statin  -recent TTE in 2020 showing normal LVEF but mod diastolic dysfunction    VTE ppx:  Lovenox 40mg SQ daily.      Attestation Statements:     Time-based billing (NON-critical care).     40 minutes spent on total encounter; more than 50% of the visit was spent counseling and / or coordinating care by the attending physician.  The necessity of the time spent during the encounter on this date of service was due to:     Pt seen + examined. Case discussed with resident. Agree with assessment and plan above (edited by me in detail):  Time spent: 40min. More than 50% of the visit was spent counseling the patient on medical condition -- right LE cellulitis, PAD, Abx.       59 yo M with PMHx of CAD (with 2 stents in the 1st Diag 7/1/19 and LPDA 7/3/19), bioprosthetic AVR with ascending aortic aneurysm repair on 03/116/15 (for a bicuspid aortic valve), cardiac tamponade (s/p pericardiocentesis 3/26/15), HFpEF, syncope (s/p ILR insertion 2/2/16), atypical chest discomfort, HTN, HLD, T2DM (not on insulin, complicated by peripheral neuropathy), GERD, vertigo, lacunar infarcts (incidentally detected on MRI), ventricular ectopy and mild carotid atherosclerosis presents to the ED c/o right lower extremity swelling, warmth, and pain, admitted for R LE cellulitis.       # Right LE Cellulitis.   - s/p IV Zosyn and Vancomycin transitioned to Ancef on 09/11  - Blood culture NGTD   - On 09/11 doppler negative for DVT  - Pain control: Tylenol q6 mild, tramadol 50mg q6 moderate, tramadol 75mg q6 severe  - ID following ([facility]), recs appreciated  - trend wbc and fever curve, WBC today 7.73, afebrile   - Podiatry following ([facility]), recs appreciated -- wound care per podiatry for foot    # Diabetic toe ulcer s/p Rt hallux excisional debridement of the hyperkeratosis which revealed full thickness wound of the right plantar hallux- less than0.5cc of purulence  - indium scan shows no evidence osteomyelitis of the R LE  - Continue home gabapentin for neuropathic pain   - Podiatry following ([facility]), recs appreciated -- wound care per podiatry for foot  - Vascular consulted, Right IMELDA cannot be calculated, Left IMELDA at upper limits, Duplex showed scattered atherosclerotic w/ questionable areas of stenosis in right post. and ant. tibial arteries -- no surgical intervention at this time per [facility] surgery -- will need close outpatient f/up      # PAD:  - Vascular surgery consulted (Julien Frankel), recs appreciated, Right IMELDA cannot be calculated, Left IMELDA at upper limits, Duplex showed scattered atherosclerotic w/ questionable areas of stenosis in right post. and ant. tibial arteries -- no surgical intervention at this time per [facility] surgery -- will need close outpatient f/up  - c/w ASA, plavix, lipitor 80mg QHS    # Hypertension.   -on metoprolol, lisinopril, hctz at home, continue with hold parameters  -BP stable on Lasix 20m Metoprolol & Lisinopril  - OFF HCTZ for now  -monitor routine hemodynamics.    # Hyperlipidemia.   -cont atorvastatin at home.    # Type 2 diabetes mellitus.   -chronic, not on insulin, complicated by neuropathy  - HbA1c 7.0%  -on metformin at home  -low dose corrective scale, accuchecks, hypoglycemia protocol    # GERD (gastroesophageal reflux disease).   -chronic  -Cont home protonix.    # Vertigo.   -chronic  -Cont home PRN antivert.    # CAD (coronary artery disease).   -chronic, s/p 2 stents in 2019  -Cont asa and plavix, B-blocker, and statin  -recent TTE in 2020 showing normal LVEF but mod diastolic dysfunction    VTE ppx:  Lovenox 40mg SQ daily.      Attestation Statements:     Time-based billing (NON-critical care).     40 minutes spent on total encounter; more than 50% of the visit was spent counseling and / or coordinating care by the attending physician.  The necessity of the time spent during the encounter on this date of service was due to:     Pt seen + examined. Case discussed with resident. Agree with assessment and plan above (edited by me in detail):  Time spent: 40min. More than 50% of the visit was spent counseling the patient on medical condition -- right LE cellulitis, PAD, Abx.

## 2021-09-16 NOTE — PROGRESS NOTE ADULT - ASSESSMENT
61 yo male here for Right hallux infection, with resolving infection, negative bone scan ruling out osteomyelitis.  Tolerating diet, ambulating, voiding.

## 2021-09-16 NOTE — PROGRESS NOTE ADULT - PROBLEM SELECTOR PLAN 1
With negative bone scan- may due angio sooner-will need to discuss with Dr. Kendall.  Continue IV antibiotics  Continue care per primary team  Ambulate/OOB  Continue antibiotics  Continue SQH, ASA, PLavix,

## 2021-09-16 NOTE — PROGRESS NOTE ADULT - SUBJECTIVE AND OBJECTIVE BOX
DUSTY STRICKLAND is a 60yMale , patient examined and chart reviewed.    INTERVAL HPI/ OVERNIGHT EVENTS:   Afebrile. Feeling better.  No events.    PAST MEDICAL & SURGICAL HISTORY:  Hypertension  AAA (abdominal aortic aneurysm)  dx 2012  GERD (gastroesophageal reflux disease)  Leg weakness  Aortic valve replaced  Cardiac tamponade  HTN (hypertension)  Diabetes mellitus  H/O aortic valve repair  S/P aneurysm repair  Aortic valve replaced  S/P AAA repair  Repaired 3/2015  S/P aortic aneurysm repair  H/O aortic valve repair  History of incision and drainage      For details regarding the patient's social history, family history, and other miscellaneous elements, please refer the initial infectious diseases consultation and/or the admitting history and physical examination for this admission.    ROS:  CONSTITUTIONAL:  Negative fever or chills  EYES:  Negative  blurry vision or double vision  CARDIOVASCULAR:  Negative for chest pain or palpitations  RESPIRATORY:  Negative for cough, wheezing, or SOB   GASTROINTESTINAL:  Negative for nausea, vomiting, diarrhea, constipation, or abdominal pain  GENITOURINARY:  Negative frequency, urgency or dysuria  NEUROLOGIC:  No headache, confusion, dizziness, lightheadedness  All other systems were reviewed and are negative     ALLERGIES:  Normodyne (Faint)  Normodyne (Unknown)      Current inpatient medications :    ANTIBIOTICS/RELEVANT:  ceFAZolin   IVPB 2000 milliGRAM(s) IV Intermittent every 8 hours    MEDICATIONS  (STANDING):  acetaminophen   Tablet .. 975 milliGRAM(s) Oral every 6 hours  aspirin  chewable 81 milliGRAM(s) Oral daily  atorvastatin 80 milliGRAM(s) Oral at bedtime  clopidogrel Tablet 75 milliGRAM(s) Oral daily  dextrose 40% Gel 15 Gram(s) Oral once  dextrose 5%. 1000 milliLiter(s) (50 mL/Hr) IV Continuous <Continuous>  dextrose 5%. 1000 milliLiter(s) (100 mL/Hr) IV Continuous <Continuous>  dextrose 50% Injectable 25 Gram(s) IV Push once  dextrose 50% Injectable 12.5 Gram(s) IV Push once  dextrose 50% Injectable 25 Gram(s) IV Push once  enoxaparin Injectable 40 milliGRAM(s) SubCutaneous daily  furosemide    Tablet 20 milliGRAM(s) Oral daily  gabapentin 800 milliGRAM(s) Oral four times a day  glucagon  Injectable 1 milliGRAM(s) IntraMuscular once  insulin lispro (ADMELOG) corrective regimen sliding scale   SubCutaneous three times a day before meals  insulin lispro (ADMELOG) corrective regimen sliding scale   SubCutaneous at bedtime  lisinopril 40 milliGRAM(s) Oral daily  metoprolol tartrate 25 milliGRAM(s) Oral two times a day  pantoprazole    Tablet 40 milliGRAM(s) Oral before breakfast  polyethylene glycol 3350 17 Gram(s) Oral daily  saccharomyces boulardii 250 milliGRAM(s) Oral two times a day  senna 2 Tablet(s) Oral at bedtime    MEDICATIONS  (PRN):  magnesium hydroxide Suspension 30 milliLiter(s) Oral daily PRN Constipation  meclizine 12.5 milliGRAM(s) Oral three times a day PRN vertigo  melatonin 3 milliGRAM(s) Oral at bedtime PRN Insomnia  ondansetron Injectable 4 milliGRAM(s) IV Push every 8 hours PRN Nausea and/or Vomiting  traMADol 50 milliGRAM(s) Oral every 6 hours PRN Moderate Pain (4 - 6)  traMADol 75 milliGRAM(s) Oral every 6 hours PRN Severe Pain (7 - 10)        Objective:  Vital Signs Last 24 Hrs  T(C): 36.3 (16 Sep 2021 13:04), Max: 36.5 (16 Sep 2021 05:28)  T(F): 97.3 (16 Sep 2021 13:04), Max: 97.7 (16 Sep 2021 05:28)  HR: 88 (16 Sep 2021 13:04) (60 - 88)  BP: 162/87 (16 Sep 2021 13:04) (108/72 - 162/87)  RR: 17 (16 Sep 2021 13:04) (17 - 17)  SpO2: 97% (16 Sep 2021 13:04) (92% - 97%)    Physical Exam:  General:  no acute distress  Neck: supple, trachea midline  Lungs: clear, no wheeze/rhonchi  Cardiovascular: regular rate and rhythm, S1 S2  Abdomen: soft, nontender,  bowel sounds normal  Neurological: alert and oriented x3  Extremities: Right LE swelling redness and pain resolving      LABS:                        13.3   7.73  )-----------( 260      ( 16 Sep 2021 09:23 )             42.0   09-16    134<L>  |  95<L>  |  26<H>  ----------------------------<  166<H>  3.9   |  31  |  1.30    Ca    10.1      16 Sep 2021 09:23  Mg     1.8     09-15        MICROBIOLOGY:    Culture - Blood (collected 11 Sep 2021 03:04)  Source: .Blood Blood  Preliminary Report (12 Sep 2021 06:15):    No growth to date.    Culture - Blood (collected 11 Sep 2021 03:04)  Source: .Blood Blood  Preliminary Report (12 Sep 2021 06:15):    No growth to date.    RADIOLOGY & ADDITIONAL STUDIES:    EXAM:  DUPLEX LOW ARTERIES UNI LTD RT                            PROCEDURE DATE:  09/14/2021          INTERPRETATION:  HISTORY: Right lower extremity cellulitis.    Right lower extremity arterial Doppler was performed using grayscale, color and spectral Doppler. There are no prior studies available for comparison.    Findings:    RIGHT: Scattered atherosclerotic plaque. There is biphasic flow within the common femoral, superficial femoral and popliteal arteries. Abnormal monophasic low resistance flow within the calf and dorsalis pedis arteries.  Peak systolic velocities are as follows (in CM/sec):  CFA: 135  DFA: 50  SFA: (Proximal, mid, distal): 77, 82, 70  Popliteal: 71  PTA: 127  Peroneal: Not visualized  JESSICA: 131  DPA: 43    IMPRESSION: Nonvisualization of the right peroneal artery. Scattered atherosclerotic plaque with questional areas of stenosis in the right posterior tibial and anterior tibial arteries.      EXAM:  NM MULTI DAY PROCEDURE                          EXAM:  NM INFLAMM LOC WBC WB SD                            PROCEDURE DATE:  09/15/2021          INTERPRETATION:  RADIOPHARMACEUTICAL: 11 mCi In-111 labeled autologous leukocytes, I.V.    CLINICAL STATEMENT: 60-year-old diabetic male with cellulitis in the right hallux referred to evaluate for osteomyelitis.    TECHNIQUE:  Static images of bilateral feet were obtained approximately 20 hours after the administration of the labeled leukocyte. Anterior and posterior abdominal images were obtained for quality is all-purpose.    FINDINGS: There is faint labeled WBC activity in the right hallux seen only on the plantar image compatible with soft tissue infection. There is physiologic labeled WBC activity in the remainder of the bilateral feet.    IMPRESSION: Tc 99m labeled leukocyte scan demonstrates:    Soft tissue infection in the plantar aspect of the right great toe. No scan evidence of osteomyelitis.      Assessment :  61 yo M with PMHx of CAD (with 2 stents in the 1st Diag 7/1/19 and LPDA 7/3/19), bioprosthetic AVR with ascending aortic aneurysm repair on 03/116/15 (for a bicuspid aortic valve), cardiac tamponade (s/p pericardiocentesis 3/26/15), HFpEF, syncope (s/p ILR insertion 2/2/16), HTN, HLD, T2DM (not on insulin, complicated by peripheral neuropathy), admitted with severe Right LE cellulitis with lymphangitis Has right hallux healed ulcer.  Still with leg pain and swelling  PVD- arterial doppler results noted Nonvisualization of the right peroneal artery.  Vascular following  Bone scan - Neg for OM    Plan:  Cont Ancef   Trend temps and cbc  Elevate leg  Will need revascularization  Vascular and podiatry on case    D/w Dr Garcia    Continue with present regiment.  Appropriate use of antibiotics and adverse effects reviewed.      > 35 minutes were spent in direct patient care reviewing notes, medications ,labs data/ imaging , discussion with multidisciplinary team.    Thank you for allowing me to participate in care of your patient .    Gaston Blanco MD  Infectious Disease  [phone]

## 2021-09-16 NOTE — PROGRESS NOTE ADULT - SUBJECTIVE AND OBJECTIVE BOX
Hospital day; 5    60y Male admitted with Cellulitis    Pt seen and examined.  States he is doing much better.  Feels better.  States he is able to touch his leg.  Notes the redness has minimally decreased.  Currently on IV antibiotics,   tolerating diet, ambulating and getting out of bed.     T(F): 97.7 (09-16-21 @ 05:28), Max: 97.7 (09-16-21 @ 05:28)  HR: 60 (09-16-21 @ 05:28) (60 - 77)  BP: 108/72 (09-16-21 @ 05:28) (108/72 - 154/84)  RR: 17 (09-16-21 @ 05:28) (17 - 17)  SpO2: 97% (09-16-21 @ 05:28) (92% - 97%)  Wt(kg): --  CAPILLARY BLOOD GLUCOSE      POCT Blood Glucose.: 152 mg/dL (16 Sep 2021 11:55)  POCT Blood Glucose.: 174 mg/dL (16 Sep 2021 07:50)  POCT Blood Glucose.: 185 mg/dL (15 Sep 2021 21:16)  POCT Blood Glucose.: 189 mg/dL (15 Sep 2021 17:09)  POCT Blood Glucose.: 126 mg/dL (15 Sep 2021 14:12)      PHYSICAL EXAM:  General: NAD  Extremities: Right foot- erythema continues to distal third of calf from foot.  Able to touch leg.  Normal temperature      LABS:                        13.3   7.73  )-----------( 260      ( 16 Sep 2021 09:23 )             42.0     09-16    134<L>  |  95<L>  |  26<H>  ----------------------------<  166<H>  3.9   |  31  |  1.30    Ca    10.1      16 Sep 2021 09:23  Mg     1.8     09-15        I&O's Detail    15 Sep 2021 07:01  -  16 Sep 2021 07:00  --------------------------------------------------------  IN:    Oral Fluid: 360 mL  Total IN: 360 mL    OUT:  Total OUT: 0 mL    Total NET: 360 mL        < from: NM Multiple Day Procedure (09.15.21 @ 11:00) >  EXAM:  NM MULTI DAY PROCEDURE                          EXAM:  NM INFLAMM LOC WBC WB SD                            PROCEDURE DATE:  09/15/2021          INTERPRETATION:  RADIOPHARMACEUTICAL: 11 mCi In-111 labeled autologous leukocytes, I.V.    CLINICAL STATEMENT: 60-year-old diabetic male with cellulitis in the right hallux referred to evaluate for osteomyelitis.    TECHNIQUE:  Static images of bilateral feet were obtained approximately 20 hours after the administration of the labeled leukocyte. Anterior and posterior abdominal images were obtained for quality is all-purpose.    FINDINGS: There is faint labeled WBC activity in the right hallux seen only on the plantar image compatible with soft tissue infection. There is physiologic labeled WBC activity in the remainder of the bilateral feet.    IMPRESSION: Tc 99m labeled leukocyte scan demonstrates:    Soft tissue infection in the plantar aspect of the right great toe. No scan evidence of osteomyelitis.      --- End of Report ---            BRO KERNS MD; Attending Nuclear Medicine  This document has been electronically signed. Sep 15 2021 11:04AM    < end of copied text >

## 2021-09-16 NOTE — PROGRESS NOTE ADULT - ASSESSMENT
Grade 2 Diabetic ulcer Right hallux- post debridement    Dr. Michele performed sharp excisional debridement of the hyperkeratosis which revealed full thickness wound of the right plantar hallux- less than0.5cc of purulence     Wound culture taken and sent off     Wound dressed with Aquacel ag and dsd     Bone Scan- no OM of the right hallux    No podiatric surgical intervention, no OM      Patient doesnt want dressing on it because its healed     Podiatry team will continue to follow while in house    Follow up with vascular recommendations , patient has non palpable pulses and arterial calcifications on the xrays

## 2021-09-16 NOTE — PROGRESS NOTE ADULT - PROBLEM SELECTOR PLAN 1
- patient seen and evaluated  - Dr. Michele performed bedside debridement of the right hallux plantar hyperkeratotic lesion with #15 blade down to subcutaneous level which revealed a full thickness, Grade 2 wound, prep with alcohol wipe  - Area gently cleansed with normal saline and pat dry with sterile gauze   - Wound culture taken and sent off  - Per Dr. Michele, discussed with patient future surgical intervention for biomechanical management of hallux limitus which will prevent recurrence of callus and ulceration, patient states that in the future will do as outpatient. Patient will continue to manage the hallux limitus with appropriate supportive shoegear  - Wound dressed with Aquacel ag and DSD  - Follow up on vascular recommendations  - Follow up on infectious disease recommendations   - Wound Care Instructions below  - Bone Scan negative OM  - No surgical intervention during this admission  - Podiatry team will continue to follow patient while in house   WOUND CARE INSTRUCTIONS   1. Cleanse wound with sterile saline solution and gently pat dry.  2. Dress wound with Aquacel ag and dry, sterile dressing.  3. Change dressings daily and monitor for any signs of infection.  4. Patient is to follow up [facility] with Dr. Scott or Dr. Michele within 1 week upon discharge.

## 2021-09-16 NOTE — PROGRESS NOTE ADULT - SUBJECTIVE AND OBJECTIVE BOX
Patient is a 60y old  Male who presents with a chief complaint of celllulitis (15 Sep 2021 17:35)      INTERVAL HPI/OVERNIGHT EVENTS: Patient seen and examined at bedside. No acute overnight events occurred. Patient continues to complain of RLE pain, however, states it has improved from yesterday. Patient also had some brief lightheadedness. Denies fevers, chills, headache, chest pain, dyspnea, abdominal pain, n/v.      MEDICATIONS  (STANDING):  acetaminophen   Tablet .. 975 milliGRAM(s) Oral every 6 hours  aspirin  chewable 81 milliGRAM(s) Oral daily  atorvastatin 80 milliGRAM(s) Oral at bedtime  ceFAZolin   IVPB 2000 milliGRAM(s) IV Intermittent every 8 hours  clopidogrel Tablet 75 milliGRAM(s) Oral daily  dextrose 40% Gel 15 Gram(s) Oral once  dextrose 5%. 1000 milliLiter(s) (50 mL/Hr) IV Continuous <Continuous>  dextrose 5%. 1000 milliLiter(s) (100 mL/Hr) IV Continuous <Continuous>  dextrose 50% Injectable 25 Gram(s) IV Push once  dextrose 50% Injectable 12.5 Gram(s) IV Push once  dextrose 50% Injectable 25 Gram(s) IV Push once  enoxaparin Injectable 40 milliGRAM(s) SubCutaneous daily  furosemide    Tablet 20 milliGRAM(s) Oral daily  gabapentin 800 milliGRAM(s) Oral four times a day  glucagon  Injectable 1 milliGRAM(s) IntraMuscular once  insulin lispro (ADMELOG) corrective regimen sliding scale   SubCutaneous three times a day before meals  insulin lispro (ADMELOG) corrective regimen sliding scale   SubCutaneous at bedtime  lisinopril 40 milliGRAM(s) Oral daily  metoprolol tartrate 25 milliGRAM(s) Oral two times a day  pantoprazole    Tablet 40 milliGRAM(s) Oral before breakfast  polyethylene glycol 3350 17 Gram(s) Oral daily  saccharomyces boulardii 250 milliGRAM(s) Oral two times a day  senna 2 Tablet(s) Oral at bedtime    MEDICATIONS  (PRN):  magnesium hydroxide Suspension 30 milliLiter(s) Oral daily PRN Constipation  meclizine 12.5 milliGRAM(s) Oral three times a day PRN vertigo  melatonin 3 milliGRAM(s) Oral at bedtime PRN Insomnia  ondansetron Injectable 4 milliGRAM(s) IV Push every 8 hours PRN Nausea and/or Vomiting  traMADol 50 milliGRAM(s) Oral every 6 hours PRN Moderate Pain (4 - 6)  traMADol 75 milliGRAM(s) Oral every 6 hours PRN Severe Pain (7 - 10)      Allergies    Normodyne (Faint)  Normodyne (Unknown)    Intolerances        REVIEW OF SYSTEMS:  CONSTITUTIONAL: No fever, weight loss, or fatigue  EYES: No eye pain, visual disturbances, or discharge  ENMT:  No difficulty hearing, tinnitus, vertigo; No sinus or throat pain  NECK: No pain or stiffness  BREASTS: No pain, masses, or nipple discharge  RESPIRATORY: No cough, wheezing, chills or hemoptysis; No shortness of breath  CARDIOVASCULAR: No chest pain, palpitations, lightheadedness, or leg swelling  GASTROINTESTINAL: No abdominal or epigastric pain. No nausea, vomiting, or hematemesis; No diarrhea or constipation. No melena or hematochezia.  GENITOURINARY: No dysuria, frequency, hematuria, or incontinence  NEUROLOGICAL: No headaches, memory loss, vertigo, loss of strength, numbness, or tremors  SKIN: No itching, burning, rashes, or lesions   LYMPH NODES: No enlarged glands  ENDOCRINE: No heat or cold intolerance; No hair loss; No polydipsia or polyuria  MUSCULOSKELETAL: No joint pain or swelling; No muscle, back, or extremity pain  PSYCHIATRIC: No depression, anxiety, or mood swings  HEME/LYMPH: No easy bruising, or bleeding gums  ALLERGY AND IMMUNOLOGIC: No hives or eczema    Vital Signs Last 24 Hrs  T(C): 36.5 (16 Sep 2021 05:28), Max: 36.5 (16 Sep 2021 05:28)  T(F): 97.7 (16 Sep 2021 05:28), Max: 97.7 (16 Sep 2021 05:28)  HR: 60 (16 Sep 2021 05:28) (60 - 77)  BP: 108/72 (16 Sep 2021 05:28) (108/72 - 154/84)  BP(mean): --  RR: 17 (16 Sep 2021 05:28) (17 - 17)  SpO2: 97% (16 Sep 2021 05:28) (92% - 97%)    PHYSICAL EXAM:  GENERAL: NAD, well-groomed, well-developed  HEAD:  Atraumatic, Normocephalic  EYES: EOMI, PERRLA, conjunctiva and sclera clear  ENMT: Moist mucous membranes, Good dentition, No lesions; No tonsillar erythema, exudates, or enlargement  NECK: Supple, No JVD, Normal thyroid  NERVOUS SYSTEM:  Alert & Oriented X3, Good concentration; All 4 extremities mobile, no gross sensory deficits.   CHEST/LUNG: Clear to auscultation bilaterally; No rales, rhonchi, wheezing, or rubs  HEART: Regular rate and rhythm; No murmurs, rubs, or gallops  ABDOMEN: Soft, Nontender, Nondistended; Bowel sounds present  EXTREMITIES:  2+ Peripheral Pulses, No clubbing, cyanosis, or edema  LYMPH: No lymphadenopathy noted  SKIN: No rashes or lesions    LABS:      Ca    9.6        15 Sep 2021 08:38          CAPILLARY BLOOD GLUCOSE      POCT Blood Glucose.: 174 mg/dL (16 Sep 2021 07:50)  POCT Blood Glucose.: 185 mg/dL (15 Sep 2021 21:16)  POCT Blood Glucose.: 189 mg/dL (15 Sep 2021 17:09)  POCT Blood Glucose.: 126 mg/dL (15 Sep 2021 14:12)      RADIOLOGY & ADDITIONAL TESTS:    Imaging Personally Reviewed:  [ ] YES     Consultant(s) Notes Reviewed:      Care Discussed with Consultants/Other Providers:    Advanced Directives: [ ] DNR  [ ] No feeding tube  [ ] MOLST in chart  [ ] MOLST completed today  [ ] Unknown   Patient is a 60y old  Male who presents with a chief complaint of celllulitis (15 Sep 2021 17:35)      INTERVAL HPI/OVERNIGHT EVENTS: Patient seen and examined at bedside. No acute overnight events occurred. Patient reports pain in RLE improving, limited wt bearing as expected.  Denies fevers, chills, headache, chest pain, dyspnea, abdominal pain, n/v.      MEDICATIONS  (STANDING):  acetaminophen   Tablet .. 975 milliGRAM(s) Oral every 6 hours  aspirin  chewable 81 milliGRAM(s) Oral daily  atorvastatin 80 milliGRAM(s) Oral at bedtime  ceFAZolin   IVPB 2000 milliGRAM(s) IV Intermittent every 8 hours  clopidogrel Tablet 75 milliGRAM(s) Oral daily  dextrose 40% Gel 15 Gram(s) Oral once  dextrose 5%. 1000 milliLiter(s) (50 mL/Hr) IV Continuous <Continuous>  dextrose 5%. 1000 milliLiter(s) (100 mL/Hr) IV Continuous <Continuous>  dextrose 50% Injectable 25 Gram(s) IV Push once  dextrose 50% Injectable 12.5 Gram(s) IV Push once  dextrose 50% Injectable 25 Gram(s) IV Push once  enoxaparin Injectable 40 milliGRAM(s) SubCutaneous daily  furosemide    Tablet 20 milliGRAM(s) Oral daily  gabapentin 800 milliGRAM(s) Oral four times a day  glucagon  Injectable 1 milliGRAM(s) IntraMuscular once  insulin lispro (ADMELOG) corrective regimen sliding scale   SubCutaneous three times a day before meals  insulin lispro (ADMELOG) corrective regimen sliding scale   SubCutaneous at bedtime  lisinopril 40 milliGRAM(s) Oral daily  metoprolol tartrate 25 milliGRAM(s) Oral two times a day  pantoprazole    Tablet 40 milliGRAM(s) Oral before breakfast  polyethylene glycol 3350 17 Gram(s) Oral daily  saccharomyces boulardii 250 milliGRAM(s) Oral two times a day  senna 2 Tablet(s) Oral at bedtime    MEDICATIONS  (PRN):  magnesium hydroxide Suspension 30 milliLiter(s) Oral daily PRN Constipation  meclizine 12.5 milliGRAM(s) Oral three times a day PRN vertigo  melatonin 3 milliGRAM(s) Oral at bedtime PRN Insomnia  ondansetron Injectable 4 milliGRAM(s) IV Push every 8 hours PRN Nausea and/or Vomiting  traMADol 50 milliGRAM(s) Oral every 6 hours PRN Moderate Pain (4 - 6)  traMADol 75 milliGRAM(s) Oral every 6 hours PRN Severe Pain (7 - 10)      Allergies    Normodyne (Faint)  Normodyne (Unknown)    Intolerances        REVIEW OF SYSTEMS:  CONSTITUTIONAL: No fever, weight loss, or fatigue  EYES: No eye pain, visual disturbances, or discharge  ENMT:  No difficulty hearing, tinnitus, vertigo; No sinus or throat pain  NECK: No pain or stiffness  BREASTS: No pain, masses, or nipple discharge  RESPIRATORY: No cough, wheezing, chills or hemoptysis; No shortness of breath  CARDIOVASCULAR: No chest pain, palpitations, lightheadedness, or leg swelling  GASTROINTESTINAL: No abdominal or epigastric pain. No nausea, vomiting, or hematemesis; No diarrhea or constipation. No melena or hematochezia.  GENITOURINARY: No dysuria, frequency, hematuria, or incontinence  NEUROLOGICAL: No headaches, memory loss, vertigo, loss of strength, numbness, or tremors  SKIN: No itching, burning, rashes, or lesions   LYMPH NODES: No enlarged glands  ENDOCRINE: No heat or cold intolerance; No hair loss; No polydipsia or polyuria  MUSCULOSKELETAL: No joint pain or swelling; No muscle, back pain. +RLE pain,warmth&swelling  PSYCHIATRIC: No depression, anxiety, or mood swings  HEME/LYMPH: No easy bruising, or bleeding gums  ALLERGY AND IMMUNOLOGIC: No hives or eczema    Vital Signs Last 24 Hrs  T(C): 36.5 (16 Sep 2021 05:28), Max: 36.5 (16 Sep 2021 05:28)  T(F): 97.7 (16 Sep 2021 05:28), Max: 97.7 (16 Sep 2021 05:28)  HR: 60 (16 Sep 2021 05:28) (60 - 77)  BP: 108/72 (16 Sep 2021 05:28) (108/72 - 154/84)  BP(mean): --  RR: 17 (16 Sep 2021 05:28) (17 - 17)  SpO2: 97% (16 Sep 2021 05:28) (92% - 97%)    PHYSICAL EXAM:  GENERAL: NAD, well-groomed, well-developed  HEAD:  Atraumatic, Normocephalic  EYES: EOMI, PERRLA, conjunctiva and sclera clear  ENMT: Moist mucous membranes, Good dentition, No lesions; No tonsillar erythema, exudates, or enlargement  NECK: Supple, No JVD, Normal thyroid  NERVOUS SYSTEM:  Alert & Oriented X3, Good concentration; All 4 extremities mobile, no gross sensory deficits.   CHEST/LUNG: Clear to auscultation bilaterally; No rales, rhonchi, wheezing, or rubs  HEART: Regular rate and rhythm; No murmurs, rubs, or gallops  ABDOMEN: Soft, Nontender, Nondistended; Bowel sounds present  EXTREMITIES:  2+ Peripheral Pulses, No clubbing, cyanosis. RLE erythema, swelling, tender to palpation, rt hallus intact dressing+  LYMPH: No lymphadenopathy noted  SKIN: No rashes or lesions    LABS:      Ca    9.6        15 Sep 2021 08:38          CAPILLARY BLOOD GLUCOSE      POCT Blood Glucose.: 174 mg/dL (16 Sep 2021 07:50)  POCT Blood Glucose.: 185 mg/dL (15 Sep 2021 21:16)  POCT Blood Glucose.: 189 mg/dL (15 Sep 2021 17:09)  POCT Blood Glucose.: 126 mg/dL (15 Sep 2021 14:12)      RADIOLOGY & ADDITIONAL TESTS:    Imaging Personally Reviewed:  [ ] YES     Consultant(s) Notes Reviewed:      Care Discussed with Consultants/Other Providers:    Advanced Directives: [ ] DNR  [ ] No feeding tube  [ ] MOLST in chart  [ ] MOLST completed today  [ ] Unknown

## 2021-09-16 NOTE — PROGRESS NOTE ADULT - SUBJECTIVE AND OBJECTIVE BOX
60y year old Male seen at [facility] 3WES 364 D1 for Grade 1 diabetic ulcer plantar right hallux. The patient states that it appeared about 5 months ago and he has been going to Dr. Garland his podiarist for treatment. The patient states that the wound is closed and before his admission he was on oral abx for the wound. The patient states that he has diabetic neuropathy and his whole entire right leg is painful and sore. Denies any fever, chills, nausea, vomiting, chest pain, shortness of breath, or calf pain at this time.    REVIEW OF SYSTEMS    PAST MEDICAL & SURGICAL HISTORY:  Hypertension    AAA (abdominal aortic aneurysm)  dx 2012    GERD (gastroesophageal reflux disease)    Leg weakness    Aortic valve replaced    Cardiac tamponade    HTN (hypertension)    Diabetes mellitus    H/O aortic valve repair    S/P aneurysm repair    Aortic valve replaced    S/P AAA repair  Repaired 3/2015    S/P aortic aneurysm repair    H/O aortic valve repair    History of incision and drainage        Allergies    Normodyne (Faint)  Normodyne (Unknown)    Intolerances        MEDICATIONS  (STANDING):  aspirin  chewable 81 milliGRAM(s) Oral daily  atorvastatin 80 milliGRAM(s) Oral at bedtime  cefTRIAXone   IVPB 1000 milliGRAM(s) IV Intermittent every 24 hours  clopidogrel Tablet 75 milliGRAM(s) Oral daily  dextrose 40% Gel 15 Gram(s) Oral once  dextrose 5%. 1000 milliLiter(s) (50 mL/Hr) IV Continuous <Continuous>  dextrose 5%. 1000 milliLiter(s) (100 mL/Hr) IV Continuous <Continuous>  dextrose 50% Injectable 25 Gram(s) IV Push once  dextrose 50% Injectable 12.5 Gram(s) IV Push once  dextrose 50% Injectable 25 Gram(s) IV Push once  enoxaparin Injectable 40 milliGRAM(s) SubCutaneous daily  gabapentin 600 milliGRAM(s) Oral four times a day  glucagon  Injectable 1 milliGRAM(s) IntraMuscular once  hydrochlorothiazide 25 milliGRAM(s) Oral daily  insulin lispro (ADMELOG) corrective regimen sliding scale   SubCutaneous three times a day before meals  insulin lispro (ADMELOG) corrective regimen sliding scale   SubCutaneous at bedtime  lisinopril 40 milliGRAM(s) Oral daily  metoprolol tartrate 25 milliGRAM(s) Oral two times a day  pantoprazole    Tablet 40 milliGRAM(s) Oral before breakfast  saccharomyces boulardii 250 milliGRAM(s) Oral two times a day    MEDICATIONS  (PRN):  acetaminophen   Tablet .. 650 milliGRAM(s) Oral every 6 hours PRN Temp greater or equal to 38C (100.4F), Mild Pain (1 - 3)  meclizine 12.5 milliGRAM(s) Oral three times a day PRN vertigo  melatonin 3 milliGRAM(s) Oral at bedtime PRN Insomnia  ondansetron Injectable 4 milliGRAM(s) IV Push every 8 hours PRN Nausea and/or Vomiting  traMADol 50 milliGRAM(s) Oral every 6 hours PRN Moderate Pain (4 - 6)  traMADol 75 milliGRAM(s) Oral every 6 hours PRN Severe Pain (7 - 10)      Social History:  , lives at home with wife, works as an [employer] , ADLs independent, ambulates independently without the use of assistive devices, former smoker, social alcohol use (last drink 9/3/21), denies recreational drug use (11 Sep 2021 02:42)      FAMILY HISTORY:  Family history of breast cancer    Family history of hypertension    Family history of stroke    Family history of prostate cancer    Family history of COPD (chronic obstructive pulmonary disease) (Grandparent)    FH: HTN (hypertension)    Vital Signs Last 24 Hrs  T(C): 36.3 (16 Sep 2021 13:04), Max: 36.5 (16 Sep 2021 05:28)  T(F): 97.3 (16 Sep 2021 13:04), Max: 97.7 (16 Sep 2021 05:28)  HR: 88 (16 Sep 2021 13:04) (60 - 88)  BP: 162/87 (16 Sep 2021 13:04) (108/72 - 162/87)  BP(mean): --  RR: 17 (16 Sep 2021 13:04) (17 - 17)  SpO2: 97% (16 Sep 2021 13:04) (92% - 97%)    PHYSICAL EXAM:  Vascular: DP faintly palpable on the RIGHT, no palpable on the left  & PT nonpalpable bilaterally, Capillary refill 3 seconds, No Digital hair present bilaterally, diffuse edema and erythema along the RLE and minimally along the right forefoot   Neurological: Loss of protective sensation b/l   Musculoskeletal: 5/5 strength in all quadrants bilaterally, AJ & STJ ROM intact,   Dermatological:   1. Pre- Debridement  Grade 1 ulcer right plantar hallux- size 0.2cmx0.1cm- adherent scab with healthy epithelized underneath , does not probe to bone, no tunneling, no undermining   Post Debridement- Grade 2 wound right plantar hallux with full thickness wound size 0.3cmx0.2cmx0.2cm- red granular base, less than 0.5cc of purulence, does not probe to bone, no tunneling, no undermining, edema and erythema, no malodor, no streaking         CBC Full  -  ( 16 Sep 2021 09:23 )  WBC Count : 7.73 K/uL  RBC Count : 4.98 M/uL  Hemoglobin : 13.3 g/dL  Hematocrit : 42.0 %  Platelet Count - Automated : 260 K/uL  Mean Cell Volume : 84.3 fl  Mean Cell Hemoglobin : 26.7 pg  Mean Cell Hemoglobin Concentration : 31.7 gm/dL  Auto Neutrophil # : x  Auto Lymphocyte # : x  Auto Monocyte # : x  Auto Eosinophil # : x  Auto Basophil # : x  Auto Neutrophil % : x  Auto Lymphocyte % : x  Auto Monocyte % : x  Auto Eosinophil % : x  Auto Basophil % : x    ----------CHEM PANEL----------  09-16    134<L>  |  95<L>  |  26<H>  ----------------------------<  166<H>  3.9   |  31  |  1.30    Ca    10.1      16 Sep 2021 09:23  Mg     1.8     09-15              PT/INR - ( 11 Sep 2021 00:20 )   PT: 14.1 sec;   INR: 1.22 ratio         PTT - ( 11 Sep 2021 00:20 )  PTT:27.5 sec        Imaging: Awaiting Bone Scan to r/o OM

## 2021-09-17 LAB — SARS-COV-2 RNA SPEC QL NAA+PROBE: SIGNIFICANT CHANGE UP

## 2021-09-17 PROCEDURE — 99233 SBSQ HOSP IP/OBS HIGH 50: CPT | Mod: GC

## 2021-09-17 PROCEDURE — 99232 SBSQ HOSP IP/OBS MODERATE 35: CPT

## 2021-09-17 RX ADMIN — Medication 25 MILLIGRAM(S): at 18:30

## 2021-09-17 RX ADMIN — POLYETHYLENE GLYCOL 3350 17 GRAM(S): 17 POWDER, FOR SOLUTION ORAL at 12:32

## 2021-09-17 RX ADMIN — GABAPENTIN 800 MILLIGRAM(S): 400 CAPSULE ORAL at 05:57

## 2021-09-17 RX ADMIN — Medication 250 MILLIGRAM(S): at 05:57

## 2021-09-17 RX ADMIN — Medication 975 MILLIGRAM(S): at 06:49

## 2021-09-17 RX ADMIN — Medication 250 MILLIGRAM(S): at 18:31

## 2021-09-17 RX ADMIN — Medication 81 MILLIGRAM(S): at 12:31

## 2021-09-17 RX ADMIN — Medication 20 MILLIGRAM(S): at 05:57

## 2021-09-17 RX ADMIN — Medication 100 MILLIGRAM(S): at 05:59

## 2021-09-17 RX ADMIN — Medication 25 MILLIGRAM(S): at 05:58

## 2021-09-17 RX ADMIN — Medication 2: at 12:31

## 2021-09-17 RX ADMIN — CLOPIDOGREL BISULFATE 75 MILLIGRAM(S): 75 TABLET, FILM COATED ORAL at 12:31

## 2021-09-17 RX ADMIN — ENOXAPARIN SODIUM 40 MILLIGRAM(S): 100 INJECTION SUBCUTANEOUS at 12:32

## 2021-09-17 RX ADMIN — Medication 975 MILLIGRAM(S): at 05:57

## 2021-09-17 RX ADMIN — LISINOPRIL 40 MILLIGRAM(S): 2.5 TABLET ORAL at 05:57

## 2021-09-17 RX ADMIN — ATORVASTATIN CALCIUM 80 MILLIGRAM(S): 80 TABLET, FILM COATED ORAL at 21:24

## 2021-09-17 RX ADMIN — SENNA PLUS 2 TABLET(S): 8.6 TABLET ORAL at 21:24

## 2021-09-17 RX ADMIN — PANTOPRAZOLE SODIUM 40 MILLIGRAM(S): 20 TABLET, DELAYED RELEASE ORAL at 05:59

## 2021-09-17 RX ADMIN — Medication 100 MILLIGRAM(S): at 13:41

## 2021-09-17 RX ADMIN — Medication 100 MILLIGRAM(S): at 21:24

## 2021-09-17 NOTE — PROGRESS NOTE ADULT - SUBJECTIVE AND OBJECTIVE BOX
Patient is a 60y old  Male who presents with a chief complaint of celllulitis (17 Sep 2021 13:31)      INTERVAL HPI/OVERNIGHT EVENTS: Patient seen and examined at bedside. No overnight events occurred. Patient has no complaints at this time. Denies fevers, chills, headache, lightheadedness, chest pain, dyspnea, abdominal pain, n/v/d/c. Patient states cellulitis is improving as well RLE pain.     MEDICATIONS  (STANDING):  acetaminophen   Tablet .. 975 milliGRAM(s) Oral every 6 hours  aspirin  chewable 81 milliGRAM(s) Oral daily  atorvastatin 80 milliGRAM(s) Oral at bedtime  ceFAZolin   IVPB 2000 milliGRAM(s) IV Intermittent every 8 hours  clopidogrel Tablet 75 milliGRAM(s) Oral daily  dextrose 40% Gel 15 Gram(s) Oral once  dextrose 5%. 1000 milliLiter(s) (50 mL/Hr) IV Continuous <Continuous>  dextrose 5%. 1000 milliLiter(s) (100 mL/Hr) IV Continuous <Continuous>  dextrose 50% Injectable 25 Gram(s) IV Push once  dextrose 50% Injectable 12.5 Gram(s) IV Push once  dextrose 50% Injectable 25 Gram(s) IV Push once  enoxaparin Injectable 40 milliGRAM(s) SubCutaneous daily  furosemide    Tablet 20 milliGRAM(s) Oral daily  gabapentin 800 milliGRAM(s) Oral four times a day  glucagon  Injectable 1 milliGRAM(s) IntraMuscular once  insulin lispro (ADMELOG) corrective regimen sliding scale   SubCutaneous three times a day before meals  insulin lispro (ADMELOG) corrective regimen sliding scale   SubCutaneous at bedtime  lisinopril 40 milliGRAM(s) Oral daily  metoprolol tartrate 25 milliGRAM(s) Oral two times a day  pantoprazole    Tablet 40 milliGRAM(s) Oral before breakfast  polyethylene glycol 3350 17 Gram(s) Oral daily  saccharomyces boulardii 250 milliGRAM(s) Oral two times a day  senna 2 Tablet(s) Oral at bedtime    MEDICATIONS  (PRN):  magnesium hydroxide Suspension 30 milliLiter(s) Oral daily PRN Constipation  meclizine 12.5 milliGRAM(s) Oral three times a day PRN vertigo  melatonin 3 milliGRAM(s) Oral at bedtime PRN Insomnia  ondansetron Injectable 4 milliGRAM(s) IV Push every 8 hours PRN Nausea and/or Vomiting  traMADol 50 milliGRAM(s) Oral every 6 hours PRN Moderate Pain (4 - 6)  traMADol 75 milliGRAM(s) Oral every 6 hours PRN Severe Pain (7 - 10)      Allergies    Normodyne (Faint)  Normodyne (Unknown)    Intolerances        REVIEW OF SYSTEMS:  CONSTITUTIONAL: No fever or chills  HEENT:  No headache, no sore throat  RESPIRATORY: No cough, wheezing, or shortness of breath  CARDIOVASCULAR: No chest pain, palpitations  GASTROINTESTINAL: No abd pain, nausea, vomiting, or diarrhea  GENITOURINARY: No dysuria, frequency, or hematuria  NEUROLOGICAL: no focal weakness or dizziness  MUSCULOSKELETAL: +right foot pain/swelling/redness (gradually improving); no myalgias    Vital Signs Last 24 Hrs  T(C): 36.5 (17 Sep 2021 13:01), Max: 36.7 (16 Sep 2021 20:29)  T(F): 97.7 (17 Sep 2021 13:01), Max: 98 (16 Sep 2021 20:29)  HR: 80 (17 Sep 2021 13:01) (73 - 104)  BP: 163/85 (17 Sep 2021 13:01) (113/77 - 163/85)  BP(mean): --  RR: 17 (17 Sep 2021 13:01) (17 - 18)  SpO2: 97% (17 Sep 2021 13:01) (93% - 97%)    PHYSICAL EXAM:  GENERAL: NAD  HEENT:  anicteric, moist mucous membranes  CHEST/LUNG:  CTA b/l, no rales, wheezes, or rhonchi  HEART:  RRR, S1, S2  ABDOMEN:  BS+, soft, nontender, nondistended  EXTREMITIES: lower R LE with erythema, swelling, tenderness;  no cyanosis, or clubbing  NERVOUS SYSTEM: answers questions and follows commands appropriately    LABS:    CBC Full  -  ( 16 Sep 2021 09:23 )  WBC Count : 7.73 K/uL  Hemoglobin : 13.3 g/dL  Hematocrit : 42.0 %  Platelet Count - Automated : 260 K/uL  Mean Cell Volume : 84.3 fl  Mean Cell Hemoglobin : 26.7 pg  Mean Cell Hemoglobin Concentration : 31.7 gm/dL  Auto Neutrophil # : x  Auto Lymphocyte # : x  Auto Monocyte # : x  Auto Eosinophil # : x  Auto Basophil # : x  Auto Neutrophil % : x  Auto Lymphocyte % : x  Auto Monocyte % : x  Auto Eosinophil % : x  Auto Basophil % : x      Ca    10.1       16 Sep 2021 09:23          CAPILLARY BLOOD GLUCOSE      POCT Blood Glucose.: 207 mg/dL (17 Sep 2021 12:08)  POCT Blood Glucose.: 150 mg/dL (17 Sep 2021 08:04)  POCT Blood Glucose.: 190 mg/dL (16 Sep 2021 21:07)  POCT Blood Glucose.: 137 mg/dL (16 Sep 2021 17:03)        Culture - Blood (collected 09-11-21 @ 03:04)  Source: .Blood Blood  Final Report (09-16-21 @ 04:01):    No Growth Final    Culture - Blood (collected 09-11-21 @ 03:04)  Source: .Blood Blood  Final Report (09-16-21 @ 04:01):    No Growth Final        RADIOLOGY & ADDITIONAL TESTS:    Personally reviewed.     Consultant(s) Notes Reviewed:  [x] YES  [ ] NO     Patient is a 60y old  Male who presents with a chief complaint of celllulitis (17 Sep 2021 13:31)      INTERVAL HPI/OVERNIGHT EVENTS: Patient seen and examined at bedside. No overnight events occurred. Patient has no complaints at this time. Denies fevers, chills, headache, lightheadedness, chest pain, dyspnea, abdominal pain, n/v/d/c. Patient states cellulitis is improving as well as RLE pain.     MEDICATIONS  (STANDING):  acetaminophen   Tablet .. 975 milliGRAM(s) Oral every 6 hours  aspirin  chewable 81 milliGRAM(s) Oral daily  atorvastatin 80 milliGRAM(s) Oral at bedtime  ceFAZolin   IVPB 2000 milliGRAM(s) IV Intermittent every 8 hours  clopidogrel Tablet 75 milliGRAM(s) Oral daily  dextrose 40% Gel 15 Gram(s) Oral once  dextrose 5%. 1000 milliLiter(s) (50 mL/Hr) IV Continuous <Continuous>  dextrose 5%. 1000 milliLiter(s) (100 mL/Hr) IV Continuous <Continuous>  dextrose 50% Injectable 25 Gram(s) IV Push once  dextrose 50% Injectable 12.5 Gram(s) IV Push once  dextrose 50% Injectable 25 Gram(s) IV Push once  enoxaparin Injectable 40 milliGRAM(s) SubCutaneous daily  furosemide    Tablet 20 milliGRAM(s) Oral daily  gabapentin 800 milliGRAM(s) Oral four times a day  glucagon  Injectable 1 milliGRAM(s) IntraMuscular once  insulin lispro (ADMELOG) corrective regimen sliding scale   SubCutaneous three times a day before meals  insulin lispro (ADMELOG) corrective regimen sliding scale   SubCutaneous at bedtime  lisinopril 40 milliGRAM(s) Oral daily  metoprolol tartrate 25 milliGRAM(s) Oral two times a day  pantoprazole    Tablet 40 milliGRAM(s) Oral before breakfast  polyethylene glycol 3350 17 Gram(s) Oral daily  saccharomyces boulardii 250 milliGRAM(s) Oral two times a day  senna 2 Tablet(s) Oral at bedtime    MEDICATIONS  (PRN):  magnesium hydroxide Suspension 30 milliLiter(s) Oral daily PRN Constipation  meclizine 12.5 milliGRAM(s) Oral three times a day PRN vertigo  melatonin 3 milliGRAM(s) Oral at bedtime PRN Insomnia  ondansetron Injectable 4 milliGRAM(s) IV Push every 8 hours PRN Nausea and/or Vomiting  traMADol 50 milliGRAM(s) Oral every 6 hours PRN Moderate Pain (4 - 6)  traMADol 75 milliGRAM(s) Oral every 6 hours PRN Severe Pain (7 - 10)      Allergies    Normodyne (Faint)  Normodyne (Unknown)    Intolerances        REVIEW OF SYSTEMS:  CONSTITUTIONAL: No fever or chills  HEENT:  No headache, no sore throat  RESPIRATORY: No cough, wheezing, or shortness of breath  CARDIOVASCULAR: No chest pain, palpitations  GASTROINTESTINAL: No abd pain, nausea, vomiting, or diarrhea  GENITOURINARY: No dysuria, frequency, or hematuria  NEUROLOGICAL: no focal weakness or dizziness  MUSCULOSKELETAL: +right foot pain/swelling/redness (gradually improving); no myalgias    Vital Signs Last 24 Hrs  T(C): 36.5 (17 Sep 2021 13:01), Max: 36.7 (16 Sep 2021 20:29)  T(F): 97.7 (17 Sep 2021 13:01), Max: 98 (16 Sep 2021 20:29)  HR: 80 (17 Sep 2021 13:01) (73 - 104)  BP: 163/85 (17 Sep 2021 13:01) (113/77 - 163/85)  BP(mean): --  RR: 17 (17 Sep 2021 13:01) (17 - 18)  SpO2: 97% (17 Sep 2021 13:01) (93% - 97%)    PHYSICAL EXAM:  GENERAL: NAD  HEENT:  anicteric, moist mucous membranes  CHEST/LUNG:  CTA b/l, no rales, wheezes, or rhonchi  HEART:  RRR, S1, S2  ABDOMEN:  BS+, soft, nontender, nondistended  EXTREMITIES: lower R LE with erythema, swelling, tenderness;  no cyanosis, or clubbing  NERVOUS SYSTEM: answers questions and follows commands appropriately    LABS:    CBC Full  -  ( 16 Sep 2021 09:23 )  WBC Count : 7.73 K/uL  Hemoglobin : 13.3 g/dL  Hematocrit : 42.0 %  Platelet Count - Automated : 260 K/uL  Mean Cell Volume : 84.3 fl  Mean Cell Hemoglobin : 26.7 pg  Mean Cell Hemoglobin Concentration : 31.7 gm/dL  Auto Neutrophil # : x  Auto Lymphocyte # : x  Auto Monocyte # : x  Auto Eosinophil # : x  Auto Basophil # : x  Auto Neutrophil % : x  Auto Lymphocyte % : x  Auto Monocyte % : x  Auto Eosinophil % : x  Auto Basophil % : x      Ca    10.1       16 Sep 2021 09:23          CAPILLARY BLOOD GLUCOSE      POCT Blood Glucose.: 207 mg/dL (17 Sep 2021 12:08)  POCT Blood Glucose.: 150 mg/dL (17 Sep 2021 08:04)  POCT Blood Glucose.: 190 mg/dL (16 Sep 2021 21:07)  POCT Blood Glucose.: 137 mg/dL (16 Sep 2021 17:03)        Culture - Blood (collected 09-11-21 @ 03:04)  Source: .Blood Blood  Final Report (09-16-21 @ 04:01):    No Growth Final    Culture - Blood (collected 09-11-21 @ 03:04)  Source: .Blood Blood  Final Report (09-16-21 @ 04:01):    No Growth Final        RADIOLOGY & ADDITIONAL TESTS:    Personally reviewed.     Consultant(s) Notes Reviewed:  [x] YES  [ ] NO     Patient is a 60y old  Male who presents with a chief complaint of R LE redness and pain.       INTERVAL HPI/OVERNIGHT EVENTS: Patient seen and examined at bedside. No acute overnight events occurred. Patient has no new complaints at this time. Denies fevers, chills, headache, lightheadedness, chest pain, dyspnea, abdominal pain, n/v/d/c. Patient states cellulitis is improving as well as RLE pain.     MEDICATIONS  (STANDING):  acetaminophen   Tablet .. 975 milliGRAM(s) Oral every 6 hours  aspirin  chewable 81 milliGRAM(s) Oral daily  atorvastatin 80 milliGRAM(s) Oral at bedtime  ceFAZolin   IVPB 2000 milliGRAM(s) IV Intermittent every 8 hours  clopidogrel Tablet 75 milliGRAM(s) Oral daily  dextrose 40% Gel 15 Gram(s) Oral once  dextrose 5%. 1000 milliLiter(s) (50 mL/Hr) IV Continuous <Continuous>  dextrose 5%. 1000 milliLiter(s) (100 mL/Hr) IV Continuous <Continuous>  dextrose 50% Injectable 25 Gram(s) IV Push once  dextrose 50% Injectable 12.5 Gram(s) IV Push once  dextrose 50% Injectable 25 Gram(s) IV Push once  enoxaparin Injectable 40 milliGRAM(s) SubCutaneous daily  furosemide    Tablet 20 milliGRAM(s) Oral daily  gabapentin 800 milliGRAM(s) Oral four times a day  glucagon  Injectable 1 milliGRAM(s) IntraMuscular once  insulin lispro (ADMELOG) corrective regimen sliding scale   SubCutaneous three times a day before meals  insulin lispro (ADMELOG) corrective regimen sliding scale   SubCutaneous at bedtime  lisinopril 40 milliGRAM(s) Oral daily  metoprolol tartrate 25 milliGRAM(s) Oral two times a day  pantoprazole    Tablet 40 milliGRAM(s) Oral before breakfast  polyethylene glycol 3350 17 Gram(s) Oral daily  saccharomyces boulardii 250 milliGRAM(s) Oral two times a day  senna 2 Tablet(s) Oral at bedtime    MEDICATIONS  (PRN):  magnesium hydroxide Suspension 30 milliLiter(s) Oral daily PRN Constipation  meclizine 12.5 milliGRAM(s) Oral three times a day PRN vertigo  melatonin 3 milliGRAM(s) Oral at bedtime PRN Insomnia  ondansetron Injectable 4 milliGRAM(s) IV Push every 8 hours PRN Nausea and/or Vomiting  traMADol 50 milliGRAM(s) Oral every 6 hours PRN Moderate Pain (4 - 6)  traMADol 75 milliGRAM(s) Oral every 6 hours PRN Severe Pain (7 - 10)      Allergies    Normodyne (Faint)  Normodyne (Unknown)    Intolerances        REVIEW OF SYSTEMS:  CONSTITUTIONAL: No fever or chills  HEENT:  No headache, no sore throat  RESPIRATORY: No cough, wheezing, or shortness of breath  CARDIOVASCULAR: No chest pain, palpitations  GASTROINTESTINAL: No abd pain, nausea, vomiting, or diarrhea  GENITOURINARY: No dysuria, frequency, or hematuria  NEUROLOGICAL: no focal weakness or dizziness  MUSCULOSKELETAL: +right foot pain/swelling/redness (gradually improving); no myalgias    Vital Signs Last 24 Hrs  T(C): 36.5 (17 Sep 2021 13:01), Max: 36.7 (16 Sep 2021 20:29)  T(F): 97.7 (17 Sep 2021 13:01), Max: 98 (16 Sep 2021 20:29)  HR: 80 (17 Sep 2021 13:01) (73 - 104)  BP: 163/85 (17 Sep 2021 13:01) (113/77 - 163/85)  BP(mean): --  RR: 17 (17 Sep 2021 13:01) (17 - 18)  SpO2: 97% (17 Sep 2021 13:01) (93% - 97%)    PHYSICAL EXAM:  GENERAL: NAD  HEENT:  anicteric, moist mucous membranes  CHEST/LUNG:  CTA b/l, no rales, wheezes, or rhonchi  HEART:  RRR, S1, S2  ABDOMEN:  BS+, soft, nontender, nondistended  EXTREMITIES: lower R LE with mild erythema, swelling, tenderness;  no cyanosis, or clubbing  NERVOUS SYSTEM: answers questions and follows commands appropriately    LABS:    CBC Full  -  ( 16 Sep 2021 09:23 )  WBC Count : 7.73 K/uL  Hemoglobin : 13.3 g/dL  Hematocrit : 42.0 %  Platelet Count - Automated : 260 K/uL  Mean Cell Volume : 84.3 fl  Mean Cell Hemoglobin : 26.7 pg  Mean Cell Hemoglobin Concentration : 31.7 gm/dL  Auto Neutrophil # : x  Auto Lymphocyte # : x  Auto Monocyte # : x  Auto Eosinophil # : x  Auto Basophil # : x  Auto Neutrophil % : x  Auto Lymphocyte % : x  Auto Monocyte % : x  Auto Eosinophil % : x  Auto Basophil % : x                CAPILLARY BLOOD GLUCOSE      POCT Blood Glucose.: 207 mg/dL (17 Sep 2021 12:08)  POCT Blood Glucose.: 150 mg/dL (17 Sep 2021 08:04)  POCT Blood Glucose.: 190 mg/dL (16 Sep 2021 21:07)  POCT Blood Glucose.: 137 mg/dL (16 Sep 2021 17:03)        Culture - Blood (collected 09-11-21 @ 03:04)  Source: .Blood Blood  Final Report (09-16-21 @ 04:01):    No Growth Final    Culture - Blood (collected 09-11-21 @ 03:04)  Source: .Blood Blood  Final Report (09-16-21 @ 04:01):    No Growth Final        RADIOLOGY & ADDITIONAL TESTS:    Personally reviewed.     Consultant(s) Notes Reviewed:  [x] YES  [ ] NO

## 2021-09-17 NOTE — DIETITIAN INITIAL EVALUATION ADULT. - OTHER INFO
Pt admit with cellulitis, to go home with IV abx. BMI 27. Diet hx reveals pt compliant with DM meal plan.

## 2021-09-17 NOTE — PROGRESS NOTE ADULT - TIME BILLING
Pt seen + examined. Case discussed with resident. Agree with assessment and plan above (edited by me in detail):  Time spent: 37min. More than 50% of the visit was spent counseling the patient on medical condition -- right LE cellulitis, PAD, Abx, outpt angiogram and implications of that procedure.  61 yo M with PMHx of CAD (with 2 stents in the 1st Diag 7/1/19 and LPDA 7/3/19), bioprosthetic AVR with ascending aortic aneurysm repair on 03/116/15 (for a bicuspid aortic valve), cardiac tamponade (s/p pericardiocentesis 3/26/15), HFpEF, syncope (s/p ILR insertion 2/2/16), atypical chest discomfort, HTN, HLD, T2DM (not on insulin, complicated by peripheral neuropathy), GERD, vertigo, lacunar infarcts (incidentally detected on MRI), ventricular ectopy and mild carotid atherosclerosis presents to the ED c/o right lower extremity swelling, warmth, and pain, admitted for R LE cellulitis.    - Right LE cellulitis gradually improving clinically   - s/p IV Zosyn and Vancomycin transitioned to Ancef on 09/11  - Blood culture NGTD   - On 09/11 doppler negative for DVT  - Pain control: Tylenol q6 mild, tramadol 50mg q6 moderate, tramadol 75mg q6 severe  - ID following (Francis), recs appreciated -- plan to d/c on Keflex tomorrow if continues to improve  - Podiatry following (Sonia xavier), recs appreciated -- wound care per podiatry for foot  - indium scan shows no evidence osteomyelitis of the R LE  - Vascular surgery consulted (Julien Frankel), recs appreciated -- now recommends angiogram in near future as an oupatient-- pt will f/up ASAP as an outpatient  - c/w ASA, plavix, lipitor 80mg QHS
Pt seen + examined. Case discussed with resident. Agree with assessment and plan above (edited by me in detail):  Time spent: 40min. More than 50% of the visit was spent counseling the patient on medical condition -- right LE cellulitis, PAD, Abx.  59 yo M with PMHx of CAD (with 2 stents in the 1st Diag 7/1/19 and LPDA 7/3/19), bioprosthetic AVR with ascending aortic aneurysm repair on 03/116/15 (for a bicuspid aortic valve), cardiac tamponade (s/p pericardiocentesis 3/26/15), HFpEF, syncope (s/p ILR insertion 2/2/16), atypical chest discomfort, HTN, HLD, T2DM (not on insulin, complicated by peripheral neuropathy), GERD, vertigo, lacunar infarcts (incidentally detected on MRI), ventricular ectopy and mild carotid atherosclerosis presents to the ED c/o right lower extremity swelling, warmth, and pain, admitted for R LE cellulitis.    - Right LE cellulitis gradually improving clinically   - s/p IV Zosyn and Vancomycin transitioned to Ancef on 09/11  - Blood culture NGTD   - On 09/11 doppler negative for DVT  - Pain control: Tylenol q6 mild, tramadol 50mg q6 moderate, tramadol 75mg q6 severe  - ID following (Francis), recs appreciated -- c/w Ancef for now per ID  - trend wbc and fever curve, WBC today 5.87, afebrile   - pt with Diabetic toe ulcer  - Podiatry following ([facility]), recs appreciated -- wound care per podiatry for foot  - indium scan shows no evidence osteomyelitis of the R LE  - Vascular surgery consulted (Julien Frankel), recs appreciated, Right IMELDA cannot be calculated, Left IMELDA at upper limits, Duplex showed scattered atherosclerotic w/ questionable areas of stenosis in right post. and ant. tibial arteries -- no surgical intervention at this time per [facility] surgery -- will need close outpatient f/up  - c/w ASA, plavix, lipitor 80mg QHS

## 2021-09-17 NOTE — PROGRESS NOTE ADULT - PROBLEM SELECTOR PLAN 1
- patient seen and evaluated  - wound dressing taken off with care and to patient's tolerance   - Area gently cleansed with normal saline and pat dry with sterile gauze   - Wound culture taken and sent off  - Per Dr. Michele, discussed with patient future surgical intervention for biomechanical management of hallux limitus which will prevent recurrence of callus and ulceration, patient states that in the future will do as outpatient. Patient will continue to manage the hallux limitus with appropriate supportive shoegear  - Wound dressed with Aquacel ag and DSD  - Follow up on vascular recommendations  - Follow up on infectious disease recommendations   - Wound Care Instructions below  - Bone Scan negative OM  - No surgical intervention during this admission  - Podiatry team will continue to follow patient while in house   WOUND CARE INSTRUCTIONS   1. Cleanse wound with sterile saline solution and gently pat dry.  2. Dress wound with Aquacel ag and dry, sterile dressing.  3. Change dressings daily and monitor for any signs of infection.  4. Patient is to [facility] with Dr. Scott or Dr. Michele within 1 week upon discharge.

## 2021-09-17 NOTE — PROGRESS NOTE ADULT - ASSESSMENT
61 yo M with PMHx of CAD (with 2 stents in the 1st Diag 7/1/19 and LPDA 7/3/19), bioprosthetic AVR with ascending aortic aneurysm repair on 03/116/15 (for a bicuspid aortic valve), cardiac tamponade (s/p pericardiocentesis 3/26/15), HFpEF, syncope (s/p ILR insertion 2/2/16), atypical chest discomfort, HTN, HLD, T2DM (not on insulin, complicated by peripheral neuropathy), GERD, vertigo, lacunar infarcts (incidentally detected on MRI), ventricular ectopy and mild carotid atherosclerosis presents to the ED c/o right lower extremity swelling, warmth, and pain, admitted for R LE cellulitis.       # Right LE Cellulitis.  - s/p IV Zosyn and Vancomycin transitioned to Ancef on 09/11  - Blood culture NGTD   - On 09/11 doppler negative for DVT  - Pain control: Tylenol q6 mild, tramadol 50mg q6 moderate, tramadol 75mg q6 severe  - ID following ([facility]), recs appreciated  - Podiatry following ([facility]), recs appreciated -- wound care per podiatry for foot    # Diabetic toe ulcer s/p Rt hallux excisional debridement of the hyperkeratosis which revealed full thickness wound of the right plantar hallux- less than0.5cc of purulence  - indium scan shows no evidence osteomyelitis of the R LE  - Continue home gabapentin for neuropathic pain   - Podiatry following ([facility]), recs appreciated -- wound care per podiatry for foot  - Vascular consulted, Right IMELDA cannot be calculated, Left IMELDA at upper limits, Duplex showed scattered atherosclerotic w/ questionable areas of stenosis in right post. and ant. tibial arteries -- no surgical intervention at this time per vas surgery -- will need close outpatient vascular surgery f/up      # PAD:  - Vascular surgery consulted (Julien Frank[facility]), recs appreciated, Right IMELDA cannot be calculated, Left IMELDA at upper limits, Duplex showed scattered atherosclerotic w/ questionable areas of stenosis in right post. and ant. tibial arteries -- no surgical intervention at this time per vasc surgery -- will need close outpatient vascular surgery f/up  - c/w ASA, plavix, lipitor 80mg QHS    # Hypertension.   -on metoprolol, lisinopril, hctz at home, continue with hold parameters  -BP stable on Lasix 20m Metoprolol & Lisinopril  - OFF HCTZ for now  -monitor routine hemodynamics.    # Hyperlipidemia.   -cont atorvastatin at home.    # Type 2 diabetes mellitus.   -chronic, not on insulin, complicated by neuropathy  - HbA1c 7.0%  -on metformin at home  -low dose corrective scale, accuchecks, hypoglycemia protocol    # GERD (gastroesophageal reflux disease).   -chronic  -Cont home protonix.    # Vertigo.   -chronic  -Cont home PRN antivert.    # CAD (coronary artery disease).   -chronic, s/p 2 stents in 2019  -Cont asa and plavix, B-blocker, and statin  -recent TTE in 2020 showing normal LVEF but mod diastolic dysfunction    VTE ppx:  Lovenox 40mg SQ daily.         61 yo M with PMHx of CAD (with 2 stents in the 1st Diag 7/1/19 and LPDA 7/3/19), bioprosthetic AVR with ascending aortic aneurysm repair on 03/116/15 (for a bicuspid aortic valve), cardiac tamponade (s/p pericardiocentesis 3/26/15), HFpEF, syncope (s/p ILR insertion 2/2/16), atypical chest discomfort, HTN, HLD, T2DM (not on insulin, complicated by peripheral neuropathy), GERD, vertigo, lacunar infarcts (incidentally detected on MRI), ventricular ectopy and mild carotid atherosclerosis presents to the ED c/o right lower extremity swelling, warmth, and pain, admitted for R LE cellulitis.       # Right LE Cellulitis.  - s/p IV Zosyn and Vancomycin transitioned to Ancef on 09/11  - Blood culture NGTD   - On 09/11 doppler negative for DVT  - Pain control: Tylenol q6 mild, tramadol 50mg q6 moderate, tramadol 75mg q6 severe  - ID following ([facility]), recs appreciated -- plan to d/c on Keflex tomorrow if continues to improve  - Podiatry following ([facility]), recs appreciated -- wound care per podiatry for foot    # Diabetic toe ulcer s/p Rt hallux excisional debridement of the hyperkeratosis which revealed full thickness wound of the right plantar hallux- less than0.5cc of purulence  - indium scan shows no evidence osteomyelitis of the R LE  - Continue home gabapentin for neuropathic pain   - Podiatry following ([facility]), recs appreciated -- wound care per podiatry for foot  - Vascular consulted, Right IMELDA cannot be calculated, Left IMELDA at upper limits, Duplex showed scattered atherosclerotic w/ questionable areas of stenosis in right post. and ant. tibial arteries -- no surgical intervention at this time per vas surgery -- will need close outpatient vascular surgery f/up      # PAD:  - Vascular surgery consulted (Julien Frankel), recs appreciated, Right IMELDA cannot be calculated, Left IMELDA at upper limits, Duplex showed scattered atherosclerotic w/ questionable areas of stenosis in right post. and ant. tibial arteries -- no surgical intervention at this time per vas surgery -- will need close outpatient vascular surgery f/up  - c/w ASA, plavix, lipitor 80mg QHS    # Hypertension.   -on metoprolol, lisinopril, hctz at home, continue with hold parameters  -BP stable on Lasix 20m Metoprolol & Lisinopril  - OFF HCTZ for now  -monitor routine hemodynamics.    # Hyperlipidemia.   -cont atorvastatin at home.    # Type 2 diabetes mellitus.   -chronic, not on insulin, complicated by neuropathy  - HbA1c 7.0%  -on metformin at home  -low dose corrective scale, accuchecks, hypoglycemia protocol    # GERD (gastroesophageal reflux disease).   -chronic  -Cont home protonix.    # Vertigo.   -chronic  -Cont home PRN antivert.    # CAD (coronary artery disease).   -chronic, s/p 2 stents in 2019  -Cont asa and plavix, B-blocker, and statin  -recent TTE in 2020 showing normal LVEF but mod diastolic dysfunction    VTE ppx:  Lovenox 40mg SQ daily.

## 2021-09-17 NOTE — DIETITIAN INITIAL EVALUATION ADULT. - BODY MASS INDEX
27.5 I have personally performed a face to face diagnostic evaluation on this patient. I have reviewed the ACP note and agree with the history, exam and plan of care, except as noted.

## 2021-09-17 NOTE — DIETITIAN INITIAL EVALUATION ADULT. - PROBLEM SELECTOR PLAN 1
presented with RLE warmth, erythema, swelling and pain, doppler negative for DVT  - no known recent trauma or insect bite . pt with partially healed wound on great toe   -nonpurulent cellulitis, continue IV Ancef , check MRSA swab  -f/u blood cx  -trend wbc and fever curve  -pain control  -if cellulitis does not improve with antibiotics, consider repeat doppler (unable to visualize all veins)  - ID Consult Dr. Blanco

## 2021-09-17 NOTE — DIETITIAN INITIAL EVALUATION ADULT. - PROBLEM SELECTOR PLAN 2
also p/w nausea, vomiting, and diarrhea with possible sick contact  - ?systemic S&S of acute infection vs viral illness   - RVP neg, covid pcr neg.   - no leukocytosis. tmax 101F yesterday  -check GI stool PCR, ova and parasites  -zofran PRN for nausea  -encourage PO intake  - trend electrolytes

## 2021-09-17 NOTE — PROGRESS NOTE ADULT - ASSESSMENT
Grade 2 Diabetic ulcer Right hallux- S/p Sharp excisional debridement of Diabetic ulcer at bedside with Dr. Michele on 9/16/2021     Dr. Michele performed sharp excisional debridement of the hyperkeratosis which revealed full thickness wound of the right plantar hallux- less than0.5cc of purulence on 9/16/2021     F/u Wound Culture     Wound dressed with Aquacel ag and dsd     Bone Scan- no OM of the right hallux    No podiatric surgical intervention, no OM    Podiatry team will continue to follow while in house    Follow up with vascular recommendations , patient has non palpable pulses and arterial calcifications on the xrays

## 2021-09-17 NOTE — DIETITIAN INITIAL EVALUATION ADULT. - ORAL INTAKE PTA/DIET HISTORY
Pt with good appetite. Hba1c usually 6.5-7. Wife is an RN. Pt/wife aware of meal planning basics, limit CHO portions, avoids excess CHO/sugar.

## 2021-09-17 NOTE — SBIRT NOTE ADULT - NSSBIRTALCPOSREINDET_GEN_A_CORE
pt endorses an occasional drink with family or friends, never had a problem with usage . no needs identified

## 2021-09-17 NOTE — PROGRESS NOTE ADULT - SUBJECTIVE AND OBJECTIVE BOX
60y year old Male seen at [facility] 3W[facility] 364 D1 for Grade 1 diabetic ulcer plantar right hallux. The patient states that it appeared about 5 months ago and he has been going to Dr. Garland his podiarist for treatment. The patient states that the wound is closed and before his admission he was on oral abx for the wound. The patient states that he has diabetic neuropathy and his whole entire right leg is painful and sore. Denies any fever, chills, nausea, vomiting, chest pain, shortness of breath, or calf pain at this time.    REVIEW OF SYSTEMS    PAST MEDICAL & SURGICAL HISTORY:  Hypertension    AAA (abdominal aortic aneurysm)  dx 2012    GERD (gastroesophageal reflux disease)    Leg weakness    Aortic valve replaced    Cardiac tamponade    HTN (hypertension)    Diabetes mellitus    H/O aortic valve repair    S/P aneurysm repair    Aortic valve replaced    S/P AAA repair  Repaired 3/2015    S/P aortic aneurysm repair    H/O aortic valve repair    History of incision and drainage        Allergies    Normodyne (Faint)  Normodyne (Unknown)    Intolerances        MEDICATIONS  (STANDING):  aspirin  chewable 81 milliGRAM(s) Oral daily  atorvastatin 80 milliGRAM(s) Oral at bedtime  cefTRIAXone   IVPB 1000 milliGRAM(s) IV Intermittent every 24 hours  clopidogrel Tablet 75 milliGRAM(s) Oral daily  dextrose 40% Gel 15 Gram(s) Oral once  dextrose 5%. 1000 milliLiter(s) (50 mL/Hr) IV Continuous <Continuous>  dextrose 5%. 1000 milliLiter(s) (100 mL/Hr) IV Continuous <Continuous>  dextrose 50% Injectable 25 Gram(s) IV Push once  dextrose 50% Injectable 12.5 Gram(s) IV Push once  dextrose 50% Injectable 25 Gram(s) IV Push once  enoxaparin Injectable 40 milliGRAM(s) SubCutaneous daily  gabapentin 600 milliGRAM(s) Oral four times a day  glucagon  Injectable 1 milliGRAM(s) IntraMuscular once  hydrochlorothiazide 25 milliGRAM(s) Oral daily  insulin lispro (ADMELOG) corrective regimen sliding scale   SubCutaneous three times a day before meals  insulin lispro (ADMELOG) corrective regimen sliding scale   SubCutaneous at bedtime  lisinopril 40 milliGRAM(s) Oral daily  metoprolol tartrate 25 milliGRAM(s) Oral two times a day  pantoprazole    Tablet 40 milliGRAM(s) Oral before breakfast  saccharomyces boulardii 250 milliGRAM(s) Oral two times a day    MEDICATIONS  (PRN):  acetaminophen   Tablet .. 650 milliGRAM(s) Oral every 6 hours PRN Temp greater or equal to 38C (100.4F), Mild Pain (1 - 3)  meclizine 12.5 milliGRAM(s) Oral three times a day PRN vertigo  melatonin 3 milliGRAM(s) Oral at bedtime PRN Insomnia  ondansetron Injectable 4 milliGRAM(s) IV Push every 8 hours PRN Nausea and/or Vomiting  traMADol 50 milliGRAM(s) Oral every 6 hours PRN Moderate Pain (4 - 6)  traMADol 75 milliGRAM(s) Oral every 6 hours PRN Severe Pain (7 - 10)      Social History:  , lives at home with wife, works as an [employer] , ADLs independent, ambulates independently without the use of assistive devices, former smoker, social alcohol use (last drink 9/3/21), denies recreational drug use (11 Sep 2021 02:42)      FAMILY HISTORY:  Family history of breast cancer    Family history of hypertension    Family history of stroke    Family history of prostate cancer    Family history of COPD (chronic obstructive pulmonary disease) (Grandparent)    FH: HTN (hypertension)    Vital Signs Last 24 Hrs  T(C): 36.5 (17 Sep 2021 13:01), Max: 36.7 (16 Sep 2021 20:29)  T(F): 97.7 (17 Sep 2021 13:01), Max: 98 (16 Sep 2021 20:29)  HR: 80 (17 Sep 2021 13:01) (73 - 104)  BP: 163/85 (17 Sep 2021 13:01) (113/77 - 163/85)  BP(mean): --  RR: 17 (17 Sep 2021 13:01) (17 - 18)  SpO2: 97% (17 Sep 2021 13:01) (93% - 97%)      PHYSICAL EXAM:  Vascular: DP faintly palpable on the RIGHT, no palpable on the left  & PT nonpalpable bilaterally, Capillary refill 3 seconds, No Digital hair present bilaterally, diffuse edema and erythema along the RLE and minimally along the right forefoot   Neurological: Loss of protective sensation b/l   Musculoskeletal: 5/5 strength in all quadrants bilaterally, AJ & STJ ROM intact,   Dermatological:   1. Pre- Debridement  Grade 1 ulcer right plantar hallux- size 0.2cmx0.1cm- adherent scab with healthy epithelized underneath , does not probe to bone, no tunneling, no undermining   Post Debridement- Grade 2 wound right plantar hallux with full thickness wound size 0.3cmx0.2cmx0.2cm- red granular base, less than 0.5cc of purulence, does not probe to bone, no tunneling, no undermining, edema and erythema, no malodor, no streaking       CBC Full  -  ( 16 Sep 2021 09:23 )  WBC Count : 7.73 K/uL  RBC Count : 4.98 M/uL  Hemoglobin : 13.3 g/dL  Hematocrit : 42.0 %  Platelet Count - Automated : 260 K/uL  Mean Cell Volume : 84.3 fl  Mean Cell Hemoglobin : 26.7 pg  Mean Cell Hemoglobin Concentration : 31.7 gm/dL  Auto Neutrophil # : x  Auto Lymphocyte # : x  Auto Monocyte # : x  Auto Eosinophil # : x  Auto Basophil # : x  Auto Neutrophil % : x  Auto Lymphocyte % : x  Auto Monocyte % : x  Auto Eosinophil % : x  Auto Basophil % : x    ----------CHEM PANEL----------  09-16    134<L>  |  95<L>  |  26<H>  ----------------------------<  166<H>  3.9   |  31  |  1.30    Ca    10.1      16 Sep 2021 09:23              PT/INR - ( 11 Sep 2021 00:20 )   PT: 14.1 sec;   INR: 1.22 ratio         PTT - ( 11 Sep 2021 00:20 )  PTT:27.5 sec        Imaging: Awaiting Bone Scan to r/o OM

## 2021-09-17 NOTE — PROGRESS NOTE ADULT - SUBJECTIVE AND OBJECTIVE BOX
DUSTY STRICKLAND is a 60yMale , patient examined and chart reviewed.    INTERVAL HPI/ OVERNIGHT EVENTS:   Afebrile. Feeling better.  No events. Doing well.    PAST MEDICAL & SURGICAL HISTORY:  Hypertension  AAA (abdominal aortic aneurysm)  dx 2012  GERD (gastroesophageal reflux disease)  Leg weakness  Aortic valve replaced  Cardiac tamponade  HTN (hypertension)  Diabetes mellitus  H/O aortic valve repair  S/P aneurysm repair  Aortic valve replaced  S/P AAA repair  Repaired 3/2015  S/P aortic aneurysm repair  H/O aortic valve repair  History of incision and drainage      For details regarding the patient's social history, family history, and other miscellaneous elements, please refer the initial infectious diseases consultation and/or the admitting history and physical examination for this admission.    ROS:  CONSTITUTIONAL:  Negative fever or chills  EYES:  Negative  blurry vision or double vision  CARDIOVASCULAR:  Negative for chest pain or palpitations  RESPIRATORY:  Negative for cough, wheezing, or SOB   GASTROINTESTINAL:  Negative for nausea, vomiting, diarrhea, constipation, or abdominal pain  GENITOURINARY:  Negative frequency, urgency or dysuria  NEUROLOGIC:  No headache, confusion, dizziness, lightheadedness  All other systems were reviewed and are negative     ALLERGIES:  Normodyne (Faint)  Normodyne (Unknown)      Current inpatient medications :    ANTIBIOTICS/RELEVANT:  ceFAZolin   IVPB 2000 milliGRAM(s) IV Intermittent every 8 hours    MEDICATIONS  (STANDING):  acetaminophen   Tablet .. 975 milliGRAM(s) Oral every 6 hours  aspirin  chewable 81 milliGRAM(s) Oral daily  atorvastatin 80 milliGRAM(s) Oral at bedtime  clopidogrel Tablet 75 milliGRAM(s) Oral daily  dextrose 40% Gel 15 Gram(s) Oral once  dextrose 5%. 1000 milliLiter(s) (50 mL/Hr) IV Continuous <Continuous>  dextrose 5%. 1000 milliLiter(s) (100 mL/Hr) IV Continuous <Continuous>  dextrose 50% Injectable 25 Gram(s) IV Push once  dextrose 50% Injectable 12.5 Gram(s) IV Push once  dextrose 50% Injectable 25 Gram(s) IV Push once  enoxaparin Injectable 40 milliGRAM(s) SubCutaneous daily  furosemide    Tablet 20 milliGRAM(s) Oral daily  gabapentin 800 milliGRAM(s) Oral four times a day  glucagon  Injectable 1 milliGRAM(s) IntraMuscular once  insulin lispro (ADMELOG) corrective regimen sliding scale   SubCutaneous three times a day before meals  insulin lispro (ADMELOG) corrective regimen sliding scale   SubCutaneous at bedtime  lisinopril 40 milliGRAM(s) Oral daily  metoprolol tartrate 25 milliGRAM(s) Oral two times a day  pantoprazole    Tablet 40 milliGRAM(s) Oral before breakfast  polyethylene glycol 3350 17 Gram(s) Oral daily  saccharomyces boulardii 250 milliGRAM(s) Oral two times a day  senna 2 Tablet(s) Oral at bedtime    MEDICATIONS  (PRN):  magnesium hydroxide Suspension 30 milliLiter(s) Oral daily PRN Constipation  meclizine 12.5 milliGRAM(s) Oral three times a day PRN vertigo  melatonin 3 milliGRAM(s) Oral at bedtime PRN Insomnia  ondansetron Injectable 4 milliGRAM(s) IV Push every 8 hours PRN Nausea and/or Vomiting  traMADol 50 milliGRAM(s) Oral every 6 hours PRN Moderate Pain (4 - 6)  traMADol 75 milliGRAM(s) Oral every 6 hours PRN Severe Pain (7 - 10)        Objective:  Vital Signs Last 24 Hrs  T(C): 36.5 (17 Sep 2021 13:01), Max: 36.7 (16 Sep 2021 20:29)  T(F): 97.7 (17 Sep 2021 13:01), Max: 98 (16 Sep 2021 20:29)  HR: 80 (17 Sep 2021 13:01) (73 - 104)  BP: 163/85 (17 Sep 2021 13:01) (113/77 - 163/85)  RR: 17 (17 Sep 2021 13:01) (17 - 18)  SpO2: 97% (17 Sep 2021 13:01) (93% - 97%)    Physical Exam:  General:  no acute distress  Neck: supple, trachea midline  Lungs: clear, no wheeze/rhonchi  Cardiovascular: regular rate and rhythm, S1 S2  Abdomen: soft, nontender,  bowel sounds normal  Neurological: alert and oriented x3  Extremities: Right LE swelling redness and pain resolving nicely      LABS:                        13.3   7.73  )-----------( 260      ( 16 Sep 2021 09:23 )             42.0   09-16    134<L>  |  95<L>  |  26<H>  ----------------------------<  166<H>  3.9   |  31  |  1.30    Ca    10.1      16 Sep 2021 09:23      MICROBIOLOGY:    Culture - Blood (collected 11 Sep 2021 03:04)  Source: .Blood Blood  Preliminary Report (12 Sep 2021 06:15):    No growth to date.    Culture - Blood (collected 11 Sep 2021 03:04)  Source: .Blood Blood  Preliminary Report (12 Sep 2021 06:15):    No growth to date.    RADIOLOGY & ADDITIONAL STUDIES:    EXAM:  DUPLEX LOW ARTERIES UNI LTD RT                            PROCEDURE DATE:  09/14/2021          INTERPRETATION:  HISTORY: Right lower extremity cellulitis.    Right lower extremity arterial Doppler was performed using grayscale, color and spectral Doppler. There are no prior studies available for comparison.    Findings:    RIGHT: Scattered atherosclerotic plaque. There is biphasic flow within the common femoral, superficial femoral and popliteal arteries. Abnormal monophasic low resistance flow within the calf and dorsalis pedis arteries.  Peak systolic velocities are as follows (in CM/sec):  CFA: 135  DFA: 50  SFA: (Proximal, mid, distal): 77, 82, 70  Popliteal: 71  PTA: 127  Peroneal: Not visualized  JESSICA: 131  DPA: 43    IMPRESSION: Nonvisualization of the right peroneal artery. Scattered atherosclerotic plaque with questional areas of stenosis in the right posterior tibial and anterior tibial arteries.      EXAM:  NM MULTI DAY PROCEDURE                          EXAM:  NM INFLAMM LOC WBC WB SD                            PROCEDURE DATE:  09/15/2021          INTERPRETATION:  RADIOPHARMACEUTICAL: 11 mCi In-111 labeled autologous leukocytes, I.V.    CLINICAL STATEMENT: 60-year-old diabetic male with cellulitis in the right hallux referred to evaluate for osteomyelitis.    TECHNIQUE:  Static images of bilateral feet were obtained approximately 20 hours after the administration of the labeled leukocyte. Anterior and posterior abdominal images were obtained for quality is all-purpose.    FINDINGS: There is faint labeled WBC activity in the right hallux seen only on the plantar image compatible with soft tissue infection. There is physiologic labeled WBC activity in the remainder of the bilateral feet.    IMPRESSION: Tc 99m labeled leukocyte scan demonstrates:    Soft tissue infection in the plantar aspect of the right great toe. No scan evidence of osteomyelitis.      Assessment :  61 yo M with PMHx of CAD (with 2 stents in the 1st Diag 7/1/19 and LPDA 7/3/19), bioprosthetic AVR with ascending aortic aneurysm repair on 03/116/15 (for a bicuspid aortic valve), cardiac tamponade (s/p pericardiocentesis 3/26/15), HFpEF, syncope (s/p ILR insertion 2/2/16), HTN, HLD, T2DM (not on insulin, complicated by peripheral neuropathy), admitted with severe Right LE cellulitis with lymphangitis Has right hallux healed ulcer.  Still with leg pain and swelling  PVD- arterial doppler results noted Nonvisualization of the right peroneal artery.  Vascular following  Bone scan - Neg for OM    Plan:  Cont Ancef   Can change to po Keflex 500mg q6h x 7 days for dc  in am  Trend temps and cbc  Elevate leg  Will need revascularization- fu vascular outpt  Vascular and podiatry on case      Continue with present regiment.  Appropriate use of antibiotics and adverse effects reviewed.      > 35 minutes were spent in direct patient care reviewing notes, medications ,labs data/ imaging , discussion with multidisciplinary team.    Thank you for allowing me to participate in care of your patient .    Gaston Blanco MD  Infectious Disease  [phone]

## 2021-09-18 ENCOUNTER — TRANSCRIPTION ENCOUNTER (OUTPATIENT)
Age: 61
End: 2021-09-18

## 2021-09-18 VITALS
SYSTOLIC BLOOD PRESSURE: 147 MMHG | RESPIRATION RATE: 18 BRPM | TEMPERATURE: 98 F | DIASTOLIC BLOOD PRESSURE: 70 MMHG | OXYGEN SATURATION: 96 % | HEART RATE: 62 BPM

## 2021-09-18 LAB
-  AMPICILLIN/SULBACTAM: SIGNIFICANT CHANGE UP
-  CEFAZOLIN: SIGNIFICANT CHANGE UP
-  CLINDAMYCIN: SIGNIFICANT CHANGE UP
-  ERYTHROMYCIN: SIGNIFICANT CHANGE UP
-  GENTAMICIN: SIGNIFICANT CHANGE UP
-  OXACILLIN: SIGNIFICANT CHANGE UP
-  PENICILLIN: SIGNIFICANT CHANGE UP
-  RIFAMPIN: SIGNIFICANT CHANGE UP
-  TETRACYCLINE: SIGNIFICANT CHANGE UP
-  TRIMETHOPRIM/SULFAMETHOXAZOLE: SIGNIFICANT CHANGE UP
-  VANCOMYCIN: SIGNIFICANT CHANGE UP
CULTURE RESULTS: SIGNIFICANT CHANGE UP
METHOD TYPE: SIGNIFICANT CHANGE UP
ORGANISM # SPEC MICROSCOPIC CNT: SIGNIFICANT CHANGE UP
ORGANISM # SPEC MICROSCOPIC CNT: SIGNIFICANT CHANGE UP
SPECIMEN SOURCE: SIGNIFICANT CHANGE UP

## 2021-09-18 PROCEDURE — 83605 ASSAY OF LACTIC ACID: CPT

## 2021-09-18 PROCEDURE — U0005: CPT

## 2021-09-18 PROCEDURE — 36573 INSJ PICC RS&I 5 YR+: CPT

## 2021-09-18 PROCEDURE — 80048 BASIC METABOLIC PNL TOTAL CA: CPT

## 2021-09-18 PROCEDURE — 85025 COMPLETE CBC W/AUTO DIFF WBC: CPT

## 2021-09-18 PROCEDURE — 99232 SBSQ HOSP IP/OBS MODERATE 35: CPT

## 2021-09-18 PROCEDURE — 76937 US GUIDE VASCULAR ACCESS: CPT

## 2021-09-18 PROCEDURE — 0225U NFCT DS DNA&RNA 21 SARSCOV2: CPT

## 2021-09-18 PROCEDURE — 80053 COMPREHEN METABOLIC PANEL: CPT

## 2021-09-18 PROCEDURE — 85610 PROTHROMBIN TIME: CPT

## 2021-09-18 PROCEDURE — 99285 EMERGENCY DEPT VISIT HI MDM: CPT

## 2021-09-18 PROCEDURE — U0003: CPT

## 2021-09-18 PROCEDURE — 93005 ELECTROCARDIOGRAM TRACING: CPT

## 2021-09-18 PROCEDURE — 93923 UPR/LXTR ART STDY 3+ LVLS: CPT

## 2021-09-18 PROCEDURE — 36415 COLL VENOUS BLD VENIPUNCTURE: CPT

## 2021-09-18 PROCEDURE — 83036 HEMOGLOBIN GLYCOSYLATED A1C: CPT

## 2021-09-18 PROCEDURE — 87186 SC STD MICRODIL/AGAR DIL: CPT

## 2021-09-18 PROCEDURE — 83735 ASSAY OF MAGNESIUM: CPT

## 2021-09-18 PROCEDURE — C1751: CPT

## 2021-09-18 PROCEDURE — 99239 HOSP IP/OBS DSCHRG MGMT >30: CPT

## 2021-09-18 PROCEDURE — 82962 GLUCOSE BLOOD TEST: CPT

## 2021-09-18 PROCEDURE — 93926 LOWER EXTREMITY STUDY: CPT

## 2021-09-18 PROCEDURE — 87641 MR-STAPH DNA AMP PROBE: CPT

## 2021-09-18 PROCEDURE — 87040 BLOOD CULTURE FOR BACTERIA: CPT

## 2021-09-18 PROCEDURE — 87077 CULTURE AEROBIC IDENTIFY: CPT

## 2021-09-18 PROCEDURE — 73630 X-RAY EXAM OF FOOT: CPT

## 2021-09-18 PROCEDURE — 87070 CULTURE OTHR SPECIMN AEROBIC: CPT

## 2021-09-18 PROCEDURE — 85027 COMPLETE CBC AUTOMATED: CPT

## 2021-09-18 PROCEDURE — 78802 RP LOCLZJ TUM WHBDY 1 D IMG: CPT

## 2021-09-18 PROCEDURE — 93971 EXTREMITY STUDY: CPT

## 2021-09-18 PROCEDURE — 87640 STAPH A DNA AMP PROBE: CPT

## 2021-09-18 PROCEDURE — 85730 THROMBOPLASTIN TIME PARTIAL: CPT

## 2021-09-18 RX ORDER — CEPHALEXIN 500 MG
1 CAPSULE ORAL
Qty: 26 | Refills: 0
Start: 2021-09-18 | End: 2021-09-24

## 2021-09-18 RX ORDER — CLOPIDOGREL BISULFATE 75 MG/1
1 TABLET, FILM COATED ORAL
Qty: 30 | Refills: 0
Start: 2021-09-18 | End: 2021-10-17

## 2021-09-18 RX ORDER — FUROSEMIDE 40 MG
1 TABLET ORAL
Qty: 30 | Refills: 0
Start: 2021-09-18 | End: 2021-10-17

## 2021-09-18 RX ORDER — FUROSEMIDE 40 MG
1 TABLET ORAL
Qty: 0 | Refills: 0 | DISCHARGE
Start: 2021-09-18

## 2021-09-18 RX ORDER — CEPHALEXIN 500 MG
1 CAPSULE ORAL
Qty: 0 | Refills: 0 | DISCHARGE
Start: 2021-09-18

## 2021-09-18 RX ORDER — SACCHAROMYCES BOULARDII 250 MG
1 POWDER IN PACKET (EA) ORAL
Qty: 14 | Refills: 0
Start: 2021-09-18 | End: 2021-09-24

## 2021-09-18 RX ORDER — CEPHALEXIN 500 MG
500 CAPSULE ORAL ONCE
Refills: 0 | Status: COMPLETED | OUTPATIENT
Start: 2021-09-18 | End: 2021-09-18

## 2021-09-18 RX ADMIN — Medication 100 MILLIGRAM(S): at 06:14

## 2021-09-18 RX ADMIN — Medication 20 MILLIGRAM(S): at 06:14

## 2021-09-18 RX ADMIN — Medication 250 MILLIGRAM(S): at 06:14

## 2021-09-18 RX ADMIN — Medication 975 MILLIGRAM(S): at 06:14

## 2021-09-18 RX ADMIN — PANTOPRAZOLE SODIUM 40 MILLIGRAM(S): 20 TABLET, DELAYED RELEASE ORAL at 06:14

## 2021-09-18 RX ADMIN — GABAPENTIN 800 MILLIGRAM(S): 400 CAPSULE ORAL at 06:13

## 2021-09-18 RX ADMIN — LISINOPRIL 40 MILLIGRAM(S): 2.5 TABLET ORAL at 06:14

## 2021-09-18 RX ADMIN — Medication 975 MILLIGRAM(S): at 11:45

## 2021-09-18 RX ADMIN — POLYETHYLENE GLYCOL 3350 17 GRAM(S): 17 POWDER, FOR SOLUTION ORAL at 11:45

## 2021-09-18 RX ADMIN — Medication 81 MILLIGRAM(S): at 11:45

## 2021-09-18 RX ADMIN — CLOPIDOGREL BISULFATE 75 MILLIGRAM(S): 75 TABLET, FILM COATED ORAL at 11:45

## 2021-09-18 RX ADMIN — GABAPENTIN 800 MILLIGRAM(S): 400 CAPSULE ORAL at 11:45

## 2021-09-18 RX ADMIN — Medication 25 MILLIGRAM(S): at 06:14

## 2021-09-18 RX ADMIN — Medication 500 MILLIGRAM(S): at 14:08

## 2021-09-18 RX ADMIN — Medication 975 MILLIGRAM(S): at 13:21

## 2021-09-18 RX ADMIN — Medication 1: at 08:50

## 2021-09-18 NOTE — CHART NOTE - NSCHARTNOTEFT_GEN_A_CORE
Resident Procedure Note    Patient seen and examined at bedside for removal of midline. Area cleaned with Alcohol swabs. Patient then placed in reverse Trendelenburg position, asked to /hum, and then midline removed. Pressure applied for 5-10 minutes with gauze. No signs of active bleeding noted. Tegaderm and gauze used to create pressure dressing to maintain pressure on central line site. Patient tolerated well without complications. Will continue to follow. RN to call/page if any changes.

## 2021-09-18 NOTE — PROGRESS NOTE ADULT - PROVIDER SPECIALTY LIST ADULT
Hospitalist
Infectious Disease
Hospitalist
Vascular Surgery
Infectious Disease
Infectious Disease
Surgery
Hospitalist
Hospitalist
Infectious Disease
Podiatry
Surgery
Podiatry
Hospitalist

## 2021-09-18 NOTE — DISCHARGE NOTE NURSING/CASE MANAGEMENT/SOCIAL WORK - NSSCTYPOFSERV_GEN_ALL_CORE
Pt here today for NOB visit  LMP: 10/13/2018  WT: 201 lb  BP: 102/58  Pt states she received the influenza vaccine at [facility] on 12/2018.   AFP Screening Offered. Pt declines AFP testing.   Pt states she gets a lot of stomach pains. Pt also reports she gets a lot of nausea and occasionally vomiting. States no other complaints.   [place] # [phone]   RN

## 2021-09-18 NOTE — DISCHARGE NOTE NURSING/CASE MANAGEMENT/SOCIAL WORK - PATIENT PORTAL LINK FT
You can access the FollowMyHealth Patient Portal offered by [facility] by registering at the following website: [email or web address]. By joining [email or web address]’s FollowMyHealth portal, you will also be able to view your health information using other applications (apps) compatible with our system.

## 2021-09-18 NOTE — PROGRESS NOTE ADULT - PROBLEM SELECTOR PROBLEM 1
Cellulitis
Diabetic toe ulcer
Diabetic toe ulcer
Cellulitis
Cellulitis
Diabetic toe ulcer
Cellulitis

## 2021-09-18 NOTE — PROGRESS NOTE ADULT - SUBJECTIVE AND OBJECTIVE BOX
60y year old Male seen at hospitals [facility] 364 D1 for Grade 1 diabetic ulcer plantar right hallux- s/p Right hallux sharp excisional debridement at bedside with  Dr. Michele on 9/16/2021 . The patient states that it appeared about 5 months ago and he has been going to Dr. Garland his podiarist for treatment. The patient states that the wound is closed and before his admission he was on oral abx for the wound. The patient states that he has diabetic neuropathy and his whole entire right leg is painful and sore. Denies any fever, chills, nausea, vomiting, chest pain, shortness of breath, or calf pain at this time.    REVIEW OF SYSTEMS    PAST MEDICAL & SURGICAL HISTORY:  Hypertension    AAA (abdominal aortic aneurysm)  dx 2012    GERD (gastroesophageal reflux disease)    Leg weakness    Aortic valve replaced    Cardiac tamponade    HTN (hypertension)    Diabetes mellitus    H/O aortic valve repair    S/P aneurysm repair    Aortic valve replaced    S/P AAA repair  Repaired 3/2015    S/P aortic aneurysm repair    H/O aortic valve repair    History of incision and drainage        Allergies    Normodyne (Faint)  Normodyne (Unknown)    Intolerances        MEDICATIONS  (STANDING):  aspirin  chewable 81 milliGRAM(s) Oral daily  atorvastatin 80 milliGRAM(s) Oral at bedtime  cefTRIAXone   IVPB 1000 milliGRAM(s) IV Intermittent every 24 hours  clopidogrel Tablet 75 milliGRAM(s) Oral daily  dextrose 40% Gel 15 Gram(s) Oral once  dextrose 5%. 1000 milliLiter(s) (50 mL/Hr) IV Continuous <Continuous>  dextrose 5%. 1000 milliLiter(s) (100 mL/Hr) IV Continuous <Continuous>  dextrose 50% Injectable 25 Gram(s) IV Push once  dextrose 50% Injectable 12.5 Gram(s) IV Push once  dextrose 50% Injectable 25 Gram(s) IV Push once  enoxaparin Injectable 40 milliGRAM(s) SubCutaneous daily  gabapentin 600 milliGRAM(s) Oral four times a day  glucagon  Injectable 1 milliGRAM(s) IntraMuscular once  hydrochlorothiazide 25 milliGRAM(s) Oral daily  insulin lispro (ADMELOG) corrective regimen sliding scale   SubCutaneous three times a day before meals  insulin lispro (ADMELOG) corrective regimen sliding scale   SubCutaneous at bedtime  lisinopril 40 milliGRAM(s) Oral daily  metoprolol tartrate 25 milliGRAM(s) Oral two times a day  pantoprazole    Tablet 40 milliGRAM(s) Oral before breakfast  saccharomyces boulardii 250 milliGRAM(s) Oral two times a day    MEDICATIONS  (PRN):  acetaminophen   Tablet .. 650 milliGRAM(s) Oral every 6 hours PRN Temp greater or equal to 38C (100.4F), Mild Pain (1 - 3)  meclizine 12.5 milliGRAM(s) Oral three times a day PRN vertigo  melatonin 3 milliGRAM(s) Oral at bedtime PRN Insomnia  ondansetron Injectable 4 milliGRAM(s) IV Push every 8 hours PRN Nausea and/or Vomiting  traMADol 50 milliGRAM(s) Oral every 6 hours PRN Moderate Pain (4 - 6)  traMADol 75 milliGRAM(s) Oral every 6 hours PRN Severe Pain (7 - 10)      Social History:  , lives at home with wife, works as an [employer] , ADLs independent, ambulates independently without the use of assistive devices, former smoker, social alcohol use (last drink 9/3/21), denies recreational drug use (11 Sep 2021 02:42)      FAMILY HISTORY:  Family history of breast cancer    Family history of hypertension    Family history of stroke    Family history of prostate cancer    Family history of COPD (chronic obstructive pulmonary disease) (Grandparent)    FH: HTN (hypertension)    Vital Signs Last 24 Hrs  T(C): 36.4 (18 Sep 2021 13:08), Max: 36.4 (18 Sep 2021 05:42)  T(F): 97.6 (18 Sep 2021 13:08), Max: 97.6 (18 Sep 2021 13:08)  HR: 62 (18 Sep 2021 13:08) (62 - 82)  BP: 147/70 (18 Sep 2021 13:08) (126/76 - 147/70)  BP(mean): --  RR: 18 (18 Sep 2021 13:08) (17 - 18)  SpO2: 96% (18 Sep 2021 13:08) (94% - 97%)      PHYSICAL EXAM:  Vascular: DP faintly palpable on the RIGHT, no palpable on the left  & PT nonpalpable bilaterally, Capillary refill 3 seconds, No Digital hair present bilaterally, diffuse edema and erythema along the RLE and minimally along the right forefoot   Neurological: Loss of protective sensation b/l   Musculoskeletal: 5/5 strength in all quadrants bilaterally, AJ & STJ ROM intact,   Dermatological:   1. Pre- Debridement  Grade 1 ulcer right plantar hallux- size 0.2cmx0.1cm- adherent scab with healthy epithelized underneath , does not probe to bone, no tunneling, no undermining   Post Debridement- Grade 2 wound right plantar hallux with full thickness wound size 0.3cmx0.2cmx0.2cm- red granular base, less than 0.5cc of purulence, does not probe to bone, no tunneling, no undermining, edema and erythema, no malodor, no streaking           ----------CHEM PANEL----------            PT/INR - ( 11 Sep 2021 00:20 )   PT: 14.1 sec;   INR: 1.22 ratio         PTT - ( 11 Sep 2021 00:20 )  PTT:27.5 sec        Imaging: Awaiting Bone Scan to r/o OM

## 2021-09-18 NOTE — PROGRESS NOTE ADULT - PROBLEM SELECTOR PLAN 1
- patient seen and evaluated  - Wound is currently dressed with aquacel ag and dsd  - patient states that upon discharge he will like to follow up with his personal podiatrist Dr. Garland   - Per Dr. Michele, discussed with patient future surgical intervention for biomechanical management of hallux limitus which will prevent recurrence of callus and ulceration, patient states that in the future will do as outpatient. Patient will continue to manage the hallux limitus with appropriate supportive shoegear  - Follow up on vascular recommendations  - Follow up on infectious disease recommendations   - Wound Care Instructions below  - Bone Scan negative OM  - No surgical intervention during this admission  - Podiatry team will continue to follow patient while in house   WOUND CARE INSTRUCTIONS   1. Cleanse wound with sterile saline solution and gently pat dry.  2. Dress wound with Aquacel ag and dry, sterile dressing.  3. Change dressings daily and monitor for any signs of infection.  4. Patient is to [facility] with Dr. Scott or Dr. Michele within 1 week upon discharge.

## 2021-09-20 ENCOUNTER — APPOINTMENT (OUTPATIENT)
Dept: CARDIOLOGY | Facility: CLINIC | Age: 61
End: 2021-09-20

## 2021-09-21 ENCOUNTER — NON-APPOINTMENT (OUTPATIENT)
Age: 61
End: 2021-09-21

## 2021-09-23 ENCOUNTER — NON-APPOINTMENT (OUTPATIENT)
Age: 61
End: 2021-09-23

## 2021-09-23 NOTE — CONSULT NOTE ADULT - CONSULT REQUESTED BY NAME
Your Child's Health  Five to Six-Year-Old Visit      Elena Charles Rubio  September 23, 2021    Visit Vitals  /62   Pulse 100   Temp 98.4 °F (36.9 °C) (Temporal)   Resp (!) 36 Comment: was breathing fast   Ht 3' 8.25\" (1.124 m)   Wt 23.5 kg (51 lb 12.8 oz)   BMI 18.60 kg/m²     Weight: 51.8 lbs      YOUR CHILD'S 5 to 6 YEAR-OLD VISIT    School  Starting school is a major milestone for children and their family members. Good language and willingness or readiness to separate from parents easily are a couple of the most important skills indicating school readiness. Once a child is enrolled in school, parents need to keep in close contact with the teachers. Learning or developmental problems which had not been previously apparent may become evident in  or first grade. If your child had previously received some educational services or therapies in a  program, they may qualify for continued services in school. School systems are obligated to evaluate children if there are significant concerns about learning or developmental problems. If you need help accessing this type of evaluation, talk with your schools, your doctor, or you can call the Department of Public Instruction at [phone] or visit their  website at http://dpi.wi.gov.    Help ensure your child's success at school by making sure they are well rested (a regular bedtime routine is important in this regard). Also, make sure that they have eaten (or have an opportunity to eat at school) every morning. Children who are tired or hungry are not in the best state to learn well! Make sure they are not over scheduled with afterschool activities (sports, clubs, other social activities)-- children need some unstructured downtime every day. As your child learns to read, set aside time daily to read together--it helps them learn and is a great way to spend family time together.     Social Development  Five and six-year-old children will  Dr Elizalde be spending more time with friends and peers and away from their families. It is important to know the friends and families that your child is spending time with.     Peers have a lot of influence on each other, and your child may be interacting with friends who do not have to follow the same rules that you have set for your child. Some rules may change as they get older, but being very clear and very consistent continues to be critical component of effective parenting. Children will frequently push the limits at this age to see what they can get away with--so it is important to regularly remind your child of appropriate rules and expectations that you have for them.     Help your child feel good about themselves by giving them praise for their accomplishments, doing things together, and listening to them without interrupting. Give them opportunities to gradually be more independent and responsible.    Continue to set a good example for them - be responsible in your own obligations, mean what you say, model appropriate behavior when you yourself are angry or upset, and show respect for others by being on time. When your child is angry or frustrated, talk to them about why they are feeling that way, what their options are and how to resolve conflicts appropriately. Physical aggression - hitting, biting, kicking, throwing things- should not be tolerated at this age; teach your children healthier ways to respond to upsetting situations and blow off steam.    Households with working parents and children school are busy! Try to devote mealtimes, bedtimes, and even driving time, to talk with your children-- keep phones and other electronic media turned off and focus on talking to each other.     Remind your children that they can tell you anything. It is especially important that they know to report you if they feel they are being teased or bullied. They should also be taught to report bullying that they witness to you or  to a teacher. Any concerns about bullying should be addressed-talk to your teachers, administrators or guidance counselors at the school to help with this issue. Good online resources regarding bullying are [employer].gov and the American Academy of Pediatrics \"HealthyChildren.org\" website (search for \"Bullying\").     Dental  Your child should be brushing at least twice daily for 2 minutes at a time with a pea-sized amount of regular (fluoridated) toothpaste. Children this age can also be taught to floss their teeth, but they still need a parent’s help to make sure all their back teeth are brushed well. Look for a dentist if you do not already have one. Limiting candy, other sweets, juice and sticky/chewy foods is good for their dental health.     Nutrition  Your child will be making more of their own choices about what to eat, so keep plenty of nutritious foods in your home for meal and snack times. Make sure your child eats something healthy for breakfast every morning; this is proven to positively impact school performance and learning. Your child needs ~3 servings of good sources of calcium everyday; this can include lowfat (or skim) milk, yogurt, low fat cheese or foods which have been fortified with calcium. Children this age should get 600 IU of vitamin D daily which (along with appropriate calcium intake) ensures good bone health. Most people cannot meet their vitamin D needs with their usual diet, so a multivitamin with iron supplement continues to be appropriate for this age. (A pure vitamin D supplement with 400 or 600 IU is also okay.)    Overweight and obesity are very serious problems; teaching your growing child to make healthy choices is important, as is continuing to avoid unhealthy choices like greasy fast food, bagged snacks, sodas and sweet drinks, lots of juice, candies and sweets. Make unhealthy choices an occasional treat only; don’t keep them in your home, because your child will find  them!    Physical Activity and Screen Time   A child who enjoys being physically active when they are young will be more likely to stay active as they grow older. Set a goal of 60 minutes of physical activity every day--it can be all at once or broken up into shorter segments. Try to make it a family activity as much as possible.     Time watching television, playing on the computer or using any other form of electronic media is NOT physical activity. As your child learns to read and their fine motor skills improve, they are going to become very skilled at using the computer and Internet (and they are going to like it!). Setting limits and supervising media use is very important. Set a time limit for media use each day (and enforce it). Consider setting up child-specific browsers and creating a “Favorites” toolbar so your child can only get to approved websites. For suggestions on developing healthy media habits, do an internet search for “healthychildren media use plan” for recommendations from the American Academy of Pediatrics.  Also, don't allow snacking in front of the TV set-- that is another unhealthy habit that is easier to prevent than change!    And parents need to be a role model--if you are constantly on your phone in situations where you could be talking with or interacting with your child, you are teaching them that the phone takes priority over your interaction with them.        Safety  Car:  Until a child weighs at least 40 pounds, they are safest in a rear or forward-facing car seat with a 5-point harness. If your 5-point harness seat has a higher weight limit than 40 pounds, keep your child in it-- they are safest in these seats until they outgrow the 's recommended size limits. (There is not a specific height limit for most of these seats, but children are safe as long as the top of their ears are below the top/ back edge of the seat and their shoulders are below the highest shoulder  strap slots.) When they do outgrow the 5-point harness seat limits, they should ride in a booster seat in the backseat. High-backed booster seats should be used if there are low seat backs or no head rests in your car; backless boosters can be used if your car has high seat backs and head rests.    Street Safety: Teach your child how to be safe when standing outside waiting for a bus or walking to school. Children this age should always be supervised when crossing streets.     Biking: Children (and their parents) should wear properly fitted helmets when biking. Children this age are not safe riding in the street.    Water & Sun Safety: Swimming lessons are a good idea if your child does not know how to swim yet. Even if they can swim, constant supervision by an adult who know how to swim is a must. Remember to use sunscreen with an SPF of 15 or higher when outside and reapply after time in the water.  Your child should avoid prolonged time in the sun between 11 AM and 3 PM and wear hats, sunglasses and sun protection clothing.     Personal Safety: A parent’s safety is just as important as a child’s safety. Violence is common in many people’s lives. If you do not feel safe in your home or if a partner has ever hit, kicked, shoved or physically hurt you or your child, it is important for you to get help. Talk to your doctors or a . In [place], resources include [facility] (987-968-3257) and the [facility] (24 hour hotline is 066- 250-9139); the National Domestic Violence Hotline is 8-327-001-DPAD (6536).    Review with your child that certain body parts (the parts usually covered by a bathing suit) and behaviors are private. For safety purposes, make sure your child knows that they should never keep secrets from parents, and they should always report to you if any adult shows any interest in their private parts (or if an adult discussed or shared their own private parts  with a child). Tell them they should talk to you if any adult is doing or saying anything that makes them uncomfortable, especially if an adult is asking them to keep a secret.    Fires & Burns: Children this age are fascinated by fires and they can be very compulsive--so watch them very carefully around fires,  grills, and stoves. Make sure matches and lighters are safely stored out of reach and continue to keep all hot items out of reach. Have a family fire safety plan, including regular smoke detector (and carbon monoxide detector) battery checks, working fire extinguishers, and escape routes. It is important that children this age know what door they should go out of if the smoke alarm goes off, where to meet family members outside and the importance of not going back into a burning building.     Firearms: Children this age are not capable of understanding how dangerous firearms are and evidence shows a child is safest in a home where no firearms are stored. If there any hunters or firearm owners in your home or anywhere your child is visiting, make sure all weapons are safely stored: unloaded, securely locked and ammunition stored separately.   Smoking: Continue to protect your child from cigarette smoke; secondhand smoke increases the risk of heart and lung disease in your child. Vapors from e-cigarettes may also be harmful, so don’t use those around your child either. If you smoke and are ready to consider quitting, talk to your doctor. Nicotine replacement products can be very helpful in breaking this tough addiction. 2-800-QUIT-NOW is a national help line that can help you find resources; other resources can be found at cdc.gov.       MEDICATION FOR FEVER OR PAIN:   Acetaminophen liquid (e.g., Tylenol or Tempra) may be given every four hours as needed for pain or fever.  Acetaminophen liquid is less concentrated than the infant dropper bottle type.  Be sure to check which product CONCENTRATION you are  using.    OLD Concentration INFANT Tylenol/Acetaminophen  Drops (80 MG/0.8 mL)    Child’s Weight: Dose:  36 - 47 pounds:   240 mg (3 droppers)  48 - 59 pounds:   320 mg (4 droppers)    NEW Concentration INFANT Tylenol/Acetaminophen  Drops (160 MG/5 mL)    Child’s Weight: Dose:  36 - 47 pounds:   240 mg (7.5 mL (1 1/2 Teaspoons))  48 - 59 pounds:   320 mg (10.0 mL (2 Teaspoons))    CHILDREN’S Tylenol/Acetaminophen  (160 MG/5 mL)    Child’s Weight: Dose:  36 - 47 pounds:   240 240 mg (7.5 mL (1 1/2 Teaspoons))  48 - 59 pounds:   320 mg (10.0 mL (2 Teaspoons))    CHILDREN’S Tylenol/Acetaminophen MELTAWAYS ( 80 MG tablets)    Child’s Weight:  Dose:  36 - 47 pounds:    240 mg (3 meltaway tablets)  48 - 59 pounds:    320 mg (4 meltaway tablets)    CHILDREN'S Ibuprofen liquid (e.g., Advil or Motrin) may be given every six hours as needed for pain or fever.    Child’s Weight:  Dose:  36 - 47 pounds:    150 MG (1 1/2 Teaspoons)  48 - 59 pounds  200 mg (2 Teaspoons)     Most Recent Immunizations   Administered Date(s) Administered   • DTaP 01/05/2017   • DTaP/Hep B/IPV 03/29/2016   • DTaP/IPV 09/11/2019   • HIB, Unspecified Formulation 10/03/2016   • Hep A, ped/adol, 2 dose 04/10/2017   • Hep B, adolescent or pediatric 2015   • Influenza, injectable, quadrivalent, preservative-free 09/15/2020   • MMR 01/05/2017   • Measles Mumps Rubella Varicella 09/11/2019   • Pneumococcal Conjugate 13 valent 10/03/2016   • Rotavirus - pentavalent 03/29/2016   • Varicella 01/05/2017       If Elena develops any of the following reactions within 72 hours after an immunization, notify your pediatrician by calling the pediatric phone nurse:  1.  A temperature of 105 degrees or above.  2.  More than 3 hours of continuous crying.  3.  A shrill, high-pitched cry.  4.  A pale, limp spell.  5.  A seizure or fainting spell.  In this case, you should call 911 or go immediately to the emergency room.      NEXT VISIT:  6 YEARS OF AGE    Thank you  for entrusting your care to [facility].    Also, check out “Children’s Health” on the [facility] Blog for updates on timely topics regarding children’s health!

## 2021-09-28 ENCOUNTER — OUTPATIENT (OUTPATIENT)
Dept: OUTPATIENT SERVICES | Facility: HOSPITAL | Age: 61
LOS: 1 days | End: 2021-09-28
Payer: COMMERCIAL

## 2021-09-28 VITALS
DIASTOLIC BLOOD PRESSURE: 93 MMHG | RESPIRATION RATE: 14 BRPM | HEART RATE: 73 BPM | WEIGHT: 223.99 LBS | TEMPERATURE: 97 F | OXYGEN SATURATION: 100 % | HEIGHT: 75 IN | SYSTOLIC BLOOD PRESSURE: 155 MMHG

## 2021-09-28 DIAGNOSIS — Z01.818 ENCOUNTER FOR OTHER PREPROCEDURAL EXAMINATION: ICD-10-CM

## 2021-09-28 DIAGNOSIS — Z98.890 OTHER SPECIFIED POSTPROCEDURAL STATES: Chronic | ICD-10-CM

## 2021-09-28 DIAGNOSIS — L97.519 NON-PRESSURE CHRONIC ULCER OF OTHER PART OF RIGHT FOOT WITH UNSPECIFIED SEVERITY: ICD-10-CM

## 2021-09-28 DIAGNOSIS — Z95.4 PRESENCE OF OTHER HEART-VALVE REPLACEMENT: Chronic | ICD-10-CM

## 2021-09-28 DIAGNOSIS — Z98.89 OTHER SPECIFIED POSTPROCEDURAL STATES: Chronic | ICD-10-CM

## 2021-09-28 LAB
ALBUMIN SERPL ELPH-MCNC: 3.9 G/DL — SIGNIFICANT CHANGE UP (ref 3.3–5)
ALP SERPL-CCNC: 135 U/L — HIGH (ref 40–120)
ALT FLD-CCNC: 40 U/L — SIGNIFICANT CHANGE UP (ref 12–78)
ANION GAP SERPL CALC-SCNC: 7 MMOL/L — SIGNIFICANT CHANGE UP (ref 5–17)
APTT BLD: 26.4 SEC — LOW (ref 27.5–35.5)
AST SERPL-CCNC: 18 U/L — SIGNIFICANT CHANGE UP (ref 15–37)
BILIRUB SERPL-MCNC: 0.4 MG/DL — SIGNIFICANT CHANGE UP (ref 0.2–1.2)
BUN SERPL-MCNC: 21 MG/DL — SIGNIFICANT CHANGE UP (ref 7–23)
CALCIUM SERPL-MCNC: 8.8 MG/DL — SIGNIFICANT CHANGE UP (ref 8.5–10.1)
CHLORIDE SERPL-SCNC: 109 MMOL/L — HIGH (ref 96–108)
CO2 SERPL-SCNC: 23 MMOL/L — SIGNIFICANT CHANGE UP (ref 22–31)
CREAT SERPL-MCNC: 1.1 MG/DL — SIGNIFICANT CHANGE UP (ref 0.5–1.3)
GLUCOSE SERPL-MCNC: 92 MG/DL — SIGNIFICANT CHANGE UP (ref 70–99)
HCT VFR BLD CALC: 37.9 % — LOW (ref 39–50)
HGB BLD-MCNC: 11.9 G/DL — LOW (ref 13–17)
INR BLD: 1.11 RATIO — SIGNIFICANT CHANGE UP (ref 0.88–1.16)
MCHC RBC-ENTMCNC: 27.2 PG — SIGNIFICANT CHANGE UP (ref 27–34)
MCHC RBC-ENTMCNC: 31.4 GM/DL — LOW (ref 32–36)
MCV RBC AUTO: 86.5 FL — SIGNIFICANT CHANGE UP (ref 80–100)
NRBC # BLD: 0 /100 WBCS — SIGNIFICANT CHANGE UP (ref 0–0)
PLATELET # BLD AUTO: 242 K/UL — SIGNIFICANT CHANGE UP (ref 150–400)
POTASSIUM SERPL-MCNC: 4 MMOL/L — SIGNIFICANT CHANGE UP (ref 3.5–5.3)
POTASSIUM SERPL-SCNC: 4 MMOL/L — SIGNIFICANT CHANGE UP (ref 3.5–5.3)
PROT SERPL-MCNC: 7.5 G/DL — SIGNIFICANT CHANGE UP (ref 6–8.3)
PROTHROM AB SERPL-ACNC: 12.9 SEC — SIGNIFICANT CHANGE UP (ref 10.6–13.6)
RBC # BLD: 4.38 M/UL — SIGNIFICANT CHANGE UP (ref 4.2–5.8)
RBC # FLD: 15 % — HIGH (ref 10.3–14.5)
SODIUM SERPL-SCNC: 139 MMOL/L — SIGNIFICANT CHANGE UP (ref 135–145)
WBC # BLD: 5.38 K/UL — SIGNIFICANT CHANGE UP (ref 3.8–10.5)
WBC # FLD AUTO: 5.38 K/UL — SIGNIFICANT CHANGE UP (ref 3.8–10.5)

## 2021-09-28 PROCEDURE — 85730 THROMBOPLASTIN TIME PARTIAL: CPT

## 2021-09-28 PROCEDURE — 85027 COMPLETE CBC AUTOMATED: CPT

## 2021-09-28 PROCEDURE — 36415 COLL VENOUS BLD VENIPUNCTURE: CPT

## 2021-09-28 PROCEDURE — 85610 PROTHROMBIN TIME: CPT

## 2021-09-28 PROCEDURE — 80053 COMPREHEN METABOLIC PANEL: CPT

## 2021-09-28 PROCEDURE — G0463: CPT

## 2021-09-28 NOTE — H&P PST ADULT - ASSESSMENT
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

## 2021-09-28 NOTE — H&P PST ADULT - NS SC CAGE ALCOHOL EYE OPENER
"  Subjective:   Margarito Ward is a 28 y.o. male who presents for GI Problem (stomach pains x 3 days today diarrhea / heart rate high)     GI Problem   This is a new problem. The current episode started in the past 7 days. The problem occurs constantly. The problem has been rapidly worsening. Associated symptoms include abdominal pain, a change in bowel habit and fatigue. Pertinent negatives include no chest pain, fever or vomiting.     Review of Systems   Constitutional: Positive for fatigue. Negative for fever.   Cardiovascular: Negative for chest pain.   Gastrointestinal: Positive for abdominal pain and change in bowel habit. Negative for vomiting.        1      Objective:   /74   Pulse 85   Temp 36.9 °C (98.4 °F) (Temporal)   Resp 15   Ht 1.778 m (5' 10\")   Wt 83.9 kg (185 lb)   SpO2 96%   BMI 26.54 kg/m²   Physical Exam   Constitutional: He is oriented to person, place, and time. He appears well-developed and well-nourished. No distress.   HENT:   Head: Normocephalic and atraumatic.   Eyes: Pupils are equal, round, and reactive to light. Conjunctivae and EOM are normal.   Cardiovascular: Normal rate and regular rhythm.    No murmur heard.  Pulmonary/Chest: Effort normal and breath sounds normal. No respiratory distress.   Abdominal: Soft. He exhibits no distension. There is no tenderness.   Neurological: He is alert and oriented to person, place, and time. He has normal reflexes. No sensory deficit.   Skin: Skin is warm and dry.   Psychiatric: He has a normal mood and affect.        Assessment/Plan:   1. Viral gastroenteritis  OTC peptobismol per 's directions.   Differential diagnosis, natural history, supportive care, and indications for immediate follow-up discussed.   " no

## 2021-09-28 NOTE — H&P PST ADULT - LAST STRESS TEST
Stress test "I would say 2 years ago" Nuclear Stress test 5/6/2020 on chart for anesthesia review - Cardiac clearance with Dr sharp on 9/29/2021

## 2021-09-28 NOTE — H&P PST ADULT - GENERAL COMMENTS
H/O COVID January 2021- has had 2 doses of Moderna vaccine - denies any travel in the last 14 days - denies any recent exposure to anyone with known or suspected covid - denies any current covid symptoms

## 2021-09-28 NOTE — H&P PST ADULT - OCCUPATION
Retired  - worked for [employer]- Now Works as [employer]  on the side Retired  - worked for [employer]- Now Works as [employer]

## 2021-09-28 NOTE — H&P PST ADULT - NSICDXPASTMEDICALHX_GEN_ALL_CORE_FT
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 with unspecified severity     S/P aneurysm repair     Type 2 diabetes mellitus      PAST MEDICAL HISTORY:  2019 novel coronavirus disease (COVID-19) January 2021    AAA (abdominal aortic aneurysm) dx 2012    Aortic valve replaced     Cardiac tamponade     GERD (gastroesophageal reflux disease)     H/O aortic valve repair     H/O cardiac catheterization Stent Placement X 2 (2019)    H/O vertigo     HTN (hypertension)     Hypertension     Leg weakness     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

## 2021-09-28 NOTE — H&P PST ADULT - SKIN COMMENTS
Slight erythema remains RLE - no pain to area - wound to bottom of right great toe (RIGHT Toe Ulcer - bottom of RIGHT Toe)

## 2021-09-28 NOTE — H&P PST ADULT - PROBLEM SELECTOR PLAN 1
PST Labs; CBC, CMP, Coags (EKG/HgB A1C on chart from 9/11/2021). Cardiac clearance appointment made for pt to see Dr Sousa on 9/29/2021 @ 2PM. PST Labs; CBC, CMP, Coags (EKG/HgB A1C on chart from 9/11/2021).  Labs from prior admission on chart as well. Cardiac clearance appointment made for pt to see Dr Sousa on 9/29/2021 @ 2PM. Regarding medical clearance; pt had yearly H&P with Dr King 8/18/2021. Will fax PST results to PCP for review and he will add addeneum to prior note clearing pt for procedure. Note from 8/18/21 visit already on chart for anesthesia review. Pt instructed to stop any NSAIDS/Herbal Supplements between now and procedure.  He may take Tylenol if needed for pain between now and procedure.  Pt will REMAIN ON BABY ASA secondary to cardiac stents. He was instructed to stop Plavix 5 days prior to procedure (& he will discuss this with Dr Sousa as well). He was instructed to HOLD his HCTZ as well as his Furosemide morning of procedure. He was also instructed to HOLD his Metformin day prior to procedure on 10/4/2021 as well as morning of procedure on 10/5/2021. Morning of procedure he was instructed to take his Baby ASA, Atorvastatin, Gabapentin, Lisinopril, Metoprolol, Pantoprazole with small sips of water. Pre-op instructions as well as pre-op wash instructions given to pt with understanding verbalized. Discussed with pt he would need to have COVID swab done at [facility] drive thru 72-48 hours prior to procedure. Informed pt [facility] admitting would call him to schedule appointment. Provided pt with address and phone number should he have any questions. Pt verbalizes understanding of all instructions discussed today in PST. All questions addressed with pt prior to him leaving the PST department.

## 2021-09-28 NOTE — H&P PST ADULT - HISTORY OF PRESENT ILLNESS
60 year old male PMH HTN, Hypercholesterolemia, CAD (2 Coronary Stents 7/1/2019 & 7/3/2019, Bioprosthetic AVR with ascending Aortic Aneurysm Repair March 2015,T2DM, Non-pressure Chronic Ulcer of other part of RIGHT Foot with unspecified severity; presents today for PST prior to RIGHT Lower Extremity Angiogram with Anesthesia with Dr Kendall on 10/5/2021.       Pt notes he was recently hospitalized for 7 days here at [facility] September 2021 for cellulitis RLE. Pt notes he completed course of both IV and oral ABX. Following discharge home Dr Dyson told pt she wanted to proceed with Angiogram as soon as possible. Following discussions with Dr Dyson pt is electing for scheduled procedure.

## 2021-09-28 NOTE — H&P PST ADULT - NSICDXPASTSURGICALHX_GEN_ALL_CORE_FT
PAST SURGICAL HISTORY:  Aortic valve replaced     H/O aortic valve repair     H/O colonoscopy     History of incision and drainage     S/P AAA repair Repaired 3/2015    S/P aortic aneurysm repair

## 2021-09-29 ENCOUNTER — APPOINTMENT (OUTPATIENT)
Dept: CARDIOLOGY | Facility: CLINIC | Age: 61
End: 2021-09-29
Payer: COMMERCIAL

## 2021-09-29 ENCOUNTER — NON-APPOINTMENT (OUTPATIENT)
Age: 61
End: 2021-09-29

## 2021-09-29 VITALS
DIASTOLIC BLOOD PRESSURE: 75 MMHG | OXYGEN SATURATION: 99 % | SYSTOLIC BLOOD PRESSURE: 134 MMHG | HEART RATE: 71 BPM | WEIGHT: 231 LBS | BODY MASS INDEX: 28.72 KG/M2 | HEIGHT: 75 IN

## 2021-09-29 VITALS
HEIGHT: 75 IN | BODY MASS INDEX: 28.85 KG/M2 | WEIGHT: 232 LBS | TEMPERATURE: 98 F | SYSTOLIC BLOOD PRESSURE: 136 MMHG | HEART RATE: 85 BPM | DIASTOLIC BLOOD PRESSURE: 78 MMHG

## 2021-09-29 DIAGNOSIS — Z01.810 ENCOUNTER FOR PREPROCEDURAL CARDIOVASCULAR EXAMINATION: ICD-10-CM

## 2021-09-29 PROCEDURE — 93000 ELECTROCARDIOGRAM COMPLETE: CPT

## 2021-09-29 PROCEDURE — 99215 OFFICE O/P EST HI 40 MIN: CPT

## 2021-09-29 RX ORDER — CEPHALEXIN 500 MG/1
500 CAPSULE ORAL EVERY 6 HOURS
Refills: 0 | Status: DISCONTINUED | COMMUNITY
Start: 2021-09-23 | End: 2021-09-29

## 2021-09-29 NOTE — HISTORY OF PRESENT ILLNESS
[Coronary Artery Disease] : coronary artery disease [No Pertinent Pulmonary History] : no history of asthma, COPD, sleep apnea, or smoking [Chronic Anticoagulation] : chronic anticoagulation [Diabetes] : diabetes [(Patient denies any chest pain, claudication, dyspnea on exertion, orthopnea, palpitations or syncope)] : Patient denies any chest pain, claudication, dyspnea on exertion, orthopnea, palpitations or syncope [Recent Myocardial Infarction] : no recent myocardial infarction [Implantable Device/Pacemaker] : no implantable device/pacemaker [Family Member] : no family member with adverse anesthesia reaction/sudden death [Self] : no previous adverse anesthesia reaction [Chronic Kidney Disease] : no chronic kidney disease [FreeTextEntry4] : Need angiogram leg\par diabetic on metformin\par s/p cabg and valve replacement

## 2021-09-30 PROBLEM — E11.9 TYPE 2 DIABETES MELLITUS WITHOUT COMPLICATIONS: Chronic | Status: ACTIVE | Noted: 2021-09-28

## 2021-09-30 PROBLEM — Z87.898 PERSONAL HISTORY OF OTHER SPECIFIED CONDITIONS: Chronic | Status: ACTIVE | Noted: 2021-09-28

## 2021-09-30 PROBLEM — U07.1 COVID-19: Chronic | Status: ACTIVE | Noted: 2021-09-28

## 2021-09-30 PROBLEM — Z98.890 OTHER SPECIFIED POSTPROCEDURAL STATES: Chronic | Status: ACTIVE | Noted: 2021-09-28

## 2021-09-30 PROBLEM — L97.519 NON-PRESSURE CHRONIC ULCER OF OTHER PART OF RIGHT FOOT WITH UNSPECIFIED SEVERITY: Chronic | Status: ACTIVE | Noted: 2021-09-28

## 2021-10-01 ENCOUNTER — OUTPATIENT (OUTPATIENT)
Dept: OUTPATIENT SERVICES | Facility: HOSPITAL | Age: 61
LOS: 1 days | End: 2021-10-01
Payer: COMMERCIAL

## 2021-10-01 DIAGNOSIS — Z98.89 OTHER SPECIFIED POSTPROCEDURAL STATES: Chronic | ICD-10-CM

## 2021-10-01 DIAGNOSIS — Z98.890 OTHER SPECIFIED POSTPROCEDURAL STATES: Chronic | ICD-10-CM

## 2021-10-01 DIAGNOSIS — Z20.828 CONTACT WITH AND (SUSPECTED) EXPOSURE TO OTHER VIRAL COMMUNICABLE DISEASES: ICD-10-CM

## 2021-10-01 DIAGNOSIS — Z95.4 PRESENCE OF OTHER HEART-VALVE REPLACEMENT: Chronic | ICD-10-CM

## 2021-10-01 PROCEDURE — U0003: CPT

## 2021-10-01 PROCEDURE — U0005: CPT

## 2021-10-02 LAB — SARS-COV-2 RNA SPEC QL NAA+PROBE: SIGNIFICANT CHANGE UP

## 2021-10-04 ENCOUNTER — TRANSCRIPTION ENCOUNTER (OUTPATIENT)
Age: 61
End: 2021-10-04

## 2021-10-04 RX ORDER — SODIUM CHLORIDE 9 MG/ML
1000 INJECTION INTRAMUSCULAR; INTRAVENOUS; SUBCUTANEOUS
Refills: 0 | Status: DISCONTINUED | OUTPATIENT
Start: 2021-10-05 | End: 2021-10-19

## 2021-10-05 ENCOUNTER — APPOINTMENT (OUTPATIENT)
Dept: VASCULAR SURGERY | Facility: HOSPITAL | Age: 61
End: 2021-10-05

## 2021-10-05 ENCOUNTER — RESULT REVIEW (OUTPATIENT)
Age: 61
End: 2021-10-05

## 2021-10-05 ENCOUNTER — OUTPATIENT (OUTPATIENT)
Dept: OUTPATIENT SERVICES | Facility: HOSPITAL | Age: 61
LOS: 1 days | End: 2021-10-05
Payer: COMMERCIAL

## 2021-10-05 DIAGNOSIS — Z98.890 OTHER SPECIFIED POSTPROCEDURAL STATES: Chronic | ICD-10-CM

## 2021-10-05 DIAGNOSIS — Z98.89 OTHER SPECIFIED POSTPROCEDURAL STATES: Chronic | ICD-10-CM

## 2021-10-05 DIAGNOSIS — L97.519 NON-PRESSURE CHRONIC ULCER OF OTHER PART OF RIGHT FOOT WITH UNSPECIFIED SEVERITY: ICD-10-CM

## 2021-10-05 DIAGNOSIS — Z95.4 PRESENCE OF OTHER HEART-VALVE REPLACEMENT: Chronic | ICD-10-CM

## 2021-10-05 PROCEDURE — 75630 X-RAY AORTA LEG ARTERIES: CPT | Mod: 26,59

## 2021-10-05 PROCEDURE — 76937 US GUIDE VASCULAR ACCESS: CPT | Mod: 26

## 2021-10-05 PROCEDURE — 37228: CPT

## 2021-10-05 RX ORDER — CLOPIDOGREL BISULFATE 75 MG/1
75 TABLET, FILM COATED ORAL DAILY
Refills: 0 | Status: DISCONTINUED | OUTPATIENT
Start: 2021-10-05 | End: 2021-10-19

## 2021-10-05 RX ORDER — CEFAZOLIN SODIUM 1 G
2000 VIAL (EA) INJECTION ONCE
Refills: 0 | Status: COMPLETED | OUTPATIENT
Start: 2021-10-05 | End: 2021-10-05

## 2021-10-05 RX ORDER — ASPIRIN/CALCIUM CARB/MAGNESIUM 324 MG
325 TABLET ORAL DAILY
Refills: 0 | Status: DISCONTINUED | OUTPATIENT
Start: 2021-10-05 | End: 2021-10-19

## 2021-10-05 RX ADMIN — SODIUM CHLORIDE 60 MILLILITER(S): 9 INJECTION INTRAMUSCULAR; INTRAVENOUS; SUBCUTANEOUS at 08:30

## 2021-10-05 RX ADMIN — Medication 100 MILLIGRAM(S): at 08:29

## 2021-10-07 PROCEDURE — 37228: CPT

## 2021-10-07 PROCEDURE — C1894: CPT

## 2021-10-07 PROCEDURE — C1725: CPT

## 2021-10-07 PROCEDURE — C1769: CPT

## 2021-10-07 PROCEDURE — 82962 GLUCOSE BLOOD TEST: CPT

## 2021-10-07 PROCEDURE — 76000 FLUOROSCOPY <1 HR PHYS/QHP: CPT

## 2021-10-07 PROCEDURE — 76937 US GUIDE VASCULAR ACCESS: CPT

## 2021-10-07 PROCEDURE — C1887: CPT

## 2021-10-07 PROCEDURE — C1760: CPT

## 2021-10-18 ENCOUNTER — RX RENEWAL (OUTPATIENT)
Age: 61
End: 2021-10-18

## 2021-10-25 ENCOUNTER — RX RENEWAL (OUTPATIENT)
Age: 61
End: 2021-10-25

## 2021-11-01 ENCOUNTER — TRANSCRIPTION ENCOUNTER (OUTPATIENT)
Age: 61
End: 2021-11-01

## 2021-11-01 ENCOUNTER — NON-APPOINTMENT (OUTPATIENT)
Age: 61
End: 2021-11-01

## 2021-11-02 ENCOUNTER — RX RENEWAL (OUTPATIENT)
Age: 61
End: 2021-11-02

## 2022-01-26 NOTE — ED PROVIDER NOTE - PSYCHIATRIC, MLM
Alert and oriented to person, place, time/situation. normal mood and affect. no apparent risk to self or others. Resulted

## 2022-04-11 PROBLEM — Z11.59 SCREENING FOR VIRAL DISEASE: Status: ACTIVE | Noted: 2020-09-30

## 2022-06-10 ENCOUNTER — RX RENEWAL (OUTPATIENT)
Age: 62
End: 2022-06-10

## 2022-06-17 ENCOUNTER — RX RENEWAL (OUTPATIENT)
Age: 62
End: 2022-06-17

## 2022-07-08 ENCOUNTER — RX RENEWAL (OUTPATIENT)
Age: 62
End: 2022-07-08

## 2022-07-29 ENCOUNTER — RX RENEWAL (OUTPATIENT)
Age: 62
End: 2022-07-29

## 2022-08-02 ENCOUNTER — RX RENEWAL (OUTPATIENT)
Age: 62
End: 2022-08-02

## 2022-08-09 ENCOUNTER — RX RENEWAL (OUTPATIENT)
Age: 62
End: 2022-08-09

## 2022-08-22 NOTE — DIETITIAN INITIAL EVALUATION ADULT. - IDEAL BODY WEIGHT (LBS)
Called Olinda Baumann regarding recent surgical pathology.    No residual cancer     Referral placed for medical oncology, radiation oncology.   Follow up in two weeks for wound check and further cancer surveillance planning.     Sarah Juarez MD       
[phone]

## 2022-09-21 ENCOUNTER — APPOINTMENT (OUTPATIENT)
Dept: VASCULAR SURGERY | Facility: CLINIC | Age: 62
End: 2022-09-21

## 2022-09-21 VITALS
DIASTOLIC BLOOD PRESSURE: 78 MMHG | HEART RATE: 78 BPM | SYSTOLIC BLOOD PRESSURE: 160 MMHG | BODY MASS INDEX: 28.22 KG/M2 | WEIGHT: 226.99 LBS | OXYGEN SATURATION: 97 % | HEIGHT: 75 IN

## 2022-09-21 DIAGNOSIS — M79.89 OTHER SPECIFIED SOFT TISSUE DISORDERS: ICD-10-CM

## 2022-09-21 DIAGNOSIS — I73.9 PERIPHERAL VASCULAR DISEASE, UNSPECIFIED: ICD-10-CM

## 2022-09-21 PROCEDURE — 99213 OFFICE O/P EST LOW 20 MIN: CPT

## 2022-09-21 PROCEDURE — 93971 EXTREMITY STUDY: CPT

## 2022-09-21 NOTE — ASSESSMENT
[FreeTextEntry1] : L foot swelling without evidence of venous insuf. The degree of pain and swelling is concerning. Intrinsic foot processes including fracture-ligamnetous injury-deep infection should be ruled out. No vascular etiology of swelling.

## 2022-09-21 NOTE — PHYSICAL EXAM
[2+] : left 2+ [Ankle Swelling (On Exam)] : present [Ankle Swelling On The Left] : of the left ankle [Ankle Swelling Bilaterally] : severe [Varicose Veins Of Lower Extremities] : not present [] : not present [de-identified] : warm, no erythema, equal feet temp

## 2022-09-21 NOTE — HISTORY OF PRESENT ILLNESS
[FreeTextEntry1] : 62 yo M known to me from RLE intervention for DFU. No further RLE problems [de-identified] : Bharath is here because he has  severe L ankle and foot edema for 3 weeks. He went fishing and suffered a sunburn on dorsum of the L foot. He states his foot has been swollen ever since. His foot is no t swollen in AM but it swells u as soon as he steps on it. Has severe pain that tracks up his leg. He saw Dr Rich and is going back this Friday. He is here to rule out any vascular potential source of the problem.

## 2022-10-19 ENCOUNTER — RX RENEWAL (OUTPATIENT)
Age: 62
End: 2022-10-19

## 2022-10-23 NOTE — PROGRESS NOTE ADULT - SUBJECTIVE AND OBJECTIVE BOX
[facility]                                                        Office: [address] [address]                                                       Telephone: [phone]. Fax:[phone]                                                                             PROGRESS NOTE    Subjective:  Patient is post LHC and PCI to RPDA today. Patient is feeling fine, no complaints.     Review of symptoms:   Cardiac:  No chest pain. No dyspnea. No palpitations.  Respiratory:no cough. No dyspnea  Gastrointestinal: No diarrhea. No abdominal pain. No bleeding.   Neuro: No focal neuro complaints.      	  Vitals:  T(C): 36.7 (07-03-19 @ 07:15), Max: 36.7 (07-02-19 @ 21:19)  HR: 64 (07-03-19 @ 09:30) (56 - 110)  BP: 138/78 (07-03-19 @ 09:30) (113/76 - 210/112)  RR: 18 (07-03-19 @ 09:30) (15 - 22)  SpO2: 96% (07-03-19 @ 09:30) (95% - 100%)  Wt(kg): --  I&O's Summary    02 Jul 2019 07:01  -  03 Jul 2019 07:00  --------------------------------------------------------  IN: 480 mL / OUT: 0 mL / NET: 480 mL      Weight (kg): 104.3 (06-29 @ 17:47)    PHYSICAL EXAM:  Appearance: Comfortable. No acute distress  HEENT:  Head and neck: Atraumatic. Normocephalic. , Neck is supple. No JVD,   Neurologic: Alert and awake, Grossly nonfocal.   Lymphatic: No cervical lymphadenopathy  Cardiovascular: Normal S1 S2, No murmurs. No JVD,   Respiratory: Lungs clear to auscultation  Gastrointestinal:  Soft, Non-tender, + BS  Lower Extremities: No edema  Psychiatry: Patient is calm. No agitation.  Skin: No rashes.    CURRENT MEDICATIONS:    MEDICATIONS  (STANDING):  aspirin  chewable 81 milliGRAM(s) Oral daily  atorvastatin 80 milliGRAM(s) Oral at bedtime  clopidogrel Tablet 75 milliGRAM(s) Oral daily  dextrose 5%. 1000 milliLiter(s) (50 mL/Hr) IV Continuous <Continuous>  dextrose 50% Injectable 12.5 Gram(s) IV Push once  dextrose 50% Injectable 25 Gram(s) IV Push once  dextrose 50% Injectable 25 Gram(s) IV Push once  gabapentin 600 milliGRAM(s) Oral two times a day  insulin lispro (HumaLOG) corrective regimen sliding scale   SubCutaneous every 6 hours  isosorbide   mononitrate ER Tablet (IMDUR) 30 milliGRAM(s) Oral daily  lisinopril 10 milliGRAM(s) Oral daily  metoprolol tartrate 50 milliGRAM(s) Oral two times a day  naproxen 375 milliGRAM(s) Oral two times a day  pantoprazole    Tablet 40 milliGRAM(s) Oral before breakfast      LABS:	 	                          11.9   5.09  )-----------( 160      ( 03 Jul 2019 01:48 )             37.8     07-02    138  |  100  |  21.0<H>  ----------------------------<  126<H>  4.1   |  27.0  |  0.93    Ca    9.5      02 Jul 2019 06:33    TPro  6.6  /  Alb  3.9  /  TBili  0.5  /  DBili  x   /  AST  29  /  ALT  34  /  AlkPhos  95  07-02    proBNP:   Lipid Profile: Date: 07-02 @ 06:33  Total cholesterol 196; Direct LDL: 106; HDL: 37; Triglycerides:265    CARDIAC MARKERS ( 03 Jul 2019 09:05 )  x     / 0.34 ng/mL / 66 U/L / x     / x          LIVER FUNCTIONS - ( 02 Jul 2019 06:33 )  Alb: 3.9 g/dL / Pro: 6.6 g/dL / ALK PHOS: 95 U/L / ALT: 34 U/L / AST: 29 U/L / GGT: x             TELEMETRY: Reviewed  - No significant arrhythmias    ECG: [facility]

## 2022-12-05 ENCOUNTER — RX RENEWAL (OUTPATIENT)
Age: 62
End: 2022-12-05

## 2022-12-20 ENCOUNTER — NON-APPOINTMENT (OUTPATIENT)
Age: 62
End: 2022-12-20

## 2023-01-17 ENCOUNTER — RX RENEWAL (OUTPATIENT)
Age: 63
End: 2023-01-17

## 2023-01-18 ENCOUNTER — NON-APPOINTMENT (OUTPATIENT)
Age: 63
End: 2023-01-18

## 2023-01-18 ENCOUNTER — APPOINTMENT (OUTPATIENT)
Dept: CARDIOLOGY | Facility: CLINIC | Age: 63
End: 2023-01-18
Payer: COMMERCIAL

## 2023-01-18 VITALS
HEART RATE: 75 BPM | WEIGHT: 238 LBS | DIASTOLIC BLOOD PRESSURE: 82 MMHG | HEIGHT: 75 IN | OXYGEN SATURATION: 99 % | BODY MASS INDEX: 29.59 KG/M2 | SYSTOLIC BLOOD PRESSURE: 189 MMHG

## 2023-01-18 DIAGNOSIS — I35.9 NONRHEUMATIC AORTIC VALVE DISORDER, UNSPECIFIED: ICD-10-CM

## 2023-01-18 DIAGNOSIS — E78.5 HYPERLIPIDEMIA, UNSPECIFIED: ICD-10-CM

## 2023-01-18 PROCEDURE — 99215 OFFICE O/P EST HI 40 MIN: CPT

## 2023-01-18 PROCEDURE — 93000 ELECTROCARDIOGRAM COMPLETE: CPT

## 2023-02-01 ENCOUNTER — RX RENEWAL (OUTPATIENT)
Age: 63
End: 2023-02-01

## 2023-03-02 NOTE — ED CDU PROVIDER INITIAL DAY NOTE - NS_EDPROVIDERDISPOUSERTYPE_ED_A_ED
Show Levator Superior Units: Yes Show Right And Left Pupillary Line Units: No Left Pupillary Line Units: 0 Expiration Date (Month Year): 4/23 Periorbital Skin Units: 5 Consent: Written consent obtained. Risks include but not limited to lid/brow ptosis, bruising, swelling, diplopia, temporary effect, incomplete chemical denervation. Post-Care Instructions: Patient instructed to not lie down for 4 hours and limit physical activity for 24 hours. Lot #: [id number] Dilution (U/0.1 Cc): 4 Additional Area 1 Location: [facility] Forehead Units: 10 Glabellar Complex Units: 20 Detail Level: Detailed Attending Attestation (For Attendings USE Only)...

## 2023-03-06 ENCOUNTER — APPOINTMENT (OUTPATIENT)
Dept: CARDIOLOGY | Facility: CLINIC | Age: 63
End: 2023-03-06
Payer: COMMERCIAL

## 2023-03-06 PROCEDURE — 93306 TTE W/DOPPLER COMPLETE: CPT

## 2023-03-21 ENCOUNTER — NON-APPOINTMENT (OUTPATIENT)
Age: 63
End: 2023-03-21

## 2023-03-22 ENCOUNTER — APPOINTMENT (OUTPATIENT)
Dept: INTERNAL MEDICINE | Facility: CLINIC | Age: 63
End: 2023-03-22

## 2023-03-24 ENCOUNTER — APPOINTMENT (OUTPATIENT)
Dept: CARDIOLOGY | Facility: CLINIC | Age: 63
End: 2023-03-24
Payer: COMMERCIAL

## 2023-03-24 PROCEDURE — 78452 HT MUSCLE IMAGE SPECT MULT: CPT

## 2023-03-24 PROCEDURE — 93015 CV STRESS TEST SUPVJ I&R: CPT

## 2023-03-24 PROCEDURE — A9500: CPT

## 2023-04-06 ENCOUNTER — RX RENEWAL (OUTPATIENT)
Age: 63
End: 2023-04-06

## 2023-05-28 ENCOUNTER — EMERGENCY (EMERGENCY)
Facility: HOSPITAL | Age: 63
LOS: 1 days | Discharge: HOME | End: 2023-05-28
Attending: EMERGENCY MEDICINE | Admitting: EMERGENCY MEDICINE
Payer: COMMERCIAL

## 2023-05-28 VITALS
HEIGHT: 75 IN | HEART RATE: 102 BPM | WEIGHT: 227.08 LBS | RESPIRATION RATE: 18 BRPM | SYSTOLIC BLOOD PRESSURE: 200 MMHG | OXYGEN SATURATION: 97 % | DIASTOLIC BLOOD PRESSURE: 94 MMHG | TEMPERATURE: 99 F

## 2023-05-28 VITALS
RESPIRATION RATE: 16 BRPM | HEART RATE: 88 BPM | SYSTOLIC BLOOD PRESSURE: 167 MMHG | OXYGEN SATURATION: 99 % | TEMPERATURE: 99 F | DIASTOLIC BLOOD PRESSURE: 87 MMHG

## 2023-05-28 DIAGNOSIS — Z98.890 OTHER SPECIFIED POSTPROCEDURAL STATES: Chronic | ICD-10-CM

## 2023-05-28 DIAGNOSIS — Z95.4 PRESENCE OF OTHER HEART-VALVE REPLACEMENT: Chronic | ICD-10-CM

## 2023-05-28 DIAGNOSIS — Z98.89 OTHER SPECIFIED POSTPROCEDURAL STATES: Chronic | ICD-10-CM

## 2023-05-28 LAB
ALBUMIN SERPL ELPH-MCNC: 3.6 G/DL — SIGNIFICANT CHANGE UP (ref 3.3–5)
ALP SERPL-CCNC: 142 U/L — HIGH (ref 40–120)
ALT FLD-CCNC: 30 U/L — SIGNIFICANT CHANGE UP (ref 12–78)
ANION GAP SERPL CALC-SCNC: 5 MMOL/L — SIGNIFICANT CHANGE UP (ref 5–17)
APTT BLD: 29.2 SEC — SIGNIFICANT CHANGE UP (ref 27.5–35.5)
AST SERPL-CCNC: 14 U/L — LOW (ref 15–37)
BASOPHILS # BLD AUTO: 0.02 K/UL — SIGNIFICANT CHANGE UP (ref 0–0.2)
BASOPHILS NFR BLD AUTO: 0.4 % — SIGNIFICANT CHANGE UP (ref 0–2)
BILIRUB SERPL-MCNC: 0.5 MG/DL — SIGNIFICANT CHANGE UP (ref 0.2–1.2)
BLD GP AB SCN SERPL QL: SIGNIFICANT CHANGE UP
BUN SERPL-MCNC: 18 MG/DL — SIGNIFICANT CHANGE UP (ref 7–23)
CALCIUM SERPL-MCNC: 9.1 MG/DL — SIGNIFICANT CHANGE UP (ref 8.5–10.1)
CHLORIDE SERPL-SCNC: 107 MMOL/L — SIGNIFICANT CHANGE UP (ref 96–108)
CO2 SERPL-SCNC: 26 MMOL/L — SIGNIFICANT CHANGE UP (ref 22–31)
CREAT SERPL-MCNC: 1.1 MG/DL — SIGNIFICANT CHANGE UP (ref 0.5–1.3)
CRP SERPL-MCNC: <3 MG/L — SIGNIFICANT CHANGE UP
EGFR: 75 ML/MIN/1.73M2 — SIGNIFICANT CHANGE UP
EOSINOPHIL # BLD AUTO: 0.03 K/UL — SIGNIFICANT CHANGE UP (ref 0–0.5)
EOSINOPHIL NFR BLD AUTO: 0.7 % — SIGNIFICANT CHANGE UP (ref 0–6)
ERYTHROCYTE [SEDIMENTATION RATE] IN BLOOD: 14 MM/HR — SIGNIFICANT CHANGE UP (ref 0–20)
GLUCOSE SERPL-MCNC: 227 MG/DL — HIGH (ref 70–99)
HCT VFR BLD CALC: 34.7 % — LOW (ref 39–50)
HGB BLD-MCNC: 10.2 G/DL — LOW (ref 13–17)
IMM GRANULOCYTES NFR BLD AUTO: 0.7 % — SIGNIFICANT CHANGE UP (ref 0–0.9)
INR BLD: 1.07 RATIO — SIGNIFICANT CHANGE UP (ref 0.88–1.16)
LACTATE SERPL-SCNC: 2.2 MMOL/L — HIGH (ref 0.7–2)
LYMPHOCYTES # BLD AUTO: 0.54 K/UL — LOW (ref 1–3.3)
LYMPHOCYTES # BLD AUTO: 12.1 % — LOW (ref 13–44)
MCHC RBC-ENTMCNC: 23.3 PG — LOW (ref 27–34)
MCHC RBC-ENTMCNC: 29.4 GM/DL — LOW (ref 32–36)
MCV RBC AUTO: 79.2 FL — LOW (ref 80–100)
MONOCYTES # BLD AUTO: 0.22 K/UL — SIGNIFICANT CHANGE UP (ref 0–0.9)
MONOCYTES NFR BLD AUTO: 4.9 % — SIGNIFICANT CHANGE UP (ref 2–14)
NEUTROPHILS # BLD AUTO: 3.62 K/UL — SIGNIFICANT CHANGE UP (ref 1.8–7.4)
NEUTROPHILS NFR BLD AUTO: 81.2 % — HIGH (ref 43–77)
NRBC # BLD: 0 /100 WBCS — SIGNIFICANT CHANGE UP (ref 0–0)
PLATELET # BLD AUTO: 165 K/UL — SIGNIFICANT CHANGE UP (ref 150–400)
POTASSIUM SERPL-MCNC: 4.5 MMOL/L — SIGNIFICANT CHANGE UP (ref 3.5–5.3)
POTASSIUM SERPL-SCNC: 4.5 MMOL/L — SIGNIFICANT CHANGE UP (ref 3.5–5.3)
PROT SERPL-MCNC: 7.1 G/DL — SIGNIFICANT CHANGE UP (ref 6–8.3)
PROTHROM AB SERPL-ACNC: 12.5 SEC — SIGNIFICANT CHANGE UP (ref 10.5–13.4)
RAPID RVP RESULT: SIGNIFICANT CHANGE UP
RBC # BLD: 4.38 M/UL — SIGNIFICANT CHANGE UP (ref 4.2–5.8)
RBC # FLD: 16.1 % — HIGH (ref 10.3–14.5)
SARS-COV-2 RNA SPEC QL NAA+PROBE: SIGNIFICANT CHANGE UP
SODIUM SERPL-SCNC: 138 MMOL/L — SIGNIFICANT CHANGE UP (ref 135–145)
WBC # BLD: 4.46 K/UL — SIGNIFICANT CHANGE UP (ref 3.8–10.5)
WBC # FLD AUTO: 4.46 K/UL — SIGNIFICANT CHANGE UP (ref 3.8–10.5)

## 2023-05-28 PROCEDURE — 86900 BLOOD TYPING SEROLOGIC ABO: CPT

## 2023-05-28 PROCEDURE — 86901 BLOOD TYPING SEROLOGIC RH(D): CPT

## 2023-05-28 PROCEDURE — 87070 CULTURE OTHR SPECIMN AEROBIC: CPT

## 2023-05-28 PROCEDURE — 80053 COMPREHEN METABOLIC PANEL: CPT

## 2023-05-28 PROCEDURE — 87186 SC STD MICRODIL/AGAR DIL: CPT

## 2023-05-28 PROCEDURE — 83605 ASSAY OF LACTIC ACID: CPT

## 2023-05-28 PROCEDURE — 96374 THER/PROPH/DIAG INJ IV PUSH: CPT

## 2023-05-28 PROCEDURE — 71045 X-RAY EXAM CHEST 1 VIEW: CPT | Mod: 26

## 2023-05-28 PROCEDURE — 99285 EMERGENCY DEPT VISIT HI MDM: CPT | Mod: 25

## 2023-05-28 PROCEDURE — 71045 X-RAY EXAM CHEST 1 VIEW: CPT

## 2023-05-28 PROCEDURE — 93970 EXTREMITY STUDY: CPT | Mod: 26

## 2023-05-28 PROCEDURE — 85025 COMPLETE CBC W/AUTO DIFF WBC: CPT

## 2023-05-28 PROCEDURE — 85610 PROTHROMBIN TIME: CPT

## 2023-05-28 PROCEDURE — 93970 EXTREMITY STUDY: CPT

## 2023-05-28 PROCEDURE — 99285 EMERGENCY DEPT VISIT HI MDM: CPT

## 2023-05-28 PROCEDURE — 87040 BLOOD CULTURE FOR BACTERIA: CPT

## 2023-05-28 PROCEDURE — 73630 X-RAY EXAM OF FOOT: CPT

## 2023-05-28 PROCEDURE — 86850 RBC ANTIBODY SCREEN: CPT

## 2023-05-28 PROCEDURE — 93010 ELECTROCARDIOGRAM REPORT: CPT

## 2023-05-28 PROCEDURE — 85730 THROMBOPLASTIN TIME PARTIAL: CPT

## 2023-05-28 PROCEDURE — 36415 COLL VENOUS BLD VENIPUNCTURE: CPT

## 2023-05-28 PROCEDURE — 87077 CULTURE AEROBIC IDENTIFY: CPT

## 2023-05-28 PROCEDURE — 0225U NFCT DS DNA&RNA 21 SARSCOV2: CPT

## 2023-05-28 PROCEDURE — 73630 X-RAY EXAM OF FOOT: CPT | Mod: 26,RT

## 2023-05-28 PROCEDURE — 85652 RBC SED RATE AUTOMATED: CPT

## 2023-05-28 PROCEDURE — 86140 C-REACTIVE PROTEIN: CPT

## 2023-05-28 PROCEDURE — 93005 ELECTROCARDIOGRAM TRACING: CPT

## 2023-05-28 RX ORDER — SODIUM CHLORIDE 9 MG/ML
1000 INJECTION INTRAMUSCULAR; INTRAVENOUS; SUBCUTANEOUS ONCE
Refills: 0 | Status: COMPLETED | OUTPATIENT
Start: 2023-05-28 | End: 2023-05-28

## 2023-05-28 RX ORDER — SODIUM CHLORIDE 9 MG/ML
1000 INJECTION INTRAMUSCULAR; INTRAVENOUS; SUBCUTANEOUS ONCE
Refills: 0 | Status: DISCONTINUED | OUTPATIENT
Start: 2023-05-28 | End: 2023-05-28

## 2023-05-28 RX ORDER — PIPERACILLIN AND TAZOBACTAM 4; .5 G/20ML; G/20ML
3.38 INJECTION, POWDER, LYOPHILIZED, FOR SOLUTION INTRAVENOUS ONCE
Refills: 0 | Status: COMPLETED | OUTPATIENT
Start: 2023-05-28 | End: 2023-05-28

## 2023-05-28 RX ORDER — VANCOMYCIN HCL 1 G
1000 VIAL (EA) INTRAVENOUS ONCE
Refills: 0 | Status: DISCONTINUED | OUTPATIENT
Start: 2023-05-28 | End: 2023-05-28

## 2023-05-28 RX ORDER — METOPROLOL TARTRATE 50 MG
50 TABLET ORAL ONCE
Refills: 0 | Status: COMPLETED | OUTPATIENT
Start: 2023-05-28 | End: 2023-05-28

## 2023-05-28 RX ADMIN — SODIUM CHLORIDE 1000 MILLILITER(S): 9 INJECTION INTRAMUSCULAR; INTRAVENOUS; SUBCUTANEOUS at 09:13

## 2023-05-28 RX ADMIN — Medication 50 MILLIGRAM(S): at 11:38

## 2023-05-28 RX ADMIN — PIPERACILLIN AND TAZOBACTAM 200 GRAM(S): 4; .5 INJECTION, POWDER, LYOPHILIZED, FOR SOLUTION INTRAVENOUS at 10:18

## 2023-05-28 NOTE — ED PROVIDER NOTE - PHYSICAL EXAMINATION
Right great toe, noted ulcerated lesion on plantar aspect, with positive drainage, possible probe to the bone, surrounding erythema, tender to palpation  left lower extremity noted erythema and edema  (+)DP pulse with cap refill < 2 sec

## 2023-05-28 NOTE — ED ADULT NURSE NOTE - NSFALLUNIVINTERV_ED_ALL_ED
Bed/Stretcher in lowest position, wheels locked, appropriate side rails in place/Call bell, personal items and telephone in reach/Instruct patient to call for assistance before getting out of bed/chair/stretcher/Non-slip footwear applied when patient is off stretcher/[place] to call system/Physically safe environment - no spills, clutter or unnecessary equipment/Purposeful proactive rounding/Room/bathroom lighting operational, light cord in reach

## 2023-05-28 NOTE — ED ADULT NURSE NOTE - OBJECTIVE STATEMENT
62yr old male a&ox4 arrives to ED from home notes diabetic rt foot ulcer and edema to left lower angle. pt notes to have low grade fever, pt notes pain radiating from wound, no active bleeding noted, dry blood noted to extremity, skin noted to be warm to touch, pt noted to be able to move extremity without difficulty. Pt denies chest pain or active sob. Manju DINH

## 2023-05-28 NOTE — ED ADULT NURSE REASSESSMENT NOTE - NS ED NURSE REASSESS COMMENT FT1
pt sitting u pin stretcher no acute distress noted, per provider okay to hold vanco pt to be d/c iv discontinue, pt rt toe noted to be intact with dress applied bt ED provider, no acute distress noted at this time. iv discontinue. Manju DINH

## 2023-05-28 NOTE — ED ADULT NURSE NOTE - PLAN OF CARE DISCUSSED WITH:
Patient SSKI Counseling:  I discussed with the patient the risks of SSKI including but not limited to thyroid abnormalities, metallic taste, GI upset, fever, headache, acne, arthralgias, paraesthesias, lymphadenopathy, easy bleeding, arrhythmias, and allergic reaction.

## 2023-05-28 NOTE — ED PROVIDER NOTE - PROGRESS NOTE DETAILS
Right great toe wound cleaned with saline, applied Xeroform dressing and secured with roll gauze. Patient feeling well, no acute distress, x-ray, ultrasound, labs reviewed no acute findings, there is no red streaking from wound, spoke with patient's wife Case discussed, patient to be discharged home with oral antibiotics and to follow-up outpatient with wound care, instructed to return to ER if having increased redness, fever greater than 100.3 °F and or worsening of symptoms

## 2023-05-28 NOTE — ED PROVIDER NOTE - NSFOLLOWUPINSTRUCTIONS_ED_ALL_ED_FT
If a colonoscopy was performed you might notice a few drops of blood on your underwear or you might see blood on the toilet paper after you use the bathroom. This is caused by irritation to the bowel during the procedure and is not a problem. However if you have any more heavy bleeding (more than 2 tablespoons of bright red blood) contact your doctor right away.
Follow-up with wound care, call to make an appointment.  Take doxycycline antibiotic as prescribed.  Keep wound clean and cover with a dressing. Return to ER if having increased redness, fever greater than 100.3 °F, and or worsening of symptoms.

## 2023-05-28 NOTE — ED PROVIDER NOTE - PATIENT PORTAL LINK FT
You can access the FollowMyHealth Patient Portal offered by [facility] by registering at the following website: [email or web address]. By joining [employer]’s FollowMyHealth portal, you will also be able to view your health information using other applications (apps) compatible with our system.

## 2023-05-28 NOTE — ED ADULT NURSE NOTE - NSICDXPASTMEDICALHX_GEN_ALL_CORE_FT
PAST MEDICAL HISTORY:  2019 novel coronavirus disease (COVID-19) January 2021    AAA (abdominal aortic aneurysm) dx 2012    Aortic valve replaced     Cardiac tamponade     GERD (gastroesophageal reflux disease)     H/O aortic valve repair     H/O cardiac catheterization Stent Placement X 2 (2019)    H/O vertigo     HTN (hypertension)     Hypertension     Leg weakness     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

## 2023-05-28 NOTE — ED PROVIDER NOTE - OBJECTIVE STATEMENT
62-year-old male with past medical history of CAD x2 cardiac stents, bioprosthetic AV replacement with repair of ascending aorta, HTN, HLD, DM, neuropathy, vertigo, GERD, lacunar infarcts, PAD status post right anterior tibial artery angioplasty, presents to the emergency department complaining of right great toe infection, patient states that he has had an ulcer on and off for the past few years of his plantar aspect of his great toe, states for the past few days has not been feeling well, having some dizziness, today had increased drainage coming out of his wound, at triage noted to have low-grade fever and tachycardia.

## 2023-05-28 NOTE — ED PROVIDER NOTE - CLINICAL SUMMARY MEDICAL DECISION MAKING FREE TEXT BOX
62-year-old male with infected right great toe ulcerated wound, left lower extremity erythema and edema, treated for cellulitis, broad-spectrum antibiotics, follow-up x-ray, ultrasound, send CBC, CMP, ESR, CRP, blood cultures, culture wound, admit

## 2023-05-28 NOTE — ED PROVIDER NOTE - CARE PROVIDER_API CALL
John Michele  [facility]  [address] [place] [address]  Phone: [phone]  Fax: [phone]  Follow Up Time:

## 2023-05-30 LAB
-  AMPICILLIN/SULBACTAM: SIGNIFICANT CHANGE UP
-  CEFAZOLIN: SIGNIFICANT CHANGE UP
-  CLINDAMYCIN: SIGNIFICANT CHANGE UP
-  ERYTHROMYCIN: SIGNIFICANT CHANGE UP
-  GENTAMICIN: SIGNIFICANT CHANGE UP
-  OXACILLIN: SIGNIFICANT CHANGE UP
-  RIFAMPIN: SIGNIFICANT CHANGE UP
-  TETRACYCLINE: SIGNIFICANT CHANGE UP
-  TRIMETHOPRIM/SULFAMETHOXAZOLE: SIGNIFICANT CHANGE UP
-  VANCOMYCIN: SIGNIFICANT CHANGE UP
CULTURE RESULTS: SIGNIFICANT CHANGE UP
METHOD TYPE: SIGNIFICANT CHANGE UP
ORGANISM # SPEC MICROSCOPIC CNT: SIGNIFICANT CHANGE UP
ORGANISM # SPEC MICROSCOPIC CNT: SIGNIFICANT CHANGE UP
SPECIMEN SOURCE: SIGNIFICANT CHANGE UP

## 2023-06-06 ENCOUNTER — APPOINTMENT (OUTPATIENT)
Dept: WOUND CARE | Facility: HOSPITAL | Age: 63
End: 2023-06-06
Payer: COMMERCIAL

## 2023-06-06 ENCOUNTER — OUTPATIENT (OUTPATIENT)
Dept: OUTPATIENT SERVICES | Facility: HOSPITAL | Age: 63
LOS: 1 days | Discharge: HOME | End: 2023-06-06
Payer: COMMERCIAL

## 2023-06-06 VITALS
BODY MASS INDEX: 28.23 KG/M2 | TEMPERATURE: 97 F | HEART RATE: 77 BPM | WEIGHT: 227 LBS | DIASTOLIC BLOOD PRESSURE: 91 MMHG | OXYGEN SATURATION: 96 % | RESPIRATION RATE: 20 BRPM | HEIGHT: 75 IN | SYSTOLIC BLOOD PRESSURE: 180 MMHG

## 2023-06-06 DIAGNOSIS — Z95.4 PRESENCE OF OTHER HEART-VALVE REPLACEMENT: Chronic | ICD-10-CM

## 2023-06-06 DIAGNOSIS — S91.309A UNSPECIFIED OPEN WOUND, UNSPECIFIED FOOT, INITIAL ENCOUNTER: ICD-10-CM

## 2023-06-06 DIAGNOSIS — Z98.890 OTHER SPECIFIED POSTPROCEDURAL STATES: Chronic | ICD-10-CM

## 2023-06-06 DIAGNOSIS — Z98.89 OTHER SPECIFIED POSTPROCEDURAL STATES: Chronic | ICD-10-CM

## 2023-06-06 PROCEDURE — G0463: CPT

## 2023-06-06 PROCEDURE — 99213 OFFICE O/P EST LOW 20 MIN: CPT

## 2023-06-06 RX ORDER — AMOXICILLIN AND CLAVULANATE POTASSIUM 875; 125 MG/1; MG/1
875-125 TABLET, COATED ORAL
Qty: 14 | Refills: 0 | Status: COMPLETED | COMMUNITY
Start: 2023-06-06 | End: 2023-06-13

## 2023-06-08 ENCOUNTER — RX RENEWAL (OUTPATIENT)
Age: 63
End: 2023-06-08

## 2023-06-12 ENCOUNTER — OUTPATIENT (OUTPATIENT)
Dept: OUTPATIENT SERVICES | Facility: HOSPITAL | Age: 63
LOS: 1 days | End: 2023-06-12
Payer: COMMERCIAL

## 2023-06-12 DIAGNOSIS — E11.621 TYPE 2 DIABETES MELLITUS WITH FOOT ULCER: ICD-10-CM

## 2023-06-12 DIAGNOSIS — Z98.890 OTHER SPECIFIED POSTPROCEDURAL STATES: Chronic | ICD-10-CM

## 2023-06-12 DIAGNOSIS — Z98.89 OTHER SPECIFIED POSTPROCEDURAL STATES: Chronic | ICD-10-CM

## 2023-06-12 DIAGNOSIS — Z95.4 PRESENCE OF OTHER HEART-VALVE REPLACEMENT: Chronic | ICD-10-CM

## 2023-06-12 PROCEDURE — 73718 MRI LOWER EXTREMITY W/O DYE: CPT

## 2023-06-12 PROCEDURE — 73718 MRI LOWER EXTREMITY W/O DYE: CPT | Mod: 26,RT

## 2023-06-14 ENCOUNTER — APPOINTMENT (OUTPATIENT)
Dept: WOUND CARE | Facility: HOSPITAL | Age: 63
End: 2023-06-14
Payer: COMMERCIAL

## 2023-06-14 ENCOUNTER — OUTPATIENT (OUTPATIENT)
Dept: OUTPATIENT SERVICES | Facility: HOSPITAL | Age: 63
LOS: 1 days | Discharge: HOME | End: 2023-06-14
Payer: COMMERCIAL

## 2023-06-14 VITALS
OXYGEN SATURATION: 99 % | HEIGHT: 75 IN | RESPIRATION RATE: 20 BRPM | TEMPERATURE: 98.2 F | BODY MASS INDEX: 28.23 KG/M2 | WEIGHT: 227 LBS | SYSTOLIC BLOOD PRESSURE: 177 MMHG | DIASTOLIC BLOOD PRESSURE: 83 MMHG | HEART RATE: 73 BPM

## 2023-06-14 DIAGNOSIS — Z98.890 OTHER SPECIFIED POSTPROCEDURAL STATES: Chronic | ICD-10-CM

## 2023-06-14 DIAGNOSIS — S91.309D UNSPECIFIED OPEN WOUND, UNSPECIFIED FOOT, SUBSEQUENT ENCOUNTER: ICD-10-CM

## 2023-06-14 DIAGNOSIS — Z98.89 OTHER SPECIFIED POSTPROCEDURAL STATES: Chronic | ICD-10-CM

## 2023-06-14 DIAGNOSIS — Z95.4 PRESENCE OF OTHER HEART-VALVE REPLACEMENT: Chronic | ICD-10-CM

## 2023-06-14 PROCEDURE — G0463: CPT

## 2023-06-14 PROCEDURE — 99213 OFFICE O/P EST LOW 20 MIN: CPT

## 2023-06-19 ENCOUNTER — TRANSCRIPTION ENCOUNTER (OUTPATIENT)
Age: 63
End: 2023-06-19

## 2023-06-19 ENCOUNTER — INPATIENT (INPATIENT)
Facility: HOSPITAL | Age: 63
LOS: 4 days | Discharge: HOME | DRG: 623 | End: 2023-06-24
Attending: FAMILY MEDICINE | Admitting: FAMILY MEDICINE
Payer: COMMERCIAL

## 2023-06-19 VITALS
WEIGHT: 227.08 LBS | TEMPERATURE: 99 F | HEART RATE: 83 BPM | HEIGHT: 75 IN | DIASTOLIC BLOOD PRESSURE: 82 MMHG | RESPIRATION RATE: 18 BRPM | SYSTOLIC BLOOD PRESSURE: 174 MMHG | OXYGEN SATURATION: 99 %

## 2023-06-19 DIAGNOSIS — E11.621 TYPE 2 DIABETES MELLITUS WITH FOOT ULCER: ICD-10-CM

## 2023-06-19 DIAGNOSIS — E11.51 TYPE 2 DIABETES MELLITUS WITH DIABETIC PERIPHERAL ANGIOPATHY WITHOUT GANGRENE: ICD-10-CM

## 2023-06-19 DIAGNOSIS — Z98.890 OTHER SPECIFIED POSTPROCEDURAL STATES: Chronic | ICD-10-CM

## 2023-06-19 DIAGNOSIS — I25.10 ATHEROSCLEROTIC HEART DISEASE OF NATIVE CORONARY ARTERY WITHOUT ANGINA PECTORIS: ICD-10-CM

## 2023-06-19 DIAGNOSIS — L84 CORNS AND CALLOSITIES: ICD-10-CM

## 2023-06-19 DIAGNOSIS — K21.9 GASTRO-ESOPHAGEAL REFLUX DISEASE WITHOUT ESOPHAGITIS: ICD-10-CM

## 2023-06-19 DIAGNOSIS — Z95.5 PRESENCE OF CORONARY ANGIOPLASTY IMPLANT AND GRAFT: ICD-10-CM

## 2023-06-19 DIAGNOSIS — E78.5 HYPERLIPIDEMIA, UNSPECIFIED: ICD-10-CM

## 2023-06-19 DIAGNOSIS — Z98.89 OTHER SPECIFIED POSTPROCEDURAL STATES: Chronic | ICD-10-CM

## 2023-06-19 DIAGNOSIS — Z79.899 OTHER LONG TERM (CURRENT) DRUG THERAPY: ICD-10-CM

## 2023-06-19 DIAGNOSIS — L97.516 NON-PRESSURE CHRONIC ULCER OF OTHER PART OF RIGHT FOOT WITH BONE INVOLVEMENT WITHOUT EVIDENCE OF NECROSIS: ICD-10-CM

## 2023-06-19 DIAGNOSIS — I10 ESSENTIAL (PRIMARY) HYPERTENSION: ICD-10-CM

## 2023-06-19 DIAGNOSIS — E11.42 TYPE 2 DIABETES MELLITUS WITH DIABETIC POLYNEUROPATHY: ICD-10-CM

## 2023-06-19 DIAGNOSIS — Z86.73 PERSONAL HISTORY OF TRANSIENT ISCHEMIC ATTACK (TIA), AND CEREBRAL INFARCTION WITHOUT RESIDUAL DEFICITS: ICD-10-CM

## 2023-06-19 DIAGNOSIS — I73.9 PERIPHERAL VASCULAR DISEASE, UNSPECIFIED: ICD-10-CM

## 2023-06-19 DIAGNOSIS — Z86.010 PERSONAL HISTORY OF COLONIC POLYPS: ICD-10-CM

## 2023-06-19 DIAGNOSIS — S91.331A PUNCTURE WOUND WITHOUT FOREIGN BODY, RIGHT FOOT, INITIAL ENCOUNTER: ICD-10-CM

## 2023-06-19 DIAGNOSIS — S91.101A UNSPECIFIED OPEN WOUND OF RIGHT GREAT TOE WITHOUT DAMAGE TO NAIL, INITIAL ENCOUNTER: ICD-10-CM

## 2023-06-19 DIAGNOSIS — R42 DIZZINESS AND GIDDINESS: ICD-10-CM

## 2023-06-19 DIAGNOSIS — Z79.82 LONG TERM (CURRENT) USE OF ASPIRIN: ICD-10-CM

## 2023-06-19 DIAGNOSIS — Z95.4 PRESENCE OF OTHER HEART-VALVE REPLACEMENT: Chronic | ICD-10-CM

## 2023-06-19 DIAGNOSIS — Z79.84 LONG TERM (CURRENT) USE OF ORAL HYPOGLYCEMIC DRUGS: ICD-10-CM

## 2023-06-19 DIAGNOSIS — E11.9 TYPE 2 DIABETES MELLITUS WITHOUT COMPLICATIONS: ICD-10-CM

## 2023-06-19 DIAGNOSIS — Z29.9 ENCOUNTER FOR PROPHYLACTIC MEASURES, UNSPECIFIED: ICD-10-CM

## 2023-06-19 DIAGNOSIS — Z80.3 FAMILY HISTORY OF MALIGNANT NEOPLASM OF BREAST: ICD-10-CM

## 2023-06-19 LAB
A1C WITH ESTIMATED AVERAGE GLUCOSE RESULT: 9.4 % — HIGH (ref 4–5.6)
ALBUMIN SERPL ELPH-MCNC: 3.8 G/DL — SIGNIFICANT CHANGE UP (ref 3.3–5)
ALP SERPL-CCNC: 151 U/L — HIGH (ref 40–120)
ALT FLD-CCNC: 38 U/L — SIGNIFICANT CHANGE UP (ref 12–78)
ANION GAP SERPL CALC-SCNC: 5 MMOL/L — SIGNIFICANT CHANGE UP (ref 5–17)
APTT BLD: 27 SEC — LOW (ref 27.5–35.5)
AST SERPL-CCNC: 21 U/L — SIGNIFICANT CHANGE UP (ref 15–37)
BASOPHILS # BLD AUTO: 0.02 K/UL — SIGNIFICANT CHANGE UP (ref 0–0.2)
BASOPHILS NFR BLD AUTO: 0.4 % — SIGNIFICANT CHANGE UP (ref 0–2)
BILIRUB SERPL-MCNC: 0.5 MG/DL — SIGNIFICANT CHANGE UP (ref 0.2–1.2)
BUN SERPL-MCNC: 17 MG/DL — SIGNIFICANT CHANGE UP (ref 7–23)
CALCIUM SERPL-MCNC: 9.3 MG/DL — SIGNIFICANT CHANGE UP (ref 8.5–10.1)
CHLORIDE SERPL-SCNC: 106 MMOL/L — SIGNIFICANT CHANGE UP (ref 96–108)
CO2 SERPL-SCNC: 25 MMOL/L — SIGNIFICANT CHANGE UP (ref 22–31)
CREAT SERPL-MCNC: 1.2 MG/DL — SIGNIFICANT CHANGE UP (ref 0.5–1.3)
EGFR: 68 ML/MIN/1.73M2 — SIGNIFICANT CHANGE UP
EOSINOPHIL # BLD AUTO: 0.05 K/UL — SIGNIFICANT CHANGE UP (ref 0–0.5)
EOSINOPHIL NFR BLD AUTO: 1 % — SIGNIFICANT CHANGE UP (ref 0–6)
ESTIMATED AVERAGE GLUCOSE: 223 MG/DL — HIGH (ref 68–114)
GLUCOSE SERPL-MCNC: 276 MG/DL — HIGH (ref 70–99)
HCT VFR BLD CALC: 36.1 % — LOW (ref 39–50)
HGB BLD-MCNC: 10.6 G/DL — LOW (ref 13–17)
IMM GRANULOCYTES NFR BLD AUTO: 0.6 % — SIGNIFICANT CHANGE UP (ref 0–0.9)
INR BLD: 1.08 RATIO — SIGNIFICANT CHANGE UP (ref 0.88–1.16)
LACTATE SERPL-SCNC: 2 MMOL/L — SIGNIFICANT CHANGE UP (ref 0.7–2)
LYMPHOCYTES # BLD AUTO: 0.64 K/UL — LOW (ref 1–3.3)
LYMPHOCYTES # BLD AUTO: 13.3 % — SIGNIFICANT CHANGE UP (ref 13–44)
MCHC RBC-ENTMCNC: 23.1 PG — LOW (ref 27–34)
MCHC RBC-ENTMCNC: 29.4 GM/DL — LOW (ref 32–36)
MCV RBC AUTO: 78.8 FL — LOW (ref 80–100)
MONOCYTES # BLD AUTO: 0.29 K/UL — SIGNIFICANT CHANGE UP (ref 0–0.9)
MONOCYTES NFR BLD AUTO: 6 % — SIGNIFICANT CHANGE UP (ref 2–14)
NEUTROPHILS # BLD AUTO: 3.79 K/UL — SIGNIFICANT CHANGE UP (ref 1.8–7.4)
NEUTROPHILS NFR BLD AUTO: 78.7 % — HIGH (ref 43–77)
NRBC # BLD: 0 /100 WBCS — SIGNIFICANT CHANGE UP (ref 0–0)
PLATELET # BLD AUTO: 181 K/UL — SIGNIFICANT CHANGE UP (ref 150–400)
POTASSIUM SERPL-MCNC: 4.2 MMOL/L — SIGNIFICANT CHANGE UP (ref 3.5–5.3)
POTASSIUM SERPL-SCNC: 4.2 MMOL/L — SIGNIFICANT CHANGE UP (ref 3.5–5.3)
PROT SERPL-MCNC: 7.2 G/DL — SIGNIFICANT CHANGE UP (ref 6–8.3)
PROTHROM AB SERPL-ACNC: 12.6 SEC — SIGNIFICANT CHANGE UP (ref 10.5–13.4)
RBC # BLD: 4.58 M/UL — SIGNIFICANT CHANGE UP (ref 4.2–5.8)
RBC # FLD: 15.9 % — HIGH (ref 10.3–14.5)
SODIUM SERPL-SCNC: 136 MMOL/L — SIGNIFICANT CHANGE UP (ref 135–145)
WBC # BLD: 4.82 K/UL — SIGNIFICANT CHANGE UP (ref 3.8–10.5)
WBC # FLD AUTO: 4.82 K/UL — SIGNIFICANT CHANGE UP (ref 3.8–10.5)

## 2023-06-19 PROCEDURE — 99222 1ST HOSP IP/OBS MODERATE 55: CPT

## 2023-06-19 PROCEDURE — 99223 1ST HOSP IP/OBS HIGH 75: CPT | Mod: GC

## 2023-06-19 PROCEDURE — 73630 X-RAY EXAM OF FOOT: CPT | Mod: 26,RT

## 2023-06-19 PROCEDURE — 99285 EMERGENCY DEPT VISIT HI MDM: CPT

## 2023-06-19 PROCEDURE — 99221 1ST HOSP IP/OBS SF/LOW 40: CPT

## 2023-06-19 PROCEDURE — 93010 ELECTROCARDIOGRAM REPORT: CPT

## 2023-06-19 RX ORDER — INSULIN LISPRO 100/ML
VIAL (ML) SUBCUTANEOUS
Refills: 0 | Status: DISCONTINUED | OUTPATIENT
Start: 2023-06-19 | End: 2023-06-20

## 2023-06-19 RX ORDER — DEXTROSE 50 % IN WATER 50 %
25 SYRINGE (ML) INTRAVENOUS ONCE
Refills: 0 | Status: DISCONTINUED | OUTPATIENT
Start: 2023-06-19 | End: 2023-06-20

## 2023-06-19 RX ORDER — ACETAMINOPHEN 500 MG
650 TABLET ORAL EVERY 6 HOURS
Refills: 0 | Status: DISCONTINUED | OUTPATIENT
Start: 2023-06-19 | End: 2023-06-20

## 2023-06-19 RX ORDER — GLUCAGON INJECTION, SOLUTION 0.5 MG/.1ML
1 INJECTION, SOLUTION SUBCUTANEOUS ONCE
Refills: 0 | Status: DISCONTINUED | OUTPATIENT
Start: 2023-06-19 | End: 2023-06-20

## 2023-06-19 RX ORDER — HYDROCHLOROTHIAZIDE 25 MG
1 TABLET ORAL
Qty: 0 | Refills: 0 | DISCHARGE

## 2023-06-19 RX ORDER — CLOPIDOGREL BISULFATE 75 MG/1
75 TABLET, FILM COATED ORAL DAILY
Refills: 0 | Status: DISCONTINUED | OUTPATIENT
Start: 2023-06-20 | End: 2023-06-20

## 2023-06-19 RX ORDER — VANCOMYCIN HCL 1 G
1000 VIAL (EA) INTRAVENOUS ONCE
Refills: 0 | Status: COMPLETED | OUTPATIENT
Start: 2023-06-19 | End: 2023-06-19

## 2023-06-19 RX ORDER — SODIUM CHLORIDE 9 MG/ML
1000 INJECTION, SOLUTION INTRAVENOUS
Refills: 0 | Status: DISCONTINUED | OUTPATIENT
Start: 2023-06-19 | End: 2023-06-20

## 2023-06-19 RX ORDER — INSULIN LISPRO 100/ML
VIAL (ML) SUBCUTANEOUS AT BEDTIME
Refills: 0 | Status: DISCONTINUED | OUTPATIENT
Start: 2023-06-19 | End: 2023-06-20

## 2023-06-19 RX ORDER — ATORVASTATIN CALCIUM 80 MG/1
80 TABLET, FILM COATED ORAL AT BEDTIME
Refills: 0 | Status: DISCONTINUED | OUTPATIENT
Start: 2023-06-19 | End: 2023-06-20

## 2023-06-19 RX ORDER — PANTOPRAZOLE SODIUM 20 MG/1
40 TABLET, DELAYED RELEASE ORAL
Refills: 0 | Status: DISCONTINUED | OUTPATIENT
Start: 2023-06-19 | End: 2023-06-20

## 2023-06-19 RX ORDER — LISINOPRIL 2.5 MG/1
40 TABLET ORAL DAILY
Refills: 0 | Status: DISCONTINUED | OUTPATIENT
Start: 2023-06-19 | End: 2023-06-20

## 2023-06-19 RX ORDER — ASPIRIN/CALCIUM CARB/MAGNESIUM 324 MG
1 TABLET ORAL
Qty: 0 | Refills: 0 | DISCHARGE

## 2023-06-19 RX ORDER — DEXTROSE 50 % IN WATER 50 %
12.5 SYRINGE (ML) INTRAVENOUS ONCE
Refills: 0 | Status: DISCONTINUED | OUTPATIENT
Start: 2023-06-19 | End: 2023-06-20

## 2023-06-19 RX ORDER — MECLIZINE HCL 12.5 MG
12.5 TABLET ORAL THREE TIMES A DAY
Refills: 0 | Status: DISCONTINUED | OUTPATIENT
Start: 2023-06-19 | End: 2023-06-20

## 2023-06-19 RX ORDER — CEFAZOLIN SODIUM 1 G
1000 VIAL (EA) INJECTION ONCE
Refills: 0 | Status: COMPLETED | OUTPATIENT
Start: 2023-06-19 | End: 2023-06-19

## 2023-06-19 RX ORDER — PIPERACILLIN AND TAZOBACTAM 4; .5 G/20ML; G/20ML
3.38 INJECTION, POWDER, LYOPHILIZED, FOR SOLUTION INTRAVENOUS ONCE
Refills: 0 | Status: COMPLETED | OUTPATIENT
Start: 2023-06-19 | End: 2023-06-19

## 2023-06-19 RX ORDER — GABAPENTIN 400 MG/1
600 CAPSULE ORAL
Refills: 0 | Status: DISCONTINUED | OUTPATIENT
Start: 2023-06-19 | End: 2023-06-20

## 2023-06-19 RX ORDER — SACCHAROMYCES BOULARDII 250 MG
250 POWDER IN PACKET (EA) ORAL
Refills: 0 | Status: DISCONTINUED | OUTPATIENT
Start: 2023-06-19 | End: 2023-06-20

## 2023-06-19 RX ORDER — FUROSEMIDE 40 MG
20 TABLET ORAL DAILY
Refills: 0 | Status: DISCONTINUED | OUTPATIENT
Start: 2023-06-19 | End: 2023-06-20

## 2023-06-19 RX ORDER — CEFAZOLIN SODIUM 1 G
VIAL (EA) INJECTION
Refills: 0 | Status: DISCONTINUED | OUTPATIENT
Start: 2023-06-19 | End: 2023-06-20

## 2023-06-19 RX ORDER — METOPROLOL TARTRATE 50 MG
25 TABLET ORAL
Refills: 0 | Status: DISCONTINUED | OUTPATIENT
Start: 2023-06-19 | End: 2023-06-20

## 2023-06-19 RX ORDER — METFORMIN HYDROCHLORIDE 850 MG/1
1 TABLET ORAL
Qty: 0 | Refills: 0 | DISCHARGE

## 2023-06-19 RX ORDER — DEXTROSE 50 % IN WATER 50 %
15 SYRINGE (ML) INTRAVENOUS ONCE
Refills: 0 | Status: DISCONTINUED | OUTPATIENT
Start: 2023-06-19 | End: 2023-06-20

## 2023-06-19 RX ORDER — CEFAZOLIN SODIUM 1 G
1000 VIAL (EA) INJECTION EVERY 8 HOURS
Refills: 0 | Status: DISCONTINUED | OUTPATIENT
Start: 2023-06-19 | End: 2023-06-20

## 2023-06-19 RX ADMIN — Medication 100 MILLIGRAM(S): at 22:45

## 2023-06-19 RX ADMIN — GABAPENTIN 600 MILLIGRAM(S): 400 CAPSULE ORAL at 17:07

## 2023-06-19 RX ADMIN — PIPERACILLIN AND TAZOBACTAM 200 GRAM(S): 4; .5 INJECTION, POWDER, LYOPHILIZED, FOR SOLUTION INTRAVENOUS at 11:32

## 2023-06-19 RX ADMIN — Medication 100 MILLIGRAM(S): at 15:15

## 2023-06-19 RX ADMIN — Medication 25 MILLIGRAM(S): at 17:06

## 2023-06-19 RX ADMIN — Medication 2: at 17:06

## 2023-06-19 RX ADMIN — Medication 250 MILLIGRAM(S): at 13:03

## 2023-06-19 RX ADMIN — Medication 250 MILLIGRAM(S): at 17:06

## 2023-06-19 RX ADMIN — ATORVASTATIN CALCIUM 80 MILLIGRAM(S): 80 TABLET, FILM COATED ORAL at 22:45

## 2023-06-19 NOTE — CONSULT NOTE ADULT - ASSESSMENT
63 y/o M with chronic, nonhealed R great toe DFU  PAD S/P R AT angioplasty in 10/2021  Normal pulse exam  No vascular intervention indicated  Podiatry may proceed with intervention as indicated  Discussed with Dr Dyson

## 2023-06-19 NOTE — HISTORY OF PRESENT ILLNESS
[FreeTextEntry1] : Patient is 62 year old male presenting for wound to the plantar right hallux with wound down to the level of bone. Patient has been performing dressing changes as advised. Patient has had the wound since the last 5 years with intermittent healing and opening of the wound. Patient has obtained MRI of the right foot.

## 2023-06-19 NOTE — CONSULT NOTE ADULT - SUBJECTIVE AND OBJECTIVE BOX
DOCUMENTATION IN PROGRESS    CHIEF COMPLAINT: Patient is a 62y old  Male who presents with a chief complaint of     HPI:  This is a 62-year-old male w/ PMHx of CAD(s/p stents), bioprosthetic AV replacement with repair of ascending aorta, HTN, HLD, DM, neuropathy, vertigo, GERD, lacunar infarcts, PAD (s/p right anterior tibial artery angioplasty) presents to the ED w/ right toe wound. pt endorsing hx of chronic wound to right great toe x "few years." he noted drainage/erythema surrounding wound around 2 weeks ago and was started on PO course of antibiotics by wound care Dr Sen. He followed up today in office and sent to ED for IV antibiotics and surgical intervention. Denies fever, chills. Pt has noted some bloody drainage from the wound site. Minimal pain at this time. Denies LE swelling/calf pain. Denies chest pain, SOB, abdominal pain, N/V, flank pain.    EKG:    REVIEW OF SYSTEMS:   All other review of systems are negative unless indicated above    PAST MEDICAL & SURGICAL HISTORY:  Hypertension      AAA (abdominal aortic aneurysm)  dx 2012      GERD (gastroesophageal reflux disease)      Leg weakness      Aortic valve replaced      Cardiac tamponade      HTN (hypertension)      H/O aortic valve repair      S/P aneurysm repair      2019 novel coronavirus disease (COVID-19)  January 2021      Non-pressure chronic ulcer of other part of right foot with unspecified severity      Type 2 diabetes mellitus  Not on Insulin but complicated by peripheral neuropathy      H/O cardiac catheterization  Stent Placement X 2 (2019)      H/O vertigo      Aortic valve replaced      S/P AAA repair  Repaired 3/2015      S/P aortic aneurysm repair      H/O aortic valve repair      History of incision and drainage      H/O colonoscopy          SOCIAL HISTORY:  No tobacco, ethanol, or drug abuse.    FAMILY HISTORY:  Family history of breast cancer    Family history of hypertension    Family history of stroke    Family history of prostate cancer    Family history of COPD (chronic obstructive pulmonary disease) (Grandparent)    FH: HTN (hypertension)      No family history of acute MI or sudden cardiac death.    MEDICATIONS  (STANDING):    MEDICATIONS  (PRN):      Allergies    Normodyne (Faint)    Intolerances        Home meds:  Home Medications:  aspirin 81 mg oral tablet: 1 tab(s) orally once a day (28 Sep 2021 14:54)  atorvastatin 80 mg oral tablet: 1 tab(s) orally once a day (28 Sep 2021 14:54)  gabapentin 600 mg oral tablet: 1 tab(s) orally 4 times a day (28 Sep 2021 14:54)  hydroCHLOROthiazide 25 mg oral tablet: 1 tab(s) orally once a day (28 Sep 2021 14:54)  lisinopril 40 mg oral tablet: 1 tab(s) orally once a day (28 Sep 2021 14:54)  meclizine 12.5 mg oral tablet: 1 tab(s) orally 3 times a day, As needed, vertigo (28 Sep 2021 14:54)  metFORMIN 1000 mg oral tablet, extended release: 1 tab(s) orally once a day (28 Sep 2021 14:54)  metoprolol tartrate 25 mg oral tablet: 1 tab(s) orally 2 times a day (28 Sep 2021 14:54)  pantoprazole 40 mg oral delayed release tablet: 1 tab(s) orally once a day (before a meal) (28 Sep 2021 14:54)        VITAL SIGNS:   Vital Signs Last 24 Hrs  T(C): 36.5 (19 Jun 2023 13:44), Max: 37 (19 Jun 2023 10:20)  T(F): 97.7 (19 Jun 2023 13:44), Max: 98.6 (19 Jun 2023 10:20)  HR: 70 (19 Jun 2023 13:44) (70 - 83)  BP: 169/88 (19 Jun 2023 13:44) (169/88 - 174/82)  BP(mean): --  RR: 16 (19 Jun 2023 13:44) (16 - 18)  SpO2: 99% (19 Jun 2023 13:44) (99% - 99%)    Parameters below as of 19 Jun 2023 13:44  Patient On (Oxygen Delivery Method): room air        I&O's Summary      On Exam:  TELE:   Constitutional: NAD, awake and alert, well-developed  HEENT: Moist Mucous Membranes, Anicteric  Pulmonary: Non-labored, breath sounds are clear bilaterally, No wheezing, rales or rhonchi  Cardiovascular: Regular, S1 and S2, No murmurs, rubs, gallops or clicks  Gastrointestinal: Bowel Sounds present, soft, nontender.   Lymph: No peripheral edema. No lymphadenopathy.  Skin: No visible rashes or ulcers.  Psych:  Mood & affect appropriate for situation    LABS: All Labs Reviewed:                        10.6   4.82  )-----------( 181      ( 19 Jun 2023 10:51 )             36.1     19 Jun 2023 10:51    136    |  106    |  17     ----------------------------<  276    4.2     |  25     |  1.20     Ca    9.3        19 Jun 2023 10:51    TPro  7.2    /  Alb  3.8    /  TBili  0.5    /  DBili  x      /  AST  21     /  ALT  38     /  AlkPhos  151    19 Jun 2023 10:51    PT/INR - ( 19 Jun 2023 10:51 )   PT: 12.6 sec;   INR: 1.08 ratio         PTT - ( 19 Jun 2023 10:51 )  PTT:27.0 sec      Blood Culture:         RADIOLOGY:              CHIEF COMPLAINT: Patient is a 62y old  Male who presents with a chief complaint of     HPI:  This is a 62-year-old male w/ PMHx of CAD(s/p stents), bioprosthetic AV replacement with repair of ascending aorta, HTN, HLD, DM, neuropathy, vertigo, GERD, lacunar infarcts, PAD (s/p right anterior tibial artery angioplasty) presents to the ED w/ right toe wound. pt endorsing hx of chronic wound to right great toe x "few years." he noted drainage/erythema surrounding wound around 2 weeks ago and was started on PO course of antibiotics by wound care Dr Sen. He followed up today in office and sent to ED for IV antibiotics and surgical intervention. Denies fever, chills. Pt has noted some bloody drainage from the wound site. Minimal pain at this time. Denies LE swelling/calf pain. Denies chest pain, SOB, abdominal pain, N/V, flank pain.    EKG: NSR     REVIEW OF SYSTEMS:   All other review of systems are negative unless indicated above    PAST MEDICAL & SURGICAL HISTORY:  Hypertension      AAA (abdominal aortic aneurysm)  dx 2012      GERD (gastroesophageal reflux disease)      Leg weakness      Aortic valve replaced      Cardiac tamponade      HTN (hypertension)      H/O aortic valve repair      S/P aneurysm repair      2019 novel coronavirus disease (COVID-19)  January 2021      Non-pressure chronic ulcer of other part of right foot with unspecified severity      Type 2 diabetes mellitus  Not on Insulin but complicated by peripheral neuropathy      H/O cardiac catheterization  Stent Placement X 2 (2019)      H/O vertigo      Aortic valve replaced      S/P AAA repair  Repaired 3/2015      S/P aortic aneurysm repair      H/O aortic valve repair      History of incision and drainage      H/O colonoscopy          SOCIAL HISTORY:  No tobacco, ethanol, or drug abuse.    FAMILY HISTORY:  Family history of breast cancer    Family history of hypertension    Family history of stroke    Family history of prostate cancer    Family history of COPD (chronic obstructive pulmonary disease) (Grandparent)    FH: HTN (hypertension)      No family history of acute MI or sudden cardiac death.    MEDICATIONS  (STANDING):    MEDICATIONS  (PRN):      Allergies    Normodyne (Faint)    Intolerances        Home meds:  Home Medications:  aspirin 81 mg oral tablet: 1 tab(s) orally once a day (28 Sep 2021 14:54)  atorvastatin 80 mg oral tablet: 1 tab(s) orally once a day (28 Sep 2021 14:54)  gabapentin 600 mg oral tablet: 1 tab(s) orally 4 times a day (28 Sep 2021 14:54)  hydroCHLOROthiazide 25 mg oral tablet: 1 tab(s) orally once a day (28 Sep 2021 14:54)  lisinopril 40 mg oral tablet: 1 tab(s) orally once a day (28 Sep 2021 14:54)  meclizine 12.5 mg oral tablet: 1 tab(s) orally 3 times a day, As needed, vertigo (28 Sep 2021 14:54)  metFORMIN 1000 mg oral tablet, extended release: 1 tab(s) orally once a day (28 Sep 2021 14:54)  metoprolol tartrate 25 mg oral tablet: 1 tab(s) orally 2 times a day (28 Sep 2021 14:54)  pantoprazole 40 mg oral delayed release tablet: 1 tab(s) orally once a day (before a meal) (28 Sep 2021 14:54)        VITAL SIGNS:   Vital Signs Last 24 Hrs  T(C): 36.5 (19 Jun 2023 13:44), Max: 37 (19 Jun 2023 10:20)  T(F): 97.7 (19 Jun 2023 13:44), Max: 98.6 (19 Jun 2023 10:20)  HR: 70 (19 Jun 2023 13:44) (70 - 83)  BP: 169/88 (19 Jun 2023 13:44) (169/88 - 174/82)  BP(mean): --  RR: 16 (19 Jun 2023 13:44) (16 - 18)  SpO2: 99% (19 Jun 2023 13:44) (99% - 99%)    Parameters below as of 19 Jun 2023 13:44  Patient On (Oxygen Delivery Method): room air        I&O's Summary      On Exam:  TELE:   Constitutional: NAD, awake and alert, well-developed  HEENT: Moist Mucous Membranes, Anicteric  Pulmonary: Non-labored, breath sounds are clear bilaterally, No wheezing, rales or rhonchi  Cardiovascular: Regular, S1 and S2, No murmurs, rubs, gallops or clicks  Gastrointestinal: Bowel Sounds present, soft, nontender.   Lymph: No peripheral edema. No lymphadenopathy.  Skin: No visible rashes or ulcers.  Psych:  Mood & affect appropriate for situation    LABS: All Labs Reviewed:                        10.6   4.82  )-----------( 181      ( 19 Jun 2023 10:51 )             36.1     19 Jun 2023 10:51    136    |  106    |  17     ----------------------------<  276    4.2     |  25     |  1.20     Ca    9.3        19 Jun 2023 10:51    TPro  7.2    /  Alb  3.8    /  TBili  0.5    /  DBili  x      /  AST  21     /  ALT  38     /  AlkPhos  151    19 Jun 2023 10:51    PT/INR - ( 19 Jun 2023 10:51 )   PT: 12.6 sec;   INR: 1.08 ratio         PTT - ( 19 Jun 2023 10:51 )  PTT:27.0 sec      Blood Culture:         RADIOLOGY:

## 2023-06-19 NOTE — ED PROVIDER NOTE - PHYSICAL EXAMINATION
Constitutional: Awake, Alert, non-toxic. NAD. Well appearing, well nourished.   NECK: Supple, non-tender  BACK: No midline or paraspinal TTP of cervical/thoracic/lumbar spine  CARDIOVASCULAR: Normal S1, S2; regular rate and rhythm.  RESPIRATORY: Normal respiratory effort; breath sounds CTAB  ABDOMEN: Soft; non-tender, non-distended.  EXTREMITIES: Full passive and active ROM in all extremities; non-tender to palpation; distal pulses palpable and symmetric  SKIN: Warm, dry; ~ 1 cm wound to plantar surface of right great toe, no surrounding erythema. scant bloody drainage noted on wound dressing.   NEURO: A&O x3. Sensory and motor functions are grossly intact. +5/5 strength to b/l LE

## 2023-06-19 NOTE — H&P ADULT - PROBLEM SELECTOR PLAN 1
Patient presents with chronic open R toe wound with recent purulent drainage, s/p 2 weeks of doxycycline. No drainage, cellulitis, sepsis on admission.   - Pt afebrile w/o leukocytosis on presentation  - R foot XR: no fx, no surrounding edema (personal read)  - Wound cx (5/28): MSSA, sensitivities noted  - s/p Vanc x1, and Zosyn x1  - Start Cefazolin  - ID consult  - Wound care following  - Monitor WBC Patient presents with chronic open R toe wound with recent purulent drainage, s/p 2 weeks of doxycycline. No drainage, cellulitis, sepsis on admission.   - Pt afebrile w/o leukocytosis on presentation  - R foot XR: no fx, no surrounding edema (personal read)  - Wound cx (5/28): MSSA, sensitivities noted  - s/p Vanc x1, and Zosyn x1  - Start Cefazolin  - ID (Dr. Wills) consult  - Wound care following  - Monitor WBC Patient presents with chronic open R toe wound with recent purulent drainage, s/p 2 weeks of doxycycline. No drainage, cellulitis, sepsis on admission.   - Pt afebrile w/o leukocytosis on presentation  - R foot XR: no fx, no surrounding edema (personal read)  - Wound cx (5/28): MSSA, sensitivities noted  - s/p Vanc x1, and Zosyn x1 in ED  - Start Cefazolin 1g  - NPO after midnight, D5 + NS @ midnight  - ID (Dr. Blanco) consult  - Wound care following  - Monitor WBC  - AM CMP, CBC, coags,

## 2023-06-19 NOTE — PHYSICAL EXAM
[2 x 2] : 2 x 2  [1+] : left 1+ [2+] : left 2+ [de-identified] : AOx3, NAD  [de-identified] : 5/5 muscle strength for all muscles and tendons crossing the foot and ankle joints, ankle joint and STJ ROM intact b/l. No pain with calf compression b/l. [de-identified] : Right foot hallux plantar wound down to bone with mild edema to the hallux, no erythema, no proximal streaking, no purulent drainage  [FreeTextEntry1] : Right Plantar Hallux [FreeTextEntry3] : 0.2 [FreeTextEntry2] : 0.2 [FreeTextEntry4] : 0.3  [de-identified] : serous [de-identified] : callus- shaved by DAPHNEY [de-identified] : Silver Alginate [de-identified] : Mechanically cleansed with Sterile gauze and 0.9% Normal Saline\par \par \par Toe sock [TWNoteComboBox4] : Small [TWNoteComboBox5] : No [TWNoteComboBox6] : Diabetic [de-identified] : No [de-identified] : other [de-identified] : None [de-identified] : None [de-identified] : 100% [de-identified] : No [de-identified] : 3x Weekly [de-identified] : Primary Dressing

## 2023-06-19 NOTE — PATIENT PROFILE ADULT - NSTOBACCONEVERSMOKERY/N_GEN_A
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  Possible periprostatic abscess ? fistula  Seen by urology and CRS-IR drainage recommended  Patient also s/p recent gemzar and carboplatin  Prior urine Cx with E  coli intermediate to zosyn  Blood cx now negative  urine Cx  polymicrobial    Rec:  1) Abscess drainage/aspiration either via IR or surgically per urology/CR surgery  2) Follow cultures  3) Follow blood Cx  4) Empiric vanco + meropenem  Adjust dosage per renal function  5) Monitor vanco trough and creatinine  6) Hold chemo   7) Overall prognosis poor-hem-onc input on prognosis and consider palliative eval    Will tailor plan for ID issues  per course,results.Will defer to primary team on management of other issues.  Case d/w wife and primary team  Infectious Diseases Service will cover over weekend.  Please call [phone] if issues Yes

## 2023-06-19 NOTE — ED PROVIDER NOTE - CLINICAL SUMMARY MEDICAL DECISION MAKING FREE TEXT BOX
62-year-old male w/ PMHx of CAD(s/p stents), bioprosthetic AV replacement with repair of ascending aorta, HTN, HLD, DM, neuropathy, vertigo, GERD, lacunar infarcts, PAD (s/p right anterior tibial artery angioplasty) presents to the ED w/ right toe wound.    pt sent to ED by wound care for broad spectrum antibiotics and surgical intervention   plan for XR, pre-op labs and IV antibiotics 62-year-old male w/ PMHx of CAD(s/p stents), bioprosthetic AV replacement with repair of ascending aorta, HTN, HLD, DM, neuropathy, vertigo, GERD, lacunar infarcts, PAD (s/p right anterior tibial artery angioplasty) presents to the ED w/ right toe wound.    pt sent to ED by wound care for broad spectrum antibiotics and surgical intervention   plan for XR, pre-op labs and IV antibiotics  ed md:  urgent care this morning.  At urgent care he was sent to the hospital for evaluation and admission for surgical debridement.  He denies fevers or chills.  Denies calf pain or calf pressure.  He denies any erythema.  He denies any fevers or chills.  Denies any other symptoms.    On evaluation is a well-developed well-nourished male no apparent distress.  HEENT is unremarkable except for rhinophyma, and is missing occasional teeth.  Neck is supple.  Heart lung exam is unremarkable as patient has clear breath sounds without respiratory distress and regular rate and rhythm.  Distal pulses are present.  Abdominal exam is well-healed scar, slightly obese, without tenderness guarding rebound or apprehension.  Musculoskeletal exam patient has pes planus of the bilateral feet, a wound on the great toe of the right foot.  There is no lymphangitis or ascending infection.  Skin exam as above.  Neurologic exam normal.  Plan of care includes antibiotics laboratory studies x-rays imaging admission to medicine for presurgical clearance and care for patient is to undergo wound debridement by podiatry.  This chart was made with dictation software and may contain typographical errors.

## 2023-06-19 NOTE — H&P ADULT - ASSESSMENT
62-year-old male w/ PMHx of CAD (s/p stents x2, 2019), bioprosthetic AV replacement with repair of ascending aorta, HTN, HLD, DM2, neuropathy, vertigo, GERD, lacunar infarcts, PAD (s/p right anterior tibial artery angioplasty) presents to the ED w/ right toe wound. Patient admitted for pre-surgical testing for planned R hallux bone biopsy.

## 2023-06-19 NOTE — ED ADULT NURSE NOTE - NSFALLUNIVINTERV_ED_ALL_ED
Bed/Stretcher in lowest position, wheels locked, appropriate side rails in place/Call bell, personal items and telephone in reach/Instruct patient to call for assistance before getting out of bed/chair/stretcher/Non-slip footwear applied when patient is off stretcher/[place] to call system/Physically safe environment - no spills, clutter or unnecessary equipment/Purposeful proactive rounding/Room/bathroom lighting operational, light cord in reach

## 2023-06-19 NOTE — H&P ADULT - PROBLEM SELECTOR PLAN 5
Chronic.   - Continue management similar to CAD listed above Chronic.   - Continue management similar to CAD listed above  - Vascular clearance

## 2023-06-19 NOTE — H&P ADULT - PROBLEM SELECTOR PLAN 2
CAD s/p stents x2 in 2019.   - Cont home ASA, clopidogrel, statin  - Cont home: Lasix 20mg, Metoprolol tartrate 25mg BID, Lisinopril 40mg qd, HCTZ 25mg qd,   - Dash/TLC, CC diet  - F/u ECG  - Cardiology consulted for clearance CAD s/p stents x2 in 2019.   - Cont home ASA, clopidogrel, statin  - Cont home: Lasix 20mg, Metoprolol tartrate 25mg BID, Lisinopril 40mg qd, HCTZ 25mg qd,   - Dash/TLC, CC diet  - Cardiology (Dr. Stevenson) consulted for clearance

## 2023-06-19 NOTE — CONSULT NOTE ADULT - NS ATTEND AMEND GEN_ALL_CORE FT
Agree with the above
Optimized for the OR from a cardiac point of view with no evidence of active ischemic heart disease, decompensated heart failure, severe obstructive valvular disease, or uncontrolled arrhythmia.

## 2023-06-19 NOTE — CONSULT NOTE ADULT - PROBLEM SELECTOR RECOMMENDATION 2
Per vascular :   PAD S/P R AT angioplasty in 10/2021  Normal pulse exam  No vascular intervention indicated  Podiatry may proceed with intervention as indicated

## 2023-06-19 NOTE — CONSULT NOTE ADULT - SUBJECTIVE AND OBJECTIVE BOX
HPI:  62-year-old male w/ PMHx of CAD (s/p stents x2, 2019), bioprosthetic AV replacement with repair of ascending aorta, HTN, HLD, DM2, neuropathy, vertigo, GERD, lacunar infarcts, PAD (s/p right anterior tibial artery angioplasty) presents to the ED w/ right toe wound. Toe wound has been present for 5 years and he has been seeing wound care outpatient. Wound has been mostly dry, until 3 weeks ago when it opened and drained brownish fluid. He was seen in the ED and discharged with doxycycline. He f/u with wound care, Dr. Sen, a week later who switched his abx (patient does not recall the name). Patient was seen in the wound care office today for f/u and instructed to present to [facility] ED for IV antibiotics and pre-surgical testing for bone biopsy. Denies fevers, chills, SOB, CP. No wound drainage currently, and reports minimal pain. Denies LE swelling / calf pain.     ED Course:   Vitals: T 98.6F, HR 83, /82, RR 18, SpO2 99% RA  Labs: Hgb 10.6, aPTT 27, glucose 276, Alk Ph 151    ECG: LAD, LVH    R Foot XR: no fractures seen, no tissue edema (personal read)    Given: Vancomycin x1, Zosyn x1 (19 Jun 2023 13:12)      62y year old Male seen at [facility] 1EAS 109 W1 for right great toe wound. Patient has has the wound for about 5 years and the wound has been closing and opening intermittently. Patient was recently referred to Wound care center and was evaluated and given oral antibiotics but the wound did not change. Patient was recommended to proceed to ER for IV antibiotics and surgical intervention. P  Denies any fever, chills, nausea, vomiting, chest pain, shortness of breath, or calf pain at this time.    REVIEW OF SYSTEMS    PAST MEDICAL & SURGICAL HISTORY:  Hypertension      AAA (abdominal aortic aneurysm)  dx 2012      GERD (gastroesophageal reflux disease)      Leg weakness      Aortic valve replaced      Cardiac tamponade      HTN (hypertension)      H/O aortic valve repair      S/P aneurysm repair      2019 novel coronavirus disease (COVID-19)  January 2021      Non-pressure chronic ulcer of other part of right foot with unspecified severity      Type 2 diabetes mellitus  Not on Insulin but complicated by peripheral neuropathy      H/O cardiac catheterization  Stent Placement X 2 (2019)      H/O vertigo      Aortic valve replaced      S/P AAA repair  Repaired 3/2015      S/P aortic aneurysm repair      H/O aortic valve repair      History of incision and drainage      H/O colonoscopy          Allergies    Normodyne (Faint)    Intolerances        MEDICATIONS  (STANDING):  atorvastatin 80 milliGRAM(s) Oral at bedtime  ceFAZolin   IVPB 1000 milliGRAM(s) IV Intermittent every 8 hours  ceFAZolin   IVPB      dextrose 5% + sodium chloride 0.9%. 1000 milliLiter(s) (50 mL/Hr) IV Continuous <Continuous>  dextrose 5%. 1000 milliLiter(s) (100 mL/Hr) IV Continuous <Continuous>  dextrose 5%. 1000 milliLiter(s) (50 mL/Hr) IV Continuous <Continuous>  dextrose 50% Injectable 25 Gram(s) IV Push once  dextrose 50% Injectable 12.5 Gram(s) IV Push once  dextrose 50% Injectable 25 Gram(s) IV Push once  furosemide    Tablet 20 milliGRAM(s) Oral daily  gabapentin 600 milliGRAM(s) Oral four times a day  glucagon  Injectable 1 milliGRAM(s) IntraMuscular once  hydrochlorothiazide 25 milliGRAM(s) Oral daily  insulin lispro (ADMELOG) corrective regimen sliding scale   SubCutaneous three times a day before meals  insulin lispro (ADMELOG) corrective regimen sliding scale   SubCutaneous at bedtime  lisinopril 40 milliGRAM(s) Oral daily  metoprolol tartrate 25 milliGRAM(s) Oral two times a day  pantoprazole    Tablet 40 milliGRAM(s) Oral before breakfast  saccharomyces boulardii 250 milliGRAM(s) Oral two times a day    MEDICATIONS  (PRN):  acetaminophen     Tablet .. 650 milliGRAM(s) Oral every 6 hours PRN Temp greater or equal to 38C (100.4F), Mild Pain (1 - 3)  dextrose Oral Gel 15 Gram(s) Oral once PRN Blood Glucose LESS THAN 70 milliGRAM(s)/deciliter  meclizine 12.5 milliGRAM(s) Oral three times a day PRN vertigo      Social History:  Lives: at home  Ambulation: independent   ADLs: independent (19 Jun 2023 13:12)      FAMILY HISTORY:  Family history of breast cancer    Family history of hypertension    Family history of stroke    Family history of prostate cancer    Family history of COPD (chronic obstructive pulmonary disease) (Grandparent)    FH: HTN (hypertension)        Vital Signs Last 24 Hrs  T(C): 36.8 (19 Jun 2023 16:47), Max: 37 (19 Jun 2023 10:20)  T(F): 98.2 (19 Jun 2023 16:47), Max: 98.6 (19 Jun 2023 10:20)  HR: 68 (19 Jun 2023 16:47) (68 - 83)  BP: 179/69 (19 Jun 2023 16:47) (169/88 - 179/69)  BP(mean): --  RR: 16 (19 Jun 2023 16:47) (16 - 18)  SpO2: 97% (19 Jun 2023 16:47) (97% - 99%)    Parameters below as of 19 Jun 2023 16:47  Patient On (Oxygen Delivery Method): room air        PHYSICAL EXAM:  Vascular: DP & PT faintly palpable bilaterally, Capillary refill 3 seconds, Digital hair absent bilaterally  Neurological: Light touch sensation diminished  bilaterally  Musculoskeletal: 5/5 strength in all quadrants bilaterally, AJ & STJ ROM intact  Dermatological: 0.5cm x 0.3cm x probe to bone with hyperkeratotic tissue overlying plantar hallux , mild periwound erythema, and edema noted  no fluctuance, no malodor, no proximal streaking at this time, serous drainage     CBC Full  -  ( 19 Jun 2023 10:51 )  WBC Count : 4.82 K/uL  RBC Count : 4.58 M/uL  Hemoglobin : 10.6 g/dL  Hematocrit : 36.1 %  Platelet Count - Automated : 181 K/uL  Mean Cell Volume : 78.8 fl  Mean Cell Hemoglobin : 23.1 pg  Mean Cell Hemoglobin Concentration : 29.4 gm/dL  Auto Neutrophil # : 3.79 K/uL  Auto Lymphocyte # : 0.64 K/uL  Auto Monocyte # : 0.29 K/uL  Auto Eosinophil # : 0.05 K/uL  Auto Basophil # : 0.02 K/uL  Auto Neutrophil % : 78.7 %  Auto Lymphocyte % : 13.3 %  Auto Monocyte % : 6.0 %  Auto Eosinophil % : 1.0 %  Auto Basophil % : 0.4 %      ----------CHEM PANEL----------    PT/INR - ( 19 Jun 2023 10:51 )   PT: 12.6 sec;   INR: 1.08 ratio         PTT - ( 19 Jun 2023 10:51 )  PTT:27.0 sec        Imaging: ----------

## 2023-06-19 NOTE — H&P ADULT - ATTENDING COMMENTS
62-year-old male w/ PMHx of CAD (s/p stents x2, 2019), bioprosthetic AV replacement with repair of ascending aorta, HTN, HLD, DM2, neuropathy, vertigo, GERD, lacunar infarcts, PAD (s/p right anterior tibial artery angioplasty) presents to the ED w/ right toe wound. Patient admitted for pre-surgical testing for planned R hallux bone biopsy.     #Open toe wound  #CAD  #HTN, HLD  #DM    -Continue IV abx, ID consulted  -Appreciate Vascular recs  -Plan for OR in AM with Podiatry, NPO except meds after midnight. Patient with RCRI 1-2, patient is medically optimized for Podiatric procedure.  -Continue home anti-HTN medications  -F/U path after OR

## 2023-06-19 NOTE — ED PROVIDER NOTE - ATTENDING APP SHARED VISIT CONTRIBUTION OF CARE
urgent care this morning.  At urgent care he was sent to the hospital for evaluation and admission for surgical debridement.  He denies fevers or chills.  Denies calf pain or calf pressure.  He denies any erythema.  He denies any fevers or chills.  Denies any other symptoms.    On evaluation is a well-developed well-nourished male no apparent distress.  HEENT is unremarkable except for rhinophyma, and is missing occasional teeth.  Neck is supple.  Heart lung exam is unremarkable as patient has clear breath sounds without respiratory distress and regular rate and rhythm.  Distal pulses are present.  Abdominal exam is well-healed scar, slightly obese, without tenderness guarding rebound or apprehension.  Musculoskeletal exam patient has pes planus of the bilateral feet, a wound on the great toe of the right foot.  There is no lymphangitis or ascending infection.  Skin exam as above.  Neurologic exam normal.  Plan of care includes antibiotics laboratory studies x-rays imaging admission to medicine for presurgical clearance and care for patient is to undergo wound debridement by podiatry.  This chart was made with dictation software and may contain typographical errors.

## 2023-06-19 NOTE — PLAN
[FreeTextEntry1] : .Patient seen and evaluated. Discussed etiology and treatment plan. Patient verbalized understanding.\par Discussed MRI with the patient for negative OM to the right hallux. Discussed with patient and family over the phone and recommended lara arthroplasty to alleviate the biomechanical pressure to the wound. Discussed with patient and family that this will be a recurring issue and that the patient will be a high risk for developing life threatening infections leading to sepsis, loss of limb and loss of life. Patient refused admission to the hospital today and stated will return on Monday to the ED. Patient verbalized understanding of the risks associated with refusal of admission to the hospital today and the risks and complications associated with it. Spent 20 minutes on patient care and medical decision making.\par

## 2023-06-19 NOTE — H&P ADULT - PROBLEM SELECTOR PLAN 3
Well controlled, not on home insulin.   - f/u A1c  - Low ISS  - Regular finger sticks  - Hypoglycemic protocol. Well controlled, not on home insulin.   - Hold home Metformin  - f/u A1c  - Low ISS  - Regular finger sticks  - Hypoglycemic protocol.  - DASH/TLC, CC diet

## 2023-06-19 NOTE — ED ADULT TRIAGE NOTE - CHIEF COMPLAINT QUOTE
62yr old male a&ox4 arrives to ED from wound center notes possible infection to right foot ulcer. pt notes to have been instructed by surgery to come into ED for iv abx and surgery, pt notes discomfort when ambulating, denies pain to calf, pt denies chest pain sob, fever or chills. Respiration even and unlabored. Manju DINH

## 2023-06-19 NOTE — CONSULT NOTE ADULT - ASSESSMENT
62-year-old male with PMHx of CAD (1st Diag. AMBER 7/1/19, LPDA AMBER 7/3/19),bicuspid AV assoc with significant AS s/p bio AVR with repair of a dilated ascending aorta (3/16/15), cardiac tamponade (s/p pericardiocentesis 3/26/15), syncope (s/p ILR insertion 2/2/16), atypical chest discomfort, HT< HLD, T2DM, vertigo, GERD, lacunar infarcts (incidentally detected on MRI), ventricular ectopy, mild carotid atherosclerosis, PAD,  (status post right anterior tibial artery angioplasty 10/5/2021) was sent by his podiatrist to ED for IV therapy and possible surgical intervention of Right great toe.    Cardiac Optimization, CAD, CHD, HTN  - presented for IV therapy vs podiatric intervention of his Right great toe    - H/o CAD. No active cardiac condition.   - Denies chest pain, palpitation, SOB, syncope, dizziness, lightheadedness, orthopnea, PND, RICHTER and edema  - EKG: NSR. No acute ischemic changes noted.   - Chest X-ray negative for acute process    - Patient euvolemic on examination with no overt signs of heart failure or cardiac ischemia.   - No severe valvular abnormalities noted on examination  - Patient with past ECHO demonstrating....   - ECHO (5/12/2020) LVEF 55-60%      - BP, HR stable and controlled  - continue home meds on: lisinopril, metoprolol, ASA, Plavix   - continue to monitor routine hemodynamics   - No additional cardiac work-up is necessary.       - History of CAD, DM, , though this is non-modifiable at current time. Patient is optimized from cardiovascular standpoint to proceed with planned procedure with routine hemodynamic monitoring.              62-year-old male with PMHx of CAD (1st Diag. AMBER 7/1/19, LPDA AMBER 7/3/19),bicuspid AV assoc with significant AS s/p bio AVR with repair of a dilated ascending aorta (3/16/15), cardiac tamponade (s/p pericardiocentesis 3/26/15), syncope (s/p ILR insertion 2/2/16), atypical chest discomfort, HTN, HLD, T2DM, vertigo, GERD, lacunar infarcts (incidentally detected on MRI), ventricular ectopy, mild carotid atherosclerosis, PAD,  (status post right anterior tibial artery angioplasty 10/5/2021) was sent by his podiatrist to ED for IV therapy and possible surgical intervention of Right great toe.    Cardiac Optimization, CAD, CHD, HTN  - presented for IV therapy and podiatric intervention in AM of his Right great toe    - H/o CAD with stent placement, VHD s/p bio AVR. No active cardiac condition.   - Pharm stress (3/2023) normal from our office, follows with Dr. Sousa  - Denies chest pain, palpitation, SOB, syncope, dizziness, lightheadedness, orthopnea, PND, RICHTER and edema  - EKG: NSR. No acute ischemic changes noted   - Chest X-ray negative for acute process    - Patient euvolemic on examination with no overt signs of heart failure or cardiac ischemia.   - No severe valvular abnormalities noted on examination  - ECHO (5/12/2020) LVEF 55-60%      - BP, HR stable and controlled  - continue home meds on: lisinopril, metoprolol, ASA, Plavix   - continue to monitor routine hemodynamics   - No additional cardiac work-up is necessary.     - History of CAD with stents, VHD s/p bio AVR repair,  though this is non-modifiable at current time. Patient is optimized from cardiovascular standpoint to proceed with planned procedure with routine hemodynamic monitoring.   - Monitor and replete Lytes. Keep K > 4 and Mg > 2  - Will continue to follow.    MARGARET Kumar  Nurse Practitioner - Cardiology   Spectra #[id number]/ [phone]  call TEAMS

## 2023-06-19 NOTE — CONSULT NOTE ADULT - SUBJECTIVE AND OBJECTIVE BOX
Vascular Attending:  Dr Kendall      HPI:  62-year-old male w/ PMHx of CAD (s/p stents x2, 2019), bioprosthetic AV replacement with repair of ascending aorta, HTN, HLD, DM2, neuropathy, vertigo, GERD, lacunar infarcts, PAD (s/p right anterior tibial artery angioplasty) presents to the ED w/ right toe wound. Toe wound has been present for 5 years and he has been seeing wound care outpatient. Wound has been mostly dry, until 3 weeks ago when it opened and drained brownish fluid. He was seen in the ED and discharged with doxycycline. He f/u with wound care, Dr. Sen, a week later who switched his abx (patient does not recall the name). Patient was seen in the wound care office today for f/u and instructed to present to [facility] ED for IV antibiotics and pre-surgical testing for bone biopsy. Denies fevers, chills, SOB, CP. No wound drainage currently, and reports minimal pain. Denies LE swelling / calf pain.     ED Course:   Vitals: T 98.6F, HR 83, /82, RR 18, SpO2 99% RA  Labs: Hgb 10.6, aPTT 27, glucose 276, Alk Ph 151    ECG: LAD, LVH    R Foot XR: no fractures seen, no tissue edema (personal read)    Given: Vancomycin x1, Zosyn x1 (19 Jun 2023 13:12)    Vascular Hx: Pt with H/O R great toe non healing ulcer and follows in [facility]. Pt is known to vascular surgery with PVD and S/P R AT angioplasty and lithotripsy 10/5/21. Pt reports wound has markedly improved in that time post angioplasty, however does not fully heal. Followed by podiatry and scheduled for bone biopsy. He denies any pain in R foot. Denies any claudication albeit limited ambulation sec to wound.     PAST MEDICAL & SURGICAL HISTORY:  Hypertension  AAA (abdominal aortic aneurysm)dx 2012  GERD (gastroesophageal reflux disease)  Leg weakness  Aortic valve replaced  Cardiac tamponade  HTN (hypertension)  H/O aortic valve repair  S/P aneurysm repair  2019 novel coronavirus disease (COVID-19) January 2021  Non-pressure chronic ulcer of other part of right foot with unspecified severity  Type 2 diabetes mellitus  H/O cardiac catheterization  Stent Placement X 2 (2019)  H/O vertigo  History of incision and drainage  H/O colonoscopy    REVIEW OF SYSTEMS-as above, otherwise negative    MEDICATIONS  (STANDING):  atorvastatin 80 milliGRAM(s) Oral at bedtime  ceFAZolin   IVPB 1000 milliGRAM(s) IV Intermittent every 8 hours  ceFAZolin   IVPB      dextrose 5% + sodium chloride 0.9%. 1000 milliLiter(s) (50 mL/Hr) IV Continuous <Continuous>  dextrose 5%. 1000 milliLiter(s) (100 mL/Hr) IV Continuous <Continuous>  dextrose 5%. 1000 milliLiter(s) (50 mL/Hr) IV Continuous <Continuous>  dextrose 50% Injectable 25 Gram(s) IV Push once  dextrose 50% Injectable 12.5 Gram(s) IV Push once  dextrose 50% Injectable 25 Gram(s) IV Push once  furosemide    Tablet 20 milliGRAM(s) Oral daily  gabapentin 600 milliGRAM(s) Oral four times a day  glucagon  Injectable 1 milliGRAM(s) IntraMuscular once  hydrochlorothiazide 25 milliGRAM(s) Oral daily  insulin lispro (ADMELOG) corrective regimen sliding scale   SubCutaneous three times a day before meals  insulin lispro (ADMELOG) corrective regimen sliding scale   SubCutaneous at bedtime  lisinopril 40 milliGRAM(s) Oral daily  metoprolol tartrate 25 milliGRAM(s) Oral two times a day  pantoprazole    Tablet 40 milliGRAM(s) Oral before breakfast  saccharomyces boulardii 250 milliGRAM(s) Oral two times a day    MEDICATIONS  (PRN):  acetaminophen     Tablet .. 650 milliGRAM(s) Oral every 6 hours PRN Temp greater or equal to 38C (100.4F), Mild Pain (1 - 3)  dextrose Oral Gel 15 Gram(s) Oral once PRN Blood Glucose LESS THAN 70 milliGRAM(s)/deciliter  meclizine 12.5 milliGRAM(s) Oral three times a day PRN vertigo      Allergies  Normodyne (Faint)    SOCIAL HISTORY: , former [employer] worker, former smoker-quit 40 years ago; Heavy ETOH in past, now 12 pack beer 3x/week      Vital Signs Last 24 Hrs  T(C): 36.5 (19 Jun 2023 13:44), Max: 37 (19 Jun 2023 10:20)  T(F): 97.7 (19 Jun 2023 13:44), Max: 98.6 (19 Jun 2023 10:20)  HR: 70 (19 Jun 2023 13:44) (70 - 83)  BP: 169/88 (19 Jun 2023 13:44) (169/88 - 174/82)  BP(mean): --  RR: 16 (19 Jun 2023 13:44) (16 - 18)  SpO2: 99% (19 Jun 2023 13:44) (99% - 99%)    Parameters below as of 19 Jun 2023 13:44  Patient On (Oxygen Delivery Method): room air        PHYSICAL EXAM:  Constitutional: WD M in NAD  Eyes: PERRL  ENMT: WNL  Neck: No JVD  Respiratory: CTA  Cardiovascular: normal S1, S2  Gastrointestinal: soft, ND, NT  Extremities: R great toe with 0.5 cm dry ulcer on plantar aspect of toe; no surrounding erythema or fluctuance, no edema  Neurological: A&O X 3, JUSTICE X 4  Pulses:   Right:                                                                          Left:  FEM [x ]2+ [ ]1+ [ ]doppler                                             FEM [ x]2+ [ ]1+ [ ]doppler    POP [x ]2+ [ ]1+ [ ]doppler                                             POP [ x]2+ [ ]1+ [ ]doppler    DP [x ]2+ [ ]1+ [ ]doppler                                                DP [x ]2+ [ ]1+ [ ]doppler  PT[ ]2+ [x ]1+ [ ]doppler                                                  PT [ ]2+ [x ]1+ [ ]doppler      LABS:                        10.6   4.82  )-----------( 181      ( 19 Jun 2023 10:51 )             36.1     06-19    136  |  106  |  17  ----------------------------<  276<H>  4.2   |  25  |  1.20    Ca    9.3      19 Jun 2023 10:51    TPro  7.2  /  Alb  3.8  /  TBili  0.5  /  DBili  x   /  AST  21  /  ALT  38  /  AlkPhos  151<H>  06-19    PT/INR - ( 19 Jun 2023 10:51 )   PT: 12.6 sec;   INR: 1.08 ratio         PTT - ( 19 Jun 2023 10:51 )  PTT:27.0 sec      RADIOLOGY & ADDITIONAL STUDIES    < from: MR Foot No Cont, Right (06.12.23 @ 15:10) >  ACC: [id number] EXAM:  MR FOOT RT   ORDERED BY: STEPHANI SCHMIDT     PROCEDURE DATE:  06/12/2023          INTERPRETATION:  RIGHT FOOT MRI    CLINICAL INFORMATION: Right plantar hallux wound. Evaluation for   cellulitis.  TECHNIQUE: Multiplanar, multisequence MRI was obtained of the right foot.    COMPARISON: Right foot radiograph dated 5/28/2023    FINDINGS:    LIGAMENTS AND CAPSULAR STRUCTURES: Ligamentous instability structures are   intact.  MUSCLES AND TENDONS: Mild edema of the musculature within the superficial   and central compartment of the plantar foot. Visualized tendons are   intact.  CARTILAGE AND SUBCHONDRAL BONE: Chondral cystic change noted at the first   MTP joint. Joint spaces are otherwise preserved.  OSSEOUS ALIGNMENT: Mild hyperextension of the first MTP joint.  BONE MARROW: No significant bone marrow edema.  WEB SPACES: No neuromas or bursitis.  PERIPHERAL SOFT TISSUES: Small cutaneous defect on the plantar surface of   the hallux, inferior to the first interphalangeal joint, consistent with   provided history of ulcer.    IMPRESSION:  1.  No evidence of osteomyelitis.  2.  Small cutaneous defect on the plantar surface of the hallux with mild   surrounding soft tissue edema, consistent with provided history of ulcer.      < end of copied text >

## 2023-06-19 NOTE — H&P ADULT - NSHPPHYSICALEXAM_GEN_ALL_CORE
T(C): 37 (06-19-23 @ 10:20), Max: 37 (06-19-23 @ 10:20)  HR: 83 (06-19-23 @ 10:20) (83 - 83)  BP: 174/82 (06-19-23 @ 10:20) (174/82 - 174/82)  RR: 18 (06-19-23 @ 10:20) (18 - 18)  SpO2: 99% (06-19-23 @ 10:20) (99% - 99%)    GENERAL: patient appears well, no acute distress, appropriate, pleasant  EYES: sclera clear, no exudates  ENMT: oropharynx clear without erythema, no exudates, moist mucous membranes  NECK: supple, soft, no thyromegaly noted  LUNGS: good air entry bilaterally, clear to auscultation, symmetric breath sounds, no wheezing or rhonchi appreciated  HEART: S1/S2, regular rate and rhythm, + systolic murmur, no lower extremity edema  GASTROINTESTINAL: abdomen is soft, nontender, nondistended, normoactive bowel sounds, no palpable masses  INTEGUMENT: good skin turgor, no lesions noted  MUSCULOSKELETAL: no clubbing or cyanosis, no obvious deformity  NEUROLOGIC: awake, alert, oriented x3, good muscle tone in 4 extremities, no obvious sensory deficits  PSYCHIATRIC: mood is good, affect is congruent, linear and logical thought process  HEME/LYMPH: no palpable supraclavicular nodules, no obvious ecchymosis or petechiae   INTEGUMENTARY: + dry, open wound on plantar surface of R hallux. No fluid drainage. No surrounding erythema or warmth.

## 2023-06-19 NOTE — H&P ADULT - NSHPREVIEWOFSYSTEMS_GEN_ALL_CORE
CONSTITUTIONAL: denies fever, chills, fatigue, weakness  HEENT: denies blurred vision, sore throat  SKIN: denies new lesions, rash  CARDIOVASCULAR: denies chest pain, chest pressure, palpitations  RESPIRATORY: denies shortness of breath, sputum production  GASTROINTESTINAL: denies nausea, vomiting, diarrhea, abdominal pain  GENITOURINARY: denies dysuria, discharge  NEUROLOGICAL: denies numbness, headache, focal weakness  MUSCULOSKELETAL: denies new joint pain, muscle aches  HEMATOLOGIC: denies gross bleeding, bruising  LYMPHATICS: denies enlarged lymph nodes, extremity swelling  PSYCHIATRIC: denies recent changes in anxiety, depression  ENDOCRINOLOGIC: denies sweating, cold or heat intolerance  INTEGUMENTARY: + chronic wound on R great toe

## 2023-06-19 NOTE — ED PROVIDER NOTE - OBJECTIVE STATEMENT
62-year-old male w/ PMHx of CAD(s/p stents), bioprosthetic AV replacement with repair of ascending aorta, HTN, HLD, DM, neuropathy, vertigo, GERD, lacunar infarcts, PAD (s/p right anterior tibial artery angioplasty) presents to the ED w/ right toe wound. pt endorsing hx of chronic wound to right great toe x "few years." he noted drainage/erythema surrounding wound around 2 weeks ago and was started on PO course of antibiotics by wound care Dr Sen. He followed up today in office and sent to ED for IV antibiotics and surgical intervention. Denies fever, chills. Pt has noted some bloody drainage from the wound site. Minimal pain at this time. Denies LE swelling/calf pain. Denies chest pain, SOB, abdominal pain, N/V, flank pain.

## 2023-06-19 NOTE — H&P ADULT - HISTORY OF PRESENT ILLNESS
GEN:  no fever, no chills, no fatigue  NEURO:  no headache, no dizziness  ENT: no sore throat, no runny nose  CV:  no chest pain, no SOB  RESP:  no dyspnea, no cough  GI:  no nausea, no vomiting, no abdominal pain,  :  no dysuria, no urinary frequency, no hematuria  MSK:  + toe pain, + edema  SKIN:  no rash, no bruising  HEME: no bleeding 62-year-old male w/ PMHx of CAD (s/p stents x2, 2019), bioprosthetic AV replacement with repair of ascending aorta, HTN, HLD, DM2, neuropathy, vertigo, GERD, lacunar infarcts, PAD (s/p right anterior tibial artery angioplasty) presents to the ED w/ right toe wound. Toe wound has been present for 5 years and he has been seeing wound care outpatient. Wound has been mostly dry, until 3 weeks ago when it opened and drained brownish fluid. He was seen in the ED and discharged with doxycycline. He f/u with wound care outpatient, Dr. Sen, who advised him to continue the antibiotics. Patient was seen in the wound care office today for f/u and instructed to present to [facility] ED for IV antibiotics and pre-surgical testing for bone biopsy. Denies fevers, chills, SOB, CP. No wound drainage currently, and reports minimal pain. Denies LE swelling / calf pain.     ED Course:   Vitals: T 98.6F, HR 83, /82, RR 18, SpO2 99% RA  Labs: Hgb 10.6, aPTT 27, glucose 276, Alk Ph 151    ECG: LAD, LVH    R Foot XR: no fractures seen, no tissue edema (personal read)    Given: Vancomycin x1, Zosyn x1 62-year-old male w/ PMHx of CAD (s/p stents x2, 2019), bioprosthetic AV replacement with repair of ascending aorta, HTN, HLD, DM2, neuropathy, vertigo, GERD, lacunar infarcts, PAD (s/p right anterior tibial artery angioplasty) presents to the ED w/ right toe wound. Toe wound has been present for 5 years and he has been seeing wound care outpatient. Wound has been mostly dry, until 3 weeks ago when it opened and drained brownish fluid. He was seen in the ED and discharged with doxycycline. He f/u with wound care, Dr. Sen, a week later who switched his abx (patient does not recall the name). Patient was seen in the wound care office today for f/u and instructed to present to \[facility]\"" ED for IV antibiotics and pre-surgical testing for bone biopsy. Denies fevers, chills, SOB, CP. No wound drainage currently, and reports minimal pain. Denies LE swelling / calf pain.     ED Course:   Vitals: T 98.6F, HR 83, /82, RR 18, SpO2 99% RA  Labs: Hgb 10.6, aPTT 27, glucose 276, Alk Ph 151    ECG: LAD, LVH    R Foot XR: no fractures seen, no tissue edema (personal read)    Given: Vancomycin x1, Zosyn x1

## 2023-06-19 NOTE — H&P ADULT - PROBLEM SELECTOR PLAN 8
Patient is ambulatory, SCDs while in bed  - Hold pharmacologic ppx given upcoming podiatry procedure

## 2023-06-19 NOTE — CONSULT NOTE ADULT - PROBLEM SELECTOR RECOMMENDATION 9
Patient was seen and evaluated   Patient's wound with warmth,  erythema, edema , increased drainage,   Applied silver alginate and sterile dry dressing to the right foot  Recommend IV antibiotics per infectious disease   Appreciate vascular consult - no surgical intervention   MRI negative for OM but given the clinical presentation and chronicity of the wound recommend resection of the base of the proximal phalanx, to alleviate the biomechanical pressure since this will be a recurrent issue and patient will be at high risk to develop recurrent infections.   Procedure discussed at length with patient regarding risks, complications, benefits, as well as pre/intra/post-operative expectations. Patient verbally consents to procedure and understood discussion regarding procedure and all questions were answered to patient's satisfaction at this time.  Patient scheduled for Right foot Rainey arthroplasty of the right Hallux tomorrow 06/20/23 at 9:00am  Discussed with patient and family that patient is still high risk for more proximal amputation, loss of limb, sepsis and loss of life.   Request medical and cardiac optimization and risk stratification

## 2023-06-19 NOTE — ASSESSMENT
[Verbal] : Verbal [Demo] : Demo [Patient] : Patient [Good - alert, interested, motivated] : Good - alert, interested, motivated [Verbalizes knowledge/Understanding] : Verbalizes knowledge/understanding [Dressing changes] : dressing changes [Foot Care] : foot care [Skin Care] : skin care [Pressure relief] : pressure relief [Signs and symptoms of infection] : sign and symptoms of infection [How and When to Call] : how and when to call [Nutrition] : nutrition [Labs and Tests] : labs and tests [Pain Management] : pain management [Off-loading] : off-loading [Patient responsibility to plan of care] : patient responsibility to plan of care [Glycemic Control] : glycemic control [Stable] : stable [Home] : Home [Ambulatory] : Ambulatory [Not Applicable - Long Term Care/Home Health Agency] : Long Term Care/Home Health Agency: Not Applicable [Surgery] : surgery [] : No [FreeTextEntry2] : infection prevention\par promote skin integrity\par encourage glycemic control\par Surgical Intervention\par Pressure relief/offloading\par goals of remaining pain free related to wounds [FreeTextEntry4] : MRI imaging reviewed by [facility],final report pending\par Pt to follow up on 6/19/23 for pre surgical testing for podiatric surgical procedure\par Pt provided with Surgical Shoes as per DPM\par

## 2023-06-19 NOTE — REVIEW OF SYSTEMS
[Fever] : no fever [Chills] : no chills [Eye Pain] : no eye pain [Red Eyes] : eyes not red [Earache] : no earache [Loss Of Hearing] : no hearing loss [Nosebleeds] : no nosebleeds [Chest Pain] : no chest pain [Shortness Of Breath] : no shortness of breath [Cough] : no cough [Abdominal Pain] : no abdominal pain [Vomiting] : no vomiting [Diarrhea] : no diarrhea [As Noted in HPI] : as noted in HPI [Limb Swelling] : limb swelling [Skin Wound] : skin wound [Negative] : Heme/Lymph [de-identified] : Plantar right hallux wound down to bone  [de-identified] : Diminished light touch to the digits b/l.

## 2023-06-19 NOTE — H&P ADULT - NSHPOUTPATIENTPROVIDERS_GEN_ALL_CORE
PCP: Josh  Wound care: Dr. Sen  Cardiology: Dr. Sousa  Podiatry: Dr. Garland PCP: Josh  Wound care: Dr. Khanna  Cardiology: Dr. Sousa  Podiatry: Dr. Garland

## 2023-06-19 NOTE — ED ADULT NURSE NOTE - OBJECTIVE STATEMENT
Patient is a  62-year-old male  presents to the ED w/ right toe wound. pt endorsing hx of chronic wound to right great toe x "few years." he noted drainage/erythema surrounding wound around 2 weeks ago and was started on PO course of antibiotics by wound care Dr Sen. He followed up today in office and sent to ED for IV antibiotics and surgical intervention. Denies fever, chills. Pt has noted some bloody drainage from the wound site. Minimal pain at this time. Denies LE swelling/calf pain. Denies chest pain, SOB, abdominal pain, N/V, flank pain.

## 2023-06-20 DIAGNOSIS — Z95.5 PRESENCE OF CORONARY ANGIOPLASTY IMPLANT AND GRAFT: ICD-10-CM

## 2023-06-20 DIAGNOSIS — Z86.010 PERSONAL HISTORY OF COLONIC POLYPS: ICD-10-CM

## 2023-06-20 DIAGNOSIS — Z79.82 LONG TERM (CURRENT) USE OF ASPIRIN: ICD-10-CM

## 2023-06-20 DIAGNOSIS — E11.42 TYPE 2 DIABETES MELLITUS WITH DIABETIC POLYNEUROPATHY: ICD-10-CM

## 2023-06-20 DIAGNOSIS — L97.516 NON-PRESSURE CHRONIC ULCER OF OTHER PART OF RIGHT FOOT WITH BONE INVOLVEMENT WITHOUT EVIDENCE OF NECROSIS: ICD-10-CM

## 2023-06-20 DIAGNOSIS — K21.9 GASTRO-ESOPHAGEAL REFLUX DISEASE WITHOUT ESOPHAGITIS: ICD-10-CM

## 2023-06-20 DIAGNOSIS — E78.5 HYPERLIPIDEMIA, UNSPECIFIED: ICD-10-CM

## 2023-06-20 DIAGNOSIS — L84 CORNS AND CALLOSITIES: ICD-10-CM

## 2023-06-20 DIAGNOSIS — E11.621 TYPE 2 DIABETES MELLITUS WITH FOOT ULCER: ICD-10-CM

## 2023-06-20 DIAGNOSIS — E11.51 TYPE 2 DIABETES MELLITUS WITH DIABETIC PERIPHERAL ANGIOPATHY WITHOUT GANGRENE: ICD-10-CM

## 2023-06-20 DIAGNOSIS — Z79.899 OTHER LONG TERM (CURRENT) DRUG THERAPY: ICD-10-CM

## 2023-06-20 DIAGNOSIS — I10 ESSENTIAL (PRIMARY) HYPERTENSION: ICD-10-CM

## 2023-06-20 DIAGNOSIS — I25.10 ATHEROSCLEROTIC HEART DISEASE OF NATIVE CORONARY ARTERY WITHOUT ANGINA PECTORIS: ICD-10-CM

## 2023-06-20 DIAGNOSIS — Z86.73 PERSONAL HISTORY OF TRANSIENT ISCHEMIC ATTACK (TIA), AND CEREBRAL INFARCTION WITHOUT RESIDUAL DEFICITS: ICD-10-CM

## 2023-06-20 DIAGNOSIS — Z79.84 LONG TERM (CURRENT) USE OF ORAL HYPOGLYCEMIC DRUGS: ICD-10-CM

## 2023-06-20 DIAGNOSIS — Z80.3 FAMILY HISTORY OF MALIGNANT NEOPLASM OF BREAST: ICD-10-CM

## 2023-06-20 LAB
A1C WITH ESTIMATED AVERAGE GLUCOSE RESULT: 9.5 % — HIGH (ref 4–5.6)
ALBUMIN SERPL ELPH-MCNC: 3.4 G/DL — SIGNIFICANT CHANGE UP (ref 3.3–5)
ALP SERPL-CCNC: 118 U/L — SIGNIFICANT CHANGE UP (ref 40–120)
ALT FLD-CCNC: 32 U/L — SIGNIFICANT CHANGE UP (ref 12–78)
ANION GAP SERPL CALC-SCNC: 5 MMOL/L — SIGNIFICANT CHANGE UP (ref 5–17)
APTT BLD: 27 SEC — LOW (ref 27.5–35.5)
AST SERPL-CCNC: 16 U/L — SIGNIFICANT CHANGE UP (ref 15–37)
BASOPHILS # BLD AUTO: 0.02 K/UL — SIGNIFICANT CHANGE UP (ref 0–0.2)
BASOPHILS NFR BLD AUTO: 0.5 % — SIGNIFICANT CHANGE UP (ref 0–2)
BILIRUB SERPL-MCNC: 0.6 MG/DL — SIGNIFICANT CHANGE UP (ref 0.2–1.2)
BUN SERPL-MCNC: 13 MG/DL — SIGNIFICANT CHANGE UP (ref 7–23)
CALCIUM SERPL-MCNC: 8.8 MG/DL — SIGNIFICANT CHANGE UP (ref 8.5–10.1)
CHLORIDE SERPL-SCNC: 106 MMOL/L — SIGNIFICANT CHANGE UP (ref 96–108)
CO2 SERPL-SCNC: 26 MMOL/L — SIGNIFICANT CHANGE UP (ref 22–31)
CREAT SERPL-MCNC: 1.1 MG/DL — SIGNIFICANT CHANGE UP (ref 0.5–1.3)
EGFR: 75 ML/MIN/1.73M2 — SIGNIFICANT CHANGE UP
EOSINOPHIL # BLD AUTO: 0.06 K/UL — SIGNIFICANT CHANGE UP (ref 0–0.5)
EOSINOPHIL NFR BLD AUTO: 1.6 % — SIGNIFICANT CHANGE UP (ref 0–6)
ESTIMATED AVERAGE GLUCOSE: 226 MG/DL — HIGH (ref 68–114)
GLUCOSE SERPL-MCNC: 259 MG/DL — HIGH (ref 70–99)
GRAM STN FLD: SIGNIFICANT CHANGE UP
GRAM STN FLD: SIGNIFICANT CHANGE UP
HCT VFR BLD CALC: 35.5 % — LOW (ref 39–50)
HGB BLD-MCNC: 10.4 G/DL — LOW (ref 13–17)
IMM GRANULOCYTES NFR BLD AUTO: 0.5 % — SIGNIFICANT CHANGE UP (ref 0–0.9)
INR BLD: 1.04 RATIO — SIGNIFICANT CHANGE UP (ref 0.88–1.16)
LYMPHOCYTES # BLD AUTO: 0.53 K/UL — LOW (ref 1–3.3)
LYMPHOCYTES # BLD AUTO: 14.3 % — SIGNIFICANT CHANGE UP (ref 13–44)
MCHC RBC-ENTMCNC: 23.3 PG — LOW (ref 27–34)
MCHC RBC-ENTMCNC: 29.3 GM/DL — LOW (ref 32–36)
MCV RBC AUTO: 79.4 FL — LOW (ref 80–100)
MONOCYTES # BLD AUTO: 0.28 K/UL — SIGNIFICANT CHANGE UP (ref 0–0.9)
MONOCYTES NFR BLD AUTO: 7.5 % — SIGNIFICANT CHANGE UP (ref 2–14)
NEUTROPHILS # BLD AUTO: 2.8 K/UL — SIGNIFICANT CHANGE UP (ref 1.8–7.4)
NEUTROPHILS NFR BLD AUTO: 75.6 % — SIGNIFICANT CHANGE UP (ref 43–77)
NRBC # BLD: 0 /100 WBCS — SIGNIFICANT CHANGE UP (ref 0–0)
PLATELET # BLD AUTO: 163 K/UL — SIGNIFICANT CHANGE UP (ref 150–400)
POTASSIUM SERPL-MCNC: 4.3 MMOL/L — SIGNIFICANT CHANGE UP (ref 3.5–5.3)
POTASSIUM SERPL-SCNC: 4.3 MMOL/L — SIGNIFICANT CHANGE UP (ref 3.5–5.3)
PROT SERPL-MCNC: 6.5 G/DL — SIGNIFICANT CHANGE UP (ref 6–8.3)
PROTHROM AB SERPL-ACNC: 12.2 SEC — SIGNIFICANT CHANGE UP (ref 10.5–13.4)
RBC # BLD: 4.47 M/UL — SIGNIFICANT CHANGE UP (ref 4.2–5.8)
RBC # FLD: 15.9 % — HIGH (ref 10.3–14.5)
SODIUM SERPL-SCNC: 137 MMOL/L — SIGNIFICANT CHANGE UP (ref 135–145)
SPECIMEN SOURCE: SIGNIFICANT CHANGE UP
SPECIMEN SOURCE: SIGNIFICANT CHANGE UP
WBC # BLD: 3.71 K/UL — LOW (ref 3.8–10.5)
WBC # FLD AUTO: 3.71 K/UL — LOW (ref 3.8–10.5)

## 2023-06-20 PROCEDURE — 28292 COR HLX VLGS RSC PRX PHLX BS: CPT

## 2023-06-20 PROCEDURE — 88311 DECALCIFY TISSUE: CPT | Mod: 26

## 2023-06-20 PROCEDURE — 99232 SBSQ HOSP IP/OBS MODERATE 35: CPT

## 2023-06-20 PROCEDURE — 99233 SBSQ HOSP IP/OBS HIGH 50: CPT | Mod: GC

## 2023-06-20 PROCEDURE — 99222 1ST HOSP IP/OBS MODERATE 55: CPT

## 2023-06-20 PROCEDURE — 73630 X-RAY EXAM OF FOOT: CPT | Mod: 26,RT

## 2023-06-20 PROCEDURE — 88307 TISSUE EXAM BY PATHOLOGIST: CPT | Mod: 26

## 2023-06-20 RX ORDER — SODIUM CHLORIDE 9 MG/ML
1000 INJECTION, SOLUTION INTRAVENOUS
Refills: 0 | Status: DISCONTINUED | OUTPATIENT
Start: 2023-06-20 | End: 2023-06-20

## 2023-06-20 RX ORDER — CEFAZOLIN SODIUM 1 G
2000 VIAL (EA) INJECTION EVERY 8 HOURS
Refills: 0 | Status: DISCONTINUED | OUTPATIENT
Start: 2023-06-20 | End: 2023-06-24

## 2023-06-20 RX ORDER — CEFAZOLIN SODIUM 1 G
1000 VIAL (EA) INJECTION EVERY 8 HOURS
Refills: 0 | Status: DISCONTINUED | OUTPATIENT
Start: 2023-06-20 | End: 2023-06-20

## 2023-06-20 RX ORDER — GABAPENTIN 400 MG/1
600 CAPSULE ORAL
Refills: 0 | Status: DISCONTINUED | OUTPATIENT
Start: 2023-06-20 | End: 2023-06-24

## 2023-06-20 RX ORDER — ATORVASTATIN CALCIUM 80 MG/1
80 TABLET, FILM COATED ORAL AT BEDTIME
Refills: 0 | Status: DISCONTINUED | OUTPATIENT
Start: 2023-06-20 | End: 2023-06-24

## 2023-06-20 RX ORDER — INSULIN GLARGINE 100 [IU]/ML
7 INJECTION, SOLUTION SUBCUTANEOUS AT BEDTIME
Refills: 0 | Status: DISCONTINUED | OUTPATIENT
Start: 2023-06-20 | End: 2023-06-21

## 2023-06-20 RX ORDER — INSULIN LISPRO 100/ML
VIAL (ML) SUBCUTANEOUS AT BEDTIME
Refills: 0 | Status: DISCONTINUED | OUTPATIENT
Start: 2023-06-20 | End: 2023-06-21

## 2023-06-20 RX ORDER — DEXTROSE 50 % IN WATER 50 %
15 SYRINGE (ML) INTRAVENOUS ONCE
Refills: 0 | Status: DISCONTINUED | OUTPATIENT
Start: 2023-06-20 | End: 2023-06-24

## 2023-06-20 RX ORDER — INSULIN LISPRO 100/ML
VIAL (ML) SUBCUTANEOUS
Refills: 0 | Status: DISCONTINUED | OUTPATIENT
Start: 2023-06-20 | End: 2023-06-21

## 2023-06-20 RX ORDER — GLUCAGON INJECTION, SOLUTION 0.5 MG/.1ML
1 INJECTION, SOLUTION SUBCUTANEOUS ONCE
Refills: 0 | Status: DISCONTINUED | OUTPATIENT
Start: 2023-06-20 | End: 2023-06-24

## 2023-06-20 RX ORDER — PANTOPRAZOLE SODIUM 20 MG/1
40 TABLET, DELAYED RELEASE ORAL
Refills: 0 | Status: DISCONTINUED | OUTPATIENT
Start: 2023-06-20 | End: 2023-06-24

## 2023-06-20 RX ORDER — ACETAMINOPHEN 500 MG
650 TABLET ORAL EVERY 6 HOURS
Refills: 0 | Status: DISCONTINUED | OUTPATIENT
Start: 2023-06-20 | End: 2023-06-24

## 2023-06-20 RX ORDER — METOPROLOL TARTRATE 50 MG
25 TABLET ORAL
Refills: 0 | Status: DISCONTINUED | OUTPATIENT
Start: 2023-06-20 | End: 2023-06-24

## 2023-06-20 RX ORDER — MECLIZINE HCL 12.5 MG
12.5 TABLET ORAL THREE TIMES A DAY
Refills: 0 | Status: DISCONTINUED | OUTPATIENT
Start: 2023-06-20 | End: 2023-06-24

## 2023-06-20 RX ORDER — ONDANSETRON 8 MG/1
4 TABLET, FILM COATED ORAL ONCE
Refills: 0 | Status: DISCONTINUED | OUTPATIENT
Start: 2023-06-20 | End: 2023-06-20

## 2023-06-20 RX ORDER — SACCHAROMYCES BOULARDII 250 MG
250 POWDER IN PACKET (EA) ORAL
Refills: 0 | Status: DISCONTINUED | OUTPATIENT
Start: 2023-06-20 | End: 2023-06-24

## 2023-06-20 RX ORDER — DEXTROSE 50 % IN WATER 50 %
25 SYRINGE (ML) INTRAVENOUS ONCE
Refills: 0 | Status: DISCONTINUED | OUTPATIENT
Start: 2023-06-20 | End: 2023-06-24

## 2023-06-20 RX ORDER — HYDROMORPHONE HYDROCHLORIDE 2 MG/ML
0.5 INJECTION INTRAMUSCULAR; INTRAVENOUS; SUBCUTANEOUS
Refills: 0 | Status: DISCONTINUED | OUTPATIENT
Start: 2023-06-20 | End: 2023-06-20

## 2023-06-20 RX ORDER — FUROSEMIDE 40 MG
20 TABLET ORAL DAILY
Refills: 0 | Status: DISCONTINUED | OUTPATIENT
Start: 2023-06-20 | End: 2023-06-22

## 2023-06-20 RX ORDER — DEXTROSE 50 % IN WATER 50 %
12.5 SYRINGE (ML) INTRAVENOUS ONCE
Refills: 0 | Status: DISCONTINUED | OUTPATIENT
Start: 2023-06-20 | End: 2023-06-24

## 2023-06-20 RX ORDER — OXYCODONE HYDROCHLORIDE 5 MG/1
5 TABLET ORAL ONCE
Refills: 0 | Status: DISCONTINUED | OUTPATIENT
Start: 2023-06-20 | End: 2023-06-20

## 2023-06-20 RX ORDER — ENOXAPARIN SODIUM 100 MG/ML
40 INJECTION SUBCUTANEOUS EVERY 24 HOURS
Refills: 0 | Status: DISCONTINUED | OUTPATIENT
Start: 2023-06-21 | End: 2023-06-24

## 2023-06-20 RX ORDER — INSULIN DETEMIR 100/ML (3)
7 INSULIN PEN (ML) SUBCUTANEOUS AT BEDTIME
Refills: 0 | Status: DISCONTINUED | OUTPATIENT
Start: 2023-06-20 | End: 2023-06-20

## 2023-06-20 RX ORDER — LISINOPRIL 2.5 MG/1
40 TABLET ORAL DAILY
Refills: 0 | Status: DISCONTINUED | OUTPATIENT
Start: 2023-06-20 | End: 2023-06-22

## 2023-06-20 RX ADMIN — SODIUM CHLORIDE 50 MILLILITER(S): 9 INJECTION, SOLUTION INTRAVENOUS at 00:05

## 2023-06-20 RX ADMIN — LISINOPRIL 40 MILLIGRAM(S): 2.5 TABLET ORAL at 07:14

## 2023-06-20 RX ADMIN — Medication 2: at 08:01

## 2023-06-20 RX ADMIN — ATORVASTATIN CALCIUM 80 MILLIGRAM(S): 80 TABLET, FILM COATED ORAL at 21:18

## 2023-06-20 RX ADMIN — Medication 100 MILLIGRAM(S): at 13:22

## 2023-06-20 RX ADMIN — Medication 100 MILLIGRAM(S): at 21:18

## 2023-06-20 RX ADMIN — INSULIN GLARGINE 7 UNIT(S): 100 INJECTION, SOLUTION SUBCUTANEOUS at 21:22

## 2023-06-20 RX ADMIN — SODIUM CHLORIDE 75 MILLILITER(S): 9 INJECTION, SOLUTION INTRAVENOUS at 11:17

## 2023-06-20 RX ADMIN — PANTOPRAZOLE SODIUM 40 MILLIGRAM(S): 20 TABLET, DELAYED RELEASE ORAL at 08:03

## 2023-06-20 RX ADMIN — Medication 1: at 11:38

## 2023-06-20 RX ADMIN — Medication 100 MILLIGRAM(S): at 07:19

## 2023-06-20 RX ADMIN — GABAPENTIN 600 MILLIGRAM(S): 400 CAPSULE ORAL at 07:13

## 2023-06-20 RX ADMIN — Medication 2: at 17:27

## 2023-06-20 RX ADMIN — Medication 25 MILLIGRAM(S): at 17:28

## 2023-06-20 RX ADMIN — GABAPENTIN 600 MILLIGRAM(S): 400 CAPSULE ORAL at 12:01

## 2023-06-20 RX ADMIN — Medication 20 MILLIGRAM(S): at 12:01

## 2023-06-20 RX ADMIN — Medication 250 MILLIGRAM(S): at 17:28

## 2023-06-20 RX ADMIN — GABAPENTIN 600 MILLIGRAM(S): 400 CAPSULE ORAL at 17:28

## 2023-06-20 RX ADMIN — Medication 25 MILLIGRAM(S): at 07:15

## 2023-06-20 NOTE — PHYSICAL EXAM
Alert-The patient is alert, awake and responds to voice. The patient is oriented to time, place, and person. The triage nurse is able to obtain subjective information. [2 x 2] : 2 x 2  [0] : left 0 [Ankle Swelling (On Exam)] : not present [Varicose Veins Of Lower Extremities] : not present [] : not present [de-identified] : AOX3, NAD  [de-identified] : 5/5 muscle strength for all muscles and tendons crossing the foot and ankle joints, ankle joint and STJ ROM intact b/l. No pain with calf compression b/l. [de-identified] : Right plantar  hallux wound, (+) probes to bone, no purulence, mild serous drainage, no erythema, mild edema and no proximal streaking  [FreeTextEntry1] : Foot 1st digit [FreeTextEntry2] : 0.5 [FreeTextEntry3] : 0.5 [FreeTextEntry4] : 0.7-0.8- TO BONE [de-identified] : callous [de-identified] : none [de-identified] : 100% [de-identified] : Silver Alginate [de-identified] : Mechanically cleansed with 0.9% normal saline and sterile gauze\par TOE SOCK [TWNoteComboBox1] : Right [TWNoteComboBox4] : None [TWNoteComboBox5] : No [TWNoteComboBox6] : Pressure [de-identified] : No [de-identified] : other [de-identified] : Mild [de-identified] : 3x Weekly [de-identified] : Primary Dressing

## 2023-06-20 NOTE — HISTORY OF PRESENT ILLNESS
[FreeTextEntry1] : Patient is 62 year old male presenting for ulcer to the right great toe down to the level of bone. Patient states the wound has been present for atleast 2 years but has been intermittently opening and closing.  Patient relates was recently seen the ER and was informed no infection to the wound and was referred to follow up at the [facility].

## 2023-06-20 NOTE — PRE-OP CHECKLIST - DENTURES
Assessment & Plan     Throat pain    - Streptococcus A Rapid Screen w/Reflex to PCR - Clinic Collect    Strep throat    - amoxicillin (AMOXIL) 875 MG tablet  Dispense: 20 tablet; Refill: 0  Amoxil as ordered.   Push fluids, rest and ibuprofen or tylenol for comfort.  RTC for persistent or worsening sx.        Laine Francois PA-C  [facility]UEL ANGEL Patel is a 30 year old female who presents to clinic today for the following health issues:  Chief Complaint   Patient presents with     Throat Problem     X today. Pain when swallow. Burning. White spots. Red.     HPI  1 day hx of moderate pharyngitis, no uri sx. No fevers.  No nausea, vomiting, abdomen pain, rash.  Tolerating fluids well.      Review of Systems  Constitutional, HEENT, cardiovascular, pulmonary, gi and gu systems are negative, except as otherwise noted.      Objective    BP (!) 158/91 (BP Location: Right arm, Patient Position: Sitting, Cuff Size: Adult Large)   Pulse 87   Temp 99.1  F (37.3  C) (Oral)   Resp 16   Wt 103.7 kg (228 lb 11.2 oz)   SpO2 98%   BMI 39.26 kg/m    Physical Exam   Pt is in no acute distress and appears well  Ears patent B:  TM s intact, non-injected. All land marks easily visibile    Nasal mucosa is non-edematous,    Pharynx: erythematous, tonsils 2+ hypertrophied withexudate   Neck supple:tender anterior cervical adenopathy      Results for orders placed or performed in visit on 11/04/22   Streptococcus A Rapid Screen w/Reflex to PCR - Clinic Collect     Status: Abnormal    Specimen: Throat; Swab   Result Value Ref Range    Group A Strep antigen Positive (A) Negative              no

## 2023-06-20 NOTE — PROGRESS NOTE ADULT - SUBJECTIVE AND OBJECTIVE BOX
Patient is a 62y old  Male who presents with a chief complaint of Toe wound, pre-surgical testing (20 Jun 2023 12:03)    pt seen and examine today alert awake , no fever or today .   INTERVAL HPI/OVERNIGHT EVENTS:     T(C): 36.6 (06-20-23 @ 13:51), Max: 36.8 (06-19-23 @ 16:47)  HR: 78 (06-20-23 @ 13:51) (54 - 78)  BP: 140/77 (06-20-23 @ 13:51) (138/78 - 190/72)  RR: 18 (06-20-23 @ 13:51) (11 - 18)  SpO2: 97% (06-20-23 @ 13:51) (96% - 100%)  Wt(kg): --  I&O's Summary    19 Jun 2023 07:01  -  20 Jun 2023 07:00  --------------------------------------------------------  IN: 450 mL / OUT: 0 mL / NET: 450 mL    20 Jun 2023 07:01  -  20 Jun 2023 15:01  --------------------------------------------------------  IN: 450 mL / OUT: 0 mL / NET: 450 mL        REVIEW OF SYSTEMS:  CONSTITUTIONAL: No fever, weight loss, or fatigue  EYES: No eye pain, visual disturbances, or discharge  ENMT:   No sinus or throat pain  NECK: No pain or stiffness  RESPIRATORY: No cough, wheezing, chills or hemoptysis; No shortness of breath  CARDIOVASCULAR: No chest pain, palpitations, dizziness, or leg swelling  GASTROINTESTINAL: No abdominal or epigastric pain. No nausea, vomiting, or hematemesis; No diarrhea or constipation.   GENITOURINARY: No dysuria, frequency, hematuria, or incontinence  NEUROLOGICAL: No headaches, memory loss, loss of strength, numbness, or tremors  SKIN: No itching, burning, rashes, or lesions   MUSCULOSKELETAL: No joint pain or swelling; No muscle, back, or extremity pain    PHYSICAL EXAM:  GENERAL: NAD,   HEAD:  Atraumatic, Normocephalic  EYES: EOMI, PERRLA, conjunctiva and sclera clear  ENMT:  Moist mucous membranes  NECK: Supple, No JVD  NERVOUS SYSTEM:  Alert & Oriented X3; Motor Strength 5/5 B/L upper and lower extremities; DTRs 2+ intact and symmetric  CHEST/LUNG: Clear to percussion bilaterally; No rales, rhonchi, wheezing,  HEART: Regular rate and rhythm; No murmurs,  ABDOMEN: Soft, Nontender, Nondistended; Bowel sounds present  EXTREMITIES:  2+ Peripheral Pulses, No clubbing, cyanosis, or edema  SKIN: No rashes or lesions rt toe dressing +    MEDICATIONS  (STANDING):  atorvastatin 80 milliGRAM(s) Oral at bedtime  ceFAZolin   IVPB 2000 milliGRAM(s) IV Intermittent every 8 hours  dextrose 50% Injectable 25 Gram(s) IV Push once  dextrose 50% Injectable 25 Gram(s) IV Push once  dextrose 50% Injectable 12.5 Gram(s) IV Push once  furosemide    Tablet 20 milliGRAM(s) Oral daily  gabapentin 600 milliGRAM(s) Oral four times a day  glucagon  Injectable 1 milliGRAM(s) IntraMuscular once  hydrochlorothiazide 25 milliGRAM(s) Oral daily  insulin lispro (ADMELOG) corrective regimen sliding scale   SubCutaneous three times a day before meals  insulin lispro (ADMELOG) corrective regimen sliding scale   SubCutaneous at bedtime  lisinopril 40 milliGRAM(s) Oral daily  metoprolol tartrate 25 milliGRAM(s) Oral two times a day  pantoprazole    Tablet 40 milliGRAM(s) Oral before breakfast  saccharomyces boulardii 250 milliGRAM(s) Oral two times a day    MEDICATIONS  (PRN):  acetaminophen     Tablet .. 650 milliGRAM(s) Oral every 6 hours PRN Temp greater or equal to 38C (100.4F), Mild Pain (1 - 3)  dextrose Oral Gel 15 Gram(s) Oral once PRN Blood Glucose LESS THAN 70 milliGRAM(s)/deciliter  meclizine 12.5 milliGRAM(s) Oral three times a day PRN vertigo      LABS:                        10.4   3.71  )-----------( 163      ( 20 Jun 2023 06:31 )             35.5     06-20    137  |  106  |  13  ----------------------------<  259<H>  4.3   |  26  |  1.10    Ca    8.8      20 Jun 2023 06:31    TPro  6.5  /  Alb  3.4  /  TBili  0.6  /  DBili  x   /  AST  16  /  ALT  32  /  AlkPhos  118  06-20    PT/INR - ( 20 Jun 2023 06:31 )   PT: 12.2 sec;   INR: 1.04 ratio         PTT - ( 20 Jun 2023 06:31 )  PTT:27.0 sec    CAPILLARY BLOOD GLUCOSE      POCT Blood Glucose.: 192 mg/dL (20 Jun 2023 10:47)  POCT Blood Glucose.: 239 mg/dL (20 Jun 2023 07:56)  POCT Blood Glucose.: 249 mg/dL (20 Jun 2023 06:09)  POCT Blood Glucose.: 216 mg/dL (19 Jun 2023 16:59)              RADIOLOGY & ADDITIONAL TESTS:    Imaging Personally Reviewed:     no new test   Advance Directives:    full code

## 2023-06-20 NOTE — BRIEF OPERATIVE NOTE - SPECIMENS
Right hallux proximal phalanx bone culture and bone pathology; right hallux bone culture and pathology distal phalanx

## 2023-06-20 NOTE — ASSESSMENT
[Verbal] : Verbal [Demo] : Demo [Good - alert, interested, motivated] : Good - alert, interested, motivated [Verbalizes knowledge/Understanding] : Verbalizes knowledge/understanding [Dressing changes] : dressing changes [Foot Care] : foot care [Skin Care] : skin care [Pressure relief] : pressure relief [Signs and symptoms of infection] : sign and symptoms of infection [Nutrition] : nutrition [How and When to Call] : how and when to call [Labs and Tests] : labs and tests [Pain Management] : pain management [Off-loading] : off-loading [Patient responsibility to plan of care] : patient responsibility to plan of care [] : Yes [Stable] : stable [Home] : Home [Ambulatory] : Ambulatory [Not Applicable - Long Term Care/Home Health Agency] : Long Term Care/Home Health Agency: Not Applicable [FreeTextEntry3] : Initial Visit [FreeTextEntry2] : Infection Prevention\par Nutrition and wound healing\par Dressing changes (Per MD order)\par  [FreeTextEntry4] : Vascular studies Auth Submitted\par Escribed ABT to pharmacy\par Auth Submitted for MRI\par Vascular consult next visit\par Follow up [facility] 1 week

## 2023-06-20 NOTE — PROGRESS NOTE ADULT - TIME BILLING
pt seen and examine today see  above plan - 62-year-old male w/ PMHx of CAD (s/p stents x2, 2019), bioprosthetic AV replacement with repair of ascending aorta, HTN, HLD, DM2, neuropathy, vertigo, GERD, lacunar infarcts, PAD (s/p right anterior tibial artery angioplasty) presents to the ED w/ right toe wound. Patient admitted for pre-surgical testing for planned R hallux bone biopsy-  PAD S/P R AT angioplasty in 10/2021  and Normal pulse exam -Plan for OR  today  with Podiatry, NPO except meds after midnight. Patient with RCRI 1-2, patient is medically optimized for Podiatric procedure.-  continue iv abx ancef  2 gm q8hr  F/U path after OR.

## 2023-06-20 NOTE — BRIEF OPERATIVE NOTE - NSICDXBRIEFPROCEDURE_GEN_ALL_CORE_FT
PROCEDURES:  Rainey arthroplasty 20-Jun-2023 10:48:32  Fanny Khanna  Biopsy of bone of distal great toe 20-Jun-2023 10:48:49  Fanny Khanna

## 2023-06-20 NOTE — PROGRESS NOTE ADULT - SUBJECTIVE AND OBJECTIVE BOX
[facility] -- Effie Sousa Pannella, Patel, Savella, Goodger  Office # [phone]    Follow Up:  cardiac optimization     Subjective/Observations: seen and examined, awake, alert, resting comfortably in bed, denies chest pain, dyspnea, palpitations or dizziness, orthopnea and PND. Tolerating room air.  ABX infusing     REVIEW OF SYSTEMS: All other review of systems is negative unless indicated above  PAST MEDICAL & SURGICAL HISTORY:  Hypertension      AAA (abdominal aortic aneurysm)  dx 2012      GERD (gastroesophageal reflux disease)      Leg weakness      Aortic valve replaced      Cardiac tamponade      HTN (hypertension)      H/O aortic valve repair      S/P aneurysm repair       novel coronavirus disease (COVID-19)  2021      Non-pressure chronic ulcer of other part of right foot with unspecified severity      Type 2 diabetes mellitus  Not on Insulin but complicated by peripheral neuropathy      H/O cardiac catheterization  Stent Placement X 2 ()      H/O vertigo      Aortic valve replaced      S/P AAA repair  Repaired 3/2015      S/P aortic aneurysm repair      H/O aortic valve repair      History of incision and drainage      H/O colonoscopy        MEDICATIONS  (STANDING):  atorvastatin 80 milliGRAM(s) Oral at bedtime  ceFAZolin   IVPB 1000 milliGRAM(s) IV Intermittent every 8 hours  ceFAZolin   IVPB      clopidogrel Tablet 75 milliGRAM(s) Oral daily  dextrose 5% + sodium chloride 0.9%. 1000 milliLiter(s) (50 mL/Hr) IV Continuous <Continuous>  dextrose 5%. 1000 milliLiter(s) (50 mL/Hr) IV Continuous <Continuous>  dextrose 5%. 1000 milliLiter(s) (100 mL/Hr) IV Continuous <Continuous>  dextrose 50% Injectable 25 Gram(s) IV Push once  dextrose 50% Injectable 25 Gram(s) IV Push once  dextrose 50% Injectable 12.5 Gram(s) IV Push once  furosemide    Tablet 20 milliGRAM(s) Oral daily  gabapentin 600 milliGRAM(s) Oral four times a day  glucagon  Injectable 1 milliGRAM(s) IntraMuscular once  hydrochlorothiazide 25 milliGRAM(s) Oral daily  insulin lispro (ADMELOG) corrective regimen sliding scale   SubCutaneous three times a day before meals  insulin lispro (ADMELOG) corrective regimen sliding scale   SubCutaneous at bedtime  lisinopril 40 milliGRAM(s) Oral daily  metoprolol tartrate 25 milliGRAM(s) Oral two times a day  pantoprazole    Tablet 40 milliGRAM(s) Oral before breakfast  saccharomyces boulardii 250 milliGRAM(s) Oral two times a day    MEDICATIONS  (PRN):  acetaminophen     Tablet .. 650 milliGRAM(s) Oral every 6 hours PRN Temp greater or equal to 38C (100.4F), Mild Pain (1 - 3)  dextrose Oral Gel 15 Gram(s) Oral once PRN Blood Glucose LESS THAN 70 milliGRAM(s)/deciliter  meclizine 12.5 milliGRAM(s) Oral three times a day PRN vertigo    Allergies    Normodyne (Faint)    Intolerances      Vital Signs Last 24 Hrs  T(C): 36.4 (2023 08:03), Max: 37 (2023 10:20)  T(F): 97.5 (2023 08:03), Max: 98.6 (2023 10:20)  HR: 54 (2023 08:03) (54 - 83)  BP: 190/72 (2023 08:03) (160/89 - 190/72)  BP(mean): --  RR: 11 (2023 08:03) (11 - 18)  SpO2: 97% (2023 08:03) (96% - 99%)    Parameters below as of 2023 06:22  Patient On (Oxygen Delivery Method): room air      I&O's Summary    2023 07:01  -  2023 07:00  --------------------------------------------------------  IN: 450 mL / OUT: 0 mL / NET: 450 mL      Weight (kg): 103 (20 @ 08:30)  TELE: Not on telemetry   PHYSICAL EXAM:  Constitutional: NAD, awake and alert  HEENT: Moist Mucous Membranes, Anicteric  Pulmonary: Non-labored, breath sounds are clear bilaterally, No wheezing, rales or rhonchi  Cardiovascular: Regular, S1 and S2, No murmurs, rubs, gallops or clicks  Gastrointestinal: Bowel Sounds present, soft, nontender.   Lymph: No peripheral edema. No lymphadenopathy.  Skin: No visible rashes or ulcers.  Psych:  Mood & affect appropriate  LABS: All Labs Reviewed:                        10.4   3.71  )-----------( 163      ( 2023 06:31 )             35.5                         10.6   4.82  )-----------( 181      ( 2023 10:51 )             36.1     2023 06:31    137    |  106    |  13     ----------------------------<  259    4.3     |  26     |  1.10   2023 10:51    136    |  106    |  17     ----------------------------<  276    4.2     |  25     |  1.20     Ca    8.8        2023 06:31  Ca    9.3        2023 10:51    TPro  6.5    /  Alb  3.4    /  TBili  0.6    /  DBili  x      /  AST  16     /  ALT  32     /  AlkPhos  118    2023 06:31  TPro  7.2    /  Alb  3.8    /  TBili  0.5    /  DBili  x      /  AST  21     /  ALT  38     /  AlkPhos  151    2023 10:51    PT/INR - ( 2023 06:31 )   PT: 12.2 sec;   INR: 1.04 ratio         PTT - ( 2023 06:31 )  PTT:27.0 sec      12 Lead ECG:   Ventricular Rate 79 BPM    Atrial Rate 79 BPM    P-R Interval 156 ms    QRS Duration 104 ms    Q-T Interval 378 ms    QTC Calculation(Bazett) 433 ms    P Axis -6 degrees    R Axis -39 degrees    T Axis 28 degrees    Diagnosis Line Normal sinus rhythm  Left axis deviation  Left ventricular hypertrophy ( R in aVL , [place] product , Romhilt-Nicolas )    Confirmed by BRIDGER RIOS ([id number]) on 2023 6:35:08 PM (23 @ 11:14)      EXAM:  ECHO TRANSTHORACIC COMP W DOPP      PROCEDURE DATE:  2019   .      INTERPRETATION:  REPORT:    TRANSTHORACIC ECHOCARDIOGRAM REPORT         Patient Name:   DUSTY STRICKLAND Patient Location: Inpatient  Medical Rec #:  [id number]        Accession #:      [id number]  Account #:      [id number]        Height:           74.8 in 190.0 cm  YOB: 1960      Weight:           229.3 lb 104.00 kg  Patient Age:    58 years        BSA:              2.32 m²  Patient Gender: M   BP:               129/82 mmHg       Date of Exam:        2019 1:16:32 PM  Sonographer:         Janina Alberto  Referring Physician: J Luis Hill MD    Procedure:   2D Echo/Doppler/Color Doppler Complete and Echocardiogram   with               Definity Contrast.  Indications: Chest pain, unspecified - R07.9  Diagnosis:   Chest pain, unspecified - R07.9         2D AND M-MODE MEASUREMENTS (normal ranges within parentheses):  Left Ventricle:                  Normal   Aorta/Left Atrium:   Normal  IVSd (2D):              1.16 cm (0.7-1.1) LA Volume Index    35.3 ml/m²  LVPWd (2D):             1.38 cm (0.7-1.1)  LVIDd (2D):             4.72 cm (3.4-5.7)  Relative Wall Thickness  0.58    (<0.42)    LV SYSTOLIC FUNCTION BY 2D PLANIMETRY (MOD):  EF-A2C View: 27.9 % EF-Biplane: 46 %    LV DIASTOLIC FUNCTION:  MV Peak E: 0.82 m/s Decel Time: 220 msec  MV Peak A: 0.66 m/s  E/A Ratio: 1.23    SPECTRAL DOPPLER ANALYSIS (where applicable):  Mitral Valve:  MV P1/2 Time: 63.71 msec  MV Area, PHT: 3.45 cm²    Aortic Valve: AoV Max Jasvir: 2.19 m/s AoV Peak P.2 mmHg AoV Mean PG:   10.2 mmHg    AoV Area, Vmax:  AoV Area, VTI:  AoV Area, Vmn:  Ao VTI: 0.465  Tricuspid Valve and PA/RV Systolic Pressure: TR Max Velocity: 2.46 m/s RA   Pressure: 3 mmHg RVSP/PASP: 27.2 mmHg       PHYSICIAN INTERPRETATION:  Left Ventricle: Endocardial visualization was enhanced with intravenous   echo contrast. The left ventricular internal cavity size is normal.  Global LV systolic function was mildly decreased. Left ventricular   ejection fraction, by visual estimation, is 45 to 50%. The left   ventricular diastolic function could not be assessed in this study.       LV Wall Scoring:  The apical inferior segment is akinetic. The mid and apical anterior   wall, mid  inferior segment, and apex are hypokinetic.    Right Ventricle: Normal right ventricular size and function.  Left Atrium: The left atrium is normal in size.  Right Atrium: The right atrium is normal in size.  Pericardium: There is no evidence of pericardial effusion.  Mitral Valve: Structurally normal mitral valve, with normal leaflet   excursion. Mild mitral valve regurgitation is seen.  Tricuspid Valve: Structurally normal tricuspid valve, with normal leaflet   excursion. Mild tricuspid regurgitation is visualized.  Aortic Valve: A bioprosthetic AoV is visualized. Peak aortic valve   gradient is 19.2 mmHg and the mean gradient is 10.2 mmHg, which is   probably normal in the setting of a prosthetic aortic valve. Trivial   aortic valve regurgitation is seen.  Pulmonic Valve: Structurally normal pulmonic valve, with normal leaflet   excursion. Trace pulmonic valve regurgitation.  Pulmonary Artery: The main pulmonary artery is normal in size.  Venous: The inferior vena cava was normal sized, with respiratory size   variation greater than 50%.       Summary:   1. Left ventricular ejection fraction, by visual estimation, is 45 to   50%.   2. Mildly decreased global left ventricular systolic function.   3. Apical inferior segment, mid and apical anterior wall, mid inferior   segment, and apex are abnormal as described above.   4. The left ventricular diastolic function could not be assessed in this   study.   5. There is no evidence of pericardial effusion.   6. Bioprosthesis in the aortic position.   7. Endocardial visualization was enhanced with intravenous echo contrast.   8. Peak aortic valve gradient is 19.2 mmHg and the mean gradient is 10.2   mmHg, which is probably normal in the setting of a prosthetic aortic  valve.    MD Alfa Electronically signed on 2019 at 5:09:04 PM              *** Final ***                  ERICK GOLDSTEIN MD  This document has been electronically signed. 2019  1:16PM              [facility] -- Effie Sousa Pannella, Patel, Savella, Goodger  Office # [phone]    Follow Up:  cardiac optimization, HTN     Subjective/Observations: seen and examined, awake, alert, resting comfortably in bed, denies chest pain, dyspnea, palpitations or dizziness, orthopnea and PND. Tolerating room air.  ABX infusing     REVIEW OF SYSTEMS: All other review of systems is negative unless indicated above  PAST MEDICAL & SURGICAL HISTORY:  Hypertension      AAA (abdominal aortic aneurysm)  dx 2012      GERD (gastroesophageal reflux disease)      Leg weakness      Aortic valve replaced      Cardiac tamponade      HTN (hypertension)      H/O aortic valve repair      S/P aneurysm repair       novel coronavirus disease (COVID-19)  2021      Non-pressure chronic ulcer of other part of right foot with unspecified severity      Type 2 diabetes mellitus  Not on Insulin but complicated by peripheral neuropathy      H/O cardiac catheterization  Stent Placement X 2 ()      H/O vertigo      Aortic valve replaced      S/P AAA repair  Repaired 3/2015      S/P aortic aneurysm repair      H/O aortic valve repair      History of incision and drainage      H/O colonoscopy        MEDICATIONS  (STANDING):  atorvastatin 80 milliGRAM(s) Oral at bedtime  ceFAZolin   IVPB 1000 milliGRAM(s) IV Intermittent every 8 hours  ceFAZolin   IVPB      clopidogrel Tablet 75 milliGRAM(s) Oral daily  dextrose 5% + sodium chloride 0.9%. 1000 milliLiter(s) (50 mL/Hr) IV Continuous <Continuous>  dextrose 5%. 1000 milliLiter(s) (50 mL/Hr) IV Continuous <Continuous>  dextrose 5%. 1000 milliLiter(s) (100 mL/Hr) IV Continuous <Continuous>  dextrose 50% Injectable 25 Gram(s) IV Push once  dextrose 50% Injectable 25 Gram(s) IV Push once  dextrose 50% Injectable 12.5 Gram(s) IV Push once  furosemide    Tablet 20 milliGRAM(s) Oral daily  gabapentin 600 milliGRAM(s) Oral four times a day  glucagon  Injectable 1 milliGRAM(s) IntraMuscular once  hydrochlorothiazide 25 milliGRAM(s) Oral daily  insulin lispro (ADMELOG) corrective regimen sliding scale   SubCutaneous three times a day before meals  insulin lispro (ADMELOG) corrective regimen sliding scale   SubCutaneous at bedtime  lisinopril 40 milliGRAM(s) Oral daily  metoprolol tartrate 25 milliGRAM(s) Oral two times a day  pantoprazole    Tablet 40 milliGRAM(s) Oral before breakfast  saccharomyces boulardii 250 milliGRAM(s) Oral two times a day    MEDICATIONS  (PRN):  acetaminophen     Tablet .. 650 milliGRAM(s) Oral every 6 hours PRN Temp greater or equal to 38C (100.4F), Mild Pain (1 - 3)  dextrose Oral Gel 15 Gram(s) Oral once PRN Blood Glucose LESS THAN 70 milliGRAM(s)/deciliter  meclizine 12.5 milliGRAM(s) Oral three times a day PRN vertigo    Allergies    Normodyne (Faint)    Intolerances      Vital Signs Last 24 Hrs  T(C): 36.4 (2023 08:03), Max: 37 (2023 10:20)  T(F): 97.5 (2023 08:03), Max: 98.6 (2023 10:20)  HR: 54 (2023 08:03) (54 - 83)  BP: 190/72 (2023 08:03) (160/89 - 190/72)  BP(mean): --  RR: 11 (2023 08:03) (11 - 18)  SpO2: 97% (2023 08:03) (96% - 99%)    Parameters below as of 2023 06:22  Patient On (Oxygen Delivery Method): room air      I&O's Summary    2023 07:01  -  2023 07:00  --------------------------------------------------------  IN: 450 mL / OUT: 0 mL / NET: 450 mL      Weight (kg): 103 (20 @ 08:30)  TELE: Not on telemetry   PHYSICAL EXAM:  Constitutional: NAD, awake and alert  HEENT: Moist Mucous Membranes, Anicteric  Pulmonary: Non-labored, breath sounds are clear bilaterally, No wheezing, rales or rhonchi  Cardiovascular: Regular, S1 and S2, No murmurs, rubs, gallops or clicks  Gastrointestinal: Bowel Sounds present, soft, nontender.   Lymph: No peripheral edema. No lymphadenopathy.  Skin: No visible rashes or ulcers.  Psych:  Mood & affect appropriate  LABS: All Labs Reviewed:                        10.4   3.71  )-----------( 163      ( 2023 06:31 )             35.5                         10.6   4.82  )-----------( 181      ( 2023 10:51 )             36.1     2023 06:31    137    |  106    |  13     ----------------------------<  259    4.3     |  26     |  1.10   2023 10:51    136    |  106    |  17     ----------------------------<  276    4.2     |  25     |  1.20     Ca    8.8        2023 06:31  Ca    9.3        2023 10:51    TPro  6.5    /  Alb  3.4    /  TBili  0.6    /  DBili  x      /  AST  16     /  ALT  32     /  AlkPhos  118    2023 06:31  TPro  7.2    /  Alb  3.8    /  TBili  0.5    /  DBili  x      /  AST  21     /  ALT  38     /  AlkPhos  151    2023 10:51    PT/INR - ( 2023 06:31 )   PT: 12.2 sec;   INR: 1.04 ratio         PTT - ( 2023 06:31 )  PTT:27.0 sec      12 Lead ECG:   Ventricular Rate 79 BPM    Atrial Rate 79 BPM    P-R Interval 156 ms    QRS Duration 104 ms    Q-T Interval 378 ms    QTC Calculation(Bazett) 433 ms    P Axis -6 degrees    R Axis -39 degrees    T Axis 28 degrees    Diagnosis Line Normal sinus rhythm  Left axis deviation  Left ventricular hypertrophy ( R in aVL , [place] product , Romhilt-Nicolas )    Confirmed by BRIDGER RIOS ([id number]) on 2023 6:35:08 PM (23 @ 11:14)      EXAM:  ECHO TRANSTHORACIC COMP W DOPP      PROCEDURE DATE:  2019   .      INTERPRETATION:  REPORT:    TRANSTHORACIC ECHOCARDIOGRAM REPORT         Patient Name:   DUSTY STRICKLAND Patient Location: Inpatient  Medical Rec #:  [id number]        Accession #:      [id number]  Account #:      [id number]        Height:           74.8 in 190.0 cm  YOB: 1960      Weight:           229.3 lb 104.00 kg  Patient Age:    58 years        BSA:              2.32 m²  Patient Gender: M   BP:               129/82 mmHg       Date of Exam:        2019 1:16:32 PM  Sonographer:         Janina Alberto  Referring Physician: J Luis Hill MD    Procedure:   2D Echo/Doppler/Color Doppler Complete and Echocardiogram   with               Definity Contrast.  Indications: Chest pain, unspecified - R07.9  Diagnosis:   Chest pain, unspecified - R07.9         2D AND M-MODE MEASUREMENTS (normal ranges within parentheses):  Left Ventricle:                  Normal   Aorta/Left Atrium:   Normal  IVSd (2D):              1.16 cm (0.7-1.1) LA Volume Index    35.3 ml/m²  LVPWd (2D):             1.38 cm (0.7-1.1)  LVIDd (2D):             4.72 cm (3.4-5.7)  Relative Wall Thickness  0.58    (<0.42)    LV SYSTOLIC FUNCTION BY 2D PLANIMETRY (MOD):  EF-A2C View: 27.9 % EF-Biplane: 46 %    LV DIASTOLIC FUNCTION:  MV Peak E: 0.82 m/s Decel Time: 220 msec  MV Peak A: 0.66 m/s  E/A Ratio: 1.23    SPECTRAL DOPPLER ANALYSIS (where applicable):  Mitral Valve:  MV P1/2 Time: 63.71 msec  MV Area, PHT: 3.45 cm²    Aortic Valve: AoV Max Jasvir: 2.19 m/s AoV Peak P.2 mmHg AoV Mean PG:   10.2 mmHg    AoV Area, Vmax:  AoV Area, VTI:  AoV Area, Vmn:  Ao VTI: 0.465  Tricuspid Valve and PA/RV Systolic Pressure: TR Max Velocity: 2.46 m/s RA   Pressure: 3 mmHg RVSP/PASP: 27.2 mmHg       PHYSICIAN INTERPRETATION:  Left Ventricle: Endocardial visualization was enhanced with intravenous   echo contrast. The left ventricular internal cavity size is normal.  Global LV systolic function was mildly decreased. Left ventricular   ejection fraction, by visual estimation, is 45 to 50%. The left   ventricular diastolic function could not be assessed in this study.       LV Wall Scoring:  The apical inferior segment is akinetic. The mid and apical anterior   wall, mid  inferior segment, and apex are hypokinetic.    Right Ventricle: Normal right ventricular size and function.  Left Atrium: The left atrium is normal in size.  Right Atrium: The right atrium is normal in size.  Pericardium: There is no evidence of pericardial effusion.  Mitral Valve: Structurally normal mitral valve, with normal leaflet   excursion. Mild mitral valve regurgitation is seen.  Tricuspid Valve: Structurally normal tricuspid valve, with normal leaflet   excursion. Mild tricuspid regurgitation is visualized.  Aortic Valve: A bioprosthetic AoV is visualized. Peak aortic valve   gradient is 19.2 mmHg and the mean gradient is 10.2 mmHg, which is   probably normal in the setting of a prosthetic aortic valve. Trivial   aortic valve regurgitation is seen.  Pulmonic Valve: Structurally normal pulmonic valve, with normal leaflet   excursion. Trace pulmonic valve regurgitation.  Pulmonary Artery: The main pulmonary artery is normal in size.  Venous: The inferior vena cava was normal sized, with respiratory size   variation greater than 50%.       Summary:   1. Left ventricular ejection fraction, by visual estimation, is 45 to   50%.   2. Mildly decreased global left ventricular systolic function.   3. Apical inferior segment, mid and apical anterior wall, mid inferior   segment, and apex are abnormal as described above.   4. The left ventricular diastolic function could not be assessed in this   study.   5. There is no evidence of pericardial effusion.   6. Bioprosthesis in the aortic position.   7. Endocardial visualization was enhanced with intravenous echo contrast.   8. Peak aortic valve gradient is 19.2 mmHg and the mean gradient is 10.2   mmHg, which is probably normal in the setting of a prosthetic aortic  valve.    MD Alfa Electronically signed on 2019 at 5:09:04 PM              *** Final ***                  ERICK GOLDSTEIN MD  This document has been electronically signed. 2019  1:16PM

## 2023-06-20 NOTE — PROGRESS NOTE ADULT - ASSESSMENT
62-year-old male with PMHx of CAD (1st Diag. AMBER 7/1/19, LPDA AMBER 7/3/19),bicuspid AV assoc with significant AS s/p bio AVR with repair of a dilated ascending aorta (3/16/15), cardiac tamponade (s/p pericardiocentesis 3/26/15), syncope (s/p ILR insertion 2/2/16), atypical chest discomfort, HTN, HLD, T2DM, vertigo, GERD, lacunar infarcts (incidentally detected on MRI), ventricular ectopy, mild carotid atherosclerosis, PAD,  (status post right anterior tibial artery angioplasty 10/5/2021) was sent by his podiatrist to ED for IV therapy and possible surgical intervention of Right great toe.    Cardiac Optimization, CAD, CHD, HTN  - p/w non healing wound Right great toe, planned for podiatric intervention today.    - H/o CAD with stent placement, VHD s/p bio AVR. No active cardiac condition.   - Pharm stress (3/2023) normal from our office, follows with Dr. Sousa  - Denies chest pain, palpitation, SOB, syncope, dizziness, lightheadedness, orthopnea, PND, RICHTER and edema  - EKG: NSR. No acute ischemic changes noted   - Chest X-ray negative for acute process    - Patient euvolemic on examination with no overt signs of heart failure or cardiac ischemia.   - No severe valvular abnormalities noted on examination  - ECHO (5/12/2020) LVEF 55-60%      - BP, HR stable and controlled  - continue home meds on: lisinopril, metoprolol, ASA, Plavix   - continue to monitor routine hemodynamics   - No additional cardiac work-up is necessary.     - History of CAD with stents, VHD s/p bio AVR repair,  though this is non-modifiable at current time. Patient is optimized from cardiovascular standpoint to proceed with planned procedure with routine hemodynamic monitoring.   - Monitor and replete Lytes. Keep K > 4 and Mg > 2  - Will continue to follow.    Maryam Porter [facility]  Nurse Practitioner - Cardiology   Spectra #[id number]/ [phone]  call TEAMS     62-year-old male with PMHx of CAD (1st Diag. AMBER 7/1/19, LPDA AMBER 7/3/19),bicuspid AV assoc with significant AS s/p bio AVR with repair of a dilated ascending aorta (3/16/15), cardiac tamponade (s/p pericardiocentesis 3/26/15), syncope (s/p ILR insertion 2/2/16), atypical chest discomfort, HTN, HLD, T2DM, vertigo, GERD, lacunar infarcts (incidentally detected on MRI), ventricular ectopy, mild carotid atherosclerosis, PAD,  (status post right anterior tibial artery angioplasty 10/5/2021) was sent by his podiatrist to ED for IV therapy and possible surgical intervention of Right great toe.    Cardiac Optimization, CAD, CHD, HTN  - p/w non healing wound Right great toe, planned for podiatric intervention today.    - H/o CAD with stent placement, VHD s/p bio AVR. No active cardiac condition.   - EKG: NSR. No acute ischemic changes noted   - Pharm stress (3/2023) normal from our office, follows with Dr. Sousa  - Chest X-ray negative for acute process    - Patient euvolemic on examination with no overt signs of heart failure or cardiac ischemia.   - No severe valvular abnormalities noted on examination  - ECHO (5/12/2020) LVEF 55-60%      - BP, HR stable and controlled  - continue home meds on: lisinopril, metoprolol, ASA, Plavix   - continue to monitor routine hemodynamics   - No additional cardiac work-up is necessary.     - History of CAD with stents, VHD s/p bio AVR repair,  though this is non-modifiable at current time. Patient is optimized from cardiovascular standpoint to proceed with planned procedure with routine hemodynamic monitoring.   - Monitor and replete Lytes. Keep K > 4 and Mg > 2  - Will continue to follow.    Maryam Porter [facility]  Nurse Practitioner - Cardiology   Spectra #[id number]/ [phone]  call TEAMS

## 2023-06-20 NOTE — CONSULT NOTE ADULT - SUBJECTIVE AND OBJECTIVE BOX
HPI:  63YO M PMHx of CAD (s/p stents x2, 2019), bioprosthetic AV replacement with repair of ascending aorta, HTN, HLD, DM2, neuropathy, vertigo, GERD, lacunar infarcts, PAD (s/p right anterior tibial artery angioplasty) who presented to the hospital with cc of nonhealing infected DM Right foot hallux plantar ulcer- wound has been present for 5 years and pt has been following with podiatry at the wound care center. 3 weeks ago wound opened and drained brownish fluid.  Was on Doxycycline. Outpt culture with MSSA. MRI 6/12 neg for OM.    Infectious Disease consult was called to evaluate pt and for antibiotic management.      Past Medical & Surgical Hx:  PAST MEDICAL & SURGICAL HISTORY:  Hypertension  AAA (abdominal aortic aneurysm)  dx 2012  GERD (gastroesophageal reflux disease)  Leg weakness  Aortic valve replaced  Cardiac tamponade  HTN (hypertension)  H/O aortic valve repair  S/P aneurysm repair  2019 novel coronavirus disease (COVID-19)  January 2021  Non-pressure chronic ulcer of other part of right foot with unspecified severity  Type 2 diabetes mellitus  Not on Insulin but complicated by peripheral neuropathy  H/O cardiac catheterization  Stent Placement X 2 (2019)  H/O vertigo  Aortic valve replaced  S/P AAA repair  Repaired 3/2015  S/P aortic aneurysm repair  H/O aortic valve repair  History of incision and drainage  H/O colonoscopy      Social History--  EtOH: denies   Tobacco: denies  Drug Use: denies      FAMILY HISTORY:  Family history of breast cancer    Family history of hypertension    Family history of stroke    Family history of prostate cancer    Family history of COPD (chronic obstructive pulmonary disease) (Grandparent)    FH: HTN (hypertension)        Allergies  Normodyne (Faint)    Intolerances  NONE      Home Medications:  aspirin 81 mg oral tablet: 1 tab(s) orally once a day (19 Jun 2023 14:00)  atorvastatin 80 mg oral tablet: 1 tab(s) orally once a day (19 Jun 2023 14:00)  gabapentin 600 mg oral tablet: 1 tab(s) orally 4 times a day (19 Jun 2023 14:00)  hydroCHLOROthiazide 25 mg oral tablet: 1 tab(s) orally once a day (19 Jun 2023 14:00)  lisinopril 40 mg oral tablet: 1 tab(s) orally once a day (19 Jun 2023 14:00)  meclizine 12.5 mg oral tablet: 1 tab(s) orally 3 times a day, As needed, vertigo (19 Jun 2023 14:00)  metFORMIN 1000 mg oral tablet, extended release: 1 tab(s) orally once a day (19 Jun 2023 14:00)  metoprolol tartrate 25 mg oral tablet: 1 tab(s) orally 2 times a day (19 Jun 2023 14:00)  pantoprazole 40 mg oral delayed release tablet: 1 tab(s) orally once a day (before a meal) (19 Jun 2023 14:00)    Current Inpatient Medications :    ANTIBIOTICS:   ceFAZolin   IVPB 1000 milliGRAM(s) IV Intermittent every 8 hours      OTHER RELEVANT MEDICATIONS :  acetaminophen     Tablet .. 650 milliGRAM(s) Oral every 6 hours PRN  atorvastatin 80 milliGRAM(s) Oral at bedtime  dextrose 50% Injectable 12.5 Gram(s) IV Push once  dextrose 50% Injectable 25 Gram(s) IV Push once  dextrose 50% Injectable 25 Gram(s) IV Push once  dextrose Oral Gel 15 Gram(s) Oral once PRN  furosemide    Tablet 20 milliGRAM(s) Oral daily  gabapentin 600 milliGRAM(s) Oral four times a day  glucagon  Injectable 1 milliGRAM(s) IntraMuscular once  hydrochlorothiazide 25 milliGRAM(s) Oral daily  insulin lispro (ADMELOG) corrective regimen sliding scale   SubCutaneous three times a day before meals  insulin lispro (ADMELOG) corrective regimen sliding scale   SubCutaneous at bedtime  lisinopril 40 milliGRAM(s) Oral daily  meclizine 12.5 milliGRAM(s) Oral three times a day PRN  metoprolol tartrate 25 milliGRAM(s) Oral two times a day  pantoprazole    Tablet 40 milliGRAM(s) Oral before breakfast      ROS:  CONSTITUTIONAL:  Negative fever or chills  EYES:  Negative  blurry vision or double vision  CARDIOVASCULAR:  Negative for chest pain or palpitations  RESPIRATORY:  Negative for cough, wheezing, or SOB   GASTROINTESTINAL:  Negative for nausea, vomiting, diarrhea, constipation, or abdominal pain  GENITOURINARY:  Negative frequency, urgency , dysuria or hematuria   NEUROLOGIC:  No headache, confusion, dizziness, lightheadedness  All other systems were reviewed and are negative        I&O's Detail    19 Jun 2023 07:01  -  20 Jun 2023 07:00  --------------------------------------------------------  IN:    dextrose 5% + sodium chloride 0.9%: 400 mL    IV PiggyBack: 50 mL  Total IN: 450 mL    OUT:  Total OUT: 0 mL    Total NET: 450 mL      Physical Exam:  Vital Signs Last 24 Hrs  T(C): 36.6 (20 Jun 2023 11:01), Max: 36.8 (19 Jun 2023 16:47)  T(F): 97.9 (20 Jun 2023 11:01), Max: 98.2 (19 Jun 2023 16:47)  HR: 56 (20 Jun 2023 11:15) (54 - 70)  BP: 138/78 (20 Jun 2023 11:15) (138/78 - 190/72)  RR: 17 (20 Jun 2023 11:15) (11 - 18)  SpO2: 98% (20 Jun 2023 11:15) (96% - 100%)    Parameters below as of 20 Jun 2023 10:56  Patient On (Oxygen Delivery Method): room air      Height (cm): 190.5 (06-20 @ 08:30)  Weight (kg): 103 (06-20 @ 08:30)  BMI (kg/m2): 28.4 (06-20 @ 08:30)  BSA (m2): 2.32 (06-20 @ 08:30)    General: no acute distress  Neck: supple, trachea midline  Lungs: clear, no wheeze/rhonchi  Cardiovascular: regular rate and rhythm, S1 S2  Abdomen: soft, nontender, ND, bowel sounds normal  Neurological:  alert and oriented x3  Skin: no rash  Extremities: Right foot drsg c/d/i    Labs:                         10.4   3.71  )-----------( 163      ( 20 Jun 2023 06:31 )             35.5   06-20    137  |  106  |  13  ----------------------------<  259<H>  4.3   |  26  |  1.10    Ca    8.8      20 Jun 2023 06:31    TPro  6.5  /  Alb  3.4  /  TBili  0.6  /  DBili  x   /  AST  16  /  ALT  32  /  AlkPhos  118  06-20      RECENT CULTURES:          RADIOLOGY & ADDITIONAL STUDIES:    ACC: [id number] EXAM:  MR FOOT RT   ORDERED BY: STEPHANI SCHMIDT     PROCEDURE DATE:  06/12/2023          INTERPRETATION:  RIGHT FOOT MRI    CLINICAL INFORMATION: Right plantar hallux wound. Evaluation for   cellulitis.  TECHNIQUE: Multiplanar, multisequence MRI was obtained of the right foot.    COMPARISON: Right foot radiograph dated 5/28/2023    FINDINGS:    LIGAMENTS AND CAPSULAR STRUCTURES: Ligamentous instability structures are   intact.  MUSCLES AND TENDONS: Mild edema of the musculature within the superficial   and central compartment of the plantar foot. Visualized tendons are   intact.  CARTILAGE AND SUBCHONDRAL BONE: Chondral cystic change noted at the first   MTP joint. Joint spaces are otherwise preserved.  OSSEOUS ALIGNMENT: Mild hyperextension of the first MTP joint.  BONE MARROW: No significant bone marrow edema.  WEB SPACES: No neuromas or bursitis.  PERIPHERAL SOFT TISSUES: Small cutaneous defect on the plantar surface of   the hallux, inferior to the first interphalangeal joint, consistent with   provided history of ulcer.    IMPRESSION:  1.  No evidence of osteomyelitis.  2.  Small cutaneous defect on the plantar surface of the hallux with mild   surrounding soft tissue edema, consistent with provided history of ulcer.    Assessment :   63YO M PMHx of CAD (s/p stents x2, 2019), bioprosthetic AV replacement with repair of ascending aorta, HTN, HLD, DM2, neuropathy, vertigo, GERD, lacunar infarcts, PAD (s/p right anterior tibial artery angioplasty) admitted with nonhealing infected DM Right foot hallux plantar ulcer. Outpt culture with MSSA. MRI 6/12 neg for OM.  Sp Rainey arthroplasty and biopsy of bone of distal great toe 20-Jun-202  Uncontrolled DM    Plan :   Cont Ancef  Fu OR cultures and path  Trend temps and cbc  Wound care per podiatry  DM control     Continue with present regiment .  Approptiate use of antibiotics and adverse effects reviewed.      I have discussed the above plan of care with patient/family in detail. They expressed understanding of the treatment plan . Risks, benefits and alternatives discussed in detail. I have asked if they have any questions or concerns and appropriately addressed them to the best of my ability .      > 45 minutes spent in direct patient care reviewing  the notes, lab data/ imaging , discussion with multidisciplinary team. All questions were addressed and answered to the best of my capacity .    Thank you for allowing me to participate in the care of your patient .      Gaston Blanco MD  Infectious Disease  [phone]

## 2023-06-20 NOTE — REVIEW OF SYSTEMS
[Skin Wound] : skin wound [Fever] : no fever [Chills] : no chills [Eye Pain] : no eye pain [Red Eyes] : eyes not red [Earache] : no earache [Loss Of Hearing] : no hearing loss [Nosebleeds] : no nosebleeds [Chest Pain] : no chest pain [Shortness Of Breath] : no shortness of breath [Cough] : no cough [Abdominal Pain] : no abdominal pain [Vomiting] : no vomiting [Diarrhea] : no diarrhea [Confused] : no confusion [Dizziness] : no dizziness [FreeTextEntry5] : HTN, Aortic valve stenosis, Hx of CVA  [FreeTextEntry7] : GERD [de-identified] : Right hallux wound down to bone  [de-identified] : DM with neuropathy

## 2023-06-20 NOTE — PLAN
[FreeTextEntry1] : .Patient seen and evaluated. Discussed etiology and treatment plan. Patient verbalized understanding. Ordered X  -rays and Ordered MRI right foot and ordered vascular studies, will request MRI to r/o OM vs any osseous pathology \par Will start local wound care and offloading. Obtained wound culture, Rx for abx sent to patient's pharmacy. \par Patient was told if there are signs of infection ( fever, chills, nausea, vomiting ) or changes in the condition of the wound ( pain , cellulitis , purulent drainage or odor ) they should proceed to the ER as soon as possible  patient high risk for limb loss and life threatening sepsis  Spent 20 minutes for patient care and medical decision making.\par Spent 30 minutes for patient care and medical decision making.\par

## 2023-06-21 LAB
ANION GAP SERPL CALC-SCNC: 5 MMOL/L — SIGNIFICANT CHANGE UP (ref 5–17)
BASOPHILS # BLD AUTO: 0.02 K/UL — SIGNIFICANT CHANGE UP (ref 0–0.2)
BASOPHILS NFR BLD AUTO: 0.3 % — SIGNIFICANT CHANGE UP (ref 0–2)
BUN SERPL-MCNC: 13 MG/DL — SIGNIFICANT CHANGE UP (ref 7–23)
CALCIUM SERPL-MCNC: 9 MG/DL — SIGNIFICANT CHANGE UP (ref 8.5–10.1)
CHLORIDE SERPL-SCNC: 97 MMOL/L — SIGNIFICANT CHANGE UP (ref 96–108)
CO2 SERPL-SCNC: 32 MMOL/L — HIGH (ref 22–31)
CREAT SERPL-MCNC: 1.2 MG/DL — SIGNIFICANT CHANGE UP (ref 0.5–1.3)
EGFR: 68 ML/MIN/1.73M2 — SIGNIFICANT CHANGE UP
EOSINOPHIL # BLD AUTO: 0.04 K/UL — SIGNIFICANT CHANGE UP (ref 0–0.5)
EOSINOPHIL NFR BLD AUTO: 0.6 % — SIGNIFICANT CHANGE UP (ref 0–6)
GLUCOSE SERPL-MCNC: 236 MG/DL — HIGH (ref 70–99)
HCT VFR BLD CALC: 37.4 % — LOW (ref 39–50)
HGB BLD-MCNC: 11 G/DL — LOW (ref 13–17)
IMM GRANULOCYTES NFR BLD AUTO: 0.4 % — SIGNIFICANT CHANGE UP (ref 0–0.9)
LYMPHOCYTES # BLD AUTO: 0.58 K/UL — LOW (ref 1–3.3)
LYMPHOCYTES # BLD AUTO: 8.3 % — LOW (ref 13–44)
MCHC RBC-ENTMCNC: 22.9 PG — LOW (ref 27–34)
MCHC RBC-ENTMCNC: 29.4 GM/DL — LOW (ref 32–36)
MCV RBC AUTO: 77.8 FL — LOW (ref 80–100)
MONOCYTES # BLD AUTO: 0.47 K/UL — SIGNIFICANT CHANGE UP (ref 0–0.9)
MONOCYTES NFR BLD AUTO: 6.7 % — SIGNIFICANT CHANGE UP (ref 2–14)
NEUTROPHILS # BLD AUTO: 5.88 K/UL — SIGNIFICANT CHANGE UP (ref 1.8–7.4)
NEUTROPHILS NFR BLD AUTO: 83.7 % — HIGH (ref 43–77)
NRBC # BLD: 0 /100 WBCS — SIGNIFICANT CHANGE UP (ref 0–0)
PLATELET # BLD AUTO: 194 K/UL — SIGNIFICANT CHANGE UP (ref 150–400)
POTASSIUM SERPL-MCNC: 4 MMOL/L — SIGNIFICANT CHANGE UP (ref 3.5–5.3)
POTASSIUM SERPL-SCNC: 4 MMOL/L — SIGNIFICANT CHANGE UP (ref 3.5–5.3)
RBC # BLD: 4.81 M/UL — SIGNIFICANT CHANGE UP (ref 4.2–5.8)
RBC # FLD: 15.9 % — HIGH (ref 10.3–14.5)
SODIUM SERPL-SCNC: 134 MMOL/L — LOW (ref 135–145)
WBC # BLD: 7.02 K/UL — SIGNIFICANT CHANGE UP (ref 3.8–10.5)
WBC # FLD AUTO: 7.02 K/UL — SIGNIFICANT CHANGE UP (ref 3.8–10.5)

## 2023-06-21 PROCEDURE — 99232 SBSQ HOSP IP/OBS MODERATE 35: CPT

## 2023-06-21 PROCEDURE — 99024 POSTOP FOLLOW-UP VISIT: CPT

## 2023-06-21 PROCEDURE — 99222 1ST HOSP IP/OBS MODERATE 55: CPT

## 2023-06-21 RX ORDER — INSULIN LISPRO 100/ML
3 VIAL (ML) SUBCUTANEOUS
Refills: 0 | Status: DISCONTINUED | OUTPATIENT
Start: 2023-06-21 | End: 2023-06-22

## 2023-06-21 RX ORDER — INSULIN GLARGINE 100 [IU]/ML
10 INJECTION, SOLUTION SUBCUTANEOUS AT BEDTIME
Refills: 0 | Status: DISCONTINUED | OUTPATIENT
Start: 2023-06-21 | End: 2023-06-22

## 2023-06-21 RX ORDER — INSULIN LISPRO 100/ML
VIAL (ML) SUBCUTANEOUS
Refills: 0 | Status: DISCONTINUED | OUTPATIENT
Start: 2023-06-21 | End: 2023-06-24

## 2023-06-21 RX ADMIN — Medication 3 UNIT(S): at 17:13

## 2023-06-21 RX ADMIN — Medication 25 MILLIGRAM(S): at 05:42

## 2023-06-21 RX ADMIN — Medication 3 UNIT(S): at 12:19

## 2023-06-21 RX ADMIN — Medication 100 MILLIGRAM(S): at 14:42

## 2023-06-21 RX ADMIN — GABAPENTIN 600 MILLIGRAM(S): 400 CAPSULE ORAL at 23:17

## 2023-06-21 RX ADMIN — Medication 4: at 17:13

## 2023-06-21 RX ADMIN — GABAPENTIN 600 MILLIGRAM(S): 400 CAPSULE ORAL at 11:18

## 2023-06-21 RX ADMIN — Medication 650 MILLIGRAM(S): at 11:18

## 2023-06-21 RX ADMIN — Medication 250 MILLIGRAM(S): at 05:42

## 2023-06-21 RX ADMIN — Medication 1: at 08:31

## 2023-06-21 RX ADMIN — Medication 4: at 21:39

## 2023-06-21 RX ADMIN — ENOXAPARIN SODIUM 40 MILLIGRAM(S): 100 INJECTION SUBCUTANEOUS at 05:41

## 2023-06-21 RX ADMIN — GABAPENTIN 600 MILLIGRAM(S): 400 CAPSULE ORAL at 05:42

## 2023-06-21 RX ADMIN — Medication 650 MILLIGRAM(S): at 12:00

## 2023-06-21 RX ADMIN — INSULIN GLARGINE 10 UNIT(S): 100 INJECTION, SOLUTION SUBCUTANEOUS at 21:38

## 2023-06-21 RX ADMIN — LISINOPRIL 40 MILLIGRAM(S): 2.5 TABLET ORAL at 05:47

## 2023-06-21 RX ADMIN — ATORVASTATIN CALCIUM 80 MILLIGRAM(S): 80 TABLET, FILM COATED ORAL at 21:38

## 2023-06-21 RX ADMIN — Medication 100 MILLIGRAM(S): at 21:49

## 2023-06-21 RX ADMIN — PANTOPRAZOLE SODIUM 40 MILLIGRAM(S): 20 TABLET, DELAYED RELEASE ORAL at 05:42

## 2023-06-21 RX ADMIN — GABAPENTIN 600 MILLIGRAM(S): 400 CAPSULE ORAL at 17:14

## 2023-06-21 RX ADMIN — Medication 250 MILLIGRAM(S): at 17:15

## 2023-06-21 RX ADMIN — Medication 2: at 12:19

## 2023-06-21 RX ADMIN — Medication 5 MILLIGRAM(S): at 21:38

## 2023-06-21 RX ADMIN — Medication 100 MILLIGRAM(S): at 05:43

## 2023-06-21 RX ADMIN — Medication 25 MILLIGRAM(S): at 17:15

## 2023-06-21 NOTE — CARE COORDINATION ASSESSMENT. - NSPASTMEDSURGHISTORY_GEN_ALL_CORE_FT
PAST MEDICAL & SURGICAL HISTORY:  Hypertension      Leg weakness      GERD (gastroesophageal reflux disease)      AAA (abdominal aortic aneurysm)  dx 2012      Cardiac tamponade      Aortic valve replaced      Aortic valve replaced      S/P AAA repair  Repaired 3/2015      S/P aneurysm repair      H/O aortic valve repair      HTN (hypertension)      H/O aortic valve repair      S/P aortic aneurysm repair      History of incision and drainage      H/O vertigo      H/O cardiac catheterization  Stent Placement X 2 (2019)      Type 2 diabetes mellitus  Not on Insulin but complicated by peripheral neuropathy      Non-pressure chronic ulcer of other part of right foot with unspecified severity      2019 novel coronavirus disease (COVID-19)  January 2021      H/O colonoscopy

## 2023-06-21 NOTE — CONSULT NOTE ADULT - SUBJECTIVE AND OBJECTIVE BOX
Patient is a 62y old  Male who presents with a chief complaint of Toe wound, pre-surgical testing (20 Jun 2023 15:01)    Type: DX year known complications Endocrine Last seen Rx home Hx DKA/HHS, Glucometer checks, needs, weight, diet, exercise  diabetes education provided  Hx ASCVD, CKD, HF    HPI:  62-year-old male w/ PMHx of CAD (s/p stents x2, 2019), bioprosthetic AV replacement with repair of ascending aorta, HTN, HLD, DM2, neuropathy, vertigo, GERD, lacunar infarcts, PAD (s/p right anterior tibial artery angioplasty) presents to the ED w/ right toe wound. Toe wound has been present for 5 years and he has been seeing wound care outpatient. Wound has been mostly dry, until 3 weeks ago when it opened and drained brownish fluid. He was seen in the ED and discharged with doxycycline. He f/u with wound care, Dr. Sen, a week later who switched his abx (patient does not recall the name). Patient was seen in the wound care office today for f/u and instructed to present to [facility] ED for IV antibiotics and pre-surgical testing for bone biopsy. Denies fevers, chills, SOB, CP. No wound drainage currently, and reports minimal pain. Denies LE swelling / calf pain.     ED Course:   Vitals: T 98.6F, HR 83, /82, RR 18, SpO2 99% RA  Labs: Hgb 10.6, aPTT 27, glucose 276, Alk Ph 151    ECG: LAD, LVH    R Foot XR: no fractures seen, no tissue edema (personal read)    Given: Vancomycin x1, Zosyn x1 (19 Jun 2023 13:12)      PAST MEDICAL & SURGICAL HISTORY:  Hypertension      AAA (abdominal aortic aneurysm)  dx 2012      GERD (gastroesophageal reflux disease)      Leg weakness      Aortic valve replaced      Cardiac tamponade      HTN (hypertension)      H/O aortic valve repair      S/P aneurysm repair      2019 novel coronavirus disease (COVID-19)  January 2021      Non-pressure chronic ulcer of other part of right foot with unspecified severity      Type 2 diabetes mellitus  Not on Insulin but complicated by peripheral neuropathy      H/O cardiac catheterization  Stent Placement X 2 (2019)      H/O vertigo      Aortic valve replaced      S/P AAA repair  Repaired 3/2015      S/P aortic aneurysm repair      H/O aortic valve repair      History of incision and drainage      H/O colonoscopy          Allergies    Normodyne (Faint)    Intolerances        MEDICATIONS  (STANDING):  atorvastatin 80 milliGRAM(s) Oral at bedtime  ceFAZolin   IVPB 2000 milliGRAM(s) IV Intermittent every 8 hours  dextrose 50% Injectable 25 Gram(s) IV Push once  dextrose 50% Injectable 12.5 Gram(s) IV Push once  dextrose 50% Injectable 25 Gram(s) IV Push once  enoxaparin Injectable 40 milliGRAM(s) SubCutaneous every 24 hours  furosemide    Tablet 20 milliGRAM(s) Oral daily  gabapentin 600 milliGRAM(s) Oral four times a day  glucagon  Injectable 1 milliGRAM(s) IntraMuscular once  hydrochlorothiazide 25 milliGRAM(s) Oral daily  insulin glargine Injectable (LANTUS) 10 Unit(s) SubCutaneous at bedtime  insulin lispro (ADMELOG) corrective regimen sliding scale   SubCutaneous Before meals and at bedtime  insulin lispro Injectable (ADMELOG) 3 Unit(s) SubCutaneous three times a day before meals  lisinopril 40 milliGRAM(s) Oral daily  metoprolol tartrate 25 milliGRAM(s) Oral two times a day  pantoprazole    Tablet 40 milliGRAM(s) Oral before breakfast  saccharomyces boulardii 250 milliGRAM(s) Oral two times a day       Patient is a 62y old  Male who presents with a chief complaint of Toe wound, pre-surgical testing (20 Jun 2023 15:01)    Patient seen at bedside. Type:2 DX 10 years. known complications: DFU, neuropathy. No Endocrine/ PCP- [facility] Last seen_________.  Rx home: metformin. Glucometer checks- never was prescribed to use, diabetes education provided verbally and handouts. re: weight, diet, exercise, medical management. Needs- pen/meter teaching. will start to self-inject insulin inpatient.   Hx + ASCVD/stents, no CKD, no HF; No Hx DKA/HHS,   Rx to [facility] ([facility]) glucometer- insulin basal-bolus.     HPI:  62-year-old male w/ PMHx of CAD (s/p stents x2, 2019), bioprosthetic AV replacement with repair of ascending aorta, HTN, HLD, DM2, neuropathy, vertigo, GERD, lacunar infarcts, PAD (s/p right anterior tibial artery angioplasty) presents to the ED w/ right toe wound. Toe wound has been present for 5 years and he has been seeing wound care outpatient. Wound has been mostly dry, until 3 weeks ago when it opened and drained brownish fluid. He was seen in the ED and discharged with doxycycline. He f/u with wound care, Dr. Sen, a week later who switched his abx (patient does not recall the name). Patient was seen in the wound care office today for f/u and instructed to present to [facility] ED for IV antibiotics and pre-surgical testing for bone biopsy. Denies fevers, chills, SOB, CP. No wound drainage currently, and reports minimal pain. Denies LE swelling / calf pain.     ED Course:   Vitals: T 98.6F, HR 83, /82, RR 18, SpO2 99% RA  Labs: Hgb 10.6, aPTT 27, glucose 276, Alk Ph 151    ECG: LAD, LVH    R Foot XR: no fractures seen, no tissue edema (personal read)    Given: Vancomycin x1, Zosyn x1 (19 Jun 2023 13:12)      PAST MEDICAL & SURGICAL HISTORY:  Hypertension      AAA (abdominal aortic aneurysm)  dx 2012      GERD (gastroesophageal reflux disease)      Leg weakness      Aortic valve replaced      Cardiac tamponade      HTN (hypertension)      H/O aortic valve repair      S/P aneurysm repair      2019 novel coronavirus disease (COVID-19)  January 2021      Non-pressure chronic ulcer of other part of right foot with unspecified severity      Type 2 diabetes mellitus  Not on Insulin but complicated by peripheral neuropathy      H/O cardiac catheterization  Stent Placement X 2 (2019)      H/O vertigo      Aortic valve replaced      S/P AAA repair  Repaired 3/2015      S/P aortic aneurysm repair      H/O aortic valve repair      History of incision and drainage      H/O colonoscopy          Allergies    Normodyne (Faint)    Intolerances        MEDICATIONS  (STANDING):  atorvastatin 80 milliGRAM(s) Oral at bedtime  ceFAZolin   IVPB 2000 milliGRAM(s) IV Intermittent every 8 hours  dextrose 50% Injectable 25 Gram(s) IV Push once  dextrose 50% Injectable 12.5 Gram(s) IV Push once  dextrose 50% Injectable 25 Gram(s) IV Push once  enoxaparin Injectable 40 milliGRAM(s) SubCutaneous every 24 hours  furosemide    Tablet 20 milliGRAM(s) Oral daily  gabapentin 600 milliGRAM(s) Oral four times a day  glucagon  Injectable 1 milliGRAM(s) IntraMuscular once  hydrochlorothiazide 25 milliGRAM(s) Oral daily  insulin glargine Injectable (LANTUS) 10 Unit(s) SubCutaneous at bedtime  insulin lispro (ADMELOG) corrective regimen sliding scale   SubCutaneous Before meals and at bedtime  insulin lispro Injectable (ADMELOG) 3 Unit(s) SubCutaneous three times a day before meals  lisinopril 40 milliGRAM(s) Oral daily  metoprolol tartrate 25 milliGRAM(s) Oral two times a day  pantoprazole    Tablet 40 milliGRAM(s) Oral before breakfast  saccharomyces boulardii 250 milliGRAM(s) Oral two times a day

## 2023-06-21 NOTE — CONSULT NOTE ADULT - SUBJECTIVE AND OBJECTIVE BOX
Patient is a 62y old  Male who presents with a chief complaint of Toe wound, pre-surgical testing (21 Jun 2023 13:44)      Reason For Consult: dm2 uncontrolled    HPI:  62-year-old male w/ PMHx of CAD (s/p stents x2, 2019), bioprosthetic AV replacement with repair of ascending aorta, HTN, HLD, DM2, neuropathy, vertigo, GERD, lacunar infarcts, PAD (s/p right anterior tibial artery angioplasty) presents to the ED w/ right toe wound. Toe wound has been present for 5 years and he has been seeing wound care outpatient. Wound has been mostly dry, until 3 weeks ago when it opened and drained brownish fluid. He was seen in the ED and discharged with doxycycline. He f/u with wound care, Dr. Sen, a week later who switched his abx (patient does not recall the name). Patient was seen in the wound care office today for f/u and instructed to present to [facility] ED for IV antibiotics and pre-surgical testing for bone biopsy. Denies fevers, chills, SOB, CP. No wound drainage currently, and reports minimal pain. Denies LE swelling / calf pain.     ED Course:   Vitals: T 98.6F, HR 83, /82, RR 18, SpO2 99% RA  Labs: Hgb 10.6, aPTT 27, glucose 276, Alk Ph 151    ECG: LAD, LVH    R Foot XR: no fractures seen, no tissue edema (personal read)    Given: Vancomycin x1, Zosyn x1 (19 Jun 2023 13:12)      PAST MEDICAL & SURGICAL HISTORY:  Hypertension      AAA (abdominal aortic aneurysm)  dx 2012      GERD (gastroesophageal reflux disease)      Leg weakness      Aortic valve replaced      Cardiac tamponade      HTN (hypertension)      H/O aortic valve repair      S/P aneurysm repair      2019 novel coronavirus disease (COVID-19)  January 2021      Non-pressure chronic ulcer of other part of right foot with unspecified severity      Type 2 diabetes mellitus  Not on Insulin but complicated by peripheral neuropathy      H/O cardiac catheterization  Stent Placement X 2 (2019)      H/O vertigo      Aortic valve replaced      S/P AAA repair  Repaired 3/2015      S/P aortic aneurysm repair      H/O aortic valve repair      History of incision and drainage      H/O colonoscopy          FAMILY HISTORY:  Family history of breast cancer    Family history of hypertension    Family history of stroke    Family history of prostate cancer    Family history of COPD (chronic obstructive pulmonary disease) (Grandparent)    FH: HTN (hypertension)          Social History:    MEDICATIONS  (STANDING):  atorvastatin 80 milliGRAM(s) Oral at bedtime  bisacodyl 5 milliGRAM(s) Oral at bedtime  ceFAZolin   IVPB 2000 milliGRAM(s) IV Intermittent every 8 hours  dextrose 50% Injectable 12.5 Gram(s) IV Push once  dextrose 50% Injectable 25 Gram(s) IV Push once  dextrose 50% Injectable 25 Gram(s) IV Push once  enoxaparin Injectable 40 milliGRAM(s) SubCutaneous every 24 hours  furosemide    Tablet 20 milliGRAM(s) Oral daily  gabapentin 600 milliGRAM(s) Oral four times a day  glucagon  Injectable 1 milliGRAM(s) IntraMuscular once  hydrochlorothiazide 25 milliGRAM(s) Oral daily  insulin glargine Injectable (LANTUS) 10 Unit(s) SubCutaneous at bedtime  insulin lispro (ADMELOG) corrective regimen sliding scale   SubCutaneous Before meals and at bedtime  insulin lispro Injectable (ADMELOG) 3 Unit(s) SubCutaneous three times a day before meals  lisinopril 40 milliGRAM(s) Oral daily  metoprolol tartrate 25 milliGRAM(s) Oral two times a day  pantoprazole    Tablet 40 milliGRAM(s) Oral before breakfast  saccharomyces boulardii 250 milliGRAM(s) Oral two times a day    MEDICATIONS  (PRN):  acetaminophen     Tablet .. 650 milliGRAM(s) Oral every 6 hours PRN Temp greater or equal to 38C (100.4F), Mild Pain (1 - 3)  dextrose Oral Gel 15 Gram(s) Oral once PRN Blood Glucose LESS THAN 70 milliGRAM(s)/deciliter  meclizine 12.5 milliGRAM(s) Oral three times a day PRN vertigo        T(C): 36.6 (06-21-23 @ 04:24), Max: 36.6 (06-21-23 @ 04:24)  HR: 73 (06-21-23 @ 04:24) (65 - 73)  BP: 113/62 (06-21-23 @ 04:24) (113/62 - 164/88)  RR: 18 (06-21-23 @ 04:24) (18 - 18)  SpO2: 95% (06-21-23 @ 04:24) (94% - 95%)  Wt(kg): --    PHYSICAL EXAM:  GENERAL: NAD, well-groomed, well-developed  HEAD:  Atraumatic, Normocephalic  NECK: Supple, No JVD, Normal thyroid  CHEST/LUNG: Clear to percussion bilaterally; No rales, rhonchi, wheezing, or rubs  HEART: Regular rate and rhythm; No murmurs, rubs, or gallops  ABDOMEN: Soft, Nontender, Nondistended; Bowel sounds present  EXTREMITIES: le foot dsg intact    CAPILLARY BLOOD GLUCOSE      POCT Blood Glucose.: 207 mg/dL (21 Jun 2023 16:53)  POCT Blood Glucose.: 194 mg/dL (21 Jun 2023 12:04)  POCT Blood Glucose.: 200 mg/dL (21 Jun 2023 07:49)  POCT Blood Glucose.: 201 mg/dL (20 Jun 2023 21:19)                            11.0   7.02  )-----------( 194      ( 21 Jun 2023 06:31 )             37.4       CMP:  06-21 @ 06:31  SGPT --  Albumin --   Alk Phos --   Anion Gap 5   SGOT --   Total Bili --   BUN 13   Calcium Total 9.0   CO2 32   Chloride 97   Creatinine 1.20   eGFR if AA --   eGFR if non AA --   Glucose 236   Potassium 4.0   Protein --   Sodium 134      Thyroid Function Tests:      Diabetes Tests:       Radiology:

## 2023-06-21 NOTE — CONSULT NOTE ADULT - ASSESSMENT
Physical Exam:   Vital Signs Last 24 Hrs  T(C): 36.6 (21 Jun 2023 04:24), Max: 36.6 (20 Jun 2023 10:46)  T(F): 97.9 (21 Jun 2023 04:24), Max: 97.9 (20 Jun 2023 10:46)  HR: 73 (21 Jun 2023 04:24) (55 - 78)  BP: 113/62 (21 Jun 2023 04:24) (113/62 - 164/88)  BP(mean): --  RR: 18 (21 Jun 2023 04:24) (15 - 18)  SpO2: 95% (21 Jun 2023 04:24) (94% - 100%)    Parameters below as of 21 Jun 2023 04:24  Patient On (Oxygen Delivery Method): room air             CAPILLARY BLOOD GLUCOSE      POCT Blood Glucose.: 200 mg/dL (21 Jun 2023 07:49)  POCT Blood Glucose.: 201 mg/dL (20 Jun 2023 21:19)  POCT Blood Glucose.: 201 mg/dL (20 Jun 2023 16:49)  POCT Blood Glucose.: 192 mg/dL (20 Jun 2023 10:47)      Cholesterol, Serum: 113 mg/dL (05.19.21 @ 08:36)     HDL Cholesterol, Serum: 22 mg/dL (05.19.21 @ 08:36)     LDL Cholesterol Calculated: 66 mg/dL (05.19.21 @ 08:36)     DIET: CC  >50%

## 2023-06-21 NOTE — CONSULT NOTE ADULT - PROBLEM SELECTOR RECOMMENDATION 9
increase lantus 15 units daily  increase admelog 5 units 3x/day before meals  cont mod dose admelog corrective scale coverage qac/qha  cont cons cho diet  recommend cont lantus 15 units qam upon d/c   recommend start trulicity 1.5mg/week upon d/c  goal bg 100-180 in hosp setting increase lantus 15 units daily  increase admelog 5 units 3x/day before meals  cont mod dose admelog corrective scale coverage qac/qha  cont cons cho diet  recommend cont lantus 15 units qam upon d/c   recommend start trulicity 1.5mg/week upon d/c  resume metformin upon d/c  goal bg 100-180 in hosp setting

## 2023-06-21 NOTE — PHYSICAL THERAPY INITIAL EVALUATION ADULT - GAIT TRAINING, PT EVAL
Patient will ambulate x 100 feet with RW independently in 2 weeks with appropriate device in order to increase mobility at home

## 2023-06-21 NOTE — PROGRESS NOTE ADULT - SUBJECTIVE AND OBJECTIVE BOX
DUSTY STRICKLAND is a 62yMale , patient examined and chart reviewed.     INTERVAL HPI/ OVERNIGHT EVENTS:   Afebrile. No events.    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      ROS:  CONSTITUTIONAL:  Negative fever or chills  EYES:  Negative  blurry vision or double vision  CARDIOVASCULAR:  Negative for chest pain or palpitations  RESPIRATORY:  Negative for cough, wheezing, or SOB   GASTROINTESTINAL:  Negative for nausea, vomiting, diarrhea, constipation, or abdominal pain  GENITOURINARY:  Negative frequency, urgency or dysuria  NEUROLOGIC:  No headache, confusion, dizziness, lightheadedness  All other systems were reviewed and are negative     ALLERGIES  Normodyne (Faint)      Current inpatient medications :    ANTIBIOTICS/RELEVANT:  ceFAZolin   IVPB 2000 milliGRAM(s) IV Intermittent every 8 hours  saccharomyces boulardii 250 milliGRAM(s) Oral two times a day      acetaminophen     Tablet .. 650 milliGRAM(s) Oral every 6 hours PRN  atorvastatin 80 milliGRAM(s) Oral at bedtime  bisacodyl 5 milliGRAM(s) Oral at bedtime  dextrose 50% Injectable 25 Gram(s) IV Push once  dextrose 50% Injectable 25 Gram(s) IV Push once  dextrose 50% Injectable 12.5 Gram(s) IV Push once  dextrose Oral Gel 15 Gram(s) Oral once PRN  enoxaparin Injectable 40 milliGRAM(s) SubCutaneous every 24 hours  furosemide    Tablet 20 milliGRAM(s) Oral daily  gabapentin 600 milliGRAM(s) Oral four times a day  glucagon  Injectable 1 milliGRAM(s) IntraMuscular once  hydrochlorothiazide 25 milliGRAM(s) Oral daily  insulin glargine Injectable (LANTUS) 10 Unit(s) SubCutaneous at bedtime  insulin lispro (ADMELOG) corrective regimen sliding scale   SubCutaneous Before meals and at bedtime  insulin lispro Injectable (ADMELOG) 3 Unit(s) SubCutaneous three times a day before meals  lisinopril 40 milliGRAM(s) Oral daily  meclizine 12.5 milliGRAM(s) Oral three times a day PRN  metoprolol tartrate 25 milliGRAM(s) Oral two times a day  pantoprazole    Tablet 40 milliGRAM(s) Oral before breakfast      Objective:    06-20 @ 07:01  -  06-21 @ 07:00  --------------------------------------------------------  IN: 450 mL / OUT: 0 mL / NET: 450 mL      T(C): 36.6 (06-21-23 @ 04:24), Max: 36.6 (06-20-23 @ 13:51)  HR: 73 (06-21-23 @ 04:24) (65 - 78)  BP: 113/62 (06-21-23 @ 04:24) (113/62 - 164/88)  RR: 18 (06-21-23 @ 04:24) (18 - 18)  SpO2: 95% (06-21-23 @ 04:24) (94% - 97%)      Physical Exam:  General: well developed well nourished, in no acute distress  Neck: supple, trachea midline  Lungs: clear, no wheeze/rhonchi  Cardiovascular: regular rate and rhythm, S1 S2  Abdomen: soft, nontender,  bowel sounds normal  Neurological: alert and oriented x3  Skin: no rash  Extremities: Right foot drsg c/d/i      LABS:                        11.0   7.02  )-----------( 194      ( 21 Jun 2023 06:31 )             37.4       06-21    134<L>  |  97  |  13  ----------------------------<  236<H>  4.0   |  32<H>  |  1.20    Ca    9.0      21 Jun 2023 06:31    TPro  6.5  /  Alb  3.4  /  TBili  0.6  /  DBili  x   /  AST  16  /  ALT  32  /  AlkPhos  118  06-20      PT/INR - ( 20 Jun 2023 06:31 )   PT: 12.2 sec;   INR: 1.04 ratio         PTT - ( 20 Jun 2023 06:31 )  PTT:27.0 sec  Urinalysis Basic - ( 21 Jun 2023 06:31 )    Color: x / Appearance: x / SG: x / pH: x  Gluc: 236 mg/dL / Ketone: x  / Bili: x / Urobili: x   Blood: x / Protein: x / Nitrite: x   Leuk Esterase: x / RBC: x / WBC x   Sq Epi: x / Non Sq Epi: x / Bacteria: x    MICROBIOLOGY:    Culture - Tissue with Gram Stain (collected 20 Jun 2023 11:00)  Source: .Tissue Other, RIGHT FOOT HALLUX BONE CULTURE  Gram Stain (20 Jun 2023 21:18):    No polymorphonuclear cells seen per low power field    No organisms seen per oil power field  Preliminary Report (21 Jun 2023 11:43):    No growth    Culture - Tissue with Gram Stain (collected 20 Jun 2023 11:00)  Source: .Tissue Other, RIGHT FOOT HALLUX  Gram Stain (20 Jun 2023 21:18):    No polymorphonuclear cells seen per low power field    No organisms seen per oil power field  Preliminary Report (21 Jun 2023 11:42):    No growth    Culture - Blood (collected 19 Jun 2023 10:51)  Source: .Blood Blood-Peripheral  Preliminary Report (20 Jun 2023 17:01):    No growth to date.    Culture - Blood (collected 19 Jun 2023 10:45)  Source: .Blood Blood-Peripheral  Preliminary Report (20 Jun 2023 17:01):    No growth to date.    RADIOLOGY & ADDITIONAL STUDIES:    ACC: [id number] EXAM:  MR FOOT RT   ORDERED BY: STEPHANI SCHMIDT     PROCEDURE DATE:  06/12/2023          INTERPRETATION:  RIGHT FOOT MRI    CLINICAL INFORMATION: Right plantar hallux wound. Evaluation for   cellulitis.  TECHNIQUE: Multiplanar, multisequence MRI was obtained of the right foot.    COMPARISON: Right foot radiograph dated 5/28/2023    FINDINGS:    LIGAMENTS AND CAPSULAR STRUCTURES: Ligamentous instability structures are   intact.  MUSCLES AND TENDONS: Mild edema of the musculature within the superficial   and central compartment of the plantar foot. Visualized tendons are   intact.  CARTILAGE AND SUBCHONDRAL BONE: Chondral cystic change noted at the first   MTP joint. Joint spaces are otherwise preserved.  OSSEOUS ALIGNMENT: Mild hyperextension of the first MTP joint.  BONE MARROW: No significant bone marrow edema.  WEB SPACES: No neuromas or bursitis.  PERIPHERAL SOFT TISSUES: Small cutaneous defect on the plantar surface of   the hallux, inferior to the first interphalangeal joint, consistent with   provided history of ulcer.    IMPRESSION:  1.  No evidence of osteomyelitis.  2.  Small cutaneous defect on the plantar surface of the hallux with mild   surrounding soft tissue edema, consistent with provided history of ulcer.    Assessment :   61YO M PMHx of CAD (s/p stents x2, 2019), bioprosthetic AV replacement with repair of ascending aorta, HTN, HLD, DM2, neuropathy, vertigo, GERD, lacunar infarcts, PAD (s/p right anterior tibial artery angioplasty) admitted with nonhealing infected DM Right foot hallux plantar ulcer. Outpt culture with MSSA. MRI 6/12 neg for OM.  Sp Rainey arthroplasty and biopsy of bone of distal great toe 20-Jun-202  Uncontrolled DM    Plan :   Cont Ancef  Fu OR cultures and path  Trend temps and cbc  Wound care per podiatry  DM control   Pulm toileting  Increase activity    D/w podiatry    Continue with present regiment.  Appropriate use of antibiotics and adverse effects reviewed.      I have discussed the above plan of care with patient in detail. He expressed understanding of the  treatment plan . Risks, benefits and alternatives discussed in detail. I have asked if he has any questions or concerns and appropriately addressed them to the best of my ability .    > 35 minutes were spent in direct patient care reviewing notes, medications ,labs data/ imaging , discussion with multidisciplinary team.    Thank you for allowing me to participate in care of your patient .    Gaston Blanco MD  Infectious Disease  [phone]

## 2023-06-21 NOTE — CONSULT NOTE ADULT - PROBLEM SELECTOR RECOMMENDATION 9
Type  A1c % adm  Recommend endocrine-[facility]lman on consult  FU appt: TBA  DSC recommendations: return to home regimen and glucose monitoring  diabetes education provided  Diabetes support info and cell # [phone] given   Goal 100-180 mg/dL; 140-180 mg/dL in critical care areas Type 2  A1c 9.5% adm DFU  Recommend endocrine-Perlman on consult  Lantus 10 units daily; Lispro 3 units AC x3; moderate correction  Rx post DSC glucometer/ insulin regimen to be sent to [facility].  FU appt: TBA  DSC recommendations: return to home include insulin regimen and glucose monitoring  diabetes education provided verbally and handouts  Diabetes support info and cell # [phone] given   Goal 100-180 mg/dL; 140-180 mg/dL in critical care areas

## 2023-06-21 NOTE — PROGRESS NOTE ADULT - ASSESSMENT
62-year-old male with PMHx of CAD (1st Diag. AMBER 7/1/19, LPDA AMBER 7/3/19),bicuspid AV assoc with significant AS s/p bio AVR with repair of a dilated ascending aorta (3/16/15), cardiac tamponade (s/p pericardiocentesis 3/26/15), syncope (s/p ILR insertion 2/2/16), atypical chest discomfort, HTN, HLD, T2DM, vertigo, GERD, lacunar infarcts (incidentally detected on MRI), ventricular ectopy, mild carotid atherosclerosis, PAD,  (status post right anterior tibial artery angioplasty 10/5/2021) was sent by his podiatrist to ED for IV therapy and possible surgical intervention of Right great toe.    Cardiac Optimization, CAD, CHD, HTN  - s/p Rainey arthoplasty with Bx.  Tolerated procedure well  - Pain control per Primary  - Follow Podia recs    - H/o CAD with stent placement, VHD s/p bio AVR. No active cardiac condition.   - EKG: NSR. No acute ischemic changes noted   - Pharm stress (3/2023) normal from our office, follows with Dr. Sousa  - Continue ASA and statin    - Chest X-ray negative for acute process  - Patient euvolemic on examination with no overt signs of heart failure or cardiac ischemia.   - ECHO (5/12/2020) LVEF 55-60%.  No need to repeat      - BP was elevated, 6/20, likely reactive to pain.  Today, much improved  - Continue home meds: Lisinopril, Metoprolol, and HCTZ    - Monitor and replete Lytes. Keep K > 4 and Mg > 2  - Will continue to follow.  Will f/u with Dr. Sousa as outpatient    Rhiannon Couch DNP, NP-C, AGACNP-C  Cardiology   Call TEAMS

## 2023-06-21 NOTE — PROGRESS NOTE ADULT - TIME BILLING
pt seen and examine today see  above plan - 62-year-old male w/ PMHx of CAD (s/p stents x2, 2019), bioprosthetic AV replacement with repair of ascending aorta, HTN, HLD, DM2, neuropathy, vertigo, GERD, lacunar infarcts, PAD (s/p right anterior tibial artery angioplasty) presents to the ED w/ right toe wound. Patient admitted for pre-surgical testing for planned R hallux bone biopsy-  PAD S/P R AT angioplasty in 10/2021  and Normal pulse exam -  post  op day  1 continue iv abx ancef  2 gm q8hr  F/U path after OR.

## 2023-06-21 NOTE — PROGRESS NOTE ADULT - SUBJECTIVE AND OBJECTIVE BOX
[facility] -- Effie Sousa Pannella, Patel, Savella, Goodger  Office # [phone]    Follow Up:  HFmodEF, Hx CAD s/p PCI, Cardiac Optimization    Subjective/Observations: Seen ambulating in the room.  Comfortable on RA, no RICHTER noted.  Denies CP or palpitations.  Denies postop pain      REVIEW OF SYSTEMS: All other review of systems is negative unless indicated above  PAST MEDICAL & SURGICAL HISTORY:  Hypertension  AAA (abdominal aortic aneurysm)  dx 2012  GERD (gastroesophageal reflux disease)  Leg weakness  Aortic valve replaced  Cardiac tamponade  HTN (hypertension)  H/O aortic valve repair  S/P aneurysm repair   novel coronavirus disease (COVID-19)  2021  Non-pressure chronic ulcer of other part of right foot with unspecified severity  Type 2 diabetes mellitus  Not on Insulin but complicated by peripheral neuropathy  H/O cardiac catheterization  Stent Placement X 2 ()  H/O vertigo  Aortic valve replaced  S/P AAA repair  Repaired 3/2015  S/P aortic aneurysm repair  H/O aortic valve repair  History of incision and drainage  H/O colonoscopy    MEDICATIONS  (STANDING):  atorvastatin 80 milliGRAM(s) Oral at bedtime  ceFAZolin   IVPB 2000 milliGRAM(s) IV Intermittent every 8 hours  dextrose 50% Injectable 25 Gram(s) IV Push once  dextrose 50% Injectable 12.5 Gram(s) IV Push once  dextrose 50% Injectable 25 Gram(s) IV Push once  enoxaparin Injectable 40 milliGRAM(s) SubCutaneous every 24 hours  furosemide    Tablet 20 milliGRAM(s) Oral daily  gabapentin 600 milliGRAM(s) Oral four times a day  glucagon  Injectable 1 milliGRAM(s) IntraMuscular once  hydrochlorothiazide 25 milliGRAM(s) Oral daily  insulin glargine Injectable (LANTUS) 10 Unit(s) SubCutaneous at bedtime  insulin lispro (ADMELOG) corrective regimen sliding scale   SubCutaneous Before meals and at bedtime  insulin lispro Injectable (ADMELOG) 3 Unit(s) SubCutaneous three times a day before meals  lisinopril 40 milliGRAM(s) Oral daily  metoprolol tartrate 25 milliGRAM(s) Oral two times a day  pantoprazole    Tablet 40 milliGRAM(s) Oral before breakfast  saccharomyces boulardii 250 milliGRAM(s) Oral two times a day    MEDICATIONS  (PRN):  acetaminophen     Tablet .. 650 milliGRAM(s) Oral every 6 hours PRN Temp greater or equal to 38C (100.4F), Mild Pain (1 - 3)  dextrose Oral Gel 15 Gram(s) Oral once PRN Blood Glucose LESS THAN 70 milliGRAM(s)/deciliter  meclizine 12.5 milliGRAM(s) Oral three times a day PRN vertigo    Allergies    Normodyne (Faint)    Intolerances    Vital Signs Last 24 Hrs  T(C): 36.6 (2023 04:24), Max: 36.6 (2023 13:51)  T(F): 97.9 (2023 04:24), Max: 97.9 (2023 04:24)  HR: 73 (2023 04:24) (56 - 78)  BP: 113/62 (2023 04:24) (113/62 - 164/88)  BP(mean): --  RR: 18 (2023 04:24) (17 - 18)  SpO2: 95% (2023 04:24) (94% - 98%)    Parameters below as of 2023 04:24  Patient On (Oxygen Delivery Method): room air    I&O's Summary    2023 07:01  -  2023 07:00  --------------------------------------------------------  IN: 450 mL / OUT: 0 mL / NET: 450 mL    PHYSICAL EXAM:  TELE: Not on tele  Constitutional: NAD, awake and alert, well-developed  HEENT: Moist Mucous Membranes, Anicteric  Pulmonary: Non-labored, breath sounds are clear but diminished bilaterally, No wheezing, rales or rhonchi  Cardiovascular: Regular, S1 and S2, +murmurs, no rubs, gallops or clicks  Gastrointestinal: Bowel Sounds present, soft, nontender.   Lymph: No peripheral edema. No lymphadenopathy.  Skin: No visible rashes.  right foot ulcers with dressing dry and intact  Psych:  Mood & affect appropriate  LABS: All Labs Reviewed:                        11.0   7.02  )-----------( 194      ( 2023 06:31 )             37.4                         10.4   3.71  )-----------( 163      ( 2023 06:31 )             35.5                         10.6   4.82  )-----------( 181      ( 2023 10:51 )             36.1     2023 06:31    134    |  97     |  13     ----------------------------<  236    4.0     |  32     |  1.20   2023 06:31    137    |  106    |  13     ----------------------------<  259    4.3     |  26     |  1.10   2023 10:51    136    |  106    |  17     ----------------------------<  276    4.2     |  25     |  1.20     Ca    9.0        2023 06:31  Ca    8.8        2023 06:31  Ca    9.3        2023 10:51    TPro  6.5    /  Alb  3.4    /  TBili  0.6    /  DBili  x      /  AST  16     /  ALT  32     /  AlkPhos  118    2023 06:31  TPro  7.2    /  Alb  3.8    /  TBili  0.5    /  DBili  x      /  AST  21     /  ALT  38     /  AlkPhos  151    2023 10:51    PT/INR - ( 2023 06:31 )   PT: 12.2 sec;   INR: 1.04 ratio    PTT - ( 2023 06:31 )  PTT:27.0 sec    EXAM:  ECHO TRANSTHORACIC COMP W DOPP      PROCEDURE DATE:  2019   .  INTERPRETATION:  REPORT:    TRANSTHORACIC ECHOCARDIOGRAM REPORT    Patient Name:   DUSTY STRICKLAND Patient Location: Inpatient  Medical Rec #:  [id number]        Accession #:      [id number]  Account #:      [id number]        Height:           74.8 in 190.0 cm  YOB: 1960      Weight:           229.3 lb 104.00 kg  Patient Age:    58 years        BSA:              2.32 m²  Patient Gender: M   BP:               129/82 mmHg    Date of Exam:        2019 1:16:32 PM  Sonographer:         Janina Alberto  Referring Physician: J Luis Hill MD    Procedure:   2D Echo/Doppler/Color Doppler Complete and Echocardiogram   with               Definity Contrast.  Indications: Chest pain, unspecified - R07.9  Diagnosis:   Chest pain, unspecified - R07.9    2D AND M-MODE MEASUREMENTS (normal ranges within parentheses):  Left Ventricle:                  Normal   Aorta/Left Atrium:   Normal  IVSd (2D):              1.16 cm (0.7-1.1) LA Volume Index    35.3 ml/m²  LVPWd (2D):             1.38 cm (0.7-1.1)  LVIDd (2D):             4.72 cm (3.4-5.7)  Relative Wall Thickness  0.58    (<0.42)    LV SYSTOLIC FUNCTION BY 2D PLANIMETRY (MOD):  EF-A2C View: 27.9 % EF-Biplane: 46 %    LV DIASTOLIC FUNCTION:  MV Peak E: 0.82 m/s Decel Time: 220 msec  MV Peak A: 0.66 m/s  E/A Ratio: 1.23    SPECTRAL DOPPLER ANALYSIS (where applicable):  Mitral Valve:  MV P1/2 Time: 63.71 msec  MV Area, PHT: 3.45 cm²    Aortic Valve: AoV Max Jasvir: 2.19 m/s AoV Peak P.2 mmHg AoV Mean PG:   10.2 mmHg    AoV Area, Vmax:  AoV Area, VTI:  AoV Area, Vmn:  Ao VTI: 0.465  Tricuspid Valve and PA/RV Systolic Pressure: TR Max Velocity: 2.46 m/s RA   Pressure: 3 mmHg RVSP/PASP: 27.2 mmHg     PHYSICIAN INTERPRETATION:  Left Ventricle: Endocardial visualization was enhanced with intravenous   echo contrast. The left ventricular internal cavity size is normal.  Global LV systolic function was mildly decreased. Left ventricular   ejection fraction, by visual estimation, is 45 to 50%. The left   ventricular diastolic function could not be assessed in this study.     LV Wall Scoring:  The apical inferior segment is akinetic. The mid and apical anterior   wall, mid  inferior segment, and apex are hypokinetic.    Right Ventricle: Normal right ventricular size and function.  Left Atrium: The left atrium is normal in size.  Right Atrium: The right atrium is normal in size.  Pericardium: There is no evidence of pericardial effusion.  Mitral Valve: Structurally normal mitral valve, with normal leaflet   excursion. Mild mitral valve regurgitation is seen.  Tricuspid Valve: Structurally normal tricuspid valve, with normal leaflet   excursion. Mild tricuspid regurgitation is visualized.  Aortic Valve: A bioprosthetic AoV is visualized. Peak aortic valve   gradient is 19.2 mmHg and the mean gradient is 10.2 mmHg, which is   probably normal in the setting of a prosthetic aortic valve. Trivial   aortic valve regurgitation is seen.  Pulmonic Valve: Structurally normal pulmonic valve, with normal leaflet   excursion. Trace pulmonic valve regurgitation.  Pulmonary Artery: The main pulmonary artery is normal in size.  Venous: The inferior vena cava was normal sized, with respiratory size   variation greater than 50%.     Summary:   1. Left ventricular ejection fraction, by visual estimation, is 45 to   50%.   2. Mildly decreased global left ventricular systolic function.   3. Apical inferior segment, mid and apical anterior wall, mid inferior   segment, and apex are abnormal as described above.   4. The left ventricular diastolic function could not be assessed in this   study.   5. There is no evidence of pericardial effusion.   6. Bioprosthesis in the aortic position.   7. Endocardial visualization was enhanced with intravenous echo contrast.   8. Peak aortic valve gradient is 19.2 mmHg and the mean gradient is 10.2   mmHg, which is probably normal in the setting of a prosthetic aortic  valve.    MD Alfa Electronically signed on 2019 at 5:09:04 PM     *** Final ***  ERICK GOLDSTEIN MD  This document has been electronically signed. 2019  1:16PM      Ventricular Rate 79 BPM    Atrial Rate 79 BPM    P-R Interval 156 ms    QRS Duration 104 ms    Q-T Interval 378 ms    QTC Calculation(Bazett) 433 ms    P Axis -6 degrees    R Axis -39 degrees    T Axis 28 degrees    Diagnosis Line Normal sinus rhythm  Left axis deviation  Left ventricular hypertrophy ( R in aVL , Bernard product , Romhilt-Nicolas )    Confirmed by BRIDGER RIOS ([id number]) on 2023 6:35:08 PM

## 2023-06-21 NOTE — CAREGIVER ENGAGEMENT NOTE - CAREGIVER OUTREACH NOTES - FREE TEXT
met with Sarika at bed side. She explained that she is a nurse and is willing to assist her , patient  when he is transition to home.

## 2023-06-21 NOTE — CARE COORDINATION ASSESSMENT. - OTHER PERTINENT REFERRAL INFORMATION
met with patient  and his wife/caregiver Sarika at bedside to address consult dubose stairs within home. Patient and wife stated they have about 8-10 steps inside the house, but also has railing. Patient said he has no issues engaging the steps.

## 2023-06-21 NOTE — PROGRESS NOTE ADULT - SUBJECTIVE AND OBJECTIVE BOX
Patient is a 62y old  Male who presents with a chief complaint of Toe wound, pre-surgical testing (21 Jun 2023 13:32)    pt seen and examine today alert ambulatory  , feel pain  surgical site  when walk .  INTERVAL HPI/OVERNIGHT EVENTS:     T(C): 36.6 (06-21-23 @ 04:24), Max: 36.6 (06-20-23 @ 13:51)  HR: 73 (06-21-23 @ 04:24) (65 - 78)  BP: 113/62 (06-21-23 @ 04:24) (113/62 - 164/88)  RR: 18 (06-21-23 @ 04:24) (18 - 18)  SpO2: 95% (06-21-23 @ 04:24) (94% - 97%)  Wt(kg): --  I&O's Summary    20 Jun 2023 07:01  -  21 Jun 2023 07:00  --------------------------------------------------------  IN: 450 mL / OUT: 0 mL / NET: 450 mL    REVIEW OF SYSTEMS:  CONSTITUTIONAL: No fever, weight loss, or fatigue  EYES: No eye pain, visual disturbances, or discharge  ENMT:   No sinus or throat pain  NECK: No pain or stiffness  RESPIRATORY: No cough, wheezing, chills , No shortness of breath  CARDIOVASCULAR: No chest pain, palpitations, dizziness, or leg swelling  GASTROINTESTINAL: No abdominal or epigastric pain. No nausea, vomiting,  , No diarrhea or constipation.   GENITOURINARY: No dysuria, frequency, hematuria, or incontinence  NEUROLOGICAL: No headaches, memory loss, loss of strength, numbness, or tremors  SKIN: No itching, burning, rashes, or lesions   MUSCULOSKELETAL: No joint pain or swelling; No muscle, back, or extremity pain    PHYSICAL EXAM:  GENERAL: NAD,   HEAD:  Atraumatic, Normocephalic  EYES: EOMI, PERRLA, conjunctiva and sclera clear  ENMT:  Moist mucous membranes  NECK: Supple, No JVD  NERVOUS SYSTEM:  Alert & Oriented X3; Motor Strength 5/5 B/L upper and lower extremities; DTRs 2+ intact and symmetric  CHEST/LUNG:   percussion bilaterally; No rales, rhonchi, wheezing,  HEART: Regular rate and rhythm; No murmurs, no tachy   ABDOMEN: Soft, Nontender, Nondistended; Bowel sounds present  EXTREMITIES:  2+ Peripheral Pulses, No clubbing, cyanosis, or edema  SKIN: No rashes or lesions rt toe dressing +      MEDICATIONS  (STANDING):  atorvastatin 80 milliGRAM(s) Oral at bedtime  bisacodyl 5 milliGRAM(s) Oral at bedtime  ceFAZolin   IVPB 2000 milliGRAM(s) IV Intermittent every 8 hours  dextrose 50% Injectable 12.5 Gram(s) IV Push once  dextrose 50% Injectable 25 Gram(s) IV Push once  dextrose 50% Injectable 25 Gram(s) IV Push once  enoxaparin Injectable 40 milliGRAM(s) SubCutaneous every 24 hours  furosemide    Tablet 20 milliGRAM(s) Oral daily  gabapentin 600 milliGRAM(s) Oral four times a day  glucagon  Injectable 1 milliGRAM(s) IntraMuscular once  hydrochlorothiazide 25 milliGRAM(s) Oral daily  insulin glargine Injectable (LANTUS) 10 Unit(s) SubCutaneous at bedtime  insulin lispro (ADMELOG) corrective regimen sliding scale   SubCutaneous Before meals and at bedtime  insulin lispro Injectable (ADMELOG) 3 Unit(s) SubCutaneous three times a day before meals  lisinopril 40 milliGRAM(s) Oral daily  metoprolol tartrate 25 milliGRAM(s) Oral two times a day  pantoprazole    Tablet 40 milliGRAM(s) Oral before breakfast  saccharomyces boulardii 250 milliGRAM(s) Oral two times a day    MEDICATIONS  (PRN):  acetaminophen     Tablet .. 650 milliGRAM(s) Oral every 6 hours PRN Temp greater or equal to 38C (100.4F), Mild Pain (1 - 3)  dextrose Oral Gel 15 Gram(s) Oral once PRN Blood Glucose LESS THAN 70 milliGRAM(s)/deciliter  meclizine 12.5 milliGRAM(s) Oral three times a day PRN vertigo      LABS:                        11.0   7.02  )-----------( 194      ( 21 Jun 2023 06:31 )             37.4     06-21    134<L>  |  97  |  13  ----------------------------<  236<H>  4.0   |  32<H>  |  1.20    Ca    9.0      21 Jun 2023 06:31    TPro  6.5  /  Alb  3.4  /  TBili  0.6  /  DBili  x   /  AST  16  /  ALT  32  /  AlkPhos  118  06-20    PT/INR - ( 20 Jun 2023 06:31 )   PT: 12.2 sec;   INR: 1.04 ratio         PTT - ( 20 Jun 2023 06:31 )  PTT:27.0 sec  Urinalysis Basic - ( 21 Jun 2023 06:31 )    Color: x / Appearance: x / SG: x / pH: x  Gluc: 236 mg/dL / Ketone: x  / Bili: x / Urobili: x   Blood: x / Protein: x / Nitrite: x   Leuk Esterase: x / RBC: x / WBC x   Sq Epi: x / Non Sq Epi: x / Bacteria: x      CAPILLARY BLOOD GLUCOSE      POCT Blood Glucose.: 194 mg/dL (21 Jun 2023 12:04)  POCT Blood Glucose.: 200 mg/dL (21 Jun 2023 07:49)  POCT Blood Glucose.: 201 mg/dL (20 Jun 2023 21:19)  POCT Blood Glucose.: 201 mg/dL (20 Jun 2023 16:49)      06-20 @ 11:00   No growth  --  --  06-19 @ 10:51   No growth to date.  --  --  06-19 @ 10:45   No growth to date.  --  --          RADIOLOGY & ADDITIONAL TESTS:    Imaging Personally Reviewed:     no new test   Advance Directives:  full code     Palliative Care:  Appropriate

## 2023-06-21 NOTE — PHYSICAL THERAPY INITIAL EVALUATION ADULT - ADDITIONAL COMMENTS
Patient lives in apartment +[place] and stairs inside. Patient was independent in ADLs and ambulated independently without device, has RW

## 2023-06-21 NOTE — PHYSICAL THERAPY INITIAL EVALUATION ADULT - PERTINENT HX OF CURRENT PROBLEM, REHAB EVAL
62-year-old male w/ PMHx of CAD (s/p stents x2, 2019), bioprosthetic AV replacement with repair of ascending aorta, HTN, HLD, DM2, neuropathy, vertigo, GERD, lacunar infarcts, PAD (s/p right anterior tibial artery angioplasty) presents to the ED w/ right toe wound. Toe wound has been present for 5 years and he has been seeing wound care outpatient. Wound has been mostly dry, until 3 weeks ago when it opened and drained brownish fluid. He was seen in the ED and discharged with doxycycline. He f/u with wound care, Dr. Sen, a week later who switched his abx (patient does not recall the name). Patient was seen in the wound care office today for f/u and instructed to present to [facility] ED for IV antibiotics and pre-surgical testing for bone biopsy. Denies fevers, chills, SOB, CP. No wound drainage currently, and reports minimal pain. Denies LE swelling / calf pain.

## 2023-06-21 NOTE — PROGRESS NOTE ADULT - SUBJECTIVE AND OBJECTIVE BOX
62y year old Male seen at \[facility]\"" 1EAS 109 W1 s/p Rainey arthroplasty DOS 06/20/23.   Patient relates no overnight events and states that they are doing well at this time. Patient has been tolerating diet well without any complications.   Denies any fever, chills, nausea, vomiting, chest pain, shortness of breath, or calf pain at this time.    Allergies    Normodyne (Faint)    Intolerances        MEDICATIONS  (STANDING):  atorvastatin 80 milliGRAM(s) Oral at bedtime  bisacodyl 5 milliGRAM(s) Oral at bedtime  ceFAZolin   IVPB 2000 milliGRAM(s) IV Intermittent every 8 hours  dextrose 50% Injectable 12.5 Gram(s) IV Push once  dextrose 50% Injectable 25 Gram(s) IV Push once  dextrose 50% Injectable 25 Gram(s) IV Push once  enoxaparin Injectable 40 milliGRAM(s) SubCutaneous every 24 hours  furosemide    Tablet 20 milliGRAM(s) Oral daily  gabapentin 600 milliGRAM(s) Oral four times a day  glucagon  Injectable 1 milliGRAM(s) IntraMuscular once  hydrochlorothiazide 25 milliGRAM(s) Oral daily  insulin glargine Injectable (LANTUS) 10 Unit(s) SubCutaneous at bedtime  insulin lispro (ADMELOG) corrective regimen sliding scale   SubCutaneous Before meals and at bedtime  insulin lispro Injectable (ADMELOG) 3 Unit(s) SubCutaneous three times a day before meals  lisinopril 40 milliGRAM(s) Oral daily  metoprolol tartrate 25 milliGRAM(s) Oral two times a day  pantoprazole    Tablet 40 milliGRAM(s) Oral before breakfast  saccharomyces boulardii 250 milliGRAM(s) Oral two times a day    MEDICATIONS  (PRN):  acetaminophen     Tablet .. 650 milliGRAM(s) Oral every 6 hours PRN Temp greater or equal to 38C (100.4F), Mild Pain (1 - 3)  dextrose Oral Gel 15 Gram(s) Oral once PRN Blood Glucose LESS THAN 70 milliGRAM(s)/deciliter  meclizine 12.5 milliGRAM(s) Oral three times a day PRN vertigo      Vital Signs Last 24 Hrs  T(C): 36.6 (21 Jun 2023 04:24), Max: 36.6 (21 Jun 2023 04:24)  T(F): 97.9 (21 Jun 2023 04:24), Max: 97.9 (21 Jun 2023 04:24)  HR: 73 (21 Jun 2023 04:24) (65 - 73)  BP: 113/62 (21 Jun 2023 04:24) (113/62 - 164/88)  BP(mean): --  RR: 18 (21 Jun 2023 04:24) (18 - 18)  SpO2: 95% (21 Jun 2023 04:24) (94% - 95%)    Parameters below as of 21 Jun 2023 04:24  Patient On (Oxygen Delivery Method): room air          PHYSICAL EXAM:  Vascular: DP & PT faintly palpable bilaterally, Capillary refill 3 seconds, Digital hair absent bilaterally  Neurological: Light touch sensation diminished  bilaterally  Musculoskeletal: 5/5 strength in all quadrants bilaterally, AJ & STJ ROM intact  Dermatological: Incision site of the RIGHT foot noted with intact sutures on the dorsal and plantar aspect , no dehiscence, mild attila-incision erythema to the dorsal foot , no proximal streaking, no fluctuance, no malodor, no signs of infection at this time.    CBC Full  -  ( 21 Jun 2023 06:31 )  WBC Count : 7.02 K/uL  RBC Count : 4.81 M/uL  Hemoglobin : 11.0 g/dL  Hematocrit : 37.4 %  Platelet Count - Automated : 194 K/uL  Mean Cell Volume : 77.8 fl  Mean Cell Hemoglobin : 22.9 pg  Mean Cell Hemoglobin Concentration : 29.4 gm/dL  Auto Neutrophil # : 5.88 K/uL  Auto Lymphocyte # : 0.58 K/uL  Auto Monocyte # : 0.47 K/uL  Auto Eosinophil # : 0.04 K/uL  Auto Basophil # : 0.02 K/uL  Auto Neutrophil % : 83.7 %  Auto Lymphocyte % : 8.3 %  Auto Monocyte % : 6.7 %  Auto Eosinophil % : 0.6 %  Auto Basophil % : 0.3 %      06-21    134<L>  |  97  |  13  ----------------------------<  236<H>  4.0   |  32<H>  |  1.20    Ca    9.0      21 Jun 2023 06:31    TPro  6.5  /  Alb  3.4  /  TBili  0.6  /  DBili  x   /  AST  16  /  ALT  32  /  AlkPhos  118  06-20                          11.0   7.02  )-----------( 194      ( 21 Jun 2023 06:31 )             37.4       PT/INR - ( 20 Jun 2023 06:31 )   PT: 12.2 sec;   INR: 1.04 ratio         PTT - ( 20 Jun 2023 06:31 )  PTT:27.0 sec      Culture - Tissue with Gram Stain (collected 20 Jun 2023 11:00)  Source: .Tissue Other, RIGHT FOOT HALLUX BONE CULTURE  Gram Stain (20 Jun 2023 21:18):    No polymorphonuclear cells seen per low power field    No organisms seen per oil power field  Preliminary Report (21 Jun 2023 11:43):    No growth    Culture - Tissue with Gram Stain (collected 20 Jun 2023 11:00)  Source: .Tissue Other, RIGHT FOOT HALLUX  Gram Stain (20 Jun 2023 21:18):    No polymorphonuclear cells seen per low power field    No organisms seen per oil power field  Preliminary Report (21 Jun 2023 11:42):    No growth    Culture - Blood (collected 19 Jun 2023 10:51)  Source: .Blood Blood-Peripheral  Preliminary Report (20 Jun 2023 17:01):    No growth to date.    Culture - Blood (collected 19 Jun 2023 10:45)  Source: .Blood Blood-Peripheral  Preliminary Report (20 Jun 2023 17:01):    No growth to date.        Imaging: ----------     1 / 1 Charla To              Report date: 6/20/2023      View Order      (Report matches exam selected in Patient History pane)                     ACC: [id number] EXAM: XR FOOT COMP MIN 3 VIEWS RT ORDERED BY: STEPHANI SCHMIDT    PROCEDURE DATE: 06/20/2023        INTERPRETATION: foot    CLINICAL INFORMATION: Operative radiographs.    TECHNIQUE: AP and lateral RIGHT foot postoperative radiographs    FINDINGS:  Status post Rainey arthroplasty of the first proximal phalanx base.  Remaining osseous and joint structures of the RIGHT foot are radiographically intact.  .    IMPRESSION:    Postoperative findings as described above.  Please see operative report.    --- End of Report ---            CHARLA TO MD; Attending Radiologist  This document has been electronically signed. Jun 20 2023 7:45PM                                                         Notes

## 2023-06-21 NOTE — PATIENT CHOICE NOTE. - NSPTCHOICESTATE_GEN_ALL_CORE

## 2023-06-21 NOTE — CARE COORDINATION ASSESSMENT. - PRO ARRIVE FROM
Note Title:  Pediatric Outpatient Psychotherapy Progress Note      Name:  Bee Valle    MRN:  [id number]    :  2010    Age:  11 y.o.    Pediatrician:  Jana Pleitez M.D.    Date of Service:  21    Service Rendered:  Individual and Family Psychotherapy, 60 minutes via teledoc   Patient was presented for a telehealth consultation via secure and encrypted videoconferencing technology.   MOP consented     Persons in Attendance: Bee     Chief Complaint/ Reason for Appointment:   Chief Complaint   Patient presents with   • Anxiety   • Depression       Mental Status Exam:   Appearance:  Well groomed, good hygiene, appears stated age.   Behavior:  Pleasant, sociable, cooperative.   Mood:  Slightly depressed   Affect:  Appropriate to mood, normal range.   Speech/Language:  Normal rate, rhythm, and tone.   Sensorium:  Alert and oriented to person, place, time, and situation.   Memory and Cognition:  Within normal limits, no evidence of gross cognitive, intellectual, or memory impairments.   Thought Process/Thought Content:  Logical, linear, goal directed. Reality testing appears intact.    Insight/Judgment: Within normal limits.     Goals and objectives addressed: improve coping skills   Techniques and Interventions Used: CBT, psychoeducation    Issues Discussed:   Met with Pt for ongoing therapy session. Pt reports that she was depressed during hallow due to her friend having a party and not inviting her. Therapist validated this feeling for Pt and processed with Pt barriers to her talking to her friend about her disappointment. Pt notes that she often avoids these conflicts with her peers but is left feeling upset. Assisted Pt with identifying steps to have the conversation with her peers and discuss her feelings in a positive manner.   Therapist processed with PT recent conflict in her family and GMOP stating that she was going to throw them out. Assisted Pt with recognizing her GMOP's  tendency to respond to situations emotionally to further assist her with depersonalizing her grandmother's negative response to things. Pt also reports recent texts with her father and reported feeling sad that he appeared sad. Therapist again assisted Pt with decreasing her tendency to take on the feelings of the adults in her family and assisted Pt with setting boundaries with this.     Progress towards goals: Patient responded positively to interventions   Risk Assessment:  Bee reports that on Halloween she was having thoughts of self-harm due to feeling upset about her friend. Pt denies plan or intent with this.     Diagnostic Impressions:    1. Bipolar affective disorder, currently depressed, mild (HCC)       Treatment Recommendations and Plan:    Continue individual therapy to improve positive coping skills     The above diagnostic impressions, recommendations, and treatment plan were discussed with and agreed upon by Bee, and his/her caregivers. Care will be coordinated with Bee's healthcare team, as appropriate.      Iris Demarco PsyD   Licensed Psychologist, [place] # [id number]  [facility], Behavioral Health      home

## 2023-06-21 NOTE — CARE COORDINATION ASSESSMENT. - OTHER PERTINENT DISCHARGE PLANNING INFORMATION:
met with patient and his wife/caregiver at bedside to introduce self. Role of case management at the hospital explained with understanding. Patient stated that he is independent without assistive devices. He works, he drives. Patient admitted for a chronic wound, went to the OR yesterday with podiatry. He had  Rainey arthroplasty and Biopsy of bone of distal great toe done. Biopsy result is pending. Transition resources and Choice for post acute services given. Patient and wife understand that  will remain available.

## 2023-06-22 ENCOUNTER — TRANSCRIPTION ENCOUNTER (OUTPATIENT)
Age: 63
End: 2023-06-22

## 2023-06-22 LAB
ANION GAP SERPL CALC-SCNC: 8 MMOL/L — SIGNIFICANT CHANGE UP (ref 5–17)
BUN SERPL-MCNC: 24 MG/DL — HIGH (ref 7–23)
CALCIUM SERPL-MCNC: 8.8 MG/DL — SIGNIFICANT CHANGE UP (ref 8.5–10.1)
CHLORIDE SERPL-SCNC: 96 MMOL/L — SIGNIFICANT CHANGE UP (ref 96–108)
CO2 SERPL-SCNC: 28 MMOL/L — SIGNIFICANT CHANGE UP (ref 22–31)
CREAT SERPL-MCNC: 1.7 MG/DL — HIGH (ref 0.5–1.3)
EGFR: 44 ML/MIN/1.73M2 — LOW
GLUCOSE SERPL-MCNC: 238 MG/DL — HIGH (ref 70–99)
HCT VFR BLD CALC: 36.1 % — LOW (ref 39–50)
HGB BLD-MCNC: 10.9 G/DL — LOW (ref 13–17)
MCHC RBC-ENTMCNC: 23.1 PG — LOW (ref 27–34)
MCHC RBC-ENTMCNC: 30.2 GM/DL — LOW (ref 32–36)
MCV RBC AUTO: 76.6 FL — LOW (ref 80–100)
NRBC # BLD: 0 /100 WBCS — SIGNIFICANT CHANGE UP (ref 0–0)
PLATELET # BLD AUTO: 212 K/UL — SIGNIFICANT CHANGE UP (ref 150–400)
POTASSIUM SERPL-MCNC: 3.9 MMOL/L — SIGNIFICANT CHANGE UP (ref 3.5–5.3)
POTASSIUM SERPL-SCNC: 3.9 MMOL/L — SIGNIFICANT CHANGE UP (ref 3.5–5.3)
RBC # BLD: 4.71 M/UL — SIGNIFICANT CHANGE UP (ref 4.2–5.8)
RBC # FLD: 16 % — HIGH (ref 10.3–14.5)
SODIUM SERPL-SCNC: 132 MMOL/L — LOW (ref 135–145)
WBC # BLD: 7.91 K/UL — SIGNIFICANT CHANGE UP (ref 3.8–10.5)
WBC # FLD AUTO: 7.91 K/UL — SIGNIFICANT CHANGE UP (ref 3.8–10.5)

## 2023-06-22 PROCEDURE — 99233 SBSQ HOSP IP/OBS HIGH 50: CPT | Mod: GC

## 2023-06-22 PROCEDURE — 99232 SBSQ HOSP IP/OBS MODERATE 35: CPT

## 2023-06-22 PROCEDURE — 99231 SBSQ HOSP IP/OBS SF/LOW 25: CPT

## 2023-06-22 PROCEDURE — 93010 ELECTROCARDIOGRAM REPORT: CPT

## 2023-06-22 RX ORDER — ONDANSETRON 8 MG/1
4 TABLET, FILM COATED ORAL EVERY 6 HOURS
Refills: 0 | Status: DISCONTINUED | OUTPATIENT
Start: 2023-06-22 | End: 2023-06-24

## 2023-06-22 RX ORDER — POLYETHYLENE GLYCOL 3350 17 G/17G
17 POWDER, FOR SOLUTION ORAL DAILY
Refills: 0 | Status: DISCONTINUED | OUTPATIENT
Start: 2023-06-22 | End: 2023-06-24

## 2023-06-22 RX ORDER — FUROSEMIDE 40 MG
20 TABLET ORAL DAILY
Refills: 0 | Status: DISCONTINUED | OUTPATIENT
Start: 2023-06-22 | End: 2023-06-22

## 2023-06-22 RX ORDER — INSULIN GLARGINE 100 [IU]/ML
15 INJECTION, SOLUTION SUBCUTANEOUS AT BEDTIME
Refills: 0 | Status: DISCONTINUED | OUTPATIENT
Start: 2023-06-22 | End: 2023-06-24

## 2023-06-22 RX ORDER — DULAGLUTIDE 4.5 MG/.5ML
0.75 INJECTION, SOLUTION SUBCUTANEOUS
Qty: 4 | Refills: 3
Start: 2023-06-22 | End: 2023-10-19

## 2023-06-22 RX ORDER — INSULIN LISPRO 100/ML
5 VIAL (ML) SUBCUTANEOUS
Refills: 0 | Status: DISCONTINUED | OUTPATIENT
Start: 2023-06-22 | End: 2023-06-24

## 2023-06-22 RX ORDER — SODIUM CHLORIDE 9 MG/ML
1000 INJECTION INTRAMUSCULAR; INTRAVENOUS; SUBCUTANEOUS
Refills: 0 | Status: DISCONTINUED | OUTPATIENT
Start: 2023-06-22 | End: 2023-06-24

## 2023-06-22 RX ORDER — SENNA PLUS 8.6 MG/1
2 TABLET ORAL AT BEDTIME
Refills: 0 | Status: DISCONTINUED | OUTPATIENT
Start: 2023-06-22 | End: 2023-06-24

## 2023-06-22 RX ORDER — FOLIC ACID 0.8 MG
1 TABLET ORAL DAILY
Refills: 0 | Status: DISCONTINUED | OUTPATIENT
Start: 2023-06-22 | End: 2023-06-24

## 2023-06-22 RX ORDER — SODIUM CHLORIDE 9 MG/ML
1000 INJECTION, SOLUTION INTRAVENOUS
Refills: 0 | Status: DISCONTINUED | OUTPATIENT
Start: 2023-06-22 | End: 2023-06-24

## 2023-06-22 RX ORDER — ACETAMINOPHEN 500 MG
650 TABLET ORAL ONCE
Refills: 0 | Status: COMPLETED | OUTPATIENT
Start: 2023-06-22 | End: 2023-06-22

## 2023-06-22 RX ORDER — THIAMINE MONONITRATE (VIT B1) 100 MG
100 TABLET ORAL DAILY
Refills: 0 | Status: DISCONTINUED | OUTPATIENT
Start: 2023-06-22 | End: 2023-06-24

## 2023-06-22 RX ORDER — ONDANSETRON 8 MG/1
4 TABLET, FILM COATED ORAL ONCE
Refills: 0 | Status: COMPLETED | OUTPATIENT
Start: 2023-06-22 | End: 2023-06-22

## 2023-06-22 RX ORDER — SENNA PLUS 8.6 MG/1
2 TABLET ORAL ONCE
Refills: 0 | Status: COMPLETED | OUTPATIENT
Start: 2023-06-22 | End: 2023-06-22

## 2023-06-22 RX ORDER — LISINOPRIL 2.5 MG/1
20 TABLET ORAL DAILY
Refills: 0 | Status: DISCONTINUED | OUTPATIENT
Start: 2023-06-22 | End: 2023-06-24

## 2023-06-22 RX ORDER — INSULIN GLARGINE 100 [IU]/ML
15 INJECTION, SOLUTION SUBCUTANEOUS
Qty: 5 | Refills: 3
Start: 2023-06-22 | End: 2023-10-19

## 2023-06-22 RX ORDER — ISOPROPYL ALCOHOL, BENZOCAINE .7; .06 ML/ML; ML/ML
0 SWAB TOPICAL
Qty: 100 | Refills: 1
Start: 2023-06-22

## 2023-06-22 RX ADMIN — Medication 25 MILLIGRAM(S): at 17:02

## 2023-06-22 RX ADMIN — Medication 1 MILLIGRAM(S): at 15:09

## 2023-06-22 RX ADMIN — Medication 650 MILLIGRAM(S): at 11:38

## 2023-06-22 RX ADMIN — ENOXAPARIN SODIUM 40 MILLIGRAM(S): 100 INJECTION SUBCUTANEOUS at 06:39

## 2023-06-22 RX ADMIN — Medication 100 MILLIGRAM(S): at 21:47

## 2023-06-22 RX ADMIN — Medication 650 MILLIGRAM(S): at 06:56

## 2023-06-22 RX ADMIN — Medication 2: at 17:03

## 2023-06-22 RX ADMIN — SODIUM CHLORIDE 70 MILLILITER(S): 9 INJECTION INTRAMUSCULAR; INTRAVENOUS; SUBCUTANEOUS at 14:16

## 2023-06-22 RX ADMIN — GABAPENTIN 600 MILLIGRAM(S): 400 CAPSULE ORAL at 17:05

## 2023-06-22 RX ADMIN — Medication 4: at 08:11

## 2023-06-22 RX ADMIN — Medication 3 UNIT(S): at 12:04

## 2023-06-22 RX ADMIN — Medication 4: at 21:46

## 2023-06-22 RX ADMIN — Medication 650 MILLIGRAM(S): at 02:22

## 2023-06-22 RX ADMIN — Medication 650 MILLIGRAM(S): at 12:11

## 2023-06-22 RX ADMIN — INSULIN GLARGINE 15 UNIT(S): 100 INJECTION, SOLUTION SUBCUTANEOUS at 21:45

## 2023-06-22 RX ADMIN — ONDANSETRON 4 MILLIGRAM(S): 8 TABLET, FILM COATED ORAL at 02:21

## 2023-06-22 RX ADMIN — Medication 2: at 12:03

## 2023-06-22 RX ADMIN — Medication 250 MILLIGRAM(S): at 17:03

## 2023-06-22 RX ADMIN — Medication 3 UNIT(S): at 08:11

## 2023-06-22 RX ADMIN — SENNA PLUS 2 TABLET(S): 8.6 TABLET ORAL at 11:38

## 2023-06-22 RX ADMIN — Medication 100 MILLIGRAM(S): at 14:20

## 2023-06-22 RX ADMIN — Medication 100 MILLIGRAM(S): at 15:08

## 2023-06-22 RX ADMIN — GABAPENTIN 600 MILLIGRAM(S): 400 CAPSULE ORAL at 14:25

## 2023-06-22 RX ADMIN — Medication 650 MILLIGRAM(S): at 01:22

## 2023-06-22 RX ADMIN — Medication 5 UNIT(S): at 17:04

## 2023-06-22 RX ADMIN — Medication 650 MILLIGRAM(S): at 07:56

## 2023-06-22 RX ADMIN — Medication 650 MILLIGRAM(S): at 19:03

## 2023-06-22 RX ADMIN — ATORVASTATIN CALCIUM 80 MILLIGRAM(S): 80 TABLET, FILM COATED ORAL at 21:46

## 2023-06-22 RX ADMIN — ONDANSETRON 4 MILLIGRAM(S): 8 TABLET, FILM COATED ORAL at 12:04

## 2023-06-22 RX ADMIN — Medication 650 MILLIGRAM(S): at 18:33

## 2023-06-22 RX ADMIN — POLYETHYLENE GLYCOL 3350 17 GRAM(S): 17 POWDER, FOR SOLUTION ORAL at 07:08

## 2023-06-22 NOTE — CONSULT NOTE ADULT - SUBJECTIVE AND OBJECTIVE BOX
Date/Time Patient Seen:  		  Referring MD:   Data Reviewed	       Patient is a 62y old  Male who presents with a chief complaint of Toe wound, pre-surgical testing (22 Jun 2023 16:04)      Subjective/HPI  in bed  seen and examined  vs noted  labs reviewed  h and p reviewed  er provider note reviewed  alert  verbal  anxious  drinks BEER    lives at home  retired     Reason for Admission: Toe wound, pre-surgical testing  History of Present Illness:   62-year-old male w/ PMHx of CAD (s/p stents x2, 2019), bioprosthetic AV replacement with repair of ascending aorta, HTN, HLD, DM2, neuropathy, vertigo, GERD, lacunar infarcts, PAD (s/p right anterior tibial artery angioplasty) presents to the ED w/ right toe wound. Toe wound has been present for 5 years and he has been seeing wound care outpatient. Wound has been mostly dry, until 3 weeks ago when it opened and drained brownish fluid. He was seen in the ED and discharged with doxycycline. He f/u with wound care, Dr. Sen, a week later who switched his abx (patient does not recall the name). Patient was seen in the wound care office today for f/u and instructed to present to [facility] ED for IV antibiotics and pre-surgical testing for bone biopsy. Denies fevers, chills, SOB, CP. No wound drainage currently, and reports minimal pain. Denies LE swelling / calf pain.   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     FAMILY HISTORY:  Family history of breast cancer  Family history of hypertension  Family history of prostate cancer  Family history of stroke  FH: HTN (hypertension)    Grandparent  Still living? Unknown  Family history of COPD (chronic obstructive pulmonary disease), Age at diagnosis: Age Unknown.     Social History:  · Substance use	Yes  · Alcohol use	2-3 times / week  · Substance use	denies  · Tobacco use	Former smoker, quit >40 years ago  · Social History (marital status, living situation, occupation, and sexual history)	Lives: at home  Ambulation: independent   ADLs: independent     Tobacco Screening:  · Core Measure Site	Yes  · Has the patient used tobacco in the past 30 days?	No    Risk Assessment:    Present on Admission:  Deep Venous Thrombosis	no  Pulmonary Embolus	no     HIV Screening:  · In accordance with [place] State law, we offer every patient who comes to our ED an HIV test. Would you like to be tested today?	Opt out        Medication list         MEDICATIONS  (STANDING):  atorvastatin 80 milliGRAM(s) Oral at bedtime  bisacodyl 5 milliGRAM(s) Oral at bedtime  ceFAZolin   IVPB 2000 milliGRAM(s) IV Intermittent every 8 hours  dextrose 50% Injectable 25 Gram(s) IV Push once  dextrose 50% Injectable 12.5 Gram(s) IV Push once  dextrose 50% Injectable 25 Gram(s) IV Push once  enoxaparin Injectable 40 milliGRAM(s) SubCutaneous every 24 hours  folic acid Injectable 1 milliGRAM(s) IV Push daily  gabapentin 600 milliGRAM(s) Oral four times a day  glucagon  Injectable 1 milliGRAM(s) IntraMuscular once  insulin glargine Injectable (LANTUS) 15 Unit(s) SubCutaneous at bedtime  insulin lispro (ADMELOG) corrective regimen sliding scale   SubCutaneous Before meals and at bedtime  insulin lispro Injectable (ADMELOG) 5 Unit(s) SubCutaneous three times a day before meals  lisinopril 20 milliGRAM(s) Oral daily  metoprolol tartrate 25 milliGRAM(s) Oral two times a day  pantoprazole    Tablet 40 milliGRAM(s) Oral before breakfast  polyethylene glycol 3350 17 Gram(s) Oral daily  saccharomyces boulardii 250 milliGRAM(s) Oral two times a day  senna 2 Tablet(s) Oral at bedtime  sodium chloride 0.9% 1000 milliLiter(s) (75 mL/Hr) IV Continuous <Continuous>  sodium chloride 0.9%. 1000 milliLiter(s) (70 mL/Hr) IV Continuous <Continuous>  thiamine Injectable 100 milliGRAM(s) IV Push daily    MEDICATIONS  (PRN):  acetaminophen     Tablet .. 650 milliGRAM(s) Oral every 6 hours PRN Temp greater or equal to 38C (100.4F), Mild Pain (1 - 3)  chlordiazePOXIDE 25 milliGRAM(s) Oral every 6 hours PRN Symptom-triggered: each CIWA -Ar score 8 or GREATER  dextrose Oral Gel 15 Gram(s) Oral once PRN Blood Glucose LESS THAN 70 milliGRAM(s)/deciliter  meclizine 12.5 milliGRAM(s) Oral three times a day PRN vertigo  ondansetron Injectable 4 milliGRAM(s) IV Push every 6 hours PRN Nausea and/or Vomiting         Vitals log        ICU Vital Signs Last 24 Hrs  T(C): 36.3 (22 Jun 2023 13:14), Max: 36.7 (22 Jun 2023 05:13)  T(F): 97.4 (22 Jun 2023 13:14), Max: 98 (22 Jun 2023 05:13)  HR: 83 (22 Jun 2023 14:21) (70 - 84)  BP: 157/88 (22 Jun 2023 14:21) (107/70 - 157/88)  BP(mean): --  ABP: --  ABP(mean): --  RR: 18 (22 Jun 2023 13:14) (18 - 18)  SpO2: 96% (22 Jun 2023 14:21) (91% - 98%)    O2 Parameters below as of 22 Jun 2023 13:14  Patient On (Oxygen Delivery Method): room air                 Input and Output:  I&O's Detail      Lab Data                        10.9   7.91  )-----------( 212      ( 22 Jun 2023 07:09 )             36.1     06-22    132<L>  |  96  |  24<H>  ----------------------------<  238<H>  3.9   |  28  |  1.70<H>    Ca    8.8      22 Jun 2023 07:09              Review of Systems	  anxious      Objective     Physical Examination  heart s1s2  lung dc BS        Pertinent Lab findings & Imaging      Spenser:  NO   Adequate UO     I&O's Detail           Discussed with:     Cultures:	        Radiology          Ventricular Rate 82 BPM    Atrial Rate 82 BPM    P-R Interval 154 ms    QRS Duration 102 ms    Q-T Interval 388 ms    QTC Calculation(Bazett) 453 ms    P Axis 8 degrees    R Axis -38 degrees    T Axis 41 degrees    Diagnosis Line Normal sinus rhythm  Left axis deviation  Voltage criteria for left ventricular hypertrophy  Confirmed by BRIDGER RIOS ([id number]) on 6/22/2023 4:14:22 PM

## 2023-06-22 NOTE — PROGRESS NOTE ADULT - SUBJECTIVE AND OBJECTIVE BOX
[facility] -- Effie Sousa Pannella, Patel, Savella, Goodger  Office # [phone]    Follow Up:      Subjective/Observations:     REVIEW OF SYSTEMS: All other review of systems is negative unless indicated above  PAST MEDICAL & SURGICAL HISTORY:  Hypertension      AAA (abdominal aortic aneurysm)  dx 2012      GERD (gastroesophageal reflux disease)      Leg weakness      Aortic valve replaced      Cardiac tamponade      HTN (hypertension)      H/O aortic valve repair      S/P aneurysm repair      2019 novel coronavirus disease (COVID-19)  January 2021      Non-pressure chronic ulcer of other part of right foot with unspecified severity      Type 2 diabetes mellitus  Not on Insulin but complicated by peripheral neuropathy      H/O cardiac catheterization  Stent Placement X 2 (2019)      H/O vertigo      Aortic valve replaced      S/P AAA repair  Repaired 3/2015      S/P aortic aneurysm repair      H/O aortic valve repair      History of incision and drainage      H/O colonoscopy        MEDICATIONS  (STANDING):  atorvastatin 80 milliGRAM(s) Oral at bedtime  bisacodyl 5 milliGRAM(s) Oral at bedtime  ceFAZolin   IVPB 2000 milliGRAM(s) IV Intermittent every 8 hours  dextrose 50% Injectable 25 Gram(s) IV Push once  dextrose 50% Injectable 25 Gram(s) IV Push once  dextrose 50% Injectable 12.5 Gram(s) IV Push once  enoxaparin Injectable 40 milliGRAM(s) SubCutaneous every 24 hours  furosemide    Tablet 20 milliGRAM(s) Oral daily  gabapentin 600 milliGRAM(s) Oral four times a day  glucagon  Injectable 1 milliGRAM(s) IntraMuscular once  hydrochlorothiazide 25 milliGRAM(s) Oral daily  insulin glargine Injectable (LANTUS) 10 Unit(s) SubCutaneous at bedtime  insulin lispro (ADMELOG) corrective regimen sliding scale   SubCutaneous Before meals and at bedtime  insulin lispro Injectable (ADMELOG) 3 Unit(s) SubCutaneous three times a day before meals  lisinopril 40 milliGRAM(s) Oral daily  metoprolol tartrate 25 milliGRAM(s) Oral two times a day  pantoprazole    Tablet 40 milliGRAM(s) Oral before breakfast  polyethylene glycol 3350 17 Gram(s) Oral daily  saccharomyces boulardii 250 milliGRAM(s) Oral two times a day  senna 2 Tablet(s) Oral at bedtime    MEDICATIONS  (PRN):  acetaminophen     Tablet .. 650 milliGRAM(s) Oral every 6 hours PRN Temp greater or equal to 38C (100.4F), Mild Pain (1 - 3)  dextrose Oral Gel 15 Gram(s) Oral once PRN Blood Glucose LESS THAN 70 milliGRAM(s)/deciliter  meclizine 12.5 milliGRAM(s) Oral three times a day PRN vertigo    Allergies    Normodyne (Faint)    Intolerances      Vital Signs Last 24 Hrs  T(C): 36.4 (22 Jun 2023 06:29), Max: 36.7 (22 Jun 2023 05:13)  T(F): 97.6 (22 Jun 2023 06:29), Max: 98 (22 Jun 2023 05:13)  HR: 75 (22 Jun 2023 06:29) (70 - 80)  BP: 116/74 (22 Jun 2023 06:29) (107/70 - 142/80)  BP(mean): --  RR: 18 (22 Jun 2023 06:29) (18 - 18)  SpO2: 92% (22 Jun 2023 06:29) (91% - 98%)    Parameters below as of 22 Jun 2023 06:29  Patient On (Oxygen Delivery Method): room air      I&O's Summary      PHYSICAL EXAM:  TELE:   Constitutional: NAD, awake and alert, well-developed  HEENT: Moist Mucous Membranes, Anicteric  Pulmonary: Non-labored, breath sounds are clear bilaterally, No wheezing, rales or rhonchi  Cardiovascular: Regular, S1 and S2, No murmurs, rubs, gallops or clicks  Gastrointestinal: Bowel Sounds present, soft, nontender.   Lymph: No peripheral edema. No lymphadenopathy.  Skin: No visible rashes or ulcers.  Psych:  Mood & affect appropriate  LABS: All Labs Reviewed:                        10.9   7.91  )-----------( 212      ( 22 Jun 2023 07:09 )             36.1                         11.0   7.02  )-----------( 194      ( 21 Jun 2023 06:31 )             37.4                         10.4   3.71  )-----------( 163      ( 20 Jun 2023 06:31 )             35.5     22 Jun 2023 07:09    132    |  96     |  24     ----------------------------<  238    3.9     |  28     |  1.70   21 Jun 2023 06:31    134    |  97     |  13     ----------------------------<  236    4.0     |  32     |  1.20   20 Jun 2023 06:31    137    |  106    |  13     ----------------------------<  259    4.3     |  26     |  1.10     Ca    8.8        22 Jun 2023 07:09  Ca    9.0        21 Jun 2023 06:31  Ca    8.8        20 Jun 2023 06:31    TPro  6.5    /  Alb  3.4    /  TBili  0.6    /  DBili  x      /  AST  16     /  ALT  32     /  AlkPhos  118    20 Jun 2023 06:31             Rhiannon Couch DNP, NP-C, AGACNP-C  Cardiology   Spectra #[phone]      [facility] -- Effie Sousa Pannella, Patel, Savella, Goodger  Office # [phone]    Follow Up:  HFmodEF, Hx CAD s/p PCI, Cardiac Optimization    Subjective/Observations: Asleep but arousable to name-calling.  Comfortable on RA.  Denies SOB or RICHTER.  Denies CP or palpitations.  Overnight events noted.  No further c/o dizziness    REVIEW OF SYSTEMS: All other review of systems is negative unless indicated above  PAST MEDICAL & SURGICAL HISTORY:  Hypertension  AAA (abdominal aortic aneurysm)  dx 2012  GERD (gastroesophageal reflux disease)  Leg weakness  Aortic valve replaced  Cardiac tamponade  HTN (hypertension)  H/O aortic valve repair  S/P aneurysm repair   novel coronavirus disease (COVID-19)  2021  Non-pressure chronic ulcer of other part of right foot with unspecified severity  Type 2 diabetes mellitus  Not on Insulin but complicated by peripheral neuropathy  H/O cardiac catheterization  Stent Placement X 2 ()  H/O vertigo  Aortic valve replaced  S/P AAA repair  Repaired 3/2015  S/P aortic aneurysm repair  H/O aortic valve repair  History of incision and drainage  H/O colonoscopy    MEDICATIONS  (STANDING):  atorvastatin 80 milliGRAM(s) Oral at bedtime  bisacodyl 5 milliGRAM(s) Oral at bedtime  ceFAZolin   IVPB 2000 milliGRAM(s) IV Intermittent every 8 hours  dextrose 50% Injectable 25 Gram(s) IV Push once  dextrose 50% Injectable 25 Gram(s) IV Push once  dextrose 50% Injectable 12.5 Gram(s) IV Push once  enoxaparin Injectable 40 milliGRAM(s) SubCutaneous every 24 hours  furosemide    Tablet 20 milliGRAM(s) Oral daily  gabapentin 600 milliGRAM(s) Oral four times a day  glucagon  Injectable 1 milliGRAM(s) IntraMuscular once  hydrochlorothiazide 25 milliGRAM(s) Oral daily  insulin glargine Injectable (LANTUS) 10 Unit(s) SubCutaneous at bedtime  insulin lispro (ADMELOG) corrective regimen sliding scale   SubCutaneous Before meals and at bedtime  insulin lispro Injectable (ADMELOG) 3 Unit(s) SubCutaneous three times a day before meals  lisinopril 40 milliGRAM(s) Oral daily  metoprolol tartrate 25 milliGRAM(s) Oral two times a day  pantoprazole    Tablet 40 milliGRAM(s) Oral before breakfast  polyethylene glycol 3350 17 Gram(s) Oral daily  saccharomyces boulardii 250 milliGRAM(s) Oral two times a day  senna 2 Tablet(s) Oral at bedtime    MEDICATIONS  (PRN):  acetaminophen     Tablet .. 650 milliGRAM(s) Oral every 6 hours PRN Temp greater or equal to 38C (100.4F), Mild Pain (1 - 3)  dextrose Oral Gel 15 Gram(s) Oral once PRN Blood Glucose LESS THAN 70 milliGRAM(s)/deciliter  meclizine 12.5 milliGRAM(s) Oral three times a day PRN vertigo    Allergies    Normodyne (Faint)    Intolerances    Vital Signs Last 24 Hrs  T(C): 36.4 (2023 06:29), Max: 36.7 (2023 05:13)  T(F): 97.6 (2023 06:29), Max: 98 (2023 05:13)  HR: 75 (2023 06:29) (70 - 80)  BP: 116/74 (2023 06:29) (107/70 - 142/80)  BP(mean): --  RR: 18 (2023 06:29) (18 - 18)  SpO2: 92% (2023 06:29) (91% - 98%)    Parameters below as of 2023 06:29  Patient On (Oxygen Delivery Method): room air    I&O's Summary      PHYSICAL EXAM:  TELE: Not on tele  Constitutional: NAD, asleep but arousable, well-developed  HEENT: Moist Mucous Membranes, Anicteric  Pulmonary: Non-labored, breath sounds are clear but diminished bilaterally, No wheezing, rales or rhonchi  Cardiovascular: Regular, S1 and S2, +murmurs, no rubs, gallops or clicks  Gastrointestinal: Bowel Sounds present, soft, nontender.   Lymph: No peripheral edema. No lymphadenopathy.  Skin: No visible rashes.  right foot ulcers with dressing dry and intact  Psych:  Mood & affect appropriate    LABS: All Labs Reviewed:                        10.9   7.91  )-----------( 212      ( 2023 07:09 )             36.1                         11.0   7.02  )-----------( 194      ( 2023 06:31 )             37.4                         10.4   3.71  )-----------( 163      ( 2023 06:31 )             35.5     2023 07:09    132    |  96     |  24     ----------------------------<  238    3.9     |  28     |  1.70   2023 06:31    134    |  97     |  13     ----------------------------<  236    4.0     |  32     |  1.20   2023 06:31    137    |  106    |  13     ----------------------------<  259    4.3     |  26     |  1.10     Ca    8.8        2023 07:09  Ca    9.0        2023 06:31  Ca    8.8        2023 06:31    TPro  6.5    /  Alb  3.4    /  TBili  0.6    /  DBili  x      /  AST  16     /  ALT  32     /  AlkPhos  118    2023 06:31    EXAM:  ECHO TRANSTHORACIC COMP W DOPP      PROCEDURE DATE:  2019   .  INTERPRETATION:  REPORT:    TRANSTHORACIC ECHOCARDIOGRAM REPORT    Patient Name:   DUSTY STRICKLAND Patient Location: [facility] Rec #:  [id number]        Accession #:      [id number]  Account #:      [id number]        Height:           74.8 in 190.0 cm  YOB: 1960      Weight:           229.3 lb 104.00 kg  Patient Age:    58 years        BSA:              2.32 m²  Patient Gender: M   BP:               129/82 mmHg    Date of Exam:        2019 1:16:32 PM  Sonographer:         Janina Alberto  Referring Physician: J Luis Hill MD    Procedure:   2D Echo/Doppler/Color Doppler Complete and Echocardiogram   with               Definity Contrast.  Indications: Chest pain, unspecified - R07.9  Diagnosis:   Chest pain, unspecified - R07.9    2D AND M-MODE MEASUREMENTS (normal ranges within parentheses):  Left Ventricle:                  Normal   Aorta/Left Atrium:   Normal  IVSd (2D):              1.16 cm (0.7-1.1) LA Volume Index    35.3 ml/m²  LVPWd (2D):             1.38 cm (0.7-1.1)  LVIDd (2D):             4.72 cm (3.4-5.7)  Relative Wall Thickness  0.58    (<0.42)    LV SYSTOLIC FUNCTION BY 2D PLANIMETRY (MOD):  EF-A2C View: 27.9 % EF-Biplane: 46 %    LV DIASTOLIC FUNCTION:  MV Peak E: 0.82 m/s Decel Time: 220 msec  MV Peak A: 0.66 m/s  E/A Ratio: 1.23    SPECTRAL DOPPLER ANALYSIS (where applicable):  Mitral Valve:  MV P1/2 Time: 63.71 msec  MV Area, PHT: 3.45 cm²    Aortic Valve: AoV Max Jasvir: 2.19 m/s AoV Peak P.2 mmHg AoV Mean PG:   10.2 mmHg    AoV Area, Vmax:  AoV Area, VTI:  AoV Area, Vmn:  Ao VTI: 0.465  Tricuspid Valve and PA/RV Systolic Pressure: TR Max Velocity: 2.46 m/s RA   Pressure: 3 mmHg RVSP/PASP: 27.2 mmHg     PHYSICIAN INTERPRETATION:  Left Ventricle: Endocardial visualization was enhanced with intravenous   echo contrast. The left ventricular internal cavity size is normal.  Global LV systolic function was mildly decreased. Left ventricular   ejection fraction, by visual estimation, is 45 to 50%. The left   ventricular diastolic function could not be assessed in this study.     LV Wall Scoring:  The apical inferior segment is akinetic. The mid and apical anterior   wall, mid  inferior segment, and apex are hypokinetic.    Right Ventricle: Normal right ventricular size and function.  Left Atrium: The left atrium is normal in size.  Right Atrium: The right atrium is normal in size.  Pericardium: There is no evidence of pericardial effusion.  Mitral Valve: Structurally normal mitral valve, with normal leaflet   excursion. Mild mitral valve regurgitation is seen.  Tricuspid Valve: Structurally normal tricuspid valve, with normal leaflet   excursion. Mild tricuspid regurgitation is visualized.  Aortic Valve: A bioprosthetic AoV is visualized. Peak aortic valve   gradient is 19.2 mmHg and the mean gradient is 10.2 mmHg, which is   probably normal in the setting of a prosthetic aortic valve. Trivial   aortic valve regurgitation is seen.  Pulmonic Valve: Structurally normal pulmonic valve, with normal leaflet   excursion. Trace pulmonic valve regurgitation.  Pulmonary Artery: The main pulmonary artery is normal in size.  Venous: The inferior vena cava was normal sized, with respiratory size   variation greater than 50%.     Summary:   1. Left ventricular ejection fraction, by visual estimation, is 45 to   50%.   2. Mildly decreased global left ventricular systolic function.   3. Apical inferior segment, mid and apical anterior wall, mid inferior   segment, and apex are abnormal as described above.   4. The left ventricular diastolic function could not be assessed in this   study.   5. There is no evidence of pericardial effusion.   6. Bioprosthesis in the aortic position.   7. Endocardial visualization was enhanced with intravenous echo contrast.   8. Peak aortic valve gradient is 19.2 mmHg and the mean gradient is 10.2   mmHg, which is probably normal in the setting of a prosthetic aortic  valve.    MD Alfa Electronically signed on 2019 at 5:09:04 PM     *** Final ***  ERICK GOLDSTEIN MD  This document has been electronically signed. 2019  1:16PM      Ventricular Rate 79 BPM    Atrial Rate 79 BPM    P-R Interval 156 ms    QRS Duration 104 ms    Q-T Interval 378 ms    QTC Calculation(Bazett) 433 ms    P Axis -6 degrees    R Axis -39 degrees    T Axis 28 degrees    Diagnosis Line Normal sinus rhythm  Left axis deviation  Left ventricular hypertrophy ( R in aVL , [place] product , Romhilt-Nicolas )    Confirmed by BRIDGER RIOS ([id number]) on 2023 6:35:08 PM

## 2023-06-22 NOTE — PROGRESS NOTE ADULT - PROBLEM SELECTOR PLAN 8
Lovenox 40 mg sc daily
Patient is ambulatory, SCDs while in bed  - Hold pharmacologic ppx given upcoming podiatry procedure
Patient is ambulatory, SCDs while in bed  - Hold pharmacologic ppx given upcoming podiatry procedure

## 2023-06-22 NOTE — DIETITIAN INITIAL EVALUATION ADULT - ORAL INTAKE PTA/DIET HISTORY
Pt reports good appetite/intake PTA. Typically consumes 3 meals/day. Has been watching portion sizes of food. Eats out at diner once a week (orders 3 eggs over easy, home fries, toast, tomato juice). Consumes cereal, muffins, egg sandwiches. Reports eating more salads lately. Pt expresses understanding that he has to start making changing to his diet.

## 2023-06-22 NOTE — DISCHARGE NOTE PROVIDER - NSDCFUSCHEDAPPT_GEN_ALL_CORE_FT
[facility]  INTMED [address]  Scheduled Appointment: 07/21/2023     [facility]  WOUNDCARE  [address]  Scheduled Appointment: 06/27/2023    Peña Funez  [facility]  WOUNDCARE  [address]  Scheduled Appointment: 06/30/2023    [facility]  INTMED [address]  Scheduled Appointment: 07/21/2023

## 2023-06-22 NOTE — DIETITIAN INITIAL EVALUATION ADULT - NS FNS DIET ORDER
Diet, DASH/TLC:   Sodium & Cholesterol Restricted  Consistent Carbohydrate {Evening Snack} (06-20-23 @ 10:57) [Active]

## 2023-06-22 NOTE — CONSULT NOTE ADULT - CONSULT REQUESTED DATE/TIME
22-Jun-2023 18:21
19-Jun-2023 13:54
21-Jun-2023 17:23
19-Jun-2023 15:25
19-Jun-2023 20:20
20-Jun-2023 12:04
21-Jun-2023 10:08

## 2023-06-22 NOTE — DISCHARGE NOTE PROVIDER - HOSPITAL COURSE
62-year-old male w/ PMHx of CAD (s/p stents x2, 2019), bioprosthetic AV replacement with repair of ascending aorta, HTN, HLD, DM2, neuropathy, vertigo, GERD, lacunar infarcts, PAD (s/p right anterior tibial artery angioplasty) presents to the ED w/ right toe wound. Patient admitted for pre-surgical testing for planned R hallux bone biopsy-  PAD S/P R AT angioplasty in 10/2021  and Normal pulse exam -   pt medically / cardiac optimize   went or Status post Rainey arthroplasty of the first proximal phalanx base.  ,   started on  iv abx ancef  2 gm q8hr  -   path    neg for  om    , switch  to po keflex 500  mg po q6hr  for 7 days at dc  as per stephanie jovel   .found to have  delma started on iv fluid -fu bmp in am  dc Lasix   .  62-year-old male w/ PMHx of CAD (s/p stents x2, 2019), bioprosthetic AV replacement with repair of ascending aorta, HTN, HLD, DM2, neuropathy, vertigo, GERD, lacunar infarcts, PAD (s/p right anterior tibial artery angioplasty) presents to the ED w/ right toe wound. Patient admitted for pre-surgical testing for planned R hallux bone biopsy-  PAD S/P R AT angioplasty in 10/2021  and Normal pulse exam -   pt medically / cardiac optimize   went or Status post Rainey arthroplasty of the first proximal phalanx base.  ,   started on  iv abx ancef  2 gm q8hr  -   path    neg for  om    , switch  to po keflex 500  mg po q6hr  until 6/29 as per stephanie jovel   .found to have  delma started on iv fluid with improved renal indices.      On day of discharge patient is in distress.  Afebrile and hemodynamically stable. Pt. has refused morning lab work.

## 2023-06-22 NOTE — CONSULT NOTE ADULT - CONSULT REASON
62y A1C with Estimated Average Glucose Result: 9.5 % (06-20-23 @ 06:31)  A1C with Estimated Average Glucose Result: 9.4 % (06-19-23 @ 10:51)   diabetes mellitus uncontrolled type 2
Infected nonhealing DM right foot wound rule out OM
etoh use  efraín
dm2 uncontrolled
Cardiac optimization
R great toe non healing wound
Right foot hallux plantar ulcer

## 2023-06-22 NOTE — DISCHARGE NOTE PROVIDER - NSDCFUADDAPPT_GEN_ALL_CORE_FT
Wound Care Instructions:  -Keep dressing clean, dry, and intact to the right foot  -Change dressing to the right foot every other day  - Apply silver alginate and cover with sterile gauze, abd pad and kenya   -Patient must remain partial weightbearing to the right heel in surgical shoe   -Monitor for any signs of infection including redness, swelling in the leg above the bandage, nausea/vomiting/fever/chills/chest pain/shortness of breath, if any are present patient must report to the emergency department immediately  -Patient is to follow up with Dr. Scott/Dr. Michele/ Dr. Khanna  within 5 days after discharge at [facility]. Please call to make an appointment [phone].

## 2023-06-22 NOTE — PROGRESS NOTE ADULT - PROBLEM SELECTOR PLAN 4
Chronic  - Cont home BP meds listed above
Chronic  - metoprolol  25 mg   po bid -   Cont home BP meds listed above
Chronic  - metoprolol  25 mg   po bid -bp stable

## 2023-06-22 NOTE — DIETITIAN INITIAL EVALUATION ADULT - PROBLEM SELECTOR PLAN 3
Well controlled, not on home insulin.   - Hold home Metformin  - f/u A1c  - Low ISS  - Regular finger sticks  - Hypoglycemic protocol.  - DASH/TLC, CC diet

## 2023-06-22 NOTE — DIETITIAN INITIAL EVALUATION ADULT - OTHER INFO
Pt is a "62-year-old male w/ PMHx of CAD (s/p stents x2, 2019), bioprosthetic AV replacement with repair of ascending aorta, HTN, HLD, DM2, neuropathy, vertigo, GERD, lacunar infarcts, PAD (s/p right anterior tibial artery angioplasty) presents to the ED w/ right toe wound. Patient admitted for pre-surgical testing for planned R hallux bone biopsy."    Visited pt at bedside this afternoon. Pt states he is not feeling well today; had nausea and vomiting this am. Minimal po intake of meals today. Previously with good appetite/intake. PO intakes % per nursing documentation. No N/V. Reports constipation. Last BM 6/19 per pt report; bowel regimen rx. CBW on admission 227#. No edema noted. R foot diabetic ulcer. Pt with hx of T2DM, A1c of 9.5% obtained. On metformin at home. Does not check BS. Pt states that his DM has always been under control; report previous A1c of 6.3-6.6%. Plan to start home insulin. Pt currently on DASH/TLC, consistent carbohydrate diet. Provided verbal and written diet education during visit today. Pt receptive to information provided.

## 2023-06-22 NOTE — PROGRESS NOTE ADULT - ASSESSMENT
Physical Exam:   Vital Signs Last 24 Hrs  T(C): 36.3 (22 Jun 2023 13:14), Max: 36.7 (22 Jun 2023 05:13)  T(F): 97.4 (22 Jun 2023 13:14), Max: 98 (22 Jun 2023 05:13)  HR: 83 (22 Jun 2023 14:21) (70 - 84)  BP: 157/88 (22 Jun 2023 14:21) (107/70 - 157/88)  BP(mean): --  RR: 18 (22 Jun 2023 13:14) (18 - 18)  SpO2: 96% (22 Jun 2023 14:21) (91% - 98%)    Parameters below as of 22 Jun 2023 13:14  Patient On (Oxygen Delivery Method): room air       CAPILLARY BLOOD GLUCOSE      POCT Blood Glucose.: 166 mg/dL (22 Jun 2023 11:36)  POCT Blood Glucose.: 245 mg/dL (22 Jun 2023 07:39)  POCT Blood Glucose.: 244 mg/dL (22 Jun 2023 01:16)  POCT Blood Glucose.: 244 mg/dL (21 Jun 2023 21:31)  POCT Blood Glucose.: 207 mg/dL (21 Jun 2023 16:53)      Cholesterol, Serum: 113 mg/dL (05.19.21 @ 08:36)     HDL Cholesterol, Serum: 22 mg/dL (05.19.21 @ 08:36)     LDL Cholesterol Calculated: 66 mg/dL (05.19.21 @ 08:36)     DIET: CC  >50%

## 2023-06-22 NOTE — PROGRESS NOTE ADULT - PROBLEM SELECTOR PROBLEM 2
PAD (peripheral artery disease)
CAD (coronary artery disease)

## 2023-06-22 NOTE — PROGRESS NOTE ADULT - PROBLEM SELECTOR PLAN 2
CAD s/p stents x2 in 2019.   - Cont home ASA, clopidogrel, statin  - Cont home: Lasix 20mg, Metoprolol tartrate 25mg BID, Lisinopril 40mg qd, HCTZ 25mg qd,   - Dash/TLC, CC diet  - Cardiology (Dr. Stevenson)
CAD s/p stents x2 in 2019.   - Cont home ASA, clopidogrel, statin  - Cont home: Lasix 20mg, Metoprolol tartrate 25mg BID, Lisinopril 40mg qd, HCTZ 25mg qd,   - Dash/TLC, CC diet  - Cardiology (Dr. Stevenson) consulted for clearance
CAD s/p stents x2 in 2019.   - Cont home ASA, clopidogrel, statin  - home: Lasix 20mg, Metoprolol tartrate 25mg BID, Lisinopril 40mg qd,    hold  HCTZ 25mg qd,  and Lasix foe delma  - Dash/TLC, CC diet  - Cardiology (Dr. Stevenson)

## 2023-06-22 NOTE — DIETITIAN INITIAL EVALUATION ADULT - PROBLEM/PLAN-8
Patient presents for visit with parent  CC: skin  concern  HPI:Patient presents with skin concern: rash, red, dry, located on     Rash has been there for  days.   There is no secondary infection or honey crusting.  Mild itching off and on   Rash not getting better.    No cough. No congestion.  No sore throat.  No vomiting.    Medications and allergies reviewed  IMMUNIZATIONS reviewed  PMHx reviewed  SH:reviewed,lives with family  Family not sick    ROS:   CONSTITUTIONAL:Alert, interactive, no fever   EYES:No eye discharge   ENT:Denies ear pain, sore throat   RESP:NL breathing, no wheezing or shortness of breath   GI:No vomiting or diarrhea   SKIN:See HPI  PHYS. EXAM:Vital Signs have been reviewed (see nurses notes)   GEN:Well nourished, well developed.   SKIN:Normal skin turgor has dry erythematous patches has cracking and swelling and discharge on right foot   EYES:PERRLA, nl conjunctiva   EARS:NL pinnae, TM's intact, right TM nl, left TM nl   NASAL:Mucosa pink, no congestion, no discharge, oropharynx-mucus membranes moist, no pharyngeal erythema   NECK:Supple, no masses   RESP:NL resp. effort, clear to auscultation   HEART:RRR no murmur   ABD: Positive BS, soft NT/ND   MS:NL tone and motor movement of extremities   LYMPH:No cervical nodes   PSYCH:In no acute distress, appropriate and interactive     IMP:Eczema  Cellulitis  Allergic rhinitis   Food allergies     PLAN: Education on eczema/atopic dermatitis  bleech baths. Increased to 1/4 th cup in half tub water.   Steroid education. Triamcinolone point 1 % top bid x 10 days  Prefer to not use steroid on face or genitalia.   Prefer not  exceed 10 days of steroid therapy to skin  Hydroxyzine 10mg/5ml 4 ml po q 6 hr prn at night and prn as directed for itch.  bactroban to cracked area tid x 10 days.  Mild cleanser like Dove or Cetaphil or plain water is best when cleansing.  When bathing it is best to soak, then pat dry, then very quickly moisturize after bath.    Decrease number of bathes, don't use hot water.Do not scratch.    Call with concerns,if symptoms persist, worsen, or if new signs and symptoms develop.     DISPLAY PLAN FREE TEXT

## 2023-06-22 NOTE — PROGRESS NOTE ADULT - PROBLEM SELECTOR PLAN 7
Chronic  - Cont home Meclizine 12.5mg TID prn

## 2023-06-22 NOTE — PROGRESS NOTE ADULT - ASSESSMENT
62-year-old male with PMHx of CAD (1st Diag. AMBER 7/1/19, LPDA AMBER 7/3/19),bicuspid AV assoc with significant AS s/p bio AVR with repair of a dilated ascending aorta (3/16/15), cardiac tamponade (s/p pericardiocentesis 3/26/15), syncope (s/p ILR insertion 2/2/16), atypical chest discomfort, HTN, HLD, T2DM, vertigo, GERD, lacunar infarcts (incidentally detected on MRI), ventricular ectopy, mild carotid atherosclerosis, PAD,  (status post right anterior tibial artery angioplasty 10/5/2021) was sent by his podiatrist to ED for IV therapy and possible surgical intervention of Right great toe.    Cardiac Optimization, CAD, Severe AS s/p BioAVR, HTN  - s/p Rainey arthoplasty with Bx.  Tolerated procedure well  - Pain control per Primary  - Follow Podia recs    - H/o CAD with stent placement, VHD s/p bio AVR.  - EKG: NSR. No acute ischemic changes noted   - Pharm stress (3/2023) normal from our office, follows with Dr. Sousa  - Continue ASA and statin    - Chest X-ray negative for acute process  - Patient euvolemic on examination with no overt signs of heart failure or cardiac ischemia.   - ECHO (5/12/2020) LVEF 55-60%.  No need to repeat      - Overnight, patient c/p dizziness.  Please, obtain orthostatics  - Now with MARION, creatinine 1.7 <-- 1.2.  Hold HCTZ for now and reduce Lisinopril to 20 mg  - Can continue low dose Lasix    - Monitor and replete Lytes. Keep K > 4 and Mg > 2  - Will continue to follow.  Will f/u with Dr. Sousa as outpatient    Rhiannon Couch DNP, NP-C, AGACNP-C  Cardiology   Call TEAMS

## 2023-06-22 NOTE — PROGRESS NOTE ADULT - PROBLEM SELECTOR PLAN 3
un controlled, not on home insulin.   - Hold home Metformin  - A1c 9.5   , Low ISS   add Lantus 7 unit sc , Regular finger sticks  - Hypoglycemic protocol.  - DASH/TLC, CC diet
un controlled, not on home insulin.   - Hold home Metformin  - A1c 9.5   , Low ISS   add Lantus 10 unit sc , ADMELOG 3 unit sc tid   Regular finger sticks  - Hypoglycemic protocol.  - DASH/TLC, CC diet
un controlled, not on home insulin.   - Hold home Metformin  - A1c 9.5   , Low ISS   add Lantus 10 unit sc , ADMELOG 3 unit sc tid   Regular finger sticks  - Hypoglycemic protocol.  - DASH/TLC, CC diet

## 2023-06-22 NOTE — DIETITIAN INITIAL EVALUATION ADULT - PERTINENT LABORATORY DATA
06-22    132<L>  |  96  |  24<H>  ----------------------------<  238<H>  3.9   |  28  |  1.70<H>    Ca    8.8      22 Jun 2023 07:09    POCT Blood Glucose.: 166 mg/dL (06-22-23 @ 11:36)  A1C with Estimated Average Glucose Result: 9.5 % (06-20-23 @ 06:31)  A1C with Estimated Average Glucose Result: 9.4 % (06-19-23 @ 10:51)

## 2023-06-22 NOTE — CONSULT NOTE ADULT - REASON FOR ADMISSION
Toe wound, pre-surgical testing

## 2023-06-22 NOTE — PROVIDER CONTACT NOTE (OTHER) - SITUATION
noted patient getting up from bed to use bathroom; unsteady gait noted. patient complained of nausea, headache, burning sensation of lt eye, feeling not good and feeling like passing out.

## 2023-06-22 NOTE — DISCHARGE NOTE PROVIDER - NSDCCPCAREPLAN_GEN_ALL_CORE_FT
PRINCIPAL DISCHARGE DIAGNOSIS  Diagnosis: Puncture wound of right foot with complication  Assessment and Plan of Treatment: You were seen by ID and podiatry.  You had Rainey arthroplasty.  Pathology was negative for osteomyelitis.   Please complete course of oral antibiotics as prescribed.  Follow up with PMD in 1 week.  Follow up with Podiatry at wound care clinic on 6/27      SECONDARY DISCHARGE DIAGNOSES  Diagnosis: Type 2 diabetes mellitus  Assessment and Plan of Treatment: Continue Lantus, Trulicity, and metformin regimen.  Follow up with endocrine.    Diagnosis: CAD (coronary artery disease)  Assessment and Plan of Treatment: Continue your cardiac maintenance medications.    Diagnosis: MARION (acute kidney injury)  Assessment and Plan of Treatment: Improved with IV hydration.  Please repeat basic metabolic panel with your PMD.

## 2023-06-22 NOTE — PROGRESS NOTE ADULT - SUBJECTIVE AND OBJECTIVE BOX
Patient is a 62y old  Male who presents with a chief complaint of Toe wound, pre-surgical testing (22 Jun 2023 11:05)    pt seen and examine today awake  today feel nausea  /dizziness  , no  bm last 3 days .  INTERVAL HPI/OVERNIGHT EVENTS:     T(C): 36.3 (06-22-23 @ 13:14), Max: 36.7 (06-22-23 @ 05:13)  HR: 84 (06-22-23 @ 13:14) (70 - 84)  BP: 130/86 (06-22-23 @ 13:14) (107/70 - 142/80)  RR: 18 (06-22-23 @ 13:14) (18 - 18)  SpO2: 98% (06-22-23 @ 13:14) (91% - 98%)  Wt(kg): --  I&O's Summary      -------------------------------------------------------  IN: 450 mL / OUT: 0 mL / NET: 450 mL    REVIEW OF SYSTEMS:  CONSTITUTIONAL: No fever, weight loss, or fatigue  EYES: No eye pain, visual disturbances, or discharge  ENMT:   No sinus or throat pain  NECK: No pain or stiffness  RESPIRATORY: No cough, wheezing, chills , No shortness of breath  CARDIOVASCULAR: No chest pain, palpitations, dizziness, or leg swelling  GASTROINTESTINAL: No abdominal or epigastric pain. No nausea, vomiting,  , No diarrhea  ,   + constipation.   GENITOURINARY: No dysuria, frequency, hematuria, or incontinence  NEUROLOGICAL: No headaches, memory loss, loss of strength, numbness, or tremors  SKIN: No itching, burning, rashes, or lesions   MUSCULOSKELETAL: No joint pain or swelling; No muscle, back, or extremity pain    PHYSICAL EXAM:  GENERAL: NAD,   HEAD:  Atraumatic, Normocephalic  EYES: EOMI, PERRLA, conjunctiva and sclera clear  ENMT:  Moist mucous membranes  NECK: Supple, No JVD  NERVOUS SYSTEM:  Alert & Oriented X3; Motor Strength 5/5 B/L upper and lower extremities; DTRs 2+ intact and symmetric  CHEST/LUNG:   percussion bilaterally; No rales, rhonchi, wheezing,  HEART: Regular rate and rhythm; No murmurs, no tachy   ABDOMEN: Soft, Nontender, Nondistended; Bowel sounds present  EXTREMITIES:  2+ Peripheral Pulses, No clubbing, cyanosis, or edema  SKIN: No rashes or lesions rt toe dressing +dry / clean     MEDICATIONS  (STANDING):  atorvastatin 80 milliGRAM(s) Oral at bedtime  bisacodyl 5 milliGRAM(s) Oral at bedtime  ceFAZolin   IVPB 2000 milliGRAM(s) IV Intermittent every 8 hours  dextrose 50% Injectable 12.5 Gram(s) IV Push once  dextrose 50% Injectable 25 Gram(s) IV Push once  dextrose 50% Injectable 25 Gram(s) IV Push once  enoxaparin Injectable 40 milliGRAM(s) SubCutaneous every 24 hours  gabapentin 600 milliGRAM(s) Oral four times a day  glucagon  Injectable 1 milliGRAM(s) IntraMuscular once  insulin glargine Injectable (LANTUS) 10 Unit(s) SubCutaneous at bedtime  insulin lispro (ADMELOG) corrective regimen sliding scale   SubCutaneous Before meals and at bedtime  insulin lispro Injectable (ADMELOG) 3 Unit(s) SubCutaneous three times a day before meals  lisinopril 20 milliGRAM(s) Oral daily  metoprolol tartrate 25 milliGRAM(s) Oral two times a day  pantoprazole    Tablet 40 milliGRAM(s) Oral before breakfast  polyethylene glycol 3350 17 Gram(s) Oral daily  saccharomyces boulardii 250 milliGRAM(s) Oral two times a day  senna 2 Tablet(s) Oral at bedtime    MEDICATIONS  (PRN):  acetaminophen     Tablet .. 650 milliGRAM(s) Oral every 6 hours PRN Temp greater or equal to 38C (100.4F), Mild Pain (1 - 3)  dextrose Oral Gel 15 Gram(s) Oral once PRN Blood Glucose LESS THAN 70 milliGRAM(s)/deciliter  meclizine 12.5 milliGRAM(s) Oral three times a day PRN vertigo  ondansetron Injectable 4 milliGRAM(s) IV Push every 6 hours PRN Nausea and/or Vomiting      LABS:                        10.9   7.91  )-----------( 212      ( 22 Jun 2023 07:09 )             36.1     06-22    132<L>  |  96  |  24<H>  ----------------------------<  238<H>  3.9   |  28  |  1.70<H>    Ca    8.8      22 Jun 2023 07:09        Urinalysis Basic - ( 22 Jun 2023 07:09 )    Color: x / Appearance: x / SG: x / pH: x  Gluc: 238 mg/dL / Ketone: x  / Bili: x / Urobili: x   Blood: x / Protein: x / Nitrite: x   Leuk Esterase: x / RBC: x / WBC x   Sq Epi: x / Non Sq Epi: x / Bacteria: x      CAPILLARY BLOOD GLUCOSE      POCT Blood Glucose.: 166 mg/dL (22 Jun 2023 11:36)  POCT Blood Glucose.: 245 mg/dL (22 Jun 2023 07:39)  POCT Blood Glucose.: 244 mg/dL (22 Jun 2023 01:16)  POCT Blood Glucose.: 244 mg/dL (21 Jun 2023 21:31)  POCT Blood Glucose.: 207 mg/dL (21 Jun 2023 16:53)      06-20 @ 11:00   Rare Streptococcus agalactiae (Group B) isolated  Group B streptococci are susceptible to ampicillin,  penicillin and cefazolin, but may be resistant to  erythromycin and clindamycin.  Recommendations for intrapartum prophylaxis for Group B  streptococci are penicillin or ampicillin.  --  --  06-19 @ 10:51   No growth to date.  --  --  06-19 @ 10:45   No growth to date.  --  --          RADIOLOGY & ADDITIONAL TESTS:    Imaging Personally Reviewed:   no new test     Advance Directives:    full code

## 2023-06-22 NOTE — CHART NOTE - NSCHARTNOTEFT_GEN_A_CORE
62-year-old male with PMHx of CAD (1st Diag. AMBER 7/1/19, LPDA AMBER 7/3/19),bicuspid AV assoc with significant AS s/p bio AVR with repair of a dilated ascending aorta (3/16/15), cardiac tamponade (s/p pericardiocentesis 3/26/15), syncope (s/p ILR insertion 2/2/16), atypical chest discomfort, HTN, HLD, T2DM, vertigo, GERD, lacunar infarcts (incidentally detected on MRI), ventricular ectopy, mild carotid atherosclerosis, PAD,  (status post right anterior tibial artery angioplasty 10/5/2021) was sent by his podiatrist to ED for IV therapy and possible surgical intervention of Right great toe.    Above HPI noted. Patient admitted for right hallux bx and IV antibiotics. Now s/p Bx which resulted negative. Patient continues on IV Ancef. New diabetic, now on insulin. Patient complained of unsteady gait, nervous, tremulous. I spoke with patient and discussed his social behaviors. Endorses drinking a few beers a day 62-year-old male with PMHx of CAD (1st Diag. AMBER 7/1/19, LPDA AMBER 7/3/19),bicuspid AV assoc with significant AS s/p bio AVR with repair of a dilated ascending aorta (3/16/15), cardiac tamponade (s/p pericardiocentesis 3/26/15), syncope (s/p ILR insertion 2/2/16), atypical chest discomfort, HTN, HLD, T2DM, vertigo, GERD, lacunar infarcts (incidentally detected on MRI), ventricular ectopy, mild carotid atherosclerosis, PAD,  (status post right anterior tibial artery angioplasty 10/5/2021) was sent by his podiatrist to ED for IV therapy and possible surgical intervention of Right great toe.    Above HPI noted. Patient admitted for right hallux bx and IV antibiotics. Now s/p Bx which resulted negative. Patient continues on IV Ancef. New diabetic, now on insulin. Patient complained of unsteady gait, nervous, tremulous. I spoke with patient and discussed his social behaviors. Endorses drinking a few beers a day. I also spoke to patient's wife who endorses more beer drinking than the patient endorses to. At this time patient's presenting symptoms are concerning for ETOH Withdraw. Plan to start patient on IV multivitamin, folate and thiamine discussed with Dr. Kelley. Patient started on Librium taper, orders placed.      PLAN:     - VSS  - pt with unsteady gain, utilizes cane  - not able to work with PT today  - s/s concerning for ETOH Withdraw  - started on IV multivitamin, folate, thiamine   - labs in am  - continues on IV Ancef, ID following

## 2023-06-22 NOTE — CHART NOTE - NSCHARTNOTEFT_GEN_A_CORE
Wound Care Instructions:  -Keep dressing clean, dry, and intact to the right foot  -Change dressing to the right foot every other day  - Apply silver alginate and cover with sterile gauze, abd pad and kenya   -Patient must remain partial weightbearing to the right heel in surgical shoe   -Monitor for any signs of infection including redness, swelling in the leg above the bandage, nausea/vomiting/fever/chills/chest pain/shortness of breath, if any are present patient must report to the emergency department immediately  -Patient is to follow up with Dr. Scott/Dr. Michele/ Dr. Khanna  within 5 days after discharge at [facility]. Please call to make an appointment [phone].

## 2023-06-22 NOTE — DIETITIAN INITIAL EVALUATION ADULT - PROBLEM SELECTOR PLAN 2
CAD s/p stents x2 in 2019.   - Cont home ASA, clopidogrel, statin  - Cont home: Lasix 20mg, Metoprolol tartrate 25mg BID, Lisinopril 40mg qd, HCTZ 25mg qd,   - Dash/TLC, CC diet  - Cardiology (Dr. Stevenson) consulted for clearance

## 2023-06-22 NOTE — PROGRESS NOTE ADULT - PROBLEM SELECTOR PLAN 5
Chronic.   - Continue management similar to CAD listed above  - Vascular clearance obtained +

## 2023-06-22 NOTE — PROGRESS NOTE ADULT - SUBJECTIVE AND OBJECTIVE BOX
Patient is a 62y old  Male who presents with a chief complaint of Puncture wound without foreign body, right foot, initial encounter     (22 Jun 2023 14:13)    updates: pt A&Ox4, c/o stomach discomfort, F/S WDL, tolerating CC diet. pt agrees to having freestyle crow sensor, discussed discharge plan for 15 units Lantus @ HS and trulicity 1.5mg weekly injection, start with 0.75mg weekly injection to get use to it. provided patient written instructions on insulin pen administration, jairon'fabien awanulicity. pt has been self injecting insulin inpatient, identified injection sites. pt states he is not feeling well at this time, unable to do insulin pen teaching or glucometer/ CGM teaching at this time, requested returning tomorrow. medications delivered to pharmacy. will reevaluate tomorrow.    HPI:  62-year-old male w/ PMHx of CAD (s/p stents x2, 2019), bioprosthetic AV replacement with repair of ascending aorta, HTN, HLD, DM2, neuropathy, vertigo, GERD, lacunar infarcts, PAD (s/p right anterior tibial artery angioplasty) presents to the ED w/ right toe wound. Toe wound has been present for 5 years and he has been seeing wound care outpatient. Wound has been mostly dry, until 3 weeks ago when it opened and drained brownish fluid. He was seen in the ED and discharged with doxycycline. He f/u with wound care, Dr. Sen, a week later who switched his abx (patient does not recall the name). Patient was seen in the wound care office today for f/u and instructed to present to [facility] ED for IV antibiotics and pre-surgical testing for bone biopsy. Denies fevers, chills, SOB, CP. No wound drainage currently, and reports minimal pain. Denies LE swelling / calf pain.     ED Course:   Vitals: T 98.6F, HR 83, /82, RR 18, SpO2 99% RA  Labs: Hgb 10.6, aPTT 27, glucose 276, Alk Ph 151    ECG: LAD, LVH    R Foot XR: no fractures seen, no tissue edema (personal read)    Given: Vancomycin x1, Zosyn x1 (19 Jun 2023 13:12)      PAST MEDICAL & SURGICAL HISTORY:  Hypertension      AAA (abdominal aortic aneurysm)  dx 2012      GERD (gastroesophageal reflux disease)      Leg weakness      Aortic valve replaced      Cardiac tamponade      HTN (hypertension)      H/O aortic valve repair      S/P aneurysm repair      2019 novel coronavirus disease (COVID-19)  January 2021      Non-pressure chronic ulcer of other part of right foot with unspecified severity      Type 2 diabetes mellitus  Not on Insulin but complicated by peripheral neuropathy      H/O cardiac catheterization  Stent Placement X 2 (2019)      H/O vertigo      Aortic valve replaced      S/P AAA repair  Repaired 3/2015      S/P aortic aneurysm repair      H/O aortic valve repair      History of incision and drainage      H/O colonoscopy    Allergies    Normodyne (Faint)    Intolerances        MEDICATIONS  (STANDING):  atorvastatin 80 milliGRAM(s) Oral at bedtime  bisacodyl 5 milliGRAM(s) Oral at bedtime  ceFAZolin   IVPB 2000 milliGRAM(s) IV Intermittent every 8 hours  dextrose 50% Injectable 12.5 Gram(s) IV Push once  dextrose 50% Injectable 25 Gram(s) IV Push once  dextrose 50% Injectable 25 Gram(s) IV Push once  enoxaparin Injectable 40 milliGRAM(s) SubCutaneous every 24 hours  folic acid Injectable 1 milliGRAM(s) IV Push daily  gabapentin 600 milliGRAM(s) Oral four times a day  glucagon  Injectable 1 milliGRAM(s) IntraMuscular once  insulin glargine Injectable (LANTUS) 15 Unit(s) SubCutaneous at bedtime  insulin lispro (ADMELOG) corrective regimen sliding scale   SubCutaneous Before meals and at bedtime  insulin lispro Injectable (ADMELOG) 5 Unit(s) SubCutaneous three times a day before meals  lisinopril 20 milliGRAM(s) Oral daily  metoprolol tartrate 25 milliGRAM(s) Oral two times a day  pantoprazole    Tablet 40 milliGRAM(s) Oral before breakfast  polyethylene glycol 3350 17 Gram(s) Oral daily  saccharomyces boulardii 250 milliGRAM(s) Oral two times a day  senna 2 Tablet(s) Oral at bedtime  sodium chloride 0.9% 1000 milliLiter(s) (75 mL/Hr) IV Continuous <Continuous>  sodium chloride 0.9%. 1000 milliLiter(s) (70 mL/Hr) IV Continuous <Continuous>  thiamine Injectable 100 milliGRAM(s) IV Push daily

## 2023-06-22 NOTE — PROGRESS NOTE ADULT - TIME BILLING
pt seen and examine today see  above plan - 62-year-old male w/ PMHx of CAD (s/p stents x2, 2019), bioprosthetic AV replacement with repair of ascending aorta, HTN, HLD, DM2, neuropathy, vertigo, GERD, lacunar infarcts, PAD (s/p right anterior tibial artery angioplasty) presents to the ED w/ right toe wound. Patient admitted for pre-surgical testing for planned R hallux bone biopsy-  PAD S/P R AT angioplasty in 10/2021  and Normal pulse exam -  post  op day  2  continue iv abx ancef  2 gm q8hr  -   path    neg for  om   switch  to po keflex 500  mg po q6hr  for 7 days at dc  as per stephanie jovel   .   delma started on iv fluid -fu bmp in am  dc Lasix   . pt seen and examine today see  above plan - 62-year-old male w/ PMHx of CAD (s/p stents x2, 2019), bioprosthetic AV replacement with repair of ascending aorta, HTN, HLD, DM2, neuropathy, vertigo, GERD, lacunar infarcts, PAD (s/p right anterior tibial artery angioplasty) presents to the ED w/ right toe wound. Patient admitted for pre-surgical testing for planned R hallux bone biopsy-  PAD S/P R AT angioplasty in 10/2021  and Normal pulse exam -  post  op day  2  Status post Rainey arthroplasty of the first proximal phalanx base.-continue iv abx Ancef  2 gm q8hr  -   path    neg for  om   switch  to po keflex 500  mg po q6hr  for 7 days at dc  as per stephanie jovel   .   delma started on iv fluid -fu bmp in am  dc Lasix   . pt seen and examine today see  above plan - 62-year-old male w/ PMHx of CAD (s/p stents x2, 2019), bioprosthetic AV replacement with repair of ascending aorta, HTN, HLD, DM2, neuropathy, vertigo, GERD, lacunar infarcts, PAD (s/p right anterior tibial artery angioplasty) presents to the ED w/ right toe wound. Patient admitted for pre-surgical testing for planned R hallux bone biopsy-  PAD S/P R AT angioplasty in 10/2021  and Normal pulse exam -  post  op day  2  Status post Rainey arthroplasty of the first proximal phalanx base.-continue iv abx Ancef  2 gm q8hr  -   path    neg for  om   switch  to po keflex 500  mg po q6hr  for 7 days at dc  as per stephanie jovel   .   delma started on iv fluid -fu bmp in am  dc Lasix   .pt admit he drink ETOH  weekend [ but  as per wife might be more ]  his symptom early etoth withdrawal - nausea / dizziness , shaking  - librium protocol started , dr francois detox aware.

## 2023-06-22 NOTE — DIETITIAN INITIAL EVALUATION ADULT - PERTINENT MEDS FT
MEDICATIONS  (STANDING):  atorvastatin 80 milliGRAM(s) Oral at bedtime  bisacodyl 5 milliGRAM(s) Oral at bedtime  ceFAZolin   IVPB 2000 milliGRAM(s) IV Intermittent every 8 hours  dextrose 50% Injectable 12.5 Gram(s) IV Push once  dextrose 50% Injectable 25 Gram(s) IV Push once  dextrose 50% Injectable 25 Gram(s) IV Push once  enoxaparin Injectable 40 milliGRAM(s) SubCutaneous every 24 hours  gabapentin 600 milliGRAM(s) Oral four times a day  glucagon  Injectable 1 milliGRAM(s) IntraMuscular once  insulin glargine Injectable (LANTUS) 10 Unit(s) SubCutaneous at bedtime  insulin lispro (ADMELOG) corrective regimen sliding scale   SubCutaneous Before meals and at bedtime  insulin lispro Injectable (ADMELOG) 3 Unit(s) SubCutaneous three times a day before meals  lisinopril 20 milliGRAM(s) Oral daily  metoprolol tartrate 25 milliGRAM(s) Oral two times a day  pantoprazole    Tablet 40 milliGRAM(s) Oral before breakfast  polyethylene glycol 3350 17 Gram(s) Oral daily  saccharomyces boulardii 250 milliGRAM(s) Oral two times a day  senna 2 Tablet(s) Oral at bedtime  sodium chloride 0.9%. 1000 milliLiter(s) (70 mL/Hr) IV Continuous <Continuous>    MEDICATIONS  (PRN):  acetaminophen     Tablet .. 650 milliGRAM(s) Oral every 6 hours PRN Temp greater or equal to 38C (100.4F), Mild Pain (1 - 3)  dextrose Oral Gel 15 Gram(s) Oral once PRN Blood Glucose LESS THAN 70 milliGRAM(s)/deciliter  meclizine 12.5 milliGRAM(s) Oral three times a day PRN vertigo  ondansetron Injectable 4 milliGRAM(s) IV Push every 6 hours PRN Nausea and/or Vomiting

## 2023-06-22 NOTE — DISCHARGE NOTE PROVIDER - CARE PROVIDER_API CALL
John Michele  [facility]  [address] [place] [address]  Phone: [phone]  Fax: [phone]  Follow Up Time: 1-3 days    Perlman, Craig Douglas  Internal Medicine  [address] [place] [address], [address]  [place] [address]  Phone: [phone]  Fax: [phone]  Follow Up Time: 2 weeks

## 2023-06-22 NOTE — PROVIDER CONTACT NOTE (OTHER) - ACTION/TREATMENT ORDERED:
Pt seen by MD at bedside. as per patient, he wants to take tylenol only at first and see if it works. pt said that he will let nurse know if he does not feel better with tylenol. tylenol given.

## 2023-06-22 NOTE — DISCHARGE NOTE PROVIDER - PROVIDER TOKENS
PROVIDER:[TOKEN:[[id number]],FOLLOWUP:[1-3 days]],PROVIDER:[TOKEN:[[id number]],FOLLOWUP:[2 weeks]]

## 2023-06-22 NOTE — DISCHARGE NOTE PROVIDER - ATTENDING DISCHARGE PHYSICAL EXAMINATION:
Vitals:  T:  97.3  P:  74  BP:  112/65  RR:  18  SpO2:  93% RA  GENERAL: NAD, well-developed, well-groomed  HEENT:  anicteric, moist mucous membranes  CHEST/LUNG:  CTA b/l, no rales, wheezes, or rhonchi  HEART:  RRR, S1, S2  ABDOMEN:  BS+, soft, nontender, nondistended  EXTREMITIES: clean dressing over left foot  NERVOUS SYSTEM: answers questions and follows commands appropriately  PSYCH: normal affect

## 2023-06-22 NOTE — DISCHARGE NOTE PROVIDER - NSDCMRMEDTOKEN_GEN_ALL_CORE_FT
alcohol swabs: Apply topically to affected area 4 times a day  aspirin 81 mg oral tablet: 1 tab(s) orally once a day  atorvastatin 80 mg oral tablet: 1 tab(s) orally once a day  clopidogrel 75 mg oral tablet: 1 tab(s) orally once a day  doxycycline hyclate 100 mg oral capsule: 1 cap(s) orally 2 times a day  Florastor 250 mg oral capsule: 1 cap(s) orally 2 times a day for a week  Freestyle Zenon 2 sensors: Apply 1 sensor every 14 days  furosemide 20 mg oral tablet: 1 tab(s) orally once a day  gabapentin 600 mg oral tablet: 1 tab(s) orally 4 times a day  glucometer (per patient&#x27;s insurance): Test blood sugars four times a day. Dispense #1 glucometer.  hydroCHLOROthiazide 25 mg oral tablet: 1 tab(s) orally once a day  Insulin Pen Needles, 4mm: 1 application subcutaneously 4 times a day. ** Use with insulin pen **  lancets: 1 application subcutaneously 4 times a day  Lantus Solostar Pen 100 units/mL subcutaneous solution: 15 unit(s) subcutaneous once a day (at bedtime) unit(s) subcutaneous once a day  lisinopril 40 mg oral tablet: 1 tab(s) orally once a day  meclizine 12.5 mg oral tablet: 1 tab(s) orally 3 times a day, As needed, vertigo  metFORMIN 1000 mg oral tablet, extended release: 1 tab(s) orally once a day  metoprolol tartrate 25 mg oral tablet: 1 tab(s) orally 2 times a day  pantoprazole 40 mg oral delayed release tablet: 1 tab(s) orally once a day (before a meal)  test strips (per patient&#x27;s insurance): 1 application subcutaneously 4 times a day. ** Compatible with patient&#x27;s glucometer **  Trulicity Pen 0.75 mg/0.5 mL subcutaneous solution: 0.75 milligram(s) subcutaneously every 7 days   acetaminophen 325 mg oral tablet: 2 tab(s) orally every 6 hours As needed Temp greater or equal to 38C (100.4F), Mild Pain (1 - 3)  aspirin 81 mg oral tablet: 1 tab(s) orally once a day  atorvastatin 80 mg oral tablet: 1 tab(s) orally once a day  cephalexin 500 mg oral tablet: 1 tab(s) orally every 6 hours  clopidogrel 75 mg oral tablet: 1 tab(s) orally once a day  Florastor 250 mg oral capsule: 1 cap(s) orally 2 times a day for a week  furosemide 20 mg oral tablet: 1 tab(s) orally once a day  gabapentin 600 mg oral tablet: 1 tab(s) orally 4 times a day  hydroCHLOROthiazide 25 mg oral tablet: 1 tab(s) orally once a day  Lanbrandon Gomezar Pen 100 units/mL subcutaneous solution: 15 unit(s) subcutaneous once a day (at bedtime) unit(s) subcutaneous once a day  lisinopril 40 mg oral tablet: 1 tab(s) orally once a day  meclizine 12.5 mg oral tablet: 1 tab(s) orally 3 times a day, As needed, vertigo  metFORMIN 1000 mg oral tablet, extended release: 1 tab(s) orally once a day  metoprolol tartrate 25 mg oral tablet: 1 tab(s) orally 2 times a day  pantoprazole 40 mg oral delayed release tablet: 1 tab(s) orally once a day (before a meal)  Trulicity Pen 0.75 mg/0.5 mL subcutaneous solution: 0.75 milligram(s) subcutaneously every 7 days

## 2023-06-22 NOTE — PROGRESS NOTE ADULT - SUBJECTIVE AND OBJECTIVE BOX
STRICKLANDDUSTY GERBER is a 62yMale , patient examined and chart reviewed.     INTERVAL HPI/ OVERNIGHT EVENTS:   Afebrile. C/o Nausea and constipation.    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      ROS:  CONSTITUTIONAL:  Negative fever or chills  EYES:  Negative  blurry vision or double vision  CARDIOVASCULAR:  Negative for chest pain or palpitations  RESPIRATORY:  Negative for cough, wheezing, or SOB   GASTROINTESTINAL:  + for nausea, constipation neg for vomiting, diarrhea, , or abdominal pain  GENITOURINARY:  Negative frequency, urgency or dysuria  NEUROLOGIC:  No headache, confusion, dizziness, lightheadedness  All other systems were reviewed and are negative     ALLERGIES  Normodyne (Faint)      Current inpatient medications :    ANTIBIOTICS/RELEVANT:  ceFAZolin   IVPB 2000 milliGRAM(s) IV Intermittent every 8 hours    MEDICATIONS  (STANDING):  atorvastatin 80 milliGRAM(s) Oral at bedtime  bisacodyl 5 milliGRAM(s) Oral at bedtime  chlordiazePOXIDE 5 milliGRAM(s) Oral every 6 hours  dextrose 50% Injectable 12.5 Gram(s) IV Push once  dextrose 50% Injectable 25 Gram(s) IV Push once  dextrose 50% Injectable 25 Gram(s) IV Push once  enoxaparin Injectable 40 milliGRAM(s) SubCutaneous every 24 hours  folic acid Injectable 1 milliGRAM(s) IV Push daily  gabapentin 600 milliGRAM(s) Oral four times a day  glucagon  Injectable 1 milliGRAM(s) IntraMuscular once  insulin glargine Injectable (LANTUS) 15 Unit(s) SubCutaneous at bedtime  insulin lispro (ADMELOG) corrective regimen sliding scale   SubCutaneous Before meals and at bedtime  insulin lispro Injectable (ADMELOG) 5 Unit(s) SubCutaneous three times a day before meals  lisinopril 20 milliGRAM(s) Oral daily  metoprolol tartrate 25 milliGRAM(s) Oral two times a day  pantoprazole    Tablet 40 milliGRAM(s) Oral before breakfast  polyethylene glycol 3350 17 Gram(s) Oral daily  saccharomyces boulardii 250 milliGRAM(s) Oral two times a day  senna 2 Tablet(s) Oral at bedtime  sodium chloride 0.9% 1000 milliLiter(s) (75 mL/Hr) IV Continuous <Continuous>  sodium chloride 0.9%. 1000 milliLiter(s) (70 mL/Hr) IV Continuous <Continuous>  thiamine Injectable 100 milliGRAM(s) IV Push daily    MEDICATIONS  (PRN):  acetaminophen     Tablet .. 650 milliGRAM(s) Oral every 6 hours PRN Temp greater or equal to 38C (100.4F), Mild Pain (1 - 3)  chlordiazePOXIDE 5 milliGRAM(s) Oral every 2 hours PRN for ciwa > 5  dextrose Oral Gel 15 Gram(s) Oral once PRN Blood Glucose LESS THAN 70 milliGRAM(s)/deciliter  LORazepam   Injectable 1 milliGRAM(s) IV Push every 4 hours PRN for ciwa > 8  meclizine 12.5 milliGRAM(s) Oral three times a day PRN vertigo  ondansetron Injectable 4 milliGRAM(s) IV Push every 6 hours PRN Nausea and/or Vomiting      Objective:  Vital Signs Last 24 Hrs  T(C): 36.3 (22 Jun 2023 13:14), Max: 36.7 (22 Jun 2023 05:13)  T(F): 97.4 (22 Jun 2023 13:14), Max: 98 (22 Jun 2023 05:13)  HR: 83 (22 Jun 2023 14:21) (70 - 84)  BP: 157/88 (22 Jun 2023 14:21) (107/70 - 157/88)  RR: 18 (22 Jun 2023 13:14) (18 - 18)  SpO2: 96% (22 Jun 2023 14:21) (91% - 98%)    Parameters below as of 22 Jun 2023 13:14  Patient On (Oxygen Delivery Method): room air      Physical Exam:  General: no acute distress  Neck: supple, trachea midline  Lungs: clear, no wheeze/rhonchi  Cardiovascular: regular rate and rhythm, S1 S2  Abdomen: soft, nontender,  bowel sounds normal  Neurological: alert and oriented x3  Skin: no rash  Extremities: Right foot drsg c/d/i      LABS:                        10.9   7.91  )-----------( 212      ( 22 Jun 2023 07:09 )             36.1   06-22    132<L>  |  96  |  24<H>  ----------------------------<  238<H>  3.9   |  28  |  1.70<H>    Ca    8.8      22 Jun 2023 07:09    Surgical Pathology Report (06.20.23 @ 10:00)  Specimen(s) Submitted  1  Right foot, hallux, proximal phalanx bone  2  Right foot, hallux bone    Final Diagnosis  1. Right foot, hallux, proximal phalanx bone  No evidence of osteomyelitis.      2. Right foot, hallux bone  No evidence of osteomyelitis.    MICROBIOLOGY:    Culture - Tissue with Gram Stain (collected 20 Jun 2023 11:00)  Source: .Tissue Other, RIGHT FOOT HALLUX BONE CULTURE  Gram Stain (20 Jun 2023 21:18):    No polymorphonuclear cells seen per low power field    No organisms seen per oil power field  Preliminary Report (21 Jun 2023 11:43):    No growth    Culture - Tissue with Gram Stain (collected 20 Jun 2023 11:00)  Source: .Tissue Other, RIGHT FOOT HALLUX  Gram Stain (20 Jun 2023 21:18):    No polymorphonuclear cells seen per low power field    No organisms seen per oil power field  Preliminary Report (22 Jun 2023 13:38):    Rare Streptococcus agalactiae (Group B) isolated    Group B streptococci are susceptible to ampicillin,    penicillin and cefazolin, but may be resistant to    erythromycin and clindamycin.    Recommendations for intrapartum prophylaxis for Group B    streptococci are penicillin or ampicillin.    Culture - Blood (collected 19 Jun 2023 10:51)  Source: .Blood Blood-Peripheral  Preliminary Report (20 Jun 2023 17:01):    No growth to date.    Culture - Blood (collected 19 Jun 2023 10:45)  Source: .Blood Blood-Peripheral  Preliminary Report (20 Jun 2023 17:01):    No growth to date.    RADIOLOGY & ADDITIONAL STUDIES:    ACC: [id number] EXAM:  MR FOOT RT   ORDERED BY: STEPHANI SCHMIDT     PROCEDURE DATE:  06/12/2023          INTERPRETATION:  RIGHT FOOT MRI    CLINICAL INFORMATION: Right plantar hallux wound. Evaluation for   cellulitis.  TECHNIQUE: Multiplanar, multisequence MRI was obtained of the right foot.    COMPARISON: Right foot radiograph dated 5/28/2023    FINDINGS:    LIGAMENTS AND CAPSULAR STRUCTURES: Ligamentous instability structures are   intact.  MUSCLES AND TENDONS: Mild edema of the musculature within the superficial   and central compartment of the plantar foot. Visualized tendons are   intact.  CARTILAGE AND SUBCHONDRAL BONE: Chondral cystic change noted at the first   MTP joint. Joint spaces are otherwise preserved.  OSSEOUS ALIGNMENT: Mild hyperextension of the first MTP joint.  BONE MARROW: No significant bone marrow edema.  WEB SPACES: No neuromas or bursitis.  PERIPHERAL SOFT TISSUES: Small cutaneous defect on the plantar surface of   the hallux, inferior to the first interphalangeal joint, consistent with   provided history of ulcer.    IMPRESSION:  1.  No evidence of osteomyelitis.  2.  Small cutaneous defect on the plantar surface of the hallux with mild   surrounding soft tissue edema, consistent with provided history of ulcer.    Assessment :   61YO M PMHx of CAD (s/p stents x2, 2019), bioprosthetic AV replacement with repair of ascending aorta, HTN, HLD, DM2, neuropathy, vertigo, GERD, lacunar infarcts, PAD (s/p right anterior tibial artery angioplasty) admitted with nonhealing infected DM Right foot hallux plantar ulcer. Outpt culture with MSSA. MRI 6/12 neg for OM.  Sp Rainey arthroplasty and biopsy of bone of distal great toe 20-Jun-202  Uncontrolled DM  OR cultures with  Rare Streptococcus agalactiae (Group B) isolated  Path neg for acute OM  Constipation    Plan :   Cont Ancef x 10 days till 6/29 - can change to po Keflex 500mg q6h on dc  Trend temps and cbc  Wound care per podiatry  Bowel regiment per primary team  DM control   Pulm toileting  Increase activity    D/w Dr Ash and podiatry    Continue with present regiment.  Appropriate use of antibiotics and adverse effects reviewed.      I have discussed the above plan of care with patient in detail. He expressed understanding of the  treatment plan . Risks, benefits and alternatives discussed in detail. I have asked if he has any questions or concerns and appropriately addressed them to the best of my ability .    > 35 minutes were spent in direct patient care reviewing notes, medications ,labs data/ imaging , discussion with multidisciplinary team.    Thank you for allowing me to participate in care of your patient .    Gaston Blanco MD  Infectious Disease  [phone]

## 2023-06-22 NOTE — CHART NOTE - NSCHARTNOTEFT_GEN_A_CORE
RN called as pt had dizzy episode while walking to the bathroom, and complained of nausea and burning sensation in eye.  Vitals: /83, HR 75, SpO2 96% on RA, Temp 97.5    Pt evaluated at bedside. He states that while he was walking to the bathroom, he had sudden dizziness and felt unsteady on his feet. he is using a cane for ambulation. this felt similar to prior vertigo episodes. At this time when pt is laying in bed, he complains of HA and nausea. He states the dizziness has resolved. States the dizziness felt similar to pasts episodes of vertigo. denies blurry vision, chest pain, palpitations, SOB, abd pain, or other complaints.    On exam:  CONSTITUTIONAL: awake, alert, mildly anxious, no acute distress  HEENT: Atraumatic normocephalic. Moist mucous membranes.  No conjunctival injection.  RESP: No respiratory distress; CTA b/l, no WRR  CV: RRR, +S1S2, no MRG  GI: Bowel sounds present. Soft, nontender, nondistended, no rebound or guarding  MSK: Moving all four extremities spontaneously. No peripheral edema. No calf tenderness.  SKIN: Warm and dry. Bandage on right foot  NEURO: AOx3, answering questions and following commands appropriately  PSYCH:  recent memory intact     Plan:  62-year-old male w/ PMHx of CAD (s/p stents x2, 2019), bioprosthetic AV replacement with repair of ascending aorta, HTN, HLD, DM2, neuropathy, vertigo, GERD, lacunar infarcts, PAD (s/p right anterior tibial artery angioplasty) presents to the ED w/ right toe wound. Patient admitted for pre-surgical testing for planned R hallux bone biopsy.    # Dizziness  - vitals stable  - due to extensive history of vertigo, suspect this is cause of dizziness episode.  - Dizziness resolved at this time  - FU orthostatics  - Pt offered prn meclizine, pt declined at this time  - will give prn tylenol for headache  - offered zofran for nausea, pt declined at this time  - RN to notify with any changes RN called as pt had dizzy episode while walking to the bathroom, and complained of nausea and burning sensation in eye.  Vitals: /83, HR 75, SpO2 96% on RA, Temp 97.5    Pt evaluated at bedside. He states that while he was walking to the bathroom, he had sudden dizziness and felt unsteady on his feet. he is using a cane for ambulation. this felt similar to prior vertigo episodes. At this time when pt is laying in bed, he complains of HA and nausea. He states the dizziness has resolved. States the dizziness felt similar to pasts episodes of vertigo. denies blurry vision, chest pain, palpitations, SOB, abd pain, or other complaints.    On exam:  CONSTITUTIONAL: awake, alert, mildly anxious, no acute distress  HEENT: Atraumatic normocephalic. Moist mucous membranes.  No conjunctival injection.  RESP: No respiratory distress; CTA b/l, no WRR  CV: RRR, +S1S2, no MRG  GI: Bowel sounds present. Soft, nontender, nondistended  MSK: Moving all four extremities spontaneously  SKIN: Warm and dry. Bandage on right foot  NEURO: AOx3, answering questions and following commands appropriately  PSYCH:  recent memory intact     Plan:  62-year-old male w/ PMHx of CAD (s/p stents x2, 2019), bioprosthetic AV replacement with repair of ascending aorta, HTN, HLD, DM2, neuropathy, vertigo, GERD, lacunar infarcts, PAD (s/p right anterior tibial artery angioplasty) presents to the ED w/ right toe wound. Patient admitted for pre-surgical testing for planned R hallux bone biopsy.    # Dizziness  - vitals stable  - due to extensive history of vertigo, suspect this is cause of dizziness episode.  - Dizziness resolved at this time  - FU orthostatics  - Pt offered prn meclizine, pt declined at this time  - will give prn tylenol for headache  - offered zofran for nausea, pt declined at this time  - RN to notify with any changes

## 2023-06-22 NOTE — CONSULT NOTE ADULT - ASSESSMENT
62-year-old male w/ PMHx of CAD (s/p stents x2, 2019), bioprosthetic AV replacement with repair of ascending aorta, HTN, HLD, DM2, neuropathy, vertigo, GERD, lacunar infarcts, PAD (s/p right anterior tibial artery angioplasty) presents to the ED w/ right toe wound.    wound  etoh use  WARREN  MARGUERITE  CAD  valv heart disease  OP  OA  HTN  HLD  DM  GERD  PAD    wound care  ABX - ID and Podiatry follow up  DM care - serial FS  Cardio following, cvs rx regimen - BP control  Pain assessment  MARGUERITE - etoh use disorder - CIWA - Librium low dose ATC with PRN - Ativan and Librium  vitamins  education  counseling  SBIRT  DVT p  labs - imaging - reviewed

## 2023-06-22 NOTE — PROVIDER CONTACT NOTE (OTHER) - ASSESSMENT
patient is alert and oriented x4, in room air. no s/s of respiratory distress noted. pt denies chest pain or difficulty in breathing. vital signs stable.

## 2023-06-22 NOTE — DIETITIAN INITIAL EVALUATION ADULT - SIGNS/SYMPTOMS
as evidenced by hx of T2DM, elevated HgbA1c, hyperglycemia.  as evidenced by open R toe wound, s/p R hallux bone biopsy.

## 2023-06-22 NOTE — DIETITIAN INITIAL EVALUATION ADULT - NUTRITIONGOAL OUTCOME1
Pt to comply with therapeutic diet plus insulin regimen for improved glycemic control; 100-180mg/dl.

## 2023-06-22 NOTE — DIETITIAN INITIAL EVALUATION ADULT - ADD RECOMMEND
1) Continue current diet as ordered  2) Recommend MVI and Vitamin C 500mg daily  3) Monitor po intake, diet tolerance, weight trends, labs, GI function, skin integrity

## 2023-06-23 ENCOUNTER — TRANSCRIPTION ENCOUNTER (OUTPATIENT)
Age: 63
End: 2023-06-23

## 2023-06-23 LAB
ALBUMIN SERPL ELPH-MCNC: 3.8 G/DL — SIGNIFICANT CHANGE UP (ref 3.3–5)
ALP SERPL-CCNC: 130 U/L — HIGH (ref 40–120)
ALT FLD-CCNC: 21 U/L — SIGNIFICANT CHANGE UP (ref 12–78)
ANION GAP SERPL CALC-SCNC: 5 MMOL/L — SIGNIFICANT CHANGE UP (ref 5–17)
AST SERPL-CCNC: 18 U/L — SIGNIFICANT CHANGE UP (ref 15–37)
BASOPHILS # BLD AUTO: 0.04 K/UL — SIGNIFICANT CHANGE UP (ref 0–0.2)
BASOPHILS NFR BLD AUTO: 0.5 % — SIGNIFICANT CHANGE UP (ref 0–2)
BILIRUB DIRECT SERPL-MCNC: 0.1 MG/DL — SIGNIFICANT CHANGE UP (ref 0–0.3)
BILIRUB INDIRECT FLD-MCNC: 0.4 MG/DL — SIGNIFICANT CHANGE UP (ref 0.2–1)
BILIRUB SERPL-MCNC: 0.5 MG/DL — SIGNIFICANT CHANGE UP (ref 0.2–1.2)
BUN SERPL-MCNC: 19 MG/DL — SIGNIFICANT CHANGE UP (ref 7–23)
CALCIUM SERPL-MCNC: 9.7 MG/DL — SIGNIFICANT CHANGE UP (ref 8.5–10.1)
CHLORIDE SERPL-SCNC: 97 MMOL/L — SIGNIFICANT CHANGE UP (ref 96–108)
CO2 SERPL-SCNC: 32 MMOL/L — HIGH (ref 22–31)
CREAT SERPL-MCNC: 1.4 MG/DL — HIGH (ref 0.5–1.3)
EGFR: 56 ML/MIN/1.73M2 — LOW
EOSINOPHIL # BLD AUTO: 0.09 K/UL — SIGNIFICANT CHANGE UP (ref 0–0.5)
EOSINOPHIL NFR BLD AUTO: 1.2 % — SIGNIFICANT CHANGE UP (ref 0–6)
GLUCOSE SERPL-MCNC: 188 MG/DL — HIGH (ref 70–99)
HCT VFR BLD CALC: 40.2 % — SIGNIFICANT CHANGE UP (ref 39–50)
HGB BLD-MCNC: 12 G/DL — LOW (ref 13–17)
IMM GRANULOCYTES NFR BLD AUTO: 0.5 % — SIGNIFICANT CHANGE UP (ref 0–0.9)
LYMPHOCYTES # BLD AUTO: 1.22 K/UL — SIGNIFICANT CHANGE UP (ref 1–3.3)
LYMPHOCYTES # BLD AUTO: 15.7 % — SIGNIFICANT CHANGE UP (ref 13–44)
MAGNESIUM SERPL-MCNC: 1.7 MG/DL — SIGNIFICANT CHANGE UP (ref 1.6–2.6)
MCHC RBC-ENTMCNC: 23.8 PG — LOW (ref 27–34)
MCHC RBC-ENTMCNC: 29.9 GM/DL — LOW (ref 32–36)
MCV RBC AUTO: 79.6 FL — LOW (ref 80–100)
MONOCYTES # BLD AUTO: 0.56 K/UL — SIGNIFICANT CHANGE UP (ref 0–0.9)
MONOCYTES NFR BLD AUTO: 7.2 % — SIGNIFICANT CHANGE UP (ref 2–14)
NEUTROPHILS # BLD AUTO: 5.84 K/UL — SIGNIFICANT CHANGE UP (ref 1.8–7.4)
NEUTROPHILS NFR BLD AUTO: 74.9 % — SIGNIFICANT CHANGE UP (ref 43–77)
NRBC # BLD: 0 /100 WBCS — SIGNIFICANT CHANGE UP (ref 0–0)
PHOSPHATE SERPL-MCNC: 2.8 MG/DL — SIGNIFICANT CHANGE UP (ref 2.5–4.5)
PLATELET # BLD AUTO: 240 K/UL — SIGNIFICANT CHANGE UP (ref 150–400)
POTASSIUM SERPL-MCNC: 4.1 MMOL/L — SIGNIFICANT CHANGE UP (ref 3.5–5.3)
POTASSIUM SERPL-SCNC: 4.1 MMOL/L — SIGNIFICANT CHANGE UP (ref 3.5–5.3)
PROT SERPL-MCNC: 7.8 G/DL — SIGNIFICANT CHANGE UP (ref 6–8.3)
RBC # BLD: 5.05 M/UL — SIGNIFICANT CHANGE UP (ref 4.2–5.8)
RBC # FLD: 16.4 % — HIGH (ref 10.3–14.5)
SODIUM SERPL-SCNC: 134 MMOL/L — LOW (ref 135–145)
WBC # BLD: 7.79 K/UL — SIGNIFICANT CHANGE UP (ref 3.8–10.5)
WBC # FLD AUTO: 7.79 K/UL — SIGNIFICANT CHANGE UP (ref 3.8–10.5)

## 2023-06-23 PROCEDURE — 99232 SBSQ HOSP IP/OBS MODERATE 35: CPT

## 2023-06-23 PROCEDURE — 99233 SBSQ HOSP IP/OBS HIGH 50: CPT

## 2023-06-23 PROCEDURE — 99231 SBSQ HOSP IP/OBS SF/LOW 25: CPT

## 2023-06-23 RX ADMIN — Medication 5 UNIT(S): at 17:39

## 2023-06-23 RX ADMIN — Medication 5 UNIT(S): at 08:23

## 2023-06-23 RX ADMIN — INSULIN GLARGINE 15 UNIT(S): 100 INJECTION, SOLUTION SUBCUTANEOUS at 22:06

## 2023-06-23 RX ADMIN — Medication 2: at 17:38

## 2023-06-23 RX ADMIN — LISINOPRIL 20 MILLIGRAM(S): 2.5 TABLET ORAL at 05:13

## 2023-06-23 RX ADMIN — Medication 4: at 22:07

## 2023-06-23 RX ADMIN — ENOXAPARIN SODIUM 40 MILLIGRAM(S): 100 INJECTION SUBCUTANEOUS at 05:13

## 2023-06-23 RX ADMIN — Medication 1 MILLIGRAM(S): at 17:37

## 2023-06-23 RX ADMIN — ONDANSETRON 4 MILLIGRAM(S): 8 TABLET, FILM COATED ORAL at 05:12

## 2023-06-23 RX ADMIN — POLYETHYLENE GLYCOL 3350 17 GRAM(S): 17 POWDER, FOR SOLUTION ORAL at 15:06

## 2023-06-23 RX ADMIN — Medication 25 MILLIGRAM(S): at 17:37

## 2023-06-23 RX ADMIN — Medication 12.5 MILLIGRAM(S): at 05:13

## 2023-06-23 RX ADMIN — Medication 5 UNIT(S): at 12:17

## 2023-06-23 RX ADMIN — Medication 25 MILLIGRAM(S): at 05:13

## 2023-06-23 RX ADMIN — Medication 100 MILLIGRAM(S): at 15:06

## 2023-06-23 RX ADMIN — PANTOPRAZOLE SODIUM 40 MILLIGRAM(S): 20 TABLET, DELAYED RELEASE ORAL at 05:14

## 2023-06-23 RX ADMIN — Medication 100 MILLIGRAM(S): at 15:05

## 2023-06-23 RX ADMIN — ATORVASTATIN CALCIUM 80 MILLIGRAM(S): 80 TABLET, FILM COATED ORAL at 21:40

## 2023-06-23 RX ADMIN — Medication 100 MILLIGRAM(S): at 05:13

## 2023-06-23 RX ADMIN — Medication 250 MILLIGRAM(S): at 17:37

## 2023-06-23 RX ADMIN — Medication 250 MILLIGRAM(S): at 05:13

## 2023-06-23 RX ADMIN — Medication 2: at 12:17

## 2023-06-23 RX ADMIN — Medication 100 MILLIGRAM(S): at 21:42

## 2023-06-23 RX ADMIN — Medication 2: at 08:22

## 2023-06-23 NOTE — PROGRESS NOTE ADULT - SUBJECTIVE AND OBJECTIVE BOX
Date/Time Patient Seen:  		  Referring MD:   Data Reviewed	       Patient is a 62y old  Male who presents with a chief complaint of Toe wound, pre-surgical testing (22 Jun 2023 18:20)      Subjective/HPI     PAST MEDICAL & SURGICAL HISTORY:  Diabetes mellitus    Hypertension    AAA (abdominal aortic aneurysm)  dx 2012    GERD (gastroesophageal reflux disease)    Leg weakness    Aortic valve replaced    Cardiac tamponade    HTN (hypertension)    Diabetes mellitus    H/O aortic valve repair    S/P aneurysm repair    2019 novel coronavirus disease (COVID-19)  January 2021    Non-pressure chronic ulcer of other part of right foot with unspecified severity    Type 2 diabetes mellitus  Not on Insulin but complicated by peripheral neuropathy    H/O cardiac catheterization  Stent Placement X 2 (2019)    H/O vertigo    No significant past surgical history    Aortic valve replaced    S/P AAA repair  Repaired 3/2015    S/P aortic aneurysm repair    H/O aortic valve repair    History of incision and drainage    H/O colonoscopy          Medication list         MEDICATIONS  (STANDING):  atorvastatin 80 milliGRAM(s) Oral at bedtime  bisacodyl 5 milliGRAM(s) Oral at bedtime  ceFAZolin   IVPB 2000 milliGRAM(s) IV Intermittent every 8 hours  chlordiazePOXIDE 5 milliGRAM(s) Oral every 6 hours  dextrose 50% Injectable 12.5 Gram(s) IV Push once  dextrose 50% Injectable 25 Gram(s) IV Push once  dextrose 50% Injectable 25 Gram(s) IV Push once  enoxaparin Injectable 40 milliGRAM(s) SubCutaneous every 24 hours  folic acid Injectable 1 milliGRAM(s) IV Push daily  gabapentin 600 milliGRAM(s) Oral four times a day  glucagon  Injectable 1 milliGRAM(s) IntraMuscular once  insulin glargine Injectable (LANTUS) 15 Unit(s) SubCutaneous at bedtime  insulin lispro (ADMELOG) corrective regimen sliding scale   SubCutaneous Before meals and at bedtime  insulin lispro Injectable (ADMELOG) 5 Unit(s) SubCutaneous three times a day before meals  lisinopril 20 milliGRAM(s) Oral daily  metoprolol tartrate 25 milliGRAM(s) Oral two times a day  pantoprazole    Tablet 40 milliGRAM(s) Oral before breakfast  polyethylene glycol 3350 17 Gram(s) Oral daily  saccharomyces boulardii 250 milliGRAM(s) Oral two times a day  senna 2 Tablet(s) Oral at bedtime  sodium chloride 0.9% 1000 milliLiter(s) (75 mL/Hr) IV Continuous <Continuous>  sodium chloride 0.9%. 1000 milliLiter(s) (70 mL/Hr) IV Continuous <Continuous>  thiamine Injectable 100 milliGRAM(s) IV Push daily    MEDICATIONS  (PRN):  acetaminophen     Tablet .. 650 milliGRAM(s) Oral every 6 hours PRN Temp greater or equal to 38C (100.4F), Mild Pain (1 - 3)  chlordiazePOXIDE 5 milliGRAM(s) Oral every 2 hours PRN for ciwa > 5  dextrose Oral Gel 15 Gram(s) Oral once PRN Blood Glucose LESS THAN 70 milliGRAM(s)/deciliter  LORazepam   Injectable 1 milliGRAM(s) IV Push every 4 hours PRN for ciwa > 8  meclizine 12.5 milliGRAM(s) Oral three times a day PRN vertigo  ondansetron Injectable 4 milliGRAM(s) IV Push every 6 hours PRN Nausea and/or Vomiting         Vitals log        ICU Vital Signs Last 24 Hrs  T(C): 36.8 (22 Jun 2023 20:09), Max: 36.8 (22 Jun 2023 20:09)  T(F): 98.2 (22 Jun 2023 20:09), Max: 98.2 (22 Jun 2023 20:09)  HR: 86 (22 Jun 2023 20:09) (70 - 86)  BP: 143/72 (22 Jun 2023 20:09) (107/70 - 157/88)  BP(mean): --  ABP: --  ABP(mean): --  RR: 18 (22 Jun 2023 20:09) (18 - 18)  SpO2: 96% (22 Jun 2023 20:09) (91% - 98%)    O2 Parameters below as of 22 Jun 2023 20:09  Patient On (Oxygen Delivery Method): room air                 Input and Output:  I&O's Detail      Lab Data                        10.9   7.91  )-----------( 212      ( 22 Jun 2023 07:09 )             36.1     06-22    132<L>  |  96  |  24<H>  ----------------------------<  238<H>  3.9   |  28  |  1.70<H>    Ca    8.8      22 Jun 2023 07:09              Review of Systems	      Objective     Physical Examination    heart s1s2  lung dc bS  head nc      Pertinent Lab findings & Imaging      Dueñas:  NO   Adequate UO     I&O's Detail           Discussed with:     Cultures:	        Radiology

## 2023-06-23 NOTE — PROGRESS NOTE ADULT - PROBLEM SELECTOR PROBLEM 1
Diabetic ulcer of right great toe
Type 2 diabetes mellitus
Open wound of right great toe
Open wound of right great toe
Type 2 diabetes mellitus
Open wound of right great toe
Open wound of right great toe
Type 2 diabetes mellitus

## 2023-06-23 NOTE — PROGRESS NOTE ADULT - ASSESSMENT
62-year-old male w/ PMHx of CAD (s/p stents x2, 2019), bioprosthetic AV replacement with repair of ascending aorta, HTN, HLD, DM2, neuropathy, vertigo, GERD, lacunar infarcts, PAD (s/p right anterior tibial artery angioplasty) presents to the ED w/ right toe wound. Patient admitted for pre-surgical testing for planned R hallux bone biopsy.     A/P:  right great toe wound     - chronic wound.     - dressing per podiatry     - on cefazolin 1gm TID and transition to keflex on discharge.     - ID Dr Blanco     CAD s/p stents     - cont home asa, plavix and statin     DM2    - uncontrolled. hgba1c 9.5. continue lantus 10 units and admelog 3 units TID    - will need insulin on discharge.     HTN    - cont home metoprolol     alcohol dependence    - minimal tremors on exam. has been refusing librium.     ?discharge tomorrow.   will reach out to podiatry re: wound care orders.    62-year-old male w/ PMHx of CAD (s/p stents x2, 2019), bioprosthetic AV replacement with repair of ascending aorta, HTN, HLD, DM2, neuropathy, vertigo, GERD, lacunar infarcts, PAD (s/p right anterior tibial artery angioplasty) presents to the ED w/ right toe wound. Patient admitted for pre-surgical testing for planned R hallux bone biopsy.     A/P:  right great toe wound     - chronic wound.     - dressing per podiatry     - on cefazolin 1gm TID and transition to keflex on discharge untill 6/29     - ID Dr Blanco     CAD s/p stents     - cont home asa, plavix and statin     DM2    - uncontrolled. hgba1c 9.5. continue lantus 10 units and admelog 3 units TID    - will need insulin on discharge.     HTN    - cont home metoprolol     alcohol dependence    - minimal tremors on exam. has been refusing librium.     ?discharge tomorrow.   will reach out to podiatry re: wound care orders.

## 2023-06-23 NOTE — PROGRESS NOTE ADULT - ASSESSMENT
62-year-old male w/ PMHx of CAD (s/p stents x2, 2019), bioprosthetic AV replacement with repair of ascending aorta, HTN, HLD, DM2, neuropathy, vertigo, GERD, lacunar infarcts, PAD (s/p right anterior tibial artery angioplasty) presents to the ED w/ right toe wound.    wound  etoh use  WARREN  MARGUERITE  CAD  valv heart disease  OP  OA  HTN  HLD  DM  GERD  PAD    IVF for MARION  on Librium  VS noted    wound care  ABX - ID and Podiatry follow up  DM care - serial FS  Cardio following, cvs rx regimen - BP control  Pain assessment  MARGUERITE - etoh use disorder - CIWA - Librium low dose ATC with PRN - Ativan and Librium  vitamins  education  counseling  SBIRT  DVT p  labs - imaging - reviewed

## 2023-06-23 NOTE — PROGRESS NOTE ADULT - PSYCHIATRIC
normal/normal affect/alert and oriented x3/normal behavior
normal/normal affect/alert and oriented x3/normal behavior

## 2023-06-23 NOTE — PROGRESS NOTE ADULT - SUBJECTIVE AND OBJECTIVE BOX
Patient is a 62y old  Male who presents with a chief complaint of Toe wound, pre-surgical testing (23 Jun 2023 10:22)    INTERVAL HPI/OVERNIGHT EVENTS: Patient was seen and examined. upset over what happened last night. reports that he used to have an alcohol problem but has overcome it and is only drinking 1-2 drinks weekly. sometimes 1 drink a day. He has been refusing the librium taper    I&O's Summary      LABS:                        12.0   7.79  )-----------( 240      ( 23 Jun 2023 07:19 )             40.2     06-23    134<L>  |  97  |  19  ----------------------------<  188<H>  4.1   |  32<H>  |  1.40<H>    Ca    9.7      23 Jun 2023 07:19  Phos  2.8     06-23  Mg     1.7     06-23    TPro  7.8  /  Alb  3.8  /  TBili  0.5  /  DBili  0.1  /  AST  18  /  ALT  21  /  AlkPhos  130<H>  06-23      Urinalysis Basic - ( 23 Jun 2023 07:19 )    Color: x / Appearance: x / SG: x / pH: x  Gluc: 188 mg/dL / Ketone: x  / Bili: x / Urobili: x   Blood: x / Protein: x / Nitrite: x   Leuk Esterase: x / RBC: x / WBC x   Sq Epi: x / Non Sq Epi: x / Bacteria: x      CAPILLARY BLOOD GLUCOSE      POCT Blood Glucose.: 185 mg/dL (23 Jun 2023 11:41)  POCT Blood Glucose.: 173 mg/dL (23 Jun 2023 08:07)  POCT Blood Glucose.: 236 mg/dL (22 Jun 2023 21:07)  POCT Blood Glucose.: 162 mg/dL (22 Jun 2023 16:35)        Urinalysis Basic - ( 23 Jun 2023 07:19 )    Color: x / Appearance: x / SG: x / pH: x  Gluc: 188 mg/dL / Ketone: x  / Bili: x / Urobili: x   Blood: x / Protein: x / Nitrite: x   Leuk Esterase: x / RBC: x / WBC x   Sq Epi: x / Non Sq Epi: x / Bacteria: x        MEDICATIONS  (STANDING):  atorvastatin 80 milliGRAM(s) Oral at bedtime  bisacodyl 5 milliGRAM(s) Oral at bedtime  ceFAZolin   IVPB 2000 milliGRAM(s) IV Intermittent every 8 hours  chlordiazePOXIDE 5 milliGRAM(s) Oral every 6 hours  dextrose 50% Injectable 12.5 Gram(s) IV Push once  dextrose 50% Injectable 25 Gram(s) IV Push once  dextrose 50% Injectable 25 Gram(s) IV Push once  enoxaparin Injectable 40 milliGRAM(s) SubCutaneous every 24 hours  folic acid Injectable 1 milliGRAM(s) IV Push daily  gabapentin 600 milliGRAM(s) Oral four times a day  glucagon  Injectable 1 milliGRAM(s) IntraMuscular once  insulin glargine Injectable (LANTUS) 15 Unit(s) SubCutaneous at bedtime  insulin lispro (ADMELOG) corrective regimen sliding scale   SubCutaneous Before meals and at bedtime  insulin lispro Injectable (ADMELOG) 5 Unit(s) SubCutaneous three times a day before meals  lisinopril 20 milliGRAM(s) Oral daily  metoprolol tartrate 25 milliGRAM(s) Oral two times a day  pantoprazole    Tablet 40 milliGRAM(s) Oral before breakfast  polyethylene glycol 3350 17 Gram(s) Oral daily  saccharomyces boulardii 250 milliGRAM(s) Oral two times a day  senna 2 Tablet(s) Oral at bedtime  sodium chloride 0.9% 1000 milliLiter(s) (75 mL/Hr) IV Continuous <Continuous>  sodium chloride 0.9%. 1000 milliLiter(s) (70 mL/Hr) IV Continuous <Continuous>  thiamine Injectable 100 milliGRAM(s) IV Push daily    MEDICATIONS  (PRN):  acetaminophen     Tablet .. 650 milliGRAM(s) Oral every 6 hours PRN Temp greater or equal to 38C (100.4F), Mild Pain (1 - 3)  chlordiazePOXIDE 5 milliGRAM(s) Oral every 2 hours PRN for ciwa > 5  dextrose Oral Gel 15 Gram(s) Oral once PRN Blood Glucose LESS THAN 70 milliGRAM(s)/deciliter  LORazepam   Injectable 1 milliGRAM(s) IV Push every 4 hours PRN for ciwa > 8  meclizine 12.5 milliGRAM(s) Oral three times a day PRN vertigo  ondansetron Injectable 4 milliGRAM(s) IV Push every 6 hours PRN Nausea and/or Vomiting      REVIEW OF SYSTEMS:  CONSTITUTIONAL: No fever or chills  HEENT:  No headache, no sore throat  RESPIRATORY: No cough, wheezing, or shortness of breath  CARDIOVASCULAR: No chest pain, palpitations  GASTROINTESTINAL: No abdominal pain, nausea, vomiting, or diarrhea  GENITOURINARY: No dysuria, frequency, or hematuria  NEUROLOGICAL: no focal weakness or dizziness  MUSCULOSKELETAL: no myalgias  PSYCH: no recent changes in mood    RADIOLOGY & ADDITIONAL TESTS:    Imaging Personally Reviewed:  [x] YES  [ ] NO    Consultant(s) Notes Reviewed:  [x] YES  [ ] NO    PHYSICAL EXAM:  T(C): 36.3 (06-23-23 @ 13:05), Max: 36.8 (06-22-23 @ 20:09)  HR: 82 (06-23-23 @ 13:05) (70 - 86)  BP: 127/81 (06-23-23 @ 13:05) (127/81 - 155/93)  RR: 18 (06-23-23 @ 13:05) (18 - 18)  SpO2: 95% (06-23-23 @ 13:05) (95% - 96%)    GENERAL: NAD, well-developed, well-groomed  HEENT:  anicteric, moist mucous membranes  CHEST/LUNG:  CTA b/l, no rales, wheezes, or rhonchi  HEART:  RRR, S1, S2  ABDOMEN:  BS+, soft, nontender, nondistended  EXTREMITIES: + LLE bandaged  NERVOUS SYSTEM: answers questions and follows commands appropriately  PSYCH: normal affect    Care Discussed with Consultants/Other Providers [x] YES  [ ] NO

## 2023-06-23 NOTE — PROGRESS NOTE ADULT - SUBJECTIVE AND OBJECTIVE BOX
DUSTY STRICKLAND is a 62yMale , patient examined and chart reviewed.     INTERVAL HPI/ OVERNIGHT EVENTS:   Afebrile Had BM feeling better.  No events.    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      ROS:  CONSTITUTIONAL:  Negative fever or chills  EYES:  Negative  blurry vision or double vision  CARDIOVASCULAR:  Negative for chest pain or palpitations  RESPIRATORY:  Negative for cough, wheezing, or SOB   GASTROINTESTINAL:  + for nausea, constipation neg for vomiting, diarrhea, , or abdominal pain  GENITOURINARY:  Negative frequency, urgency or dysuria  NEUROLOGIC:  No headache, confusion, dizziness, lightheadedness  All other systems were reviewed and are negative     ALLERGIES  Normodyne (Faint)      Current inpatient medications :    ANTIBIOTICS/RELEVANT:  ceFAZolin   IVPB 2000 milliGRAM(s) IV Intermittent every 8 hours    MEDICATIONS  (STANDING):  atorvastatin 80 milliGRAM(s) Oral at bedtime  bisacodyl 5 milliGRAM(s) Oral at bedtime  chlordiazePOXIDE 5 milliGRAM(s) Oral every 6 hours  dextrose 50% Injectable 12.5 Gram(s) IV Push once  dextrose 50% Injectable 25 Gram(s) IV Push once  dextrose 50% Injectable 25 Gram(s) IV Push once  enoxaparin Injectable 40 milliGRAM(s) SubCutaneous every 24 hours  folic acid Injectable 1 milliGRAM(s) IV Push daily  gabapentin 600 milliGRAM(s) Oral four times a day  glucagon  Injectable 1 milliGRAM(s) IntraMuscular once  insulin glargine Injectable (LANTUS) 15 Unit(s) SubCutaneous at bedtime  insulin lispro (ADMELOG) corrective regimen sliding scale   SubCutaneous Before meals and at bedtime  insulin lispro Injectable (ADMELOG) 5 Unit(s) SubCutaneous three times a day before meals  lisinopril 20 milliGRAM(s) Oral daily  metoprolol tartrate 25 milliGRAM(s) Oral two times a day  pantoprazole    Tablet 40 milliGRAM(s) Oral before breakfast  polyethylene glycol 3350 17 Gram(s) Oral daily  saccharomyces boulardii 250 milliGRAM(s) Oral two times a day  senna 2 Tablet(s) Oral at bedtime  sodium chloride 0.9% 1000 milliLiter(s) (75 mL/Hr) IV Continuous <Continuous>  sodium chloride 0.9%. 1000 milliLiter(s) (70 mL/Hr) IV Continuous <Continuous>  thiamine Injectable 100 milliGRAM(s) IV Push daily    MEDICATIONS  (PRN):  acetaminophen     Tablet .. 650 milliGRAM(s) Oral every 6 hours PRN Temp greater or equal to 38C (100.4F), Mild Pain (1 - 3)  chlordiazePOXIDE 5 milliGRAM(s) Oral every 2 hours PRN for ciwa > 5  dextrose Oral Gel 15 Gram(s) Oral once PRN Blood Glucose LESS THAN 70 milliGRAM(s)/deciliter  LORazepam   Injectable 1 milliGRAM(s) IV Push every 4 hours PRN for ciwa > 8  meclizine 12.5 milliGRAM(s) Oral three times a day PRN vertigo  ondansetron Injectable 4 milliGRAM(s) IV Push every 6 hours PRN Nausea and/or Vomiting        Objective:  Vital Signs Last 24 Hrs  T(C): 36.3 (23 Jun 2023 13:05), Max: 36.8 (22 Jun 2023 20:09)  T(F): 97.3 (23 Jun 2023 13:05), Max: 98.2 (22 Jun 2023 20:09)  HR: 82 (23 Jun 2023 13:05) (70 - 86)  BP: 127/81 (23 Jun 2023 13:05) (127/81 - 155/93  RR: 18 (23 Jun 2023 13:05) (18 - 18)  SpO2: 95% (23 Jun 2023 13:05) (95% - 96%)    Parameters below as of 23 Jun 2023 13:05  Patient On (Oxygen Delivery Method): room air    Physical Exam:  General: no acute distress  Neck: supple, trachea midline  Lungs: clear, no wheeze/rhonchi  Cardiovascular: regular rate and rhythm, S1 S2  Abdomen: soft, nontender,  bowel sounds normal  Neurological: alert and oriented x3  Skin: no rash  Extremities: Right foot drsg c/d/i      LABS:                        10.9   7.91  )-----------( 212      ( 22 Jun 2023 07:09 )             36.1   06-22    132<L>  |  96  |  24<H>  ----------------------------<  238<H>  3.9   |  28  |  1.70<H>    Ca    8.8      22 Jun 2023 07:09    Surgical Pathology Report (06.20.23 @ 10:00)  Specimen(s) Submitted  1  Right foot, hallux, proximal phalanx bone  2  Right foot, hallux bone    Final Diagnosis  1. Right foot, hallux, proximal phalanx bone  No evidence of osteomyelitis.      2. Right foot, hallux bone  No evidence of osteomyelitis.    MICROBIOLOGY:    Culture - Tissue with Gram Stain (collected 20 Jun 2023 11:00)  Source: .Tissue Other, RIGHT FOOT HALLUX BONE CULTURE  Gram Stain (20 Jun 2023 21:18):    No polymorphonuclear cells seen per low power field    No organisms seen per oil power field  Preliminary Report (21 Jun 2023 11:43):    No growth    Culture - Tissue with Gram Stain (collected 20 Jun 2023 11:00)  Source: .Tissue Other, RIGHT FOOT HALLUX  Gram Stain (20 Jun 2023 21:18):    No polymorphonuclear cells seen per low power field    No organisms seen per oil power field  Preliminary Report (22 Jun 2023 13:38):    Rare Streptococcus agalactiae (Group B) isolated    Group B streptococci are susceptible to ampicillin,    penicillin and cefazolin, but may be resistant to    erythromycin and clindamycin.    Recommendations for intrapartum prophylaxis for Group B    streptococci are penicillin or ampicillin.    Culture - Blood (collected 19 Jun 2023 10:51)  Source: .Blood Blood-Peripheral  Preliminary Report (20 Jun 2023 17:01):    No growth to date.    Culture - Blood (collected 19 Jun 2023 10:45)  Source: .Blood Blood-Peripheral  Preliminary Report (20 Jun 2023 17:01):    No growth to date.    RADIOLOGY & ADDITIONAL STUDIES:    ACC: [id number] EXAM:  MR FOOT RT   ORDERED BY: STEPHANI SCHMIDT     PROCEDURE DATE:  06/12/2023          INTERPRETATION:  RIGHT FOOT MRI    CLINICAL INFORMATION: Right plantar hallux wound. Evaluation for   cellulitis.  TECHNIQUE: Multiplanar, multisequence MRI was obtained of the right foot.    COMPARISON: Right foot radiograph dated 5/28/2023    FINDINGS:    LIGAMENTS AND CAPSULAR STRUCTURES: Ligamentous instability structures are   intact.  MUSCLES AND TENDONS: Mild edema of the musculature within the superficial   and central compartment of the plantar foot. Visualized tendons are   intact.  CARTILAGE AND SUBCHONDRAL BONE: Chondral cystic change noted at the first   MTP joint. Joint spaces are otherwise preserved.  OSSEOUS ALIGNMENT: Mild hyperextension of the first MTP joint.  BONE MARROW: No significant bone marrow edema.  WEB SPACES: No neuromas or bursitis.  PERIPHERAL SOFT TISSUES: Small cutaneous defect on the plantar surface of   the hallux, inferior to the first interphalangeal joint, consistent with   provided history of ulcer.    IMPRESSION:  1.  No evidence of osteomyelitis.  2.  Small cutaneous defect on the plantar surface of the hallux with mild   surrounding soft tissue edema, consistent with provided history of ulcer.    Assessment :   61YO M PMHx of CAD (s/p stents x2, 2019), bioprosthetic AV replacement with repair of ascending aorta, HTN, HLD, DM2, neuropathy, vertigo, GERD, lacunar infarcts, PAD (s/p right anterior tibial artery angioplasty) admitted with nonhealing infected DM Right foot hallux plantar ulcer. Outpt culture with MSSA. MRI 6/12 neg for OM.  Sp Rainey arthroplasty and biopsy of bone of distal great toe 20-Jun-202  Uncontrolled DM  OR cultures with  Rare Streptococcus agalactiae (Group B) isolated  Path neg for acute OM  Constipation- Had BM today feeling better    Plan :   Cont Ancef x 10 days till 6/29 - can change to po Keflex 500mg q6h on dc  Trend temps and cbc  Wound care per podiatry  Bowel regiment per primary team  DM control   Pulm toileting  Increase activity  Stable from ID standpoint  Dc home per primary team    Continue with present regiment.  Appropriate use of antibiotics and adverse effects reviewed.      I have discussed the above plan of care with patient in detail. He expressed understanding of the  treatment plan . Risks, benefits and alternatives discussed in detail. I have asked if he has any questions or concerns and appropriately addressed them to the best of my ability .    > 35 minutes were spent in direct patient care reviewing notes, medications ,labs data/ imaging , discussion with multidisciplinary team.    Thank you for allowing me to participate in care of your patient .    Gaston Blanco MD  Infectious Disease  [phone]

## 2023-06-23 NOTE — PROGRESS NOTE ADULT - ASSESSMENT
Physical Exam:   Vital Signs Last 24 Hrs  T(C): 36.3 (23 Jun 2023 13:05), Max: 36.8 (22 Jun 2023 20:09)  T(F): 97.3 (23 Jun 2023 13:05), Max: 98.2 (22 Jun 2023 20:09)  HR: 82 (23 Jun 2023 13:05) (70 - 86)  BP: 127/81 (23 Jun 2023 13:05) (127/81 - 155/93)  BP(mean): --  RR: 18 (23 Jun 2023 13:05) (18 - 18)  SpO2: 95% (23 Jun 2023 13:05) (95% - 96%)    Parameters below as of 23 Jun 2023 13:05  Patient On (Oxygen Delivery Method): room air           CAPILLARY BLOOD GLUCOSE      POCT Blood Glucose.: 185 mg/dL (23 Jun 2023 11:41)  POCT Blood Glucose.: 173 mg/dL (23 Jun 2023 08:07)  POCT Blood Glucose.: 236 mg/dL (22 Jun 2023 21:07)  POCT Blood Glucose.: 162 mg/dL (22 Jun 2023 16:35)      Cholesterol, Serum: 113 mg/dL (05.19.21 @ 08:36)     HDL Cholesterol, Serum: 22 mg/dL (05.19.21 @ 08:36)     LDL Cholesterol Calculated: 66 mg/dL (05.19.21 @ 08:36)     DIET: CC  >50%

## 2023-06-23 NOTE — PROGRESS NOTE ADULT - SUBJECTIVE AND OBJECTIVE BOX
[facility] -- Effie Sousa Pannella, Patel, Savella, Goodger  Office # [phone]    Follow Up:      Subjective/Observations:     REVIEW OF SYSTEMS: All other review of systems is negative unless indicated above  PAST MEDICAL & SURGICAL HISTORY:  Hypertension      AAA (abdominal aortic aneurysm)  dx 2012      GERD (gastroesophageal reflux disease)      Leg weakness      Aortic valve replaced      Cardiac tamponade      HTN (hypertension)      H/O aortic valve repair      S/P aneurysm repair      2019 novel coronavirus disease (COVID-19)  January 2021      Non-pressure chronic ulcer of other part of right foot with unspecified severity      Type 2 diabetes mellitus  Not on Insulin but complicated by peripheral neuropathy      H/O cardiac catheterization  Stent Placement X 2 (2019)      H/O vertigo      Aortic valve replaced      S/P AAA repair  Repaired 3/2015      S/P aortic aneurysm repair      H/O aortic valve repair      History of incision and drainage      H/O colonoscopy        MEDICATIONS  (STANDING):  atorvastatin 80 milliGRAM(s) Oral at bedtime  bisacodyl 5 milliGRAM(s) Oral at bedtime  ceFAZolin   IVPB 2000 milliGRAM(s) IV Intermittent every 8 hours  chlordiazePOXIDE 5 milliGRAM(s) Oral every 6 hours  dextrose 50% Injectable 25 Gram(s) IV Push once  dextrose 50% Injectable 25 Gram(s) IV Push once  dextrose 50% Injectable 12.5 Gram(s) IV Push once  enoxaparin Injectable 40 milliGRAM(s) SubCutaneous every 24 hours  folic acid Injectable 1 milliGRAM(s) IV Push daily  gabapentin 600 milliGRAM(s) Oral four times a day  glucagon  Injectable 1 milliGRAM(s) IntraMuscular once  insulin glargine Injectable (LANTUS) 15 Unit(s) SubCutaneous at bedtime  insulin lispro (ADMELOG) corrective regimen sliding scale   SubCutaneous Before meals and at bedtime  insulin lispro Injectable (ADMELOG) 5 Unit(s) SubCutaneous three times a day before meals  lisinopril 20 milliGRAM(s) Oral daily  metoprolol tartrate 25 milliGRAM(s) Oral two times a day  pantoprazole    Tablet 40 milliGRAM(s) Oral before breakfast  polyethylene glycol 3350 17 Gram(s) Oral daily  saccharomyces boulardii 250 milliGRAM(s) Oral two times a day  senna 2 Tablet(s) Oral at bedtime  sodium chloride 0.9% 1000 milliLiter(s) (75 mL/Hr) IV Continuous <Continuous>  sodium chloride 0.9%. 1000 milliLiter(s) (70 mL/Hr) IV Continuous <Continuous>  thiamine Injectable 100 milliGRAM(s) IV Push daily    MEDICATIONS  (PRN):  acetaminophen     Tablet .. 650 milliGRAM(s) Oral every 6 hours PRN Temp greater or equal to 38C (100.4F), Mild Pain (1 - 3)  chlordiazePOXIDE 5 milliGRAM(s) Oral every 2 hours PRN for ciwa > 5  dextrose Oral Gel 15 Gram(s) Oral once PRN Blood Glucose LESS THAN 70 milliGRAM(s)/deciliter  LORazepam   Injectable 1 milliGRAM(s) IV Push every 4 hours PRN for ciwa > 8  meclizine 12.5 milliGRAM(s) Oral three times a day PRN vertigo  ondansetron Injectable 4 milliGRAM(s) IV Push every 6 hours PRN Nausea and/or Vomiting    Allergies    Normodyne (Faint)    Intolerances      Vital Signs Last 24 Hrs  T(C): 36.6 (23 Jun 2023 05:40), Max: 36.8 (22 Jun 2023 20:09)  T(F): 97.8 (23 Jun 2023 05:40), Max: 98.2 (22 Jun 2023 20:09)  HR: 70 (23 Jun 2023 05:40) (70 - 86)  BP: 155/93 (23 Jun 2023 05:40) (130/86 - 157/88)  BP(mean): --  RR: 18 (23 Jun 2023 05:40) (18 - 18)  SpO2: 95% (23 Jun 2023 05:40) (95% - 98%)    Parameters below as of 23 Jun 2023 05:40  Patient On (Oxygen Delivery Method): room air      I&O's Summary      PHYSICAL EXAM:  TELE:   Constitutional: NAD, awake and alert, well-developed  HEENT: Moist Mucous Membranes, Anicteric  Pulmonary: Non-labored, breath sounds are clear bilaterally, No wheezing, rales or rhonchi  Cardiovascular: Regular, S1 and S2, No murmurs, rubs, gallops or clicks  Gastrointestinal: Bowel Sounds present, soft, nontender.   Lymph: No peripheral edema. No lymphadenopathy.  Skin: No visible rashes or ulcers.  Psych:  Mood & affect appropriate  LABS: All Labs Reviewed:                        12.0   7.79  )-----------( 240      ( 23 Jun 2023 07:19 )             40.2                         10.9   7.91  )-----------( 212      ( 22 Jun 2023 07:09 )             36.1                         11.0   7.02  )-----------( 194      ( 21 Jun 2023 06:31 )             37.4     23 Jun 2023 07:19    134    |  97     |  19     ----------------------------<  188    4.1     |  32     |  1.40   22 Jun 2023 07:09    132    |  96     |  24     ----------------------------<  238    3.9     |  28     |  1.70   21 Jun 2023 06:31    134    |  97     |  13     ----------------------------<  236    4.0     |  32     |  1.20     Ca    9.7        23 Jun 2023 07:19  Ca    8.8        22 Jun 2023 07:09  Ca    9.0        21 Jun 2023 06:31  Phos  2.8       23 Jun 2023 07:19  Mg     1.7       23 Jun 2023 07:19    TPro  7.8    /  Alb  3.8    /  TBili  0.5    /  DBili  0.1    /  AST  18     /  ALT  21     /  AlkPhos  130    23 Jun 2023 07:19             Rhiannon Couch DNP, NP-C, AGACNP-C  Cardiology   Spectra #[phone]      [facility] -- Effie Sousa Pannella, Patel, Savella, Goodger  Office # [phone]    Follow Up: HFmodEF, Hx CAD s/p PCI, Cardiac Optimization     Subjective/Observations:  No c/o HA, dizziness or nausea.  Denies any form of respiratory or cardiac discomfort.  Denies postop pain    REVIEW OF SYSTEMS: All other review of systems is negative unless indicated above  PAST MEDICAL & SURGICAL HISTORY:  Hypertension  AAA (abdominal aortic aneurysm)  dx 2012  GERD (gastroesophageal reflux disease)  Leg weakness  Aortic valve replaced  Cardiac tamponade  HTN (hypertension)  H/O aortic valve repair  S/P aneurysm repair   novel coronavirus disease (COVID-19)  2021  Non-pressure chronic ulcer of other part of right foot with unspecified severity  Type 2 diabetes mellitus  Not on Insulin but complicated by peripheral neuropathy  H/O cardiac catheterization  Stent Placement X 2 ()  H/O vertigo  Aortic valve replaced  S/P AAA repair  Repaired 3/2015  S/P aortic aneurysm repair  H/O aortic valve repair  History of incision and drainage  H/O colonoscopy    MEDICATIONS  (STANDING):  atorvastatin 80 milliGRAM(s) Oral at bedtime  bisacodyl 5 milliGRAM(s) Oral at bedtime  ceFAZolin   IVPB 2000 milliGRAM(s) IV Intermittent every 8 hours  chlordiazePOXIDE 5 milliGRAM(s) Oral every 6 hours  dextrose 50% Injectable 25 Gram(s) IV Push once  dextrose 50% Injectable 25 Gram(s) IV Push once  dextrose 50% Injectable 12.5 Gram(s) IV Push once  enoxaparin Injectable 40 milliGRAM(s) SubCutaneous every 24 hours  folic acid Injectable 1 milliGRAM(s) IV Push daily  gabapentin 600 milliGRAM(s) Oral four times a day  glucagon  Injectable 1 milliGRAM(s) IntraMuscular once  insulin glargine Injectable (LANTUS) 15 Unit(s) SubCutaneous at bedtime  insulin lispro (ADMELOG) corrective regimen sliding scale   SubCutaneous Before meals and at bedtime  insulin lispro Injectable (ADMELOG) 5 Unit(s) SubCutaneous three times a day before meals  lisinopril 20 milliGRAM(s) Oral daily  metoprolol tartrate 25 milliGRAM(s) Oral two times a day  pantoprazole    Tablet 40 milliGRAM(s) Oral before breakfast  polyethylene glycol 3350 17 Gram(s) Oral daily  saccharomyces boulardii 250 milliGRAM(s) Oral two times a day  senna 2 Tablet(s) Oral at bedtime  sodium chloride 0.9% 1000 milliLiter(s) (75 mL/Hr) IV Continuous <Continuous>  sodium chloride 0.9%. 1000 milliLiter(s) (70 mL/Hr) IV Continuous <Continuous>  thiamine Injectable 100 milliGRAM(s) IV Push daily    MEDICATIONS  (PRN):  acetaminophen     Tablet .. 650 milliGRAM(s) Oral every 6 hours PRN Temp greater or equal to 38C (100.4F), Mild Pain (1 - 3)  chlordiazePOXIDE 5 milliGRAM(s) Oral every 2 hours PRN for ciwa > 5  dextrose Oral Gel 15 Gram(s) Oral once PRN Blood Glucose LESS THAN 70 milliGRAM(s)/deciliter  LORazepam   Injectable 1 milliGRAM(s) IV Push every 4 hours PRN for ciwa > 8  meclizine 12.5 milliGRAM(s) Oral three times a day PRN vertigo  ondansetron Injectable 4 milliGRAM(s) IV Push every 6 hours PRN Nausea and/or Vomiting    Allergies    Normodyne (Faint)    Intolerances    Vital Signs Last 24 Hrs  T(C): 36.6 (2023 05:40), Max: 36.8 (2023 20:09)  T(F): 97.8 (2023 05:40), Max: 98.2 (2023 20:09)  HR: 70 (2023 05:40) (70 - 86)  BP: 155/93 (2023 05:40) (130/86 - 157/88)  BP(mean): --  RR: 18 (2023 05:40) (18 - 18)  SpO2: 95% (2023 05:40) (95% - 98%)    Parameters below as of 2023 05:40  Patient On (Oxygen Delivery Method): room air    I&O's Summary      PHYSICAL EXAM:  TELE: Not on tele  Constitutional: NAD, asleep but arousable, well-developed  HEENT: Moist Mucous Membranes, Anicteric  Pulmonary: Non-labored, breath sounds are clear but diminished bilaterally, No wheezing, rales or rhonchi  Cardiovascular: Regular, S1 and S2, +murmurs, no rubs, gallops or clicks  Gastrointestinal: Bowel Sounds present, soft, nontender.   Lymph: No peripheral edema. No lymphadenopathy.  Skin: No visible rashes.  right foot ulcers with dressing dry and intact  Psych:  Mood & affect appropriate    LABS: All Labs Reviewed:                        12.0   7.79  )-----------( 240      ( 2023 07:19 )             40.2                         10.9   7.91  )-----------( 212      ( 2023 07:09 )             36.1                         11.0   7.02  )-----------( 194      ( 2023 06:31 )             37.4     2023 07:19    134    |  97     |  19     ----------------------------<  188    4.1     |  32     |  1.40   2023 07:09    132    |  96     |  24     ----------------------------<  238    3.9     |  28     |  1.70   2023 06:31    134    |  97     |  13     ----------------------------<  236    4.0     |  32     |  1.20     Ca    9.7        2023 07:19  Ca    8.8        2023 07:09  Ca    9.0        2023 06:31  Phos  2.8       2023 07:19  Mg     1.7       2023 07:19    TPro  7.8    /  Alb  3.8    /  TBili  0.5    /  DBili  0.1    /  AST  18     /  ALT  21     /  AlkPhos  130    2023 07:19    EXAM:  ECHO TRANSTHORACIC COMP W DOPP      PROCEDURE DATE:  2019   .  INTERPRETATION:  REPORT:    TRANSTHORACIC ECHOCARDIOGRAM REPORT    Patient Name:   DUSTY STRICKLAND Patient Location: [facility]  Medical Rec #:  [id number]        Accession #:      [id number]  Account #:      [id number]        Height:           74.8 in 190.0 cm  YOB: 1960      Weight:           229.3 lb 104.00 kg  Patient Age:    58 years        BSA:              2.32 m²  Patient Gender: M   BP:               129/82 mmHg    Date of Exam:        2019 1:16:32 PM  Sonographer:         Janina Alberto  Referring Physician: J Luis Hill MD    Procedure:   2D Echo/Doppler/Color Doppler Complete and Echocardiogram   with               Definity Contrast.  Indications: Chest pain, unspecified - R07.9  Diagnosis:   Chest pain, unspecified - R07.9    2D AND M-MODE MEASUREMENTS (normal ranges within parentheses):  Left Ventricle:                  Normal   Aorta/Left Atrium:   Normal  IVSd (2D):              1.16 cm (0.7-1.1) LA Volume Index    35.3 ml/m²  LVPWd (2D):             1.38 cm (0.7-1.1)  LVIDd (2D):             4.72 cm (3.4-5.7)  Relative Wall Thickness  0.58    (<0.42)    LV SYSTOLIC FUNCTION BY 2D PLANIMETRY (MOD):  EF-A2C View: 27.9 % EF-Biplane: 46 %    LV DIASTOLIC FUNCTION:  MV Peak E: 0.82 m/s Decel Time: 220 msec  MV Peak A: 0.66 m/s  E/A Ratio: 1.23    SPECTRAL DOPPLER ANALYSIS (where applicable):  Mitral Valve:  MV P1/2 Time: 63.71 msec  MV Area, PHT: 3.45 cm²    Aortic Valve: AoV Max Jasvir: 2.19 m/s AoV Peak P.2 mmHg AoV Mean PG:   10.2 mmHg    AoV Area, Vmax:  AoV Area, VTI:  AoV Area, Vmn:  Ao VTI: 0.465  Tricuspid Valve and PA/RV Systolic Pressure: TR Max Velocity: 2.46 m/s RA   Pressure: 3 mmHg RVSP/PASP: 27.2 mmHg     PHYSICIAN INTERPRETATION:  Left Ventricle: Endocardial visualization was enhanced with intravenous   echo contrast. The left ventricular internal cavity size is normal.  Global LV systolic function was mildly decreased. Left ventricular   ejection fraction, by visual estimation, is 45 to 50%. The left   ventricular diastolic function could not be assessed in this study.     LV Wall Scoring:  The apical inferior segment is akinetic. The mid and apical anterior   wall, mid  inferior segment, and apex are hypokinetic.    Right Ventricle: Normal right ventricular size and function.  Left Atrium: The left atrium is normal in size.  Right Atrium: The right atrium is normal in size.  Pericardium: There is no evidence of pericardial effusion.  Mitral Valve: Structurally normal mitral valve, with normal leaflet   excursion. Mild mitral valve regurgitation is seen.  Tricuspid Valve: Structurally normal tricuspid valve, with normal leaflet   excursion. Mild tricuspid regurgitation is visualized.  Aortic Valve: A bioprosthetic AoV is visualized. Peak aortic valve   gradient is 19.2 mmHg and the mean gradient is 10.2 mmHg, which is   probably normal in the setting of a prosthetic aortic valve. Trivial   aortic valve regurgitation is seen.  Pulmonic Valve: Structurally normal pulmonic valve, with normal leaflet   excursion. Trace pulmonic valve regurgitation.  Pulmonary Artery: The main pulmonary artery is normal in size.  Venous: The inferior vena cava was normal sized, with respiratory size   variation greater than 50%.     Summary:   1. Left ventricular ejection fraction, by visual estimation, is 45 to   50%.   2. Mildly decreased global left ventricular systolic function.   3. Apical inferior segment, mid and apical anterior wall, mid inferior   segment, and apex are abnormal as described above.   4. The left ventricular diastolic function could not be assessed in this   study.   5. There is no evidence of pericardial effusion.   6. Bioprosthesis in the aortic position.   7. Endocardial visualization was enhanced with intravenous echo contrast.   8. Peak aortic valve gradient is 19.2 mmHg and the mean gradient is 10.2   mmHg, which is probably normal in the setting of a prosthetic aortic  valve.    MD Alfa Electronically signed on 2019 at 5:09:04 PM     *** Final ***  ERICK GOLDSTEIN MD  This document has been electronically signed. 2019  1:16PM      Ventricular Rate 79 BPM    Atrial Rate 79 BPM    P-R Interval 156 ms    QRS Duration 104 ms    Q-T Interval 378 ms    QTC Calculation(Bazett) 433 ms    P Axis -6 degrees    R Axis -39 degrees    T Axis 28 degrees    Diagnosis Line Normal sinus rhythm  Left axis deviation  Left ventricular hypertrophy ( R in aVL , Bernard product , Romhilt-Nicolas )    Confirmed by BRIDGER RIOS ([id number]) on 2023 6:35:08 PM

## 2023-06-23 NOTE — DISCHARGE NOTE NURSING/CASE MANAGEMENT/SOCIAL WORK - NSDCFUADDAPPT_GEN_ALL_CORE_FT
Wound Care Instructions:  -Keep dressing clean, dry, and intact to the right foot  -Change dressing to the right foot every other day  - Apply silver alginate and cover with sterile gauze, abd pad and kenya   -Patient must remain partial weightbearing to the right heel in surgical shoe   -Monitor for any signs of infection including redness, swelling in the leg above the bandage, nausea/vomiting/fever/chills/chest pain/shortness of breath, if any are present patient must report to the emergency department immediately  -Patient is to follow up with Dr. Scott/Dr. Michele/ Dr. Khanna  within 5 days after discharge at [facility] [place]. Please call to make an appointment [phone].

## 2023-06-23 NOTE — DISCHARGE NOTE NURSING/CASE MANAGEMENT/SOCIAL WORK - NSDCPEFALRISK_GEN_ALL_CORE
For information on Fall & Injury Prevention, visit: [email or web address] OR  [email or web address] OR  https://www.cdc.gov/steadi/patient.html

## 2023-06-23 NOTE — PROGRESS NOTE ADULT - SUBJECTIVE AND OBJECTIVE BOX
CAPILLARY BLOOD GLUCOSE      POCT Blood Glucose.: 188 mg/dL (23 Jun 2023 16:41)  POCT Blood Glucose.: 185 mg/dL (23 Jun 2023 11:41)  POCT Blood Glucose.: 173 mg/dL (23 Jun 2023 08:07)  POCT Blood Glucose.: 236 mg/dL (22 Jun 2023 21:07)      Vital Signs Last 24 Hrs  T(C): 36.3 (23 Jun 2023 13:05), Max: 36.8 (22 Jun 2023 20:09)  T(F): 97.3 (23 Jun 2023 13:05), Max: 98.2 (22 Jun 2023 20:09)  HR: 82 (23 Jun 2023 13:05) (70 - 86)  BP: 127/81 (23 Jun 2023 13:05) (127/81 - 155/93)  BP(mean): --  RR: 18 (23 Jun 2023 13:05) (18 - 18)  SpO2: 95% (23 Jun 2023 13:05) (95% - 96%)    Parameters below as of 23 Jun 2023 13:05  Patient On (Oxygen Delivery Method): room air        General: WN/WD NAD  Respiratory: CTA B/L  CV: RRR, S1S2, no murmurs, rubs or gallops  Abdominal: Soft, NT, ND +BS, Last BM  Extremities: No edema, + peripheral pulses     06-23    134<L>  |  97  |  19  ----------------------------<  188<H>  4.1   |  32<H>  |  1.40<H>    Ca    9.7      23 Jun 2023 07:19  Phos  2.8     06-23  Mg     1.7     06-23    TPro  7.8  /  Alb  3.8  /  TBili  0.5  /  DBili  0.1  /  AST  18  /  ALT  21  /  AlkPhos  130<H>  06-23      atorvastatin 80 milliGRAM(s) Oral at bedtime  dextrose 50% Injectable 12.5 Gram(s) IV Push once  dextrose 50% Injectable 25 Gram(s) IV Push once  dextrose 50% Injectable 25 Gram(s) IV Push once  dextrose Oral Gel 15 Gram(s) Oral once PRN  glucagon  Injectable 1 milliGRAM(s) IntraMuscular once  insulin glargine Injectable (LANTUS) 15 Unit(s) SubCutaneous at bedtime  insulin lispro (ADMELOG) corrective regimen sliding scale   SubCutaneous Before meals and at bedtime  insulin lispro Injectable (ADMELOG) 5 Unit(s) SubCutaneous three times a day before meals

## 2023-06-23 NOTE — PROGRESS NOTE ADULT - PROBLEM SELECTOR PLAN 1
cont lantus 15 units qhs  cont admelog 5 units 3x/day before meals  cont mod dose admelog corrective scale coverage qac/qhs  cont cons cho diet  goal bg 100-180 in hosp setting
Patient examined and evaluated.  Surgical site dressed with aquacel and dry, sterile dressing.  Patient is to be partial weight bearing to the right  heel  in a surgical shoe.  Antibiotics as per ID recommendations.    Wound Care Instructions:  -Keep dressing clean, dry, and intact to the right foot  -Patient to keep dressings clean, dry, and intact until follow up at the [facility] on Tuesday 6/27/23 (patient already has an appointment)  -Patient must remain partial weightbearing to the right heel in surgical shoe   -Monitor for any signs of infection including redness, swelling in the leg above the bandage, nausea/vomiting/fever/chills/chest pain/shortness of breath, if any are present patient must report to the emergency department immediately
Patient examined and evaluated.  Surgical site dressed with silver alginate  and dry, sterile dressing.  Patient is to be partial weight bearing to the right  heel  in a surgical shoe.  Surgical pathology and cultures pending at this time.  Antibiotics as per ID recommendations.    Wound Care Instructions:  -Keep dressing clean, dry, and intact to the right foot  -Change dressing to the right foot every other day  - Apply silver alginate and cover with sterile gauze, abd pad and kenya   -Patient must remain partial weightbearing to the right heel in surgical shoe   -Monitor for any signs of infection including redness, swelling in the leg above the bandage, nausea/vomiting/fever/chills/chest pain/shortness of breath, if any are present patient must report to the emergency department immediately  -Patient is to follow up with Dr. Scott/Dr. Michele/ Dr. Khanna  within 5 days after discharge at [facility] [place]. Please call to make an appointment [phone]
Patient presents with chronic open R toe wound with recent purulent drainage, s/p 2 weeks of doxycycline. No drainage ,no cellulitis or sepsis on admission.   - Pt afebrile w/o leukocytosis on presentation  - R foot XR: no fx, no surrounding edema (personal read)  - Wound cx (5/28): MSSA, sensitivities   - s/p Vanc x1, and Zosyn x1 in ED  - Start Cefazolin 1g q8hr  switch po keflex 500 mg po q6hr 7 days    as per ID (Dr. Blanco) consult - path report  neg   - Wound care following- order +
Type 2 A1c 9.5% adm R DFU  Recommend endocrine-Perlman onconsult  FU appt: [facility], Dr. Craig perlman  DSC recommendations: return to home on new regimen and glucose monitoring  diabetes education provided as documented above  Diabetes support info and cell # [phone] given   Goal 100-180 mg/dL; 140-180 mg/dL in critical care areas
Type 2 A1c 9.3% adm diabetic ulcer of R great toe  increase Lantus to 15 units @ HS  increase admelog to 5 units premeal  c/w moderate ISS  c/w CC diet and accucheck ACHS  Recommend endocrine-Perlman onconsult  FU appt: [facility], Dr. Perlman  DSC recommendations: return to home on insulin regimen and increased glucose monitoring  diabetes education provided as documented above  Diabetes support info and cell # [phone] given   Goal 100-180 mg/dL; 140-180 mg/dL in critical care areas
Patient presents with chronic open R toe wound with recent purulent drainage, s/p 2 weeks of doxycycline. No drainage,no cellulitis or sepsis on admission.   - Pt afebrile w/o leukocytosis on presentation  - R foot XR: no fx, no surrounding edema (personal read)  - Wound cx (5/28): MSSA, sensitivities   - s/p Vanc x1, and Zosyn x1 in ED  - Start Cefazolin 1g q8hr   - ID (Dr. Blanco) consult  - Wound care following
Patient presents with chronic open R toe wound with recent purulent drainage, s/p 2 weeks of doxycycline. No drainage,no cellulitis or sepsis on admission.   - Pt afebrile w/o leukocytosis on presentation  - R foot XR: no fx, no surrounding edema (personal read)  - Wound cx (5/28): MSSA, sensitivities   - s/p Vanc x1, and Zosyn x1 in ED  - Start Cefazolin 1g q8hr   - NPO after midnight, D5 + NS @ midnight-or today   - ID (Dr. Blanco) consult  - Wound care following

## 2023-06-23 NOTE — PROGRESS NOTE ADULT - ASSESSMENT
62-year-old male with PMHx of CAD (1st Diag. AMBER 7/1/19, LPDA AMBER 7/3/19),bicuspid AV assoc with significant AS s/p bio AVR with repair of a dilated ascending aorta (3/16/15), cardiac tamponade (s/p pericardiocentesis 3/26/15), syncope (s/p ILR insertion 2/2/16), atypical chest discomfort, HTN, HLD, T2DM, vertigo, GERD, lacunar infarcts (incidentally detected on MRI), ventricular ectopy, mild carotid atherosclerosis, PAD,  (status post right anterior tibial artery angioplasty 10/5/2021) was sent by his podiatrist to ED for IV therapy and possible surgical intervention of Right great toe.    Cardiac Optimization, CAD, Severe AS s/p BioAVR, HTN  - s/p Rainey arthoplasty with Bx.  Tolerated procedure well  - Pain control per Primary  - Follow Podia recs    - H/o CAD with stent placement, VHD s/p bio AVR.  - EKG: NSR. No acute ischemic changes noted   - Pharm stress (3/2023) normal from our office, follows with Dr. Sousa  - Continue ASA and statin    - Chest X-ray negative for acute process  - Patient euvolemic on examination with no overt signs of heart failure or cardiac ischemia.   - ECHO (5/12/2020) LVEF 55-60%.  No need to repeat  - Holding diuretics for MARION.  creatinine improved today (1.4 <--- 1.7)  - Will eventually resume home Lasix    - BP mildly elevated, likely, reactive  - Continue Lisinopril at lower dose.  Will eventually increase to home dose when MARION resolved    - Now on CIWA protocol.  Seen, asymptomatic, though, very anxious  - Addiction Medicine following    - Monitor and replete Lytes. Keep K > 4 and Mg > 2  - Will continue to follow.  Will f/u with Dr. Sousa as outpatient    Rhiannon Couch DNP, NP-C, AGACNP-C  Cardiology   Call TEAMS

## 2023-06-23 NOTE — PROGRESS NOTE ADULT - SUBJECTIVE AND OBJECTIVE BOX
NOTE IN PROGRESS SUBJECTIVE:  62y year old Male seen at [facility] 1EAS 109 W1 s/p Rainey arthroplasty DOS 06/20/23.   Patient relates no overnight events and states that they are doing well at this time. Patient has been tolerating diet well without any complications.   Denies any fever, chills, nausea, vomiting, chest pain, shortness of breath, or calf pain at this time.    Allergies    Normodyne (Faint)    Intolerances        MEDICATIONS  (STANDING):  atorvastatin 80 milliGRAM(s) Oral at bedtime  bisacodyl 5 milliGRAM(s) Oral at bedtime  ceFAZolin   IVPB 2000 milliGRAM(s) IV Intermittent every 8 hours  chlordiazePOXIDE 5 milliGRAM(s) Oral every 6 hours  dextrose 50% Injectable 12.5 Gram(s) IV Push once  dextrose 50% Injectable 25 Gram(s) IV Push once  dextrose 50% Injectable 25 Gram(s) IV Push once  enoxaparin Injectable 40 milliGRAM(s) SubCutaneous every 24 hours  folic acid Injectable 1 milliGRAM(s) IV Push daily  gabapentin 600 milliGRAM(s) Oral four times a day  glucagon  Injectable 1 milliGRAM(s) IntraMuscular once  insulin glargine Injectable (LANTUS) 15 Unit(s) SubCutaneous at bedtime  insulin lispro (ADMELOG) corrective regimen sliding scale   SubCutaneous Before meals and at bedtime  insulin lispro Injectable (ADMELOG) 5 Unit(s) SubCutaneous three times a day before meals  lisinopril 20 milliGRAM(s) Oral daily  metoprolol tartrate 25 milliGRAM(s) Oral two times a day  pantoprazole    Tablet 40 milliGRAM(s) Oral before breakfast  polyethylene glycol 3350 17 Gram(s) Oral daily  saccharomyces boulardii 250 milliGRAM(s) Oral two times a day  senna 2 Tablet(s) Oral at bedtime  sodium chloride 0.9% 1000 milliLiter(s) (75 mL/Hr) IV Continuous <Continuous>  sodium chloride 0.9%. 1000 milliLiter(s) (70 mL/Hr) IV Continuous <Continuous>  thiamine Injectable 100 milliGRAM(s) IV Push daily    MEDICATIONS  (PRN):  acetaminophen     Tablet .. 650 milliGRAM(s) Oral every 6 hours PRN Temp greater or equal to 38C (100.4F), Mild Pain (1 - 3)  chlordiazePOXIDE 5 milliGRAM(s) Oral every 2 hours PRN for ciwa > 5  dextrose Oral Gel 15 Gram(s) Oral once PRN Blood Glucose LESS THAN 70 milliGRAM(s)/deciliter  LORazepam   Injectable 1 milliGRAM(s) IV Push every 4 hours PRN for ciwa > 8  meclizine 12.5 milliGRAM(s) Oral three times a day PRN vertigo  ondansetron Injectable 4 milliGRAM(s) IV Push every 6 hours PRN Nausea and/or Vomiting      Vital Signs Last 24 Hrs  T(C): 36.3 (23 Jun 2023 13:05), Max: 36.6 (23 Jun 2023 05:40)  T(F): 97.3 (23 Jun 2023 13:05), Max: 97.8 (23 Jun 2023 05:40)  HR: 82 (23 Jun 2023 13:05) (70 - 82)  BP: 127/81 (23 Jun 2023 13:05) (127/81 - 155/93)  BP(mean): --  RR: 18 (23 Jun 2023 13:05) (18 - 18)  SpO2: 95% (23 Jun 2023 13:05) (95% - 95%)    Parameters below as of 23 Jun 2023 13:05  Patient On (Oxygen Delivery Method): room air        PHYSICAL EXAM:  Vascular: DP & PT faintly palpable bilaterally, Capillary refill 3 seconds, Digital hair absent bilaterally  Neurological: Light touch sensation diminished  bilaterally  Musculoskeletal: 5/5 strength in all quadrants bilaterally, AJ & STJ ROM intact  Dermatological: Incision site of the RIGHT foot noted with intact sutures on the dorsal and plantar aspect , no dehiscence, minimal attila-incision erythema to the dorsal foot , no proximal streaking, no fluctuance, no malodor, no signs of infection at this time.                          12.0   7.79  )-----------( 240      ( 23 Jun 2023 07:19 )             40.2       06-23    134<L>  |  97  |  19  ----------------------------<  188<H>  4.1   |  32<H>  |  1.40<H>    Ca    9.7      23 Jun 2023 07:19  Phos  2.8     06-23  Mg     1.7     06-23    TPro  7.8  /  Alb  3.8  /  TBili  0.5  /  DBili  0.1  /  AST  18  /  ALT  21  /  AlkPhos  130<H>  06-23

## 2023-06-23 NOTE — DISCHARGE NOTE NURSING/CASE MANAGEMENT/SOCIAL WORK - PATIENT PORTAL LINK FT
You can access the FollowMyHealth Patient Portal offered by [facility] by registering at the following website: [email or web address]. By joining [employer]’s FollowMyHealth portal, you will also be able to view your health information using other applications (apps) compatible with our system.

## 2023-06-24 VITALS — WEIGHT: 223.11 LBS

## 2023-06-24 PROCEDURE — 87075 CULTR BACTERIA EXCEPT BLOOD: CPT

## 2023-06-24 PROCEDURE — 99239 HOSP IP/OBS DSCHRG MGMT >30: CPT

## 2023-06-24 PROCEDURE — 82962 GLUCOSE BLOOD TEST: CPT

## 2023-06-24 PROCEDURE — 80053 COMPREHEN METABOLIC PANEL: CPT

## 2023-06-24 PROCEDURE — 96374 THER/PROPH/DIAG INJ IV PUSH: CPT

## 2023-06-24 PROCEDURE — 80048 BASIC METABOLIC PNL TOTAL CA: CPT

## 2023-06-24 PROCEDURE — 85027 COMPLETE CBC AUTOMATED: CPT

## 2023-06-24 PROCEDURE — 85730 THROMBOPLASTIN TIME PARTIAL: CPT

## 2023-06-24 PROCEDURE — 36415 COLL VENOUS BLD VENIPUNCTURE: CPT

## 2023-06-24 PROCEDURE — 88311 DECALCIFY TISSUE: CPT

## 2023-06-24 PROCEDURE — 99232 SBSQ HOSP IP/OBS MODERATE 35: CPT

## 2023-06-24 PROCEDURE — 93005 ELECTROCARDIOGRAM TRACING: CPT

## 2023-06-24 PROCEDURE — 87077 CULTURE AEROBIC IDENTIFY: CPT

## 2023-06-24 PROCEDURE — 86900 BLOOD TYPING SEROLOGIC ABO: CPT

## 2023-06-24 PROCEDURE — 86850 RBC ANTIBODY SCREEN: CPT

## 2023-06-24 PROCEDURE — 99285 EMERGENCY DEPT VISIT HI MDM: CPT | Mod: 25

## 2023-06-24 PROCEDURE — 80076 HEPATIC FUNCTION PANEL: CPT

## 2023-06-24 PROCEDURE — 73630 X-RAY EXAM OF FOOT: CPT

## 2023-06-24 PROCEDURE — 97162 PT EVAL MOD COMPLEX 30 MIN: CPT

## 2023-06-24 PROCEDURE — 84100 ASSAY OF PHOSPHORUS: CPT

## 2023-06-24 PROCEDURE — 85610 PROTHROMBIN TIME: CPT

## 2023-06-24 PROCEDURE — 83036 HEMOGLOBIN GLYCOSYLATED A1C: CPT

## 2023-06-24 PROCEDURE — 83735 ASSAY OF MAGNESIUM: CPT

## 2023-06-24 PROCEDURE — 85025 COMPLETE CBC W/AUTO DIFF WBC: CPT

## 2023-06-24 PROCEDURE — 86901 BLOOD TYPING SEROLOGIC RH(D): CPT

## 2023-06-24 PROCEDURE — 87070 CULTURE OTHR SPECIMN AEROBIC: CPT

## 2023-06-24 PROCEDURE — 97110 THERAPEUTIC EXERCISES: CPT

## 2023-06-24 PROCEDURE — 87040 BLOOD CULTURE FOR BACTERIA: CPT

## 2023-06-24 PROCEDURE — 83605 ASSAY OF LACTIC ACID: CPT

## 2023-06-24 PROCEDURE — 88307 TISSUE EXAM BY PATHOLOGIST: CPT

## 2023-06-24 RX ORDER — CEPHALEXIN 500 MG
1 CAPSULE ORAL
Qty: 24 | Refills: 0
Start: 2023-06-24 | End: 2023-06-29

## 2023-06-24 RX ORDER — ACETAMINOPHEN 500 MG
2 TABLET ORAL
Qty: 0 | Refills: 0 | DISCHARGE
Start: 2023-06-24

## 2023-06-24 RX ADMIN — Medication 25 MILLIGRAM(S): at 05:44

## 2023-06-24 RX ADMIN — LISINOPRIL 20 MILLIGRAM(S): 2.5 TABLET ORAL at 05:44

## 2023-06-24 RX ADMIN — Medication 2: at 08:27

## 2023-06-24 RX ADMIN — PANTOPRAZOLE SODIUM 40 MILLIGRAM(S): 20 TABLET, DELAYED RELEASE ORAL at 05:44

## 2023-06-24 RX ADMIN — Medication 5 UNIT(S): at 08:28

## 2023-06-24 RX ADMIN — ENOXAPARIN SODIUM 40 MILLIGRAM(S): 100 INJECTION SUBCUTANEOUS at 05:45

## 2023-06-24 NOTE — PHARMACOTHERAPY INTERVENTION NOTE - COMMENTS
Meds to beds requested by provider.    The following prescriptions were filled at [facility] [place]: Trulicity, Semglee, pen needles      Medications delivered by pharmacist at bedside and counseled on indication, directions, and side effects.  Patient verbalized understanding and had no further questions

## 2023-06-24 NOTE — PROGRESS NOTE ADULT - SUBJECTIVE AND OBJECTIVE BOX
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 with unspecified severity    Type 2 diabetes mellitus  Not on Insulin but complicated by peripheral neuropathy    H/O cardiac catheterization  Stent Placement X 2 (2019)    H/O vertigo    No significant past surgical history    Aortic valve replaced    S/P AAA repair  Repaired 3/2015    S/P aortic aneurysm repair    H/O aortic valve repair    History of incision and drainage    H/O colonoscopy          Medication list         MEDICATIONS  (STANDING):  atorvastatin 80 milliGRAM(s) Oral at bedtime  bisacodyl 5 milliGRAM(s) Oral at bedtime  ceFAZolin   IVPB 2000 milliGRAM(s) IV Intermittent every 8 hours  chlordiazePOXIDE 5 milliGRAM(s) Oral every 6 hours  dextrose 50% Injectable 12.5 Gram(s) IV Push once  dextrose 50% Injectable 25 Gram(s) IV Push once  dextrose 50% Injectable 25 Gram(s) IV Push once  enoxaparin Injectable 40 milliGRAM(s) SubCutaneous every 24 hours  folic acid Injectable 1 milliGRAM(s) IV Push daily  gabapentin 600 milliGRAM(s) Oral four times a day  glucagon  Injectable 1 milliGRAM(s) IntraMuscular once  insulin glargine Injectable (LANTUS) 15 Unit(s) SubCutaneous at bedtime  insulin lispro (ADMELOG) corrective regimen sliding scale   SubCutaneous Before meals and at bedtime  insulin lispro Injectable (ADMELOG) 5 Unit(s) SubCutaneous three times a day before meals  lisinopril 20 milliGRAM(s) Oral daily  metoprolol tartrate 25 milliGRAM(s) Oral two times a day  pantoprazole    Tablet 40 milliGRAM(s) Oral before breakfast  polyethylene glycol 3350 17 Gram(s) Oral daily  saccharomyces boulardii 250 milliGRAM(s) Oral two times a day  senna 2 Tablet(s) Oral at bedtime  sodium chloride 0.9% 1000 milliLiter(s) (75 mL/Hr) IV Continuous <Continuous>  sodium chloride 0.9%. 1000 milliLiter(s) (70 mL/Hr) IV Continuous <Continuous>  thiamine Injectable 100 milliGRAM(s) IV Push daily    MEDICATIONS  (PRN):  acetaminophen     Tablet .. 650 milliGRAM(s) Oral every 6 hours PRN Temp greater or equal to 38C (100.4F), Mild Pain (1 - 3)  chlordiazePOXIDE 5 milliGRAM(s) Oral every 2 hours PRN for ciwa > 5  dextrose Oral Gel 15 Gram(s) Oral once PRN Blood Glucose LESS THAN 70 milliGRAM(s)/deciliter  LORazepam   Injectable 1 milliGRAM(s) IV Push every 4 hours PRN for ciwa > 8  meclizine 12.5 milliGRAM(s) Oral three times a day PRN vertigo  ondansetron Injectable 4 milliGRAM(s) IV Push every 6 hours PRN Nausea and/or Vomiting         Vitals log        ICU Vital Signs Last 24 Hrs  T(C): 36.3 (24 Jun 2023 05:12), Max: 36.3 (23 Jun 2023 13:05)  T(F): 97.3 (24 Jun 2023 05:12), Max: 97.3 (23 Jun 2023 13:05)  HR: 74 (24 Jun 2023 05:12) (74 - 82)  BP: 112/65 (24 Jun 2023 05:12) (112/65 - 134/80)  BP(mean): --  ABP: --  ABP(mean): --  RR: 18 (24 Jun 2023 05:12) (18 - 18)  SpO2: 93% (24 Jun 2023 05:12) (93% - 95%)    O2 Parameters below as of 24 Jun 2023 05:12  Patient On (Oxygen Delivery Method): room air                 Input and Output:  I&O's Detail      Lab Data                        12.0   7.79  )-----------( 240      ( 23 Jun 2023 07:19 )             40.2     06-23    134<L>  |  97  |  19  ----------------------------<  188<H>  4.1   |  32<H>  |  1.40<H>    Ca    9.7      23 Jun 2023 07:19  Phos  2.8     06-23  Mg     1.7     06-23    TPro  7.8  /  Alb  3.8  /  TBili  0.5  /  DBili  0.1  /  AST  18  /  ALT  21  /  AlkPhos  130<H>  06-23            Review of Systems	      Objective     Physical Examination    heart s1s2  lung dc BS  Head nc      Pertinent Lab findings & Imaging      Dueñas:  NO   Adequate UO     I&O's Detail           Discussed with:     Cultures:	        Radiology

## 2023-06-24 NOTE — PROGRESS NOTE ADULT - SUBJECTIVE AND OBJECTIVE BOX
[facility] -- Effie Sousa Pannella, Patel, Savella, Goodger  Office # [phone]    Follow Up:  HFmodEF, CAD, Cardiac Optimization    Subjective/Observations:     REVIEW OF SYSTEMS: All other review of systems is negative unless indicated above  PAST MEDICAL & SURGICAL HISTORY:  Hypertension      AAA (abdominal aortic aneurysm)  dx 2012      GERD (gastroesophageal reflux disease)      Leg weakness      Aortic valve replaced      Cardiac tamponade      HTN (hypertension)      H/O aortic valve repair      S/P aneurysm repair       novel coronavirus disease (COVID-19)  2021      Non-pressure chronic ulcer of other part of right foot with unspecified severity      Type 2 diabetes mellitus  Not on Insulin but complicated by peripheral neuropathy      H/O cardiac catheterization  Stent Placement X 2 ()      H/O vertigo      Aortic valve replaced      S/P AAA repair  Repaired 3/2015      S/P aortic aneurysm repair      H/O aortic valve repair      History of incision and drainage      H/O colonoscopy        MEDICATIONS  (STANDING):  atorvastatin 80 milliGRAM(s) Oral at bedtime  bisacodyl 5 milliGRAM(s) Oral at bedtime  ceFAZolin   IVPB 2000 milliGRAM(s) IV Intermittent every 8 hours  chlordiazePOXIDE 5 milliGRAM(s) Oral every 6 hours  dextrose 50% Injectable 25 Gram(s) IV Push once  dextrose 50% Injectable 12.5 Gram(s) IV Push once  dextrose 50% Injectable 25 Gram(s) IV Push once  enoxaparin Injectable 40 milliGRAM(s) SubCutaneous every 24 hours  folic acid Injectable 1 milliGRAM(s) IV Push daily  gabapentin 600 milliGRAM(s) Oral four times a day  glucagon  Injectable 1 milliGRAM(s) IntraMuscular once  insulin glargine Injectable (LANTUS) 15 Unit(s) SubCutaneous at bedtime  insulin lispro (ADMELOG) corrective regimen sliding scale   SubCutaneous Before meals and at bedtime  insulin lispro Injectable (ADMELOG) 5 Unit(s) SubCutaneous three times a day before meals  lisinopril 20 milliGRAM(s) Oral daily  metoprolol tartrate 25 milliGRAM(s) Oral two times a day  pantoprazole    Tablet 40 milliGRAM(s) Oral before breakfast  polyethylene glycol 3350 17 Gram(s) Oral daily  saccharomyces boulardii 250 milliGRAM(s) Oral two times a day  senna 2 Tablet(s) Oral at bedtime  sodium chloride 0.9% 1000 milliLiter(s) (75 mL/Hr) IV Continuous <Continuous>  sodium chloride 0.9%. 1000 milliLiter(s) (70 mL/Hr) IV Continuous <Continuous>  thiamine Injectable 100 milliGRAM(s) IV Push daily    MEDICATIONS  (PRN):  acetaminophen     Tablet .. 650 milliGRAM(s) Oral every 6 hours PRN Temp greater or equal to 38C (100.4F), Mild Pain (1 - 3)  chlordiazePOXIDE 5 milliGRAM(s) Oral every 2 hours PRN for ciwa > 5  dextrose Oral Gel 15 Gram(s) Oral once PRN Blood Glucose LESS THAN 70 milliGRAM(s)/deciliter  LORazepam   Injectable 1 milliGRAM(s) IV Push every 4 hours PRN for ciwa > 8  meclizine 12.5 milliGRAM(s) Oral three times a day PRN vertigo  ondansetron Injectable 4 milliGRAM(s) IV Push every 6 hours PRN Nausea and/or Vomiting    Allergies    Normodyne (Faint)    Intolerances      Vital Signs Last 24 Hrs  T(C): 36.3 (2023 05:12), Max: 36.3 (2023 13:05)  T(F): 97.3 (2023 05:12), Max: 97.3 (2023 13:05)  HR: 74 (2023 05:12) (74 - 82)  BP: 112/65 (2023 05:12) (112/65 - 134/80)  BP(mean): --  RR: 18 (2023 05:12) (18 - 18)  SpO2: 93% (2023 05:12) (93% - 95%)    Parameters below as of 2023 05:12  Patient On (Oxygen Delivery Method): room air      I&O's Summary      TELE:   PHYSICAL EXAM:  Constitutional: NAD, awake and alert, well-developed  HEENT: Moist Mucous Membranes, Anicteric  Pulmonary: Non-labored, breath sounds are clear bilaterally, No wheezing, rales or rhonchi  Cardiovascular: Regular, S1 and S2, No murmurs, rubs, gallops or clicks  Gastrointestinal: Bowel Sounds present, soft, nontender.   Lymph: No peripheral edema. No lymphadenopathy.  Skin: No visible rashes or ulcers.  Psych:  Mood & affect appropriate  LABS: All Labs Reviewed:                        12.0   7.79  )-----------( 240      ( 2023 07:19 )             40.2                         10.9   7.91  )-----------( 212      ( 2023 07:09 )             36.1     2023 07:19    134    |  97     |  19     ----------------------------<  188    4.1     |  32     |  1.40   2023 07:09    132    |  96     |  24     ----------------------------<  238    3.9     |  28     |  1.70     Ca    9.7        2023 07:19  Ca    8.8        2023 07:09  Phos  2.8       2023 07:19  Mg     1.7       2023 07:19    TPro  7.8    /  Alb  3.8    /  TBili  0.5    /  DBili  0.1    /  AST  18     /  ALT  21     /  AlkPhos  130    2023 07:19          12 Lead ECG:   Ventricular Rate 82 BPM    Atrial Rate 82 BPM    P-R Interval 154 ms    QRS Duration 102 ms    Q-T Interval 388 ms    QTC Calculation(Bazett) 453 ms    P Axis 8 degrees    R Axis -38 degrees    T Axis 41 degrees    Diagnosis Line Normal sinus rhythm  Left axis deviation  Voltage criteria for left ventricular hypertrophy  Confirmed by BRIDGER RIOS ([id number]) on 2023 4:14:22 PM (23 @ 14:26)      EXAM:  ECHO TRANSTHORACIC COMP W DOPP      PROCEDURE DATE:  2019   .      INTERPRETATION:  REPORT:    TRANSTHORACIC ECHOCARDIOGRAM REPORT         Patient Name:   DUSTY STRICKLAND Patient Location: Inpatient  Medical Rec #:  [id number]        Accession #:      [id number]  Account #:      [id number]        Height:           74.8 in 190.0 cm  YOB: 1960      Weight:           229.3 lb 104.00 kg  Patient Age:    58 years        BSA:              2.32 m²  Patient Gender: M   BP:               129/82 mmHg       Date of Exam:        2019 1:16:32 PM  Sonographer:         Janina Soong  Referring Physician: J Luis Hill MD    Procedure:   2D Echo/Doppler/Color Doppler Complete and Echocardiogram   with               Definity Contrast.  Indications: Chest pain, unspecified - R07.9  Diagnosis:   Chest pain, unspecified - R07.9         2D AND M-MODE MEASUREMENTS (normal ranges within parentheses):  Left Ventricle:                  Normal   Aorta/Left Atrium:   Normal  IVSd (2D):              1.16 cm (0.7-1.1) LA Volume Index    35.3 ml/m²  LVPWd (2D):             1.38 cm (0.7-1.1)  LVIDd (2D):             4.72 cm (3.4-5.7)  Relative Wall Thickness  0.58    (<0.42)    LV SYSTOLIC FUNCTION BY 2D PLANIMETRY (MOD):  EF-A2C View: 27.9 % EF-Biplane: 46 %    LV DIASTOLIC FUNCTION:  MV Peak E: 0.82 m/s Decel Time: 220 msec  MV Peak A: 0.66 m/s  E/A Ratio: 1.23    SPECTRAL DOPPLER ANALYSIS (where applicable):  Mitral Valve:  MV P1/2 Time: 63.71 msec  MV Area, PHT: 3.45 cm²    Aortic Valve: AoV Max Jasvir: 2.19 m/s AoV Peak P.2 mmHg AoV Mean PG:   10.2 mmHg    AoV Area, Vmax:  AoV Area, VTI:  AoV Area, Vmn:  Ao VTI: 0.465  Tricuspid Valve and PA/RV Systolic Pressure: TR Max Velocity: 2.46 m/s RA   Pressure: 3 mmHg RVSP/PASP: 27.2 mmHg       PHYSICIAN INTERPRETATION:  Left Ventricle: Endocardial visualization was enhanced with intravenous   echo contrast. The left ventricular internal cavity size is normal.  Global LV systolic function was mildly decreased. Left ventricular   ejection fraction, by visual estimation, is 45 to 50%. The left   ventricular diastolic function could not be assessed in this study.       LV Wall Scoring:  The apical inferior segment is akinetic. The mid and apical anterior   wall, mid  inferior segment, and apex are hypokinetic.    Right Ventricle: Normal right ventricular size and function.  Left Atrium: The left atrium is normal in size.  Right Atrium: The right atrium is normal in size.  Pericardium: There is no evidence of pericardial effusion.  Mitral Valve: Structurally normal mitral valve, with normal leaflet   excursion. Mild mitral valve regurgitation is seen.  Tricuspid Valve: Structurally normal tricuspid valve, with normal leaflet   excursion. Mild tricuspid regurgitation is visualized.  Aortic Valve: A bioprosthetic AoV is visualized. Peak aortic valve   gradient is 19.2 mmHg and the mean gradient is 10.2 mmHg, which is   probably normal in the setting of a prosthetic aortic valve. Trivial   aortic valve regurgitation is seen.  Pulmonic Valve: Structurally normal pulmonic valve, with normal leaflet   excursion. Trace pulmonic valve regurgitation.  Pulmonary Artery: The main pulmonary artery is normal in size.  Venous: The inferior vena cava was normal sized, with respiratory size   variation greater than 50%.       Summary:   1. Left ventricular ejection fraction, by visual estimation, is 45 to   50%.   2. Mildly decreased global left ventricular systolic function.   3. Apical inferior segment, mid and apical anterior wall, mid inferior   segment, and apex are abnormal as described above.   4. The left ventricular diastolic function could not be assessed in this   study.   5. There is no evidence of pericardial effusion.   6. Bioprosthesis in the aortic position.   7. Endocardial visualization was enhanced with intravenous echo contrast.   8. Peak aortic valve gradient is 19.2 mmHg and the mean gradient is 10.2   mmHg, which is probably normal in the setting of a prosthetic aortic  valve.    MD Alfa Electronically signed on 2019 at 5:09:04 PM              *** Final ***                  ERICK GOLDSTEIN MD  This document has been electronically signed. 2019  1:16PM              [facility] -- Effie Sousa Pannella, Patel, Savella, Goodger  Office # [phone]    Follow Up:  HFmodEF, CAD, Cardiac Optimization    Subjective/Observations: seen and examined, awake, alert, resting comfortably in bed, denies chest pain, dyspnea, palpitations or dizziness, orthopnea and PND. Tolerating room air.     REVIEW OF SYSTEMS: All other review of systems is negative unless indicated above  PAST MEDICAL & SURGICAL HISTORY:  Hypertension      AAA (abdominal aortic aneurysm)  dx 2012      GERD (gastroesophageal reflux disease)      Leg weakness      Aortic valve replaced      Cardiac tamponade      HTN (hypertension)      H/O aortic valve repair      S/P aneurysm repair       novel coronavirus disease (COVID-19)  2021      Non-pressure chronic ulcer of other part of right foot with unspecified severity      Type 2 diabetes mellitus  Not on Insulin but complicated by peripheral neuropathy      H/O cardiac catheterization  Stent Placement X 2 ()      H/O vertigo      Aortic valve replaced      S/P AAA repair  Repaired 3/2015      S/P aortic aneurysm repair      H/O aortic valve repair      History of incision and drainage      H/O colonoscopy        MEDICATIONS  (STANDING):  atorvastatin 80 milliGRAM(s) Oral at bedtime  bisacodyl 5 milliGRAM(s) Oral at bedtime  ceFAZolin   IVPB 2000 milliGRAM(s) IV Intermittent every 8 hours  chlordiazePOXIDE 5 milliGRAM(s) Oral every 6 hours  dextrose 50% Injectable 25 Gram(s) IV Push once  dextrose 50% Injectable 12.5 Gram(s) IV Push once  dextrose 50% Injectable 25 Gram(s) IV Push once  enoxaparin Injectable 40 milliGRAM(s) SubCutaneous every 24 hours  folic acid Injectable 1 milliGRAM(s) IV Push daily  gabapentin 600 milliGRAM(s) Oral four times a day  glucagon  Injectable 1 milliGRAM(s) IntraMuscular once  insulin glargine Injectable (LANTUS) 15 Unit(s) SubCutaneous at bedtime  insulin lispro (ADMELOG) corrective regimen sliding scale   SubCutaneous Before meals and at bedtime  insulin lispro Injectable (ADMELOG) 5 Unit(s) SubCutaneous three times a day before meals  lisinopril 20 milliGRAM(s) Oral daily  metoprolol tartrate 25 milliGRAM(s) Oral two times a day  pantoprazole    Tablet 40 milliGRAM(s) Oral before breakfast  polyethylene glycol 3350 17 Gram(s) Oral daily  saccharomyces boulardii 250 milliGRAM(s) Oral two times a day  senna 2 Tablet(s) Oral at bedtime  sodium chloride 0.9% 1000 milliLiter(s) (75 mL/Hr) IV Continuous <Continuous>  sodium chloride 0.9%. 1000 milliLiter(s) (70 mL/Hr) IV Continuous <Continuous>  thiamine Injectable 100 milliGRAM(s) IV Push daily    MEDICATIONS  (PRN):  acetaminophen     Tablet .. 650 milliGRAM(s) Oral every 6 hours PRN Temp greater or equal to 38C (100.4F), Mild Pain (1 - 3)  chlordiazePOXIDE 5 milliGRAM(s) Oral every 2 hours PRN for ciwa > 5  dextrose Oral Gel 15 Gram(s) Oral once PRN Blood Glucose LESS THAN 70 milliGRAM(s)/deciliter  LORazepam   Injectable 1 milliGRAM(s) IV Push every 4 hours PRN for ciwa > 8  meclizine 12.5 milliGRAM(s) Oral three times a day PRN vertigo  ondansetron Injectable 4 milliGRAM(s) IV Push every 6 hours PRN Nausea and/or Vomiting    Allergies    Normodyne (Faint)    Intolerances      Vital Signs Last 24 Hrs  T(C): 36.3 (2023 05:12), Max: 36.3 (2023 13:05)  T(F): 97.3 (2023 05:12), Max: 97.3 (2023 13:05)  HR: 74 (2023 05:12) (74 - 82)  BP: 112/65 (2023 05:12) (112/65 - 134/80)  BP(mean): --  RR: 18 (2023 05:12) (18 - 18)  SpO2: 93% (2023 05:12) (93% - 95%)    Parameters below as of 2023 05:12  Patient On (Oxygen Delivery Method): room air      I&O's Summary      TELE: Not on telemetry   PHYSICAL EXAM:  Constitutional: NAD, awake and alert  HEENT: Moist Mucous Membranes, Anicteric  Pulmonary: Non-labored, breath sounds are clear bilaterally, No wheezing, rales or rhonchi  Cardiovascular: Regular, S1 and S2, + murmurs, rubs, gallops or clicks  Gastrointestinal: Bowel Sounds present, soft, nontender.   Lymph: No peripheral edema. No lymphadenopathy.  Skin: No visible rashes or ulcers.  Psych:  Mood & affect appropriate  LABS: All Labs Reviewed:                        12.0   7.79  )-----------( 240      ( 2023 07:19 )             40.2                         10.9   7.91  )-----------( 212      ( 2023 07:09 )             36.1     2023 07:19    134    |  97     |  19     ----------------------------<  188    4.1     |  32     |  1.40   2023 07:09    132    |  96     |  24     ----------------------------<  238    3.9     |  28     |  1.70     Ca    9.7        2023 07:19  Ca    8.8        2023 07:09  Phos  2.8       2023 07:19  Mg     1.7       2023 07:19    TPro  7.8    /  Alb  3.8    /  TBili  0.5    /  DBili  0.1    /  AST  18     /  ALT  21     /  AlkPhos  130    2023 07:19          12 Lead ECG:   Ventricular Rate 82 BPM    Atrial Rate 82 BPM    P-R Interval 154 ms    QRS Duration 102 ms    Q-T Interval 388 ms    QTC Calculation(Bazett) 453 ms    P Axis 8 degrees    R Axis -38 degrees    T Axis 41 degrees    Diagnosis Line Normal sinus rhythm  Left axis deviation  Voltage criteria for left ventricular hypertrophy  Confirmed by BRIDGER RIOS ([id number]) on 2023 4:14:22 PM (23 @ 14:26)      EXAM:  ECHO TRANSTHORACIC COMP W DOPP      PROCEDURE DATE:  2019   .      INTERPRETATION:  REPORT:    TRANSTHORACIC ECHOCARDIOGRAM REPORT         Patient Name:   DUSTY STRICKLAND Patient Location: Inpatient  Medical Rec #:  [id number]        Accession #:      [id number]  Account #:      [id number]        Height:           74.8 in 190.0 cm  YOB: 1960      Weight:           229.3 lb 104.00 kg  Patient Age:    58 years        BSA:              2.32 m²  Patient Gender: M   BP:               129/82 mmHg       Date of Exam:        2019 1:16:32 PM  Sonographer:         Janina Alberto  Referring Physician: J Luis Hill MD    Procedure:   2D Echo/Doppler/Color Doppler Complete and Echocardiogram   with               Definity Contrast.  Indications: Chest pain, unspecified - R07.9  Diagnosis:   Chest pain, unspecified - R07.9         2D AND M-MODE MEASUREMENTS (normal ranges within parentheses):  Left Ventricle:                  Normal   Aorta/Left Atrium:   Normal  IVSd (2D):              1.16 cm (0.7-1.1) LA Volume Index    35.3 ml/m²  LVPWd (2D):             1.38 cm (0.7-1.1)  LVIDd (2D):             4.72 cm (3.4-5.7)  Relative Wall Thickness  0.58    (<0.42)    LV SYSTOLIC FUNCTION BY 2D PLANIMETRY (MOD):  EF-A2C View: 27.9 % EF-Biplane: 46 %    LV DIASTOLIC FUNCTION:  MV Peak E: 0.82 m/s Decel Time: 220 msec  MV Peak A: 0.66 m/s  E/A Ratio: 1.23    SPECTRAL DOPPLER ANALYSIS (where applicable):  Mitral Valve:  MV P1/2 Time: 63.71 msec  MV Area, PHT: 3.45 cm²    Aortic Valve: AoV Max Jasvir: 2.19 m/s AoV Peak P.2 mmHg AoV Mean PG:   10.2 mmHg    AoV Area, Vmax:  AoV Area, VTI:  AoV Area, Vmn:  Ao VTI: 0.465  Tricuspid Valve and PA/RV Systolic Pressure: TR Max Velocity: 2.46 m/s RA   Pressure: 3 mmHg RVSP/PASP: 27.2 mmHg       PHYSICIAN INTERPRETATION:  Left Ventricle: Endocardial visualization was enhanced with intravenous   echo contrast. The left ventricular internal cavity size is normal.  Global LV systolic function was mildly decreased. Left ventricular   ejection fraction, by visual estimation, is 45 to 50%. The left   ventricular diastolic function could not be assessed in this study.       LV Wall Scoring:  The apical inferior segment is akinetic. The mid and apical anterior   wall, mid  inferior segment, and apex are hypokinetic.    Right Ventricle: Normal right ventricular size and function.  Left Atrium: The left atrium is normal in size.  Right Atrium: The right atrium is normal in size.  Pericardium: There is no evidence of pericardial effusion.  Mitral Valve: Structurally normal mitral valve, with normal leaflet   excursion. Mild mitral valve regurgitation is seen.  Tricuspid Valve: Structurally normal tricuspid valve, with normal leaflet   excursion. Mild tricuspid regurgitation is visualized.  Aortic Valve: A bioprosthetic AoV is visualized. Peak aortic valve   gradient is 19.2 mmHg and the mean gradient is 10.2 mmHg, which is   probably normal in the setting of a prosthetic aortic valve. Trivial   aortic valve regurgitation is seen.  Pulmonic Valve: Structurally normal pulmonic valve, with normal leaflet   excursion. Trace pulmonic valve regurgitation.  Pulmonary Artery: The main pulmonary artery is normal in size.  Venous: The inferior vena cava was normal sized, with respiratory size   variation greater than 50%.       Summary:   1. Left ventricular ejection fraction, by visual estimation, is 45 to   50%.   2. Mildly decreased global left ventricular systolic function.   3. Apical inferior segment, mid and apical anterior wall, mid inferior   segment, and apex are abnormal as described above.   4. The left ventricular diastolic function could not be assessed in this   study.   5. There is no evidence of pericardial effusion.   6. Bioprosthesis in the aortic position.   7. Endocardial visualization was enhanced with intravenous echo contrast.   8. Peak aortic valve gradient is 19.2 mmHg and the mean gradient is 10.2   mmHg, which is probably normal in the setting of a prosthetic aortic  valve.    MD Alfa Electronically signed on 2019 at 5:09:04 PM              *** Final ***                  ERICK GOLDSTEIN MD  This document has been electronically signed. 2019  1:16PM

## 2023-06-24 NOTE — PROGRESS NOTE ADULT - NS ATTEND AMEND GEN_ALL_CORE FT
-there is no evidence of acute ischemia.  -there is no evidence of significant arrhythmia.  -there is no evidence for meaningful  volume overload.  -no cardiovascular complications postop  -DVT prophylaxis  -monitor electrolytes, keep k>4, Mg>2  -cardiovascular status is otherwise without acute change.
optimized for podiatric procedure. No signs of significant ischemia or volume overload. cont current care. Further cardiac workup will depend on clinical course.
I have personally seen and examined the patient in detail.  I have spoken to the provider regarding the assessment and plan of care.  I have made changes to the note accordingly.
I have personally seen and examined the patient in detail.  I have spoken to the provider regarding the assessment and plan of care.  I have made changes to the note accordingly.
Tolerated OR with no cardiac complications.  check orthostatics.  To follow while admitted.

## 2023-06-24 NOTE — PROGRESS NOTE ADULT - PROVIDER SPECIALTY LIST ADULT
Cardiology
Hospitalist
Hospitalist
Infectious Disease
Infectious Disease
Cardiology
Cardiology
Podiatry
Cardiology
Infectious Disease
Cardiology
Addiction Medicine
Addiction Medicine
Endocrinology
Hospitalist
Podiatry
Diabetes
Diabetes
Hospitalist

## 2023-06-24 NOTE — PROGRESS NOTE ADULT - ASSESSMENT
62-year-old male w/ PMHx of CAD (s/p stents x2, 2019), bioprosthetic AV replacement with repair of ascending aorta, HTN, HLD, DM2, neuropathy, vertigo, GERD, lacunar infarcts, PAD (s/p right anterior tibial artery angioplasty) presents to the ED w/ right toe wound.    wound  etoh use  WARREN  MARGUERITE  CAD  valv heart disease  OP  OA  HTN  HLD  DM  GERD  PAD    on ABX  cardio f/u noted  vs noted  will dc Librium this am    wound care  ABX - ID and Podiatry follow up  DM care - serial FS  Cardio following, cvs rx regimen - BP control  Pain assessment  MARGUERITE - etoh use disorder - CIWA - Librium low dose ATC with PRN - Ativan and Librium  vitamins  education  counseling  SBIRT  DVT p  labs - imaging - reviewed

## 2023-06-24 NOTE — PROGRESS NOTE ADULT - REASON FOR ADMISSION
Toe wound, pre-surgical testing

## 2023-06-24 NOTE — PROGRESS NOTE ADULT - ASSESSMENT
62-year-old male with PMHx of CAD (1st Diag. AMBER 7/1/19, LPDA AMBER 7/3/19),bicuspid AV assoc with significant AS s/p bio AVR with repair of a dilated ascending aorta (3/16/15), cardiac tamponade (s/p pericardiocentesis 3/26/15), syncope (s/p ILR insertion 2/2/16), atypical chest discomfort, HTN, HLD, T2DM, vertigo, GERD, lacunar infarcts (incidentally detected on MRI), ventricular ectopy, mild carotid atherosclerosis, PAD,  (status post right anterior tibial artery angioplasty 10/5/2021) was sent by his podiatrist to ED for IV therapy and possible surgical intervention of Right great toe.    Cardiac Optimization, CAD, Severe AS s/p BioAVR, HTN  - s/p Rainey arthoplasty with Bx.  Tolerated procedure well, podiatry following   - Pain control per Primary    - H/o CAD with stent placement, VHD s/p bio AVR.  - EKG: NSR. No acute ischemic changes noted   - Pharm stress (3/2023) normal from our office, follows with Dr. Sousa  - Continue ASA and statin    - Chest X-ray negative for acute process  - compensated from HF POV   - ECHO (5/12/2020) LVEF 55-60%.  No need to repeat  - Holding diuretics for MARION.  creatinine improving  (1.4 <- 1.7), pending today's level   - Will eventually resume home Lasix    - BP improving, now controlled   - Continue Lisinopril, increase to home dose when MARION resolved    - Now on CIWA protocol.  Seen, asymptomatic, though, very anxious  - Addiction Medicine following    - Monitor and replete Lytes. Keep K > 4 and Mg > 2  - Will continue to follow.    MARGARET Kumar  Nurse Practitioner - Cardiology   Spectra #[phone]/ [phone]  call TEAMS   62-year-old male with PMHx of CAD (1st Diag. AMBER 7/1/19, LPDA AMBER 7/3/19),bicuspid AV assoc with significant AS s/p bio AVR with repair of a dilated ascending aorta (3/16/15), cardiac tamponade (s/p pericardiocentesis 3/26/15), syncope (s/p ILR insertion 2/2/16), atypical chest discomfort, HTN, HLD, T2DM, vertigo, GERD, lacunar infarcts (incidentally detected on MRI), ventricular ectopy, mild carotid atherosclerosis, PAD,  (status post right anterior tibial artery angioplasty 10/5/2021) was sent by his podiatrist to ED for IV therapy and possible surgical intervention of Right great toe.    Cardiac Optimization, CAD, Severe AS s/p BioAVR, HTN  - s/p Rainey arthoplasty with Bx.  Tolerated procedure well, podiatry following   - Pain control per Primary    - H/o CAD with stent placement, VHD s/p bio AVR.  - EKG: NSR. No acute ischemic changes noted   - Pharm stress (3/2023) normal from our office, follows with Dr. Sousa  - Continue ASA and statin    - Chest X-ray negative for acute process  - compensated from HF POV   - ECHO (5/12/2020) LVEF 55-60%.  No need to repeat  - Holding diuretics for MARION, creatinine improving  (1.4 <- 1.7), pending today's level   - Will eventually resume home Lasix    - BP improving, now controlled   - Continue Lisinopril, increase to home dose when MARION resolved    - Now on CIWA protocol. Asymptomatic, though, very anxious  - Addiction Medicine following    - Monitor and replete Lytes. Keep K > 4 and Mg > 2  - DC planning per primary   - Will continue to follow.    Maryam Porter [facility]  Nurse Practitioner - Cardiology   Spectra #[phone]/ [phone]  call TEAMS

## 2023-06-24 NOTE — CASE MANAGEMENT PROGRESS NOTE - NSCMPROGRESSNOTE_GEN_ALL_CORE
Patient for discharge home today.  Patient declines home care services.  Patient states he has appointments at wound care center Tuesday and Friday for dressing changes and podiatry note reflects for patient to keep dressing intact until follow up appointment at [facility].  Wife to pick patient up and transport home when D/C ready.  Will remain available from a case management perspective.

## 2023-06-26 ENCOUNTER — TRANSCRIPTION ENCOUNTER (OUTPATIENT)
Age: 63
End: 2023-06-26

## 2023-06-27 ENCOUNTER — NON-APPOINTMENT (OUTPATIENT)
Age: 63
End: 2023-06-27

## 2023-06-28 ENCOUNTER — OUTPATIENT (OUTPATIENT)
Dept: OUTPATIENT SERVICES | Facility: HOSPITAL | Age: 63
LOS: 1 days | Discharge: HOME | End: 2023-06-28
Payer: COMMERCIAL

## 2023-06-28 ENCOUNTER — APPOINTMENT (OUTPATIENT)
Dept: WOUND CARE | Facility: HOSPITAL | Age: 63
End: 2023-06-28
Payer: COMMERCIAL

## 2023-06-28 VITALS
OXYGEN SATURATION: 98 % | DIASTOLIC BLOOD PRESSURE: 82 MMHG | HEART RATE: 81 BPM | SYSTOLIC BLOOD PRESSURE: 126 MMHG | WEIGHT: 227 LBS | BODY MASS INDEX: 28.23 KG/M2 | TEMPERATURE: 98 F | RESPIRATION RATE: 20 BRPM | HEIGHT: 75 IN

## 2023-06-28 DIAGNOSIS — Z98.890 OTHER SPECIFIED POSTPROCEDURAL STATES: Chronic | ICD-10-CM

## 2023-06-28 DIAGNOSIS — Z98.89 OTHER SPECIFIED POSTPROCEDURAL STATES: Chronic | ICD-10-CM

## 2023-06-28 DIAGNOSIS — Z09 ENCOUNTER FOR FOLLOW-UP EXAMINATION AFTER COMPLETED TREATMENT FOR CONDITIONS OTHER THAN MALIGNANT NEOPLASM: ICD-10-CM

## 2023-06-28 DIAGNOSIS — Z95.4 PRESENCE OF OTHER HEART-VALVE REPLACEMENT: Chronic | ICD-10-CM

## 2023-06-28 PROCEDURE — 99024 POSTOP FOLLOW-UP VISIT: CPT

## 2023-06-28 PROCEDURE — G0463: CPT

## 2023-06-29 ENCOUNTER — TRANSCRIPTION ENCOUNTER (OUTPATIENT)
Age: 63
End: 2023-06-29

## 2023-06-30 RX ORDER — HYDROCHLOROTHIAZIDE 25 MG/1
25 TABLET ORAL
Qty: 90 | Refills: 3 | Status: ACTIVE | COMMUNITY
Start: 2020-05-20

## 2023-07-05 ENCOUNTER — APPOINTMENT (OUTPATIENT)
Dept: WOUND CARE | Facility: HOSPITAL | Age: 63
End: 2023-07-05
Payer: COMMERCIAL

## 2023-07-05 ENCOUNTER — OUTPATIENT (OUTPATIENT)
Dept: OUTPATIENT SERVICES | Facility: HOSPITAL | Age: 63
LOS: 1 days | Discharge: HOME | End: 2023-07-05
Payer: COMMERCIAL

## 2023-07-05 VITALS
BODY MASS INDEX: 28.23 KG/M2 | HEIGHT: 75 IN | TEMPERATURE: 97.5 F | OXYGEN SATURATION: 96 % | WEIGHT: 227 LBS | HEART RATE: 68 BPM | RESPIRATION RATE: 20 BRPM | SYSTOLIC BLOOD PRESSURE: 189 MMHG | DIASTOLIC BLOOD PRESSURE: 97 MMHG

## 2023-07-05 DIAGNOSIS — L97.516 NON-PRESSURE CHRONIC ULCER OF OTHER PART OF RIGHT FOOT WITH BONE INVOLVEMENT WITHOUT EVIDENCE OF NECROSIS: ICD-10-CM

## 2023-07-05 DIAGNOSIS — Z79.84 LONG TERM (CURRENT) USE OF ORAL HYPOGLYCEMIC DRUGS: ICD-10-CM

## 2023-07-05 DIAGNOSIS — Z98.890 OTHER SPECIFIED POSTPROCEDURAL STATES: Chronic | ICD-10-CM

## 2023-07-05 DIAGNOSIS — E11.621 TYPE 2 DIABETES MELLITUS WITH FOOT ULCER: ICD-10-CM

## 2023-07-05 DIAGNOSIS — Z09 ENCOUNTER FOR FOLLOW-UP EXAMINATION AFTER COMPLETED TREATMENT FOR CONDITIONS OTHER THAN MALIGNANT NEOPLASM: ICD-10-CM

## 2023-07-05 DIAGNOSIS — E11.51 TYPE 2 DIABETES MELLITUS WITH DIABETIC PERIPHERAL ANGIOPATHY WITHOUT GANGRENE: ICD-10-CM
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
## 2023-07-05 DIAGNOSIS — Z86.73 PERSONAL HISTORY OF TRANSIENT ISCHEMIC ATTACK (TIA), AND CEREBRAL INFARCTION WITHOUT RESIDUAL DEFICITS: ICD-10-CM

## 2023-07-05 DIAGNOSIS — Z79.82 LONG TERM (CURRENT) USE OF ASPIRIN: ICD-10-CM

## 2023-07-05 DIAGNOSIS — Z80.3 FAMILY HISTORY OF MALIGNANT NEOPLASM OF BREAST: ICD-10-CM

## 2023-07-05 DIAGNOSIS — Z95.5 PRESENCE OF CORONARY ANGIOPLASTY IMPLANT AND GRAFT: ICD-10-CM

## 2023-07-05 DIAGNOSIS — L97.519 TYPE 2 DIABETES MELLITUS WITH FOOT ULCER: ICD-10-CM

## 2023-07-05 DIAGNOSIS — Z86.010 PERSONAL HISTORY OF COLONIC POLYPS: ICD-10-CM

## 2023-07-05 DIAGNOSIS — Z79.899 OTHER LONG TERM (CURRENT) DRUG THERAPY: ICD-10-CM

## 2023-07-05 DIAGNOSIS — Z95.4 PRESENCE OF OTHER HEART-VALVE REPLACEMENT: Chronic | ICD-10-CM

## 2023-07-05 DIAGNOSIS — K21.9 GASTRO-ESOPHAGEAL REFLUX DISEASE WITHOUT ESOPHAGITIS: ICD-10-CM

## 2023-07-05 DIAGNOSIS — E11.41 TYPE 2 DIABETES MELLITUS WITH DIABETIC MONONEUROPATHY: ICD-10-CM

## 2023-07-05 DIAGNOSIS — I25.10 ATHEROSCLEROTIC HEART DISEASE OF NATIVE CORONARY ARTERY WITHOUT ANGINA PECTORIS: ICD-10-CM

## 2023-07-05 DIAGNOSIS — E78.5 HYPERLIPIDEMIA, UNSPECIFIED: ICD-10-CM

## 2023-07-05 DIAGNOSIS — Z98.89 OTHER SPECIFIED POSTPROCEDURAL STATES: Chronic | ICD-10-CM

## 2023-07-05 DIAGNOSIS — I10 ESSENTIAL (PRIMARY) HYPERTENSION: ICD-10-CM

## 2023-07-05 PROCEDURE — 99213 OFFICE O/P EST LOW 20 MIN: CPT

## 2023-07-05 PROCEDURE — G0463: CPT

## 2023-07-05 NOTE — REVIEW OF SYSTEMS
[Fever] : no fever [Chills] : no chills [Eye Pain] : no eye pain [Red Eyes] : eyes not red [Earache] : no earache [Loss Of Hearing] : no hearing loss [Nosebleeds] : no nosebleeds [Chest Pain] : no chest pain [Shortness Of Breath] : no shortness of breath [Cough] : no cough [Abdominal Pain] : no abdominal pain [Vomiting] : no vomiting [Diarrhea] : no diarrhea [As Noted in HPI] : as noted in HPI [Limb Swelling] : limb swelling [Skin Wound] : skin wound [Negative] : Heme/Lymph [de-identified] : Plantar right hallux wound down to bone  [de-identified] : Diminished light touch to the digits b/l.

## 2023-07-05 NOTE — PHYSICAL EXAM
[4 x 4] : 4 x 4  [1+] : left 1+ [2+] : left 2+ [de-identified] : AOx3, NAD  [de-identified] : Status post right hallux Rainey arthroplasty [de-identified] : Right foot incision sites with intact sutures, no dehiscence, no purulence, no fluctuance, no proximal streaking, no signs of infection [de-identified] : every other suture removed by MD [FreeTextEntry1] : right plantar hallux- s/p lara arthroplasty- sutures CDI [FreeTextEntry2] : 1.5 [FreeTextEntry3] : 0.1 [FreeTextEntry4] : suture line [de-identified] : silver alginate [de-identified] : Mechanically cleansed with Sterile gauze and 0.9% Normal Saline\par  [de-identified] : every other suture removed by MD [FreeTextEntry7] : Right Dorsal Hallux- s/p Rainey arthroplasty- sutures CDI [FreeTextEntry8] : 3.5 [FreeTextEntry9] : 0.1 [de-identified] : suture line [de-identified] : silver alginate [de-identified] : Mechanically cleansed with Sterile gauze and 0.9% Normal Saline\par  [TWNoteComboBox4] : None [TWNoteComboBox5] : No [TWNoteComboBox6] : Surgical [de-identified] : No [de-identified] : Normal [de-identified] : None [de-identified] : None [de-identified] : Weekly [de-identified] : Primary Dressing [de-identified] : None [de-identified] : No [de-identified] : Surgical [de-identified] : No [de-identified] : Normal [de-identified] : None [de-identified] : None [de-identified] : Weekly [de-identified] : Primary Dressing

## 2023-07-05 NOTE — HISTORY OF PRESENT ILLNESS
[FreeTextEntry1] : Patient is 62 year old male presenting for wound to the plantar right hallux with wound down to the level of bone. Patient has been performing dressing changes as advised. Patient has had the wound since the last 5 years with intermittent healing and opening of the wound. Patient has obtained MRI of the right foot. \par \par 6/28/2023: Patient seen status post right hallux Rainey arthroplasty with bone biopsy of the distal phalanx (6/20/2023).  Patient relates that he is doing well at this time and denies any other complaints.\par \par 7/5/23 right hallux wounds healing well with no signs of infection

## 2023-07-05 NOTE — PLAN
[FreeTextEntry1] : Patient examined and evaluated at this time. \par every other suture removed\par redressed with AgAlginate,DD,weekly\par return 1 week for remainder of sutures\par 20 minutes spent with patient

## 2023-07-05 NOTE — ASSESSMENT
[Verbal] : Verbal [Demo] : Demo [Patient] : Patient [Good - alert, interested, motivated] : Good - alert, interested, motivated [Demonstrates independently] : demonstrates independently [Dressing changes] : dressing changes [Foot Care] : foot care [Skin Care] : skin care [Pressure relief] : pressure relief [Signs and symptoms of infection] : sign and symptoms of infection [Nutrition] : nutrition [How and When to Call] : how and when to call [Patient responsibility to plan of care] : patient responsibility to plan of care [Glycemic Control] : glycemic control [Stable] : stable [Home] : Home [Ambulatory] : Ambulatory [Not Applicable - Long Term Care/Home Health Agency] : Long Term Care/Home Health Agency: Not Applicable [] : No [FreeTextEntry2] : Infection prevention \par Wound care (dressing changes)\par Maintain optimal skin integrity to high pressure areas\par Compression therapy\par Nutrition and wound healing\par Offloading the stress on skin structures and decreasing potential pathologic biomechanical influences. [FreeTextEntry4] : F/U in 1 Week with Podiatry for remaining sutures to be removed.

## 2023-07-06 NOTE — HISTORY OF PRESENT ILLNESS
[FreeTextEntry1] : Patient is 62 year old male presenting for wound to the plantar right hallux with wound down to the level of bone. Patient has been performing dressing changes as advised. Patient has had the wound since the last 5 years with intermittent healing and opening of the wound. Patient has obtained MRI of the right foot. \par \par 6/28/2023: Patient seen status post right hallux Rainey arthroplasty with bone biopsy of the distal phalanx (6/20/2023).  Patient relates that he is doing well at this time and denies any other complaints.

## 2023-07-06 NOTE — PHYSICAL EXAM
[4 x 4] : 4 x 4  [1+] : left 1+ [2+] : left 2+ [de-identified] : AOx3, NAD  [de-identified] : Status post right hallux Rainey arthroplasty [de-identified] : Right foot incision sites with intact sutures, no dehiscence, no purulence, no fluctuance, no proximal streaking, no signs of infection [FreeTextEntry1] : right plantar hallux- s/p lara arthroplasty- sutures CDI [FreeTextEntry2] : 1.5 [FreeTextEntry3] : 0.1 [FreeTextEntry4] : suture line [de-identified] : silver alginate [de-identified] : Mechanically cleansed with Sterile gauze and 0.9% Normal Saline\par  [FreeTextEntry7] : Right Dorsal Hallux- s/p Rainey arthroplasty- sutures CDI [FreeTextEntry8] : 3.5 [FreeTextEntry9] : 0.1 [de-identified] : suture line [de-identified] : silver alginate [de-identified] : Mechanically cleansed with Sterile gauze and 0.9% Normal Saline\par  [TWNoteComboBox4] : None [TWNoteComboBox5] : No [TWNoteComboBox6] : Surgical [de-identified] : No [de-identified] : Normal [de-identified] : None [de-identified] : None [de-identified] : Primary Dressing [de-identified] : Weekly [de-identified] : None [de-identified] : No [de-identified] : Surgical [de-identified] : No [de-identified] : Normal [de-identified] : None [de-identified] : None [de-identified] : False [de-identified] : False [de-identified] : Weekly [de-identified] : Primary Dressing

## 2023-07-06 NOTE — PLAN
[FreeTextEntry1] : Patient examined and evaluated at this time.  Discussed postoperative healing process with patient and all questions answered to satisfaction.  Patient verbalized understanding.  Patient advised regarding the need for local wound care to aid in preventing an infection, as patient presented today without any dressings on his right foot.  Patient remains a very high risk for infection, sepsis, limb loss, amputation, and death.  Reiterated the need for patient to minimize weightbearing and the need to keep the incision site covered.  Spent 30 minutes on patient care and medical decision making.

## 2023-07-06 NOTE — REVIEW OF SYSTEMS
[As Noted in HPI] : as noted in HPI [Limb Swelling] : limb swelling [Skin Wound] : skin wound [Negative] : Heme/Lymph [Fever] : no fever [Chills] : no chills [Eye Pain] : no eye pain [Red Eyes] : eyes not red [Earache] : no earache [Loss Of Hearing] : no hearing loss [Nosebleeds] : no nosebleeds [Chest Pain] : no chest pain [Shortness Of Breath] : no shortness of breath [Cough] : no cough [Abdominal Pain] : no abdominal pain [Vomiting] : no vomiting [Diarrhea] : no diarrhea [de-identified] : Plantar right hallux wound down to bone  [de-identified] : Diminished light touch to the digits b/l.

## 2023-07-06 NOTE — ASSESSMENT
[Verbal] : Verbal [Demo] : Demo [Patient] : Patient [Good - alert, interested, motivated] : Good - alert, interested, motivated [Demonstrates independently] : demonstrates independently [Dressing changes] : dressing changes [Foot Care] : foot care [Skin Care] : skin care [Pressure relief] : pressure relief [Signs and symptoms of infection] : sign and symptoms of infection [Nutrition] : nutrition [How and When to Call] : how and when to call [Patient responsibility to plan of care] : patient responsibility to plan of care [Glycemic Control] : glycemic control [] : Yes [Stable] : stable [Home] : Home [Ambulatory] : Ambulatory [Not Applicable - Long Term Care/Home Health Agency] : Long Term Care/Home Health Agency: Not Applicable [FreeTextEntry2] : infection prevention \par promote skin integrity\par pressure relief\par nutrition and wound healing\par encourage glycemic control [FreeTextEntry4] : F/U 1 week\par Pt advised to continue wearing surgical shoe and to keep dressings CDI, pt verbalized understanding.\par Medications reconciled
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
## 2023-07-07 DIAGNOSIS — Z86.010 PERSONAL HISTORY OF COLONIC POLYPS: ICD-10-CM
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
## 2023-07-07 DIAGNOSIS — Z79.84 LONG TERM (CURRENT) USE OF ORAL HYPOGLYCEMIC DRUGS: ICD-10-CM

## 2023-07-07 DIAGNOSIS — Z95.5 PRESENCE OF CORONARY ANGIOPLASTY IMPLANT AND GRAFT: ICD-10-CM

## 2023-07-07 DIAGNOSIS — E11.51 TYPE 2 DIABETES MELLITUS WITH DIABETIC PERIPHERAL ANGIOPATHY WITHOUT GANGRENE: ICD-10-CM

## 2023-07-07 DIAGNOSIS — T81.89XD OTHER COMPLICATIONS OF PROCEDURES, NOT ELSEWHERE CLASSIFIED, SUBSEQUENT ENCOUNTER: ICD-10-CM

## 2023-07-07 DIAGNOSIS — E11.42 TYPE 2 DIABETES MELLITUS WITH DIABETIC POLYNEUROPATHY: ICD-10-CM

## 2023-07-07 DIAGNOSIS — Z79.899 OTHER LONG TERM (CURRENT) DRUG THERAPY: ICD-10-CM

## 2023-07-07 DIAGNOSIS — I25.10 ATHEROSCLEROTIC HEART DISEASE OF NATIVE CORONARY ARTERY WITHOUT ANGINA PECTORIS: ICD-10-CM

## 2023-07-07 DIAGNOSIS — Z79.82 LONG TERM (CURRENT) USE OF ASPIRIN: ICD-10-CM

## 2023-07-07 DIAGNOSIS — I10 ESSENTIAL (PRIMARY) HYPERTENSION: ICD-10-CM

## 2023-07-07 DIAGNOSIS — E78.5 HYPERLIPIDEMIA, UNSPECIFIED: ICD-10-CM

## 2023-07-07 DIAGNOSIS — Z80.3 FAMILY HISTORY OF MALIGNANT NEOPLASM OF BREAST: ICD-10-CM

## 2023-07-07 DIAGNOSIS — Z86.73 PERSONAL HISTORY OF TRANSIENT ISCHEMIC ATTACK (TIA), AND CEREBRAL INFARCTION WITHOUT RESIDUAL DEFICITS: ICD-10-CM

## 2023-07-07 DIAGNOSIS — K21.9 GASTRO-ESOPHAGEAL REFLUX DISEASE WITHOUT ESOPHAGITIS: ICD-10-CM

## 2023-07-11 ENCOUNTER — NON-APPOINTMENT (OUTPATIENT)
Age: 63
End: 2023-07-11

## 2023-07-12 ENCOUNTER — OUTPATIENT (OUTPATIENT)
Dept: OUTPATIENT SERVICES | Facility: HOSPITAL | Age: 63
LOS: 1 days | Discharge: HOME | End: 2023-07-12
Payer: COMMERCIAL

## 2023-07-12 ENCOUNTER — APPOINTMENT (OUTPATIENT)
Dept: WOUND CARE | Facility: HOSPITAL | Age: 63
End: 2023-07-12
Payer: COMMERCIAL

## 2023-07-12 VITALS
SYSTOLIC BLOOD PRESSURE: 155 MMHG | WEIGHT: 227 LBS | DIASTOLIC BLOOD PRESSURE: 78 MMHG | HEART RATE: 63 BPM | HEIGHT: 75 IN | OXYGEN SATURATION: 97 % | TEMPERATURE: 98.2 F | RESPIRATION RATE: 18 BRPM | BODY MASS INDEX: 28.23 KG/M2

## 2023-07-12 DIAGNOSIS — Z09 ENCOUNTER FOR FOLLOW-UP EXAMINATION AFTER COMPLETED TREATMENT FOR CONDITIONS OTHER THAN MALIGNANT NEOPLASM: ICD-10-CM

## 2023-07-12 DIAGNOSIS — Z98.89 OTHER SPECIFIED POSTPROCEDURAL STATES: Chronic | ICD-10-CM

## 2023-07-12 DIAGNOSIS — Z98.890 OTHER SPECIFIED POSTPROCEDURAL STATES: Chronic | ICD-10-CM

## 2023-07-12 DIAGNOSIS — Z95.4 PRESENCE OF OTHER HEART-VALVE REPLACEMENT: Chronic | ICD-10-CM

## 2023-07-12 PROCEDURE — G0463: CPT

## 2023-07-12 PROCEDURE — 99213 OFFICE O/P EST LOW 20 MIN: CPT | Mod: 24

## 2023-07-12 NOTE — ASSESSMENT
[Demo] : Demo [Verbal] : Verbal [Good - alert, interested, motivated] : Good - alert, interested, motivated [Verbalizes knowledge/Understanding] : Verbalizes knowledge/understanding [Dressing changes] : dressing changes [Foot Care] : foot care [Skin Care] : skin care [Signs and symptoms of infection] : sign and symptoms of infection [Nutrition] : nutrition [How and When to Call] : how and when to call [Pain Management] : pain management [Patient responsibility to plan of care] : patient responsibility to plan of care [Glycemic Control] : glycemic control [] : Yes [FreeTextEntry2] : infection prevention\par promote skin integrity\par encourage glycemic control\par goals of remaining pain free related to wounds

## 2023-07-12 NOTE — HISTORY OF PRESENT ILLNESS
[FreeTextEntry1] : Patient is 62 year old male presenting for wound to the plantar right hallux with wound down to the level of bone. Patient has been performing dressing changes as advised. Patient has had the wound since the last 5 years with intermittent healing and opening of the wound. Patient has obtained MRI of the right foot. \par \par 7/12/2023: Patient seen status post right hallux Rainey arthroplasty with bone biopsy of the distal phalanx (6/20/2023).  Patient relates that he is doing well at this time and denies any other complaints.  Patient relates that he has been walking in the surgical shoe as advised his last encounter.

## 2023-07-12 NOTE — PLAN
[FreeTextEntry1] : Patient examined and evaluated at this time.  Discussed postoperative healing process with patient and all questions answered to satisfaction.  Patient verbalized understanding.  Few sutures removed at this time.  Patient remains a very high risk for infection, sepsis, limb loss, amputation, and death.  Reiterated the need for patient to minimize weightbearing and the need to keep the incision site covered.  Spent 20 minutes on patient care and medical decision making.

## 2023-07-12 NOTE — PHYSICAL EXAM
[4 x 4] : 4 x 4  [1+] : left 1+ [2+] : left 2+ [de-identified] : AOx3, NAD  [de-identified] : Status post right hallux Rainey arthroplasty [de-identified] : Right foot incision sites with intact sutures, no dehiscence, no purulence, no fluctuance, no proximal streaking, no signs of infection [FreeTextEntry1] : right plantar hallux- s/p lara arthroplasty- sutures CDI [FreeTextEntry2] : 1.0 [FreeTextEntry3] : 0.1 [FreeTextEntry4] : suture line [de-identified] : scant serosanguineous  [de-identified] : intact [de-identified] : silver alginate [de-identified] : Mechanically cleansed with Sterile gauze and 0.9% Normal Saline\par \par  remaining sutures removed by DPM\par \par Dressing applied by DPM [FreeTextEntry7] : Right Dorsal Hallux- s/p Rainey arthroplasty- sutures CDI [FreeTextEntry8] : 2.1 [FreeTextEntry9] : 0.1 [de-identified] : suture line [de-identified] : scant serous [de-identified] : silver alginate [de-identified] : Mechanically cleansed with Sterile gauze and 0.9% Normal Saline\par \par  remaining sutures removed by DPM w/ 1-2 sutures remaining \par \par Dressing applied by DPM [TWNoteComboBox4] : Small [TWNoteComboBox5] : No [TWNoteComboBox6] : Surgical [de-identified] : No [de-identified] : other [de-identified] : None [de-identified] : None [de-identified] : Weekly [de-identified] : Primary Dressing [de-identified] : None [de-identified] : No [de-identified] : Surgical [de-identified] : No [de-identified] : Normal [de-identified] : None [de-identified] : None [de-identified] : Weekly [de-identified] : Primary Dressing

## 2023-07-12 NOTE — REVIEW OF SYSTEMS
[Fever] : no fever [Chills] : no chills [Eye Pain] : no eye pain [Red Eyes] : eyes not red [Earache] : no earache [Loss Of Hearing] : no hearing loss [Nosebleeds] : no nosebleeds [Chest Pain] : no chest pain [Shortness Of Breath] : no shortness of breath [Cough] : no cough [Abdominal Pain] : no abdominal pain [Vomiting] : no vomiting [Diarrhea] : no diarrhea [As Noted in HPI] : as noted in HPI [Limb Swelling] : limb swelling [Skin Wound] : skin wound [Negative] : Heme/Lymph [de-identified] : Plantar right hallux wound down to bone  [de-identified] : Diminished light touch to the digits b/l.

## 2023-07-13 DIAGNOSIS — E11.42 TYPE 2 DIABETES MELLITUS WITH DIABETIC POLYNEUROPATHY: ICD-10-CM

## 2023-07-13 DIAGNOSIS — Z79.899 OTHER LONG TERM (CURRENT) DRUG THERAPY: ICD-10-CM
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
## 2023-07-13 DIAGNOSIS — Z86.73 PERSONAL HISTORY OF TRANSIENT ISCHEMIC ATTACK (TIA), AND CEREBRAL INFARCTION WITHOUT RESIDUAL DEFICITS: ICD-10-CM

## 2023-07-13 DIAGNOSIS — Z95.5 PRESENCE OF CORONARY ANGIOPLASTY IMPLANT AND GRAFT: ICD-10-CM

## 2023-07-13 DIAGNOSIS — Z80.3 FAMILY HISTORY OF MALIGNANT NEOPLASM OF BREAST: ICD-10-CM

## 2023-07-13 DIAGNOSIS — I25.10 ATHEROSCLEROTIC HEART DISEASE OF NATIVE CORONARY ARTERY WITHOUT ANGINA PECTORIS: ICD-10-CM

## 2023-07-13 DIAGNOSIS — Z79.84 LONG TERM (CURRENT) USE OF ORAL HYPOGLYCEMIC DRUGS: ICD-10-CM

## 2023-07-13 DIAGNOSIS — T81.89XD OTHER COMPLICATIONS OF PROCEDURES, NOT ELSEWHERE CLASSIFIED, SUBSEQUENT ENCOUNTER: ICD-10-CM

## 2023-07-13 DIAGNOSIS — Z79.82 LONG TERM (CURRENT) USE OF ASPIRIN: ICD-10-CM

## 2023-07-13 DIAGNOSIS — Z86.010 PERSONAL HISTORY OF COLONIC POLYPS: ICD-10-CM
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
## 2023-07-13 DIAGNOSIS — E78.5 HYPERLIPIDEMIA, UNSPECIFIED: ICD-10-CM

## 2023-07-13 DIAGNOSIS — E11.51 TYPE 2 DIABETES MELLITUS WITH DIABETIC PERIPHERAL ANGIOPATHY WITHOUT GANGRENE: ICD-10-CM

## 2023-07-13 DIAGNOSIS — K21.9 GASTRO-ESOPHAGEAL REFLUX DISEASE WITHOUT ESOPHAGITIS: ICD-10-CM

## 2023-07-13 DIAGNOSIS — I10 ESSENTIAL (PRIMARY) HYPERTENSION: ICD-10-CM

## 2023-07-18 ENCOUNTER — APPOINTMENT (OUTPATIENT)
Dept: WOUND CARE | Facility: HOSPITAL | Age: 63
End: 2023-07-18
Payer: COMMERCIAL

## 2023-07-18 ENCOUNTER — OUTPATIENT (OUTPATIENT)
Dept: OUTPATIENT SERVICES | Facility: HOSPITAL | Age: 63
LOS: 1 days | Discharge: HOME | End: 2023-07-18
Payer: COMMERCIAL

## 2023-07-18 VITALS
SYSTOLIC BLOOD PRESSURE: 184 MMHG | DIASTOLIC BLOOD PRESSURE: 91 MMHG | HEART RATE: 71 BPM | RESPIRATION RATE: 18 BRPM | HEIGHT: 75 IN | BODY MASS INDEX: 28.23 KG/M2 | OXYGEN SATURATION: 98 % | TEMPERATURE: 98.3 F | WEIGHT: 227 LBS

## 2023-07-18 DIAGNOSIS — Z98.890 OTHER SPECIFIED POSTPROCEDURAL STATES: Chronic | ICD-10-CM

## 2023-07-18 DIAGNOSIS — T81.89XD OTHER COMPLICATIONS OF PROCEDURES, NOT ELSEWHERE CLASSIFIED, SUBSEQUENT ENCOUNTER: ICD-10-CM

## 2023-07-18 DIAGNOSIS — Z95.4 PRESENCE OF OTHER HEART-VALVE REPLACEMENT: Chronic | ICD-10-CM

## 2023-07-18 DIAGNOSIS — Z09 ENCOUNTER FOR FOLLOW-UP EXAMINATION AFTER COMPLETED TREATMENT FOR CONDITIONS OTHER THAN MALIGNANT NEOPLASM: ICD-10-CM

## 2023-07-18 DIAGNOSIS — Z98.89 OTHER SPECIFIED POSTPROCEDURAL STATES: Chronic | ICD-10-CM

## 2023-07-18 PROCEDURE — 99024 POSTOP FOLLOW-UP VISIT: CPT

## 2023-07-18 PROCEDURE — G0463: CPT

## 2023-07-18 NOTE — PHYSICAL EXAM
[1+] : left 1+ [2+] : left 2+ [de-identified] : AOx3, NAD  [de-identified] : Status post right hallux Rainey arthroplasty [de-identified] : Right foot incision sites with intact sutures, no dehiscence, no purulence, no fluctuance, no proximal streaking, no signs of infection.  Right plantar wound healed at this time [FreeTextEntry1] : Right Plantar Hallux -CLOSED -  s/p Rainey arthroplasty  [FreeTextEntry2] : 1.0 [FreeTextEntry3] : 0.1 [FreeTextEntry4] : suture line [de-identified] : intact, scab removed [de-identified] : NONE [de-identified] : Mechanically cleansed with 0.9% Normal Saline\par Kerlix [FreeTextEntry7] : Right Dorsal Hallux - CLOSED -  s/p Rainey arthroplasty  [FreeTextEntry8] : 2.1 [FreeTextEntry9] : 0.1 [de-identified] : suture line [de-identified] : small serous [de-identified] : sutures removed [de-identified] : intact [de-identified] : None [TWNoteComboBox6] : Surgical [de-identified] : Mechanically cleansed with 0.9% Normal Saline\par Kerlix [de-identified] : No [de-identified] : None [de-identified] : None [de-identified] : No [de-identified] : Surgical [de-identified] : No [de-identified] : None [de-identified] : None

## 2023-07-18 NOTE — PLAN
[FreeTextEntry1] : Patient examined and evaluated at this time.  All remaining sutures removed at this time, incision site has healed and right plantar hallux ulcer remains epithelialized.  Patient remains a very high risk for infection, sepsis, limb loss, amputation, and death.  Patient happy with outcome of treatment.  Spent 20 minutes on patient care and medical decision making.

## 2023-07-18 NOTE — REVIEW OF SYSTEMS
[Fever] : no fever [Chills] : no chills [Eye Pain] : no eye pain [Red Eyes] : eyes not red [Earache] : no earache [Loss Of Hearing] : no hearing loss [Nosebleeds] : no nosebleeds [Chest Pain] : no chest pain [Shortness Of Breath] : no shortness of breath [Cough] : no cough [Abdominal Pain] : no abdominal pain [Vomiting] : no vomiting [Diarrhea] : no diarrhea [As Noted in HPI] : as noted in HPI [Limb Swelling] : limb swelling [Skin Wound] : skin wound [Negative] : Heme/Lymph [de-identified] : Plantar right hallux wound down to bone  [de-identified] : Diminished light touch to the digits b/l.

## 2023-07-18 NOTE — HISTORY OF PRESENT ILLNESS
[FreeTextEntry1] : Patient is 62 year old male presenting for wound to the plantar right hallux with wound down to the level of bone. Patient has been performing dressing changes as advised. Patient has had the wound since the last 5 years with intermittent healing and opening of the wound. Patient has obtained MRI of the right foot. \par \par 7/12/2023: Patient seen status post right hallux Rainey arthroplasty with bone biopsy of the distal phalanx (6/20/2023).  Patient relates that he is doing well at this time and denies any other complaints.  Patient relates that he has been walking in the surgical shoe as advised his last encounter.\par 7/18/2023: Patient seen status post right hallux Rainey arthroplasty with bone biopsy of the distal phalanx (DOS: 6/20/2023) patient relates that he has been doing well and denies any other complaints.  Patient also relates that he has been walking in the surgical shoe as advised at the last counter.

## 2023-07-18 NOTE — ASSESSMENT
[Verbal] : Verbal [Demo] : Demo [Patient] : Patient [Good - alert, interested, motivated] : Good - alert, interested, motivated [Verbalizes knowledge/Understanding] : Verbalizes knowledge/understanding [Dressing changes] : dressing changes [Foot Care] : foot care [Skin Care] : skin care [Signs and symptoms of infection] : sign and symptoms of infection [Nutrition] : nutrition [How and When to Call] : how and when to call [Off-loading] : off-loading [Patient responsibility to plan of care] : patient responsibility to plan of care [Glycemic Control] : glycemic control [Stable] : stable [Home] : Home [Ambulatory] : Ambulatory [Not Applicable - Long Term Care/Home Health Agency] : Long Term Care/Home Health Agency: Not Applicable [] : No [FreeTextEntry4] : DPM assessed site - final suture removed from  dorsal site.  No drainage. Right and Left wound sites closed.\par Patient may shower and get foot wet.  No pools or bathtub. \par May wear supportive sneaker.\par Follow up if or when needed. [FreeTextEntry2] : Infection Prevention\par Wound care \par Promote and Restore optimal skin integrity\par Nutrition and Wound Healing \par Offloading and Pressure relief\par Protect and Guard wound site \par \par \par \par \par \par \par \par \par \par \par

## 2023-07-20 DIAGNOSIS — Z79.899 OTHER LONG TERM (CURRENT) DRUG THERAPY: ICD-10-CM

## 2023-07-20 DIAGNOSIS — T81.89XD OTHER COMPLICATIONS OF PROCEDURES, NOT ELSEWHERE CLASSIFIED, SUBSEQUENT ENCOUNTER: ICD-10-CM

## 2023-07-20 DIAGNOSIS — Z79.84 LONG TERM (CURRENT) USE OF ORAL HYPOGLYCEMIC DRUGS: ICD-10-CM

## 2023-07-20 DIAGNOSIS — E11.51 TYPE 2 DIABETES MELLITUS WITH DIABETIC PERIPHERAL ANGIOPATHY WITHOUT GANGRENE: ICD-10-CM

## 2023-07-20 DIAGNOSIS — I10 ESSENTIAL (PRIMARY) HYPERTENSION: ICD-10-CM

## 2023-07-20 DIAGNOSIS — E11.42 TYPE 2 DIABETES MELLITUS WITH DIABETIC POLYNEUROPATHY: ICD-10-CM

## 2023-07-20 DIAGNOSIS — Z95.5 PRESENCE OF CORONARY ANGIOPLASTY IMPLANT AND GRAFT: ICD-10-CM
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
## 2023-07-20 DIAGNOSIS — Z79.82 LONG TERM (CURRENT) USE OF ASPIRIN: ICD-10-CM

## 2023-07-20 DIAGNOSIS — K21.9 GASTRO-ESOPHAGEAL REFLUX DISEASE WITHOUT ESOPHAGITIS: ICD-10-CM

## 2023-07-20 DIAGNOSIS — Z86.010 PERSONAL HISTORY OF COLONIC POLYPS: ICD-10-CM

## 2023-07-20 DIAGNOSIS — Z86.73 PERSONAL HISTORY OF TRANSIENT ISCHEMIC ATTACK (TIA), AND CEREBRAL INFARCTION WITHOUT RESIDUAL DEFICITS: ICD-10-CM

## 2023-07-20 DIAGNOSIS — Z80.3 FAMILY HISTORY OF MALIGNANT NEOPLASM OF BREAST: ICD-10-CM

## 2023-07-20 DIAGNOSIS — E78.5 HYPERLIPIDEMIA, UNSPECIFIED: ICD-10-CM

## 2023-07-20 DIAGNOSIS — I25.10 ATHEROSCLEROTIC HEART DISEASE OF NATIVE CORONARY ARTERY WITHOUT ANGINA PECTORIS: ICD-10-CM

## 2023-07-21 ENCOUNTER — LABORATORY RESULT (OUTPATIENT)
Age: 63
End: 2023-07-21

## 2023-07-21 ENCOUNTER — APPOINTMENT (OUTPATIENT)
Dept: INTERNAL MEDICINE | Facility: CLINIC | Age: 63
End: 2023-07-21
Payer: COMMERCIAL

## 2023-07-21 VITALS
HEIGHT: 75 IN | HEART RATE: 86 BPM | OXYGEN SATURATION: 97 % | DIASTOLIC BLOOD PRESSURE: 99 MMHG | SYSTOLIC BLOOD PRESSURE: 166 MMHG | WEIGHT: 228 LBS | BODY MASS INDEX: 28.35 KG/M2 | TEMPERATURE: 98 F

## 2023-07-21 DIAGNOSIS — Z00.00 ENCOUNTER FOR GENERAL ADULT MEDICAL EXAMINATION W/OUT ABNORMAL FINDINGS: ICD-10-CM

## 2023-07-21 DIAGNOSIS — E11.9 TYPE 2 DIABETES MELLITUS W/OUT COMPLICATIONS: ICD-10-CM

## 2023-07-21 DIAGNOSIS — E11.51 TYPE 2 DIABETES MELLITUS WITH DIABETIC PERIPHERAL ANGIOPATHY W/OUT GANGRENE: ICD-10-CM

## 2023-07-21 DIAGNOSIS — K21.9 GASTRO-ESOPHAGEAL REFLUX DISEASE W/OUT ESOPHAGITIS: ICD-10-CM

## 2023-07-21 DIAGNOSIS — I10 ESSENTIAL (PRIMARY) HYPERTENSION: ICD-10-CM

## 2023-07-21 DIAGNOSIS — I25.10 ATHEROSCLEROTIC HEART DISEASE OF NATIVE CORONARY ARTERY W/OUT ANGINA PECTORIS: ICD-10-CM

## 2023-07-21 DIAGNOSIS — E11.42 TYPE 2 DIABETES MELLITUS WITH DIABETIC POLYNEUROPATHY: ICD-10-CM

## 2023-07-21 PROCEDURE — 99396 PREV VISIT EST AGE 40-64: CPT | Mod: 25

## 2023-07-21 PROCEDURE — 36415 COLL VENOUS BLD VENIPUNCTURE: CPT

## 2023-07-21 RX ORDER — CLOPIDOGREL BISULFATE 75 MG/1
75 TABLET, FILM COATED ORAL
Qty: 90 | Refills: 3 | Status: ACTIVE | COMMUNITY
Start: 2019-07-24

## 2023-07-21 NOTE — HEALTH RISK ASSESSMENT
[Good] : ~his/her~  mood as  good [Yes] : Yes [2 - 4 times a month (2 pts)] : 2-4 times a month (2 points) [Never (0 pts)] : Never (0 points) [No falls in past year] : Patient reported no falls in the past year [0] : 2) Feeling down, depressed, or hopeless: Not at all (0) [PHQ-2 Negative - No further assessment needed] : PHQ-2 Negative - No further assessment needed [[id number]] : 0 [Change in mental status noted] : No change in mental status noted [Language] : denies difficulty with language [Behavior] : denies difficulty with behavior [Learning/Retaining New Information] : denies difficulty learning/retaining new information [Handling Complex Tasks] : denies difficulty handling complex tasks [Reasoning] : denies difficulty with reasoning [Spatial Ability and Orientation] : denies difficulty with spatial ability and orientation [None] : None [With Family] : lives with family [Retired] : retired [High School] : high school [] :  [Feels Safe at Home] : Feels safe at home [Fully functional (bathing, dressing, toileting, transferring, walking, feeding)] : Fully functional (bathing, dressing, toileting, transferring, walking, feeding) [Fully functional (using the telephone, shopping, preparing meals, housekeeping, doing laundry, using] : Fully functional and needs no help or supervision to perform IADLs (using the telephone, shopping, preparing meals, housekeeping, doing laundry, using transportation, managing medications and managing finances) [Reports changes in hearing] : Reports no changes in hearing [Reports changes in vision] : Reports no changes in vision [Reports changes in dental health] : Reports no changes in dental health [Smoke Detector] : smoke detector [Carbon Monoxide Detector] : carbon monoxide detector [Guns at Home] : no guns at home [Seat Belt] :  uses seat belt

## 2023-07-21 NOTE — HISTORY OF PRESENT ILLNESS
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

## 2023-07-21 NOTE — PLAN
[FreeTextEntry1] : Annual\par decline flu\par recent colon\par \par diabetes check a1c, lipid and urine\par bp good\par foot ulcer healed

## 2023-07-23 LAB
ALBUMIN SERPL ELPH-MCNC: 4.5 G/DL
ALP BLD-CCNC: 125 U/L
ALT SERPL-CCNC: 27 U/L
ANION GAP SERPL CALC-SCNC: 13 MMOL/L
APPEARANCE: CLEAR
AST SERPL-CCNC: 17 U/L
BILIRUB SERPL-MCNC: 0.4 MG/DL
BILIRUBIN URINE: NEGATIVE
BLOOD URINE: NEGATIVE
BUN SERPL-MCNC: 14 MG/DL
CALCIUM SERPL-MCNC: 9.9 MG/DL
CHLORIDE SERPL-SCNC: 105 MMOL/L
CHOLEST SERPL-MCNC: 160 MG/DL
CO2 SERPL-SCNC: 24 MMOL/L
COLOR: YELLOW
CREAT SERPL-MCNC: 1.1 MG/DL
EGFR: 75 ML/MIN/1.73M2
ESTIMATED AVERAGE GLUCOSE: 194 MG/DL
GLUCOSE QUALITATIVE U: 500 MG/DL
GLUCOSE SERPL-MCNC: 196 MG/DL
HBA1C MFR BLD HPLC: 8.4 %
HDLC SERPL-MCNC: 46 MG/DL
KETONES URINE: NEGATIVE MG/DL
LDLC SERPL CALC-MCNC: 83 MG/DL
LEUKOCYTE ESTERASE URINE: NEGATIVE
NITRITE URINE: NEGATIVE
NONHDLC SERPL-MCNC: 114 MG/DL
PH URINE: 5.5
POTASSIUM SERPL-SCNC: 4.2 MMOL/L
PROT SERPL-MCNC: 6.9 G/DL
PROTEIN URINE: 30 MG/DL
PSA SERPL-MCNC: 1.74 NG/ML
SODIUM SERPL-SCNC: 142 MMOL/L
SPECIFIC GRAVITY URINE: 1.02
T4 FREE SERPL-MCNC: 1.4 NG/DL
TRIGL SERPL-MCNC: 181 MG/DL
TSH SERPL-ACNC: 0.59 UIU/ML
UROBILINOGEN URINE: 0.2 MG/DL

## 2023-08-03 ENCOUNTER — NON-APPOINTMENT (OUTPATIENT)
Age: 63
End: 2023-08-03

## 2023-08-09 RX ORDER — MECLIZINE HYDROCHLORIDE 12.5 MG/1
12.5 TABLET ORAL 3 TIMES DAILY
Qty: 42 | Refills: 0 | Status: ACTIVE | COMMUNITY
Start: 2018-04-02 | End: 1900-01-01

## 2023-08-28 NOTE — ED ADULT TRIAGE NOTE - ADDITIONAL SAFETY/BANDS...
Requested medication(s) are due for refill today: Yes  Patient has already received a courtesy refill: No  Other reason request has been forwarded to provider:
Additional Safety/Bands:

## 2023-08-30 ENCOUNTER — RX RENEWAL (OUTPATIENT)
Age: 63
End: 2023-08-30

## 2023-09-03 ENCOUNTER — RX RENEWAL (OUTPATIENT)
Age: 63
End: 2023-09-03

## 2023-09-03 RX ORDER — LISINOPRIL 40 MG/1
40 TABLET ORAL
Qty: 1 | Refills: 3 | Status: ACTIVE | COMMUNITY
Start: 2018-07-03 | End: 1900-01-01

## 2023-10-03 NOTE — DIETITIAN INITIAL EVALUATION ADULT - PROBLEM SELECTOR PLAN 1
Patient presents with chronic open R toe wound with recent purulent drainage, s/p 2 weeks of doxycycline. No drainage, cellulitis, sepsis on admission.   - Pt afebrile w/o leukocytosis on presentation  - R foot XR: no fx, no surrounding edema (personal read)  - Wound cx (5/28): MSSA, sensitivities noted  - s/p Vanc x1, and Zosyn x1 in ED  - Start Cefazolin 1g  - NPO after midnight, D5 + NS @ midnight  - ID (Dr. Blanco) consult  - Wound care following  - Monitor WBC  - AM CMP, CBC, coags, 03-Oct-2023 14:36

## 2023-11-12 ENCOUNTER — RX RENEWAL (OUTPATIENT)
Age: 63
End: 2023-11-12

## 2023-11-12 RX ORDER — GABAPENTIN 600 MG/1
600 TABLET, COATED ORAL
Qty: 360 | Refills: 2 | Status: ACTIVE | COMMUNITY
Start: 2018-05-25 | End: 1900-01-01

## 2023-12-08 NOTE — PLAN
[FreeTextEntry1] : pre op angiogram\par cbc good\par creat 1.1\par Diabetic on mettformin\par \par Hold metformin day of procedure and for 4 day post procedure\par \par Medically cleared for angiogram
Yes, get tested

## 2023-12-11 NOTE — DIETITIAN INITIAL EVALUATION ADULT. - PROBLEM/PLAN-7
Department of Obstetrics and Gynecology  Spontaneous Vaginal Delivery Note      Pre-operative Diagnosis:  Term pregnancy and Induced labor    Post-operative Diagnosis:  Living  infant(s) and Female    Information for the patient's :  Muriel Ortiz [[id number]]                  Infant Wt:   Information for the patient's :  Muriel Ortiz [[id number]]         APGARS:     Information for the patient's :  Muriel Ortiz [[id number]]         Anesthesia:  epidural anesthesia    Application and Delivery:  30 yo  at 40 1/7 weeks, IOL, pitocin, AROM. Progressed well to complete. Pushed 30 min to  over small 2nd degree lac. Viable female, skin to skin, delayed cord clamping. Placenta spont intact, uterus explored. Repair with 3-0 vicryl.  ml. Delivery Summary:  Labor & Delivery Summary  Labor Onset Date: 23  Labor Onset Time: 1031  Dilation Complete Date: 23  Dilation Complete Time: 1550       Intake/Output:     Date 12/10/23 0701 - 23 07(Not Admitted) 23 - 23 0700   Shift 5761-8061 0079-8227 24 Hour Total 2000-3783 6395-3502 24 Hour Total   INTAKE   I.V.  0.6 0.6 565.4  565.4   Shift Total  0.6 0.6 565.4  565.4   OUTPUT   Urine    1700  1700   Shift Total    1700  1700   NET  0.6 0.6 -1134.6  -1134.6       Condition:  infant stable to general nursery and mother stable    Blood Type and Rh: A POS        Rubella Immunity Status:   Immune           Infant Feeding:    breast    Attending Attestation: I performed the procedure. DISPLAY PLAN FREE TEXT

## 2024-03-25 ENCOUNTER — APPOINTMENT (OUTPATIENT)
Dept: INTERNAL MEDICINE | Facility: CLINIC | Age: 64
End: 2024-03-25

## 2024-03-26 ENCOUNTER — NON-APPOINTMENT (OUTPATIENT)
Age: 64
End: 2024-03-26

## 2024-04-18 ENCOUNTER — APPOINTMENT (OUTPATIENT)
Dept: DERMATOLOGY | Facility: CLINIC | Age: 64
End: 2024-04-18

## 2024-05-14 RX ORDER — PANTOPRAZOLE 40 MG/1
40 TABLET, DELAYED RELEASE ORAL
Qty: 90 | Refills: 0 | Status: ACTIVE | COMMUNITY
Start: 2019-08-07 | End: 1900-01-01

## 2024-05-16 ENCOUNTER — RX RENEWAL (OUTPATIENT)
Age: 64
End: 2024-05-16

## 2024-05-16 RX ORDER — ATORVASTATIN CALCIUM 80 MG/1
80 TABLET, FILM COATED ORAL
Qty: 90 | Refills: 0 | Status: ACTIVE | COMMUNITY
Start: 2019-07-24 | End: 1900-01-01

## 2024-05-17 NOTE — ED ADULT NURSE NOTE - NS ED NURSE RECORD ANOTHER VITAL SIGN
"Oncology Rooming Note    May 17, 2024 12:19 PM   Fang Estes is a 80 year old female who presents for:    Chief Complaint   Patient presents with    Oncology Clinic Visit     3 month return visit related to Invasive lobular carcinoma of breast, stage 2, right.     Initial Vitals: BP (!) 147/86 (BP Location: Left arm, Patient Position: Sitting, Cuff Size: Adult Regular)   Pulse 74   Temp 98.7  F (37.1  C) (Tympanic)   Resp 16   Ht 1.6 m (5' 3\")   Wt 56.2 kg (123 lb 12.8 oz)   SpO2 98%   BMI 21.93 kg/m   Estimated body mass index is 21.93 kg/m  as calculated from the following:    Height as of this encounter: 1.6 m (5' 3\").    Weight as of this encounter: 56.2 kg (123 lb 12.8 oz). Body surface area is 1.58 meters squared.  No Pain (0) Comment: Data Unavailable   No LMP recorded. Patient is postmenopausal.  Allergies reviewed: Yes  Medications reviewed: Yes    Medications: Medication refills not needed today.  Pharmacy name entered into [employer]: [id number] - [place] [id number] [address] [facility] [address]    Frailty Screening:   Is the patient here for a new oncology consult visit in cancer care? 2. No      Clinical concerns: none.      Nery Price CMA              " Yes

## 2024-06-06 NOTE — ED CDU PROVIDER INITIAL DAY NOTE - OBJECTIVE STATEMENT
Pt is a 58M with chest pain.  PMH of aortic valve replaced and ascending aneurysm repair (bovine), HTN, NIDDM2.  Patient states one month of pain.  States worsening today.  around chest and upper abdomen into back and left arm. No aggravating or relieving factors. No other pertinent PMH.  No other pertinent PSH.  No other pertinent FHx.  Patient denies EtOH/tobacco/illicit substance use. No fever/chills, No photophobia/eye pain/changes in vision, No ear pain/sore throat/dysphagia, No palpitations, no SOB/cough/wheeze/stridor,  No diarrhea, no dysuria/frequency/discharge, No neck/back pain, no rash, no changes in neurological status/function. Pt now reports pain is subsiding. Will keep in obs for repeat trops/ekgs.
Statement Selected

## 2024-10-02 ENCOUNTER — NON-APPOINTMENT (OUTPATIENT)
Age: 64
End: 2024-10-02

## 2024-10-23 ENCOUNTER — APPOINTMENT (OUTPATIENT)
Dept: CARDIOLOGY | Facility: CLINIC | Age: 64
End: 2024-10-23
Payer: COMMERCIAL

## 2024-10-23 VITALS
SYSTOLIC BLOOD PRESSURE: 156 MMHG | HEIGHT: 75 IN | OXYGEN SATURATION: 95 % | HEART RATE: 67 BPM | WEIGHT: 218 LBS | BODY MASS INDEX: 27.1 KG/M2 | DIASTOLIC BLOOD PRESSURE: 80 MMHG

## 2024-10-23 DIAGNOSIS — Z95.3 PRESENCE OF XENOGENIC HEART VALVE: ICD-10-CM

## 2024-10-23 PROCEDURE — 99215 OFFICE O/P EST HI 40 MIN: CPT

## 2024-10-23 PROCEDURE — G2211 COMPLEX E/M VISIT ADD ON: CPT

## 2024-10-23 PROCEDURE — 93000 ELECTROCARDIOGRAM COMPLETE: CPT

## 2024-10-29 ENCOUNTER — APPOINTMENT (OUTPATIENT)
Dept: INTERNAL MEDICINE | Facility: CLINIC | Age: 64
End: 2024-10-29
Payer: COMMERCIAL

## 2024-10-29 VITALS
OXYGEN SATURATION: 97 % | HEIGHT: 75 IN | TEMPERATURE: 98.3 F | HEART RATE: 85 BPM | WEIGHT: 209 LBS | BODY MASS INDEX: 25.99 KG/M2 | SYSTOLIC BLOOD PRESSURE: 169 MMHG | DIASTOLIC BLOOD PRESSURE: 99 MMHG

## 2024-10-29 DIAGNOSIS — E11.51 TYPE 2 DIABETES MELLITUS WITH DIABETIC PERIPHERAL ANGIOPATHY W/OUT GANGRENE: ICD-10-CM

## 2024-10-29 DIAGNOSIS — Z00.00 ENCOUNTER FOR GENERAL ADULT MEDICAL EXAMINATION W/OUT ABNORMAL FINDINGS: ICD-10-CM

## 2024-10-29 DIAGNOSIS — E11.9 TYPE 2 DIABETES MELLITUS W/OUT COMPLICATIONS: ICD-10-CM

## 2024-10-29 DIAGNOSIS — D64.9 ANEMIA, UNSPECIFIED: ICD-10-CM

## 2024-10-29 DIAGNOSIS — K21.9 GASTRO-ESOPHAGEAL REFLUX DISEASE W/OUT ESOPHAGITIS: ICD-10-CM

## 2024-10-29 DIAGNOSIS — R74.8 ABNORMAL LEVELS OF OTHER SERUM ENZYMES: ICD-10-CM

## 2024-10-29 DIAGNOSIS — I25.10 ATHEROSCLEROTIC HEART DISEASE OF NATIVE CORONARY ARTERY W/OUT ANGINA PECTORIS: ICD-10-CM

## 2024-10-29 DIAGNOSIS — I35.9 NONRHEUMATIC AORTIC VALVE DISORDER, UNSPECIFIED: ICD-10-CM

## 2024-10-29 DIAGNOSIS — I10 ESSENTIAL (PRIMARY) HYPERTENSION: ICD-10-CM

## 2024-10-29 DIAGNOSIS — R63.4 ABNORMAL WEIGHT LOSS: ICD-10-CM

## 2024-10-29 DIAGNOSIS — E78.5 HYPERLIPIDEMIA, UNSPECIFIED: ICD-10-CM

## 2024-10-29 PROCEDURE — 36415 COLL VENOUS BLD VENIPUNCTURE: CPT

## 2024-10-29 PROCEDURE — 99397 PER PM REEVAL EST PAT 65+ YR: CPT

## 2024-10-30 ENCOUNTER — APPOINTMENT (OUTPATIENT)
Dept: CARDIOLOGY | Facility: CLINIC | Age: 64
End: 2024-10-30
Payer: COMMERCIAL

## 2024-10-30 ENCOUNTER — MED ADMIN CHARGE (OUTPATIENT)
Age: 64
End: 2024-10-30

## 2024-10-30 ENCOUNTER — TRANSCRIPTION ENCOUNTER (OUTPATIENT)
Age: 64
End: 2024-10-30

## 2024-10-30 PROCEDURE — 93306 TTE W/DOPPLER COMPLETE: CPT

## 2024-10-30 RX ORDER — DAPAGLIFLOZIN 5 MG/1
5 TABLET, FILM COATED ORAL
Qty: 30 | Refills: 3 | Status: ACTIVE | COMMUNITY
Start: 2024-10-30 | End: 1900-01-01

## 2024-10-30 RX ORDER — PERFLUTREN 6.52 MG/ML
6.52 INJECTION, SUSPENSION INTRAVENOUS
Qty: 1 | Refills: 0 | Status: COMPLETED | OUTPATIENT
Start: 2024-10-30

## 2024-10-30 RX ORDER — GLIMEPIRIDE 2 MG/1
2 TABLET ORAL DAILY
Qty: 30 | Refills: 0 | Status: ACTIVE | COMMUNITY
Start: 2024-10-30 | End: 1900-01-01

## 2024-10-30 RX ADMIN — PERFLUTREN MG/ML: 6.52 INJECTION, SUSPENSION INTRAVENOUS at 00:00

## 2024-10-31 LAB
ALBUMIN SERPL ELPH-MCNC: 4.4 G/DL
ALP BLD-CCNC: 163 U/L
ALT SERPL-CCNC: 25 U/L
ANION GAP SERPL CALC-SCNC: 17 MMOL/L
APPEARANCE: CLEAR
AST SERPL-CCNC: 15 U/L
BILIRUB SERPL-MCNC: 0.4 MG/DL
BILIRUBIN URINE: NEGATIVE
BLOOD URINE: NEGATIVE
BUN SERPL-MCNC: 14 MG/DL
CALCIUM SERPL-MCNC: 10.1 MG/DL
CANCER AG19-9 SERPL-ACNC: 22 U/ML
CEA SERPL-MCNC: 3.2 NG/ML
CHLORIDE SERPL-SCNC: 97 MMOL/L
CHOLEST SERPL-MCNC: 294 MG/DL
CO2 SERPL-SCNC: 22 MMOL/L
COLOR: YELLOW
CREAT SERPL-MCNC: 1.02 MG/DL
CRP SERPL-MCNC: 4 MG/L
EGFR: 82 ML/MIN/1.73M2
ERYTHROCYTE [SEDIMENTATION RATE] IN BLOOD BY WESTERGREN METHOD: 29 MM/HR
ESTIMATED AVERAGE GLUCOSE: 303 MG/DL
FERRITIN SERPL-MCNC: 20 NG/ML
FOLATE SERPL-MCNC: >20 NG/ML
GGT SERPL-CCNC: 54 U/L
GLUCOSE QUALITATIVE U: >=1000 MG/DL
GLUCOSE SERPL-MCNC: 496 MG/DL
HBA1C MFR BLD HPLC: 12.2 %
HCT VFR BLD CALC: 49.1 %
HDLC SERPL-MCNC: 52 MG/DL
HGB BLD-MCNC: 14.6 G/DL
IRON SATN MFR SERPL: 10 %
IRON SERPL-MCNC: 43 UG/DL
KETONES URINE: NEGATIVE MG/DL
LDLC SERPL CALC-MCNC: 131 MG/DL
LEUKOCYTE ESTERASE URINE: NEGATIVE
MCHC RBC-ENTMCNC: 26.4 PG
MCHC RBC-ENTMCNC: 29.7 G/DL
MCV RBC AUTO: 88.8 FL
NITRITE URINE: NEGATIVE
NONHDLC SERPL-MCNC: 241 MG/DL
PH URINE: 6
PLATELET # BLD AUTO: 200 K/UL
POTASSIUM SERPL-SCNC: 4.3 MMOL/L
PROT SERPL-MCNC: 7.6 G/DL
PROTEIN URINE: NORMAL MG/DL
PSA SERPL-MCNC: 1.59 NG/ML
RBC # BLD: 5.53 M/UL
RBC # FLD: 14.9 %
SODIUM SERPL-SCNC: 136 MMOL/L
SPECIFIC GRAVITY URINE: >1.03
T4 FREE SERPL-MCNC: 1.5 NG/DL
TIBC SERPL-MCNC: 426 UG/DL
TRIGL SERPL-MCNC: 598 MG/DL
TSH SERPL-ACNC: 0.52 UIU/ML
UIBC SERPL-MCNC: 382 UG/DL
UROBILINOGEN URINE: 0.2 MG/DL
VIT B12 SERPL-MCNC: 275 PG/ML
WBC # FLD AUTO: 5.83 K/UL

## 2024-11-27 ENCOUNTER — EMERGENCY (EMERGENCY)
Facility: HOSPITAL | Age: 64
LOS: 1 days | Discharge: HOME | End: 2024-11-27
Attending: EMERGENCY MEDICINE | Admitting: EMERGENCY MEDICINE
Payer: COMMERCIAL

## 2024-11-27 VITALS
RESPIRATION RATE: 16 BRPM | WEIGHT: 209 LBS | HEART RATE: 78 BPM | OXYGEN SATURATION: 98 % | SYSTOLIC BLOOD PRESSURE: 210 MMHG | HEIGHT: 75 IN | TEMPERATURE: 98 F | DIASTOLIC BLOOD PRESSURE: 85 MMHG

## 2024-11-27 VITALS
DIASTOLIC BLOOD PRESSURE: 78 MMHG | OXYGEN SATURATION: 99 % | RESPIRATION RATE: 16 BRPM | HEART RATE: 72 BPM | SYSTOLIC BLOOD PRESSURE: 140 MMHG | TEMPERATURE: 98 F

## 2024-11-27 DIAGNOSIS — Z98.890 OTHER SPECIFIED POSTPROCEDURAL STATES: Chronic | ICD-10-CM

## 2024-11-27 DIAGNOSIS — Z95.4 PRESENCE OF OTHER HEART-VALVE REPLACEMENT: Chronic | ICD-10-CM

## 2024-11-27 DIAGNOSIS — Z98.89 OTHER SPECIFIED POSTPROCEDURAL STATES: Chronic | ICD-10-CM

## 2024-11-27 LAB
ALBUMIN SERPL ELPH-MCNC: 3.7 G/DL — SIGNIFICANT CHANGE UP (ref 3.3–5)
ALP SERPL-CCNC: 140 U/L — HIGH (ref 40–120)
ALT FLD-CCNC: 37 U/L — SIGNIFICANT CHANGE UP (ref 12–78)
ANION GAP SERPL CALC-SCNC: 6 MMOL/L — SIGNIFICANT CHANGE UP (ref 5–17)
APTT BLD: 31.2 SEC — SIGNIFICANT CHANGE UP (ref 24.5–35.6)
AST SERPL-CCNC: 15 U/L — SIGNIFICANT CHANGE UP (ref 15–37)
BASOPHILS # BLD AUTO: 0.04 K/UL — SIGNIFICANT CHANGE UP (ref 0–0.2)
BASOPHILS NFR BLD AUTO: 0.6 % — SIGNIFICANT CHANGE UP (ref 0–2)
BILIRUB SERPL-MCNC: 0.5 MG/DL — SIGNIFICANT CHANGE UP (ref 0.2–1.2)
BUN SERPL-MCNC: 13 MG/DL — SIGNIFICANT CHANGE UP (ref 7–23)
CALCIUM SERPL-MCNC: 9.2 MG/DL — SIGNIFICANT CHANGE UP (ref 8.5–10.1)
CHLORIDE SERPL-SCNC: 104 MMOL/L — SIGNIFICANT CHANGE UP (ref 96–108)
CK MB BLD-MCNC: 3.2 % — SIGNIFICANT CHANGE UP (ref 0–3.5)
CK MB CFR SERPL CALC: 2.6 NG/ML — SIGNIFICANT CHANGE UP (ref 0–3.6)
CK SERPL-CCNC: 81 U/L — SIGNIFICANT CHANGE UP (ref 26–308)
CO2 SERPL-SCNC: 30 MMOL/L — SIGNIFICANT CHANGE UP (ref 22–31)
CREAT SERPL-MCNC: 1.2 MG/DL — SIGNIFICANT CHANGE UP (ref 0.5–1.3)
EGFR: 67 ML/MIN/1.73M2 — SIGNIFICANT CHANGE UP
EOSINOPHIL # BLD AUTO: 0.08 K/UL — SIGNIFICANT CHANGE UP (ref 0–0.5)
EOSINOPHIL NFR BLD AUTO: 1.2 % — SIGNIFICANT CHANGE UP (ref 0–6)
GLUCOSE SERPL-MCNC: 241 MG/DL — HIGH (ref 70–99)
HCT VFR BLD CALC: 42.3 % — SIGNIFICANT CHANGE UP (ref 39–50)
HGB BLD-MCNC: 13.5 G/DL — SIGNIFICANT CHANGE UP (ref 13–17)
IMM GRANULOCYTES NFR BLD AUTO: 0.6 % — SIGNIFICANT CHANGE UP (ref 0–0.9)
INR BLD: 1.03 RATIO — SIGNIFICANT CHANGE UP (ref 0.85–1.16)
LIDOCAIN IGE QN: 33 U/L — SIGNIFICANT CHANGE UP (ref 13–75)
LYMPHOCYTES # BLD AUTO: 0.73 K/UL — LOW (ref 1–3.3)
LYMPHOCYTES # BLD AUTO: 11 % — LOW (ref 13–44)
MAGNESIUM SERPL-MCNC: 0.9 MG/DL — CRITICAL LOW (ref 1.6–2.6)
MAGNESIUM SERPL-MCNC: 1.4 MG/DL — LOW (ref 1.6–2.6)
MCHC RBC-ENTMCNC: 26.9 PG — LOW (ref 27–34)
MCHC RBC-ENTMCNC: 31.9 G/DL — LOW (ref 32–36)
MCV RBC AUTO: 84.3 FL — SIGNIFICANT CHANGE UP (ref 80–100)
MONOCYTES # BLD AUTO: 0.46 K/UL — SIGNIFICANT CHANGE UP (ref 0–0.9)
MONOCYTES NFR BLD AUTO: 6.9 % — SIGNIFICANT CHANGE UP (ref 2–14)
NEUTROPHILS # BLD AUTO: 5.27 K/UL — SIGNIFICANT CHANGE UP (ref 1.8–7.4)
NEUTROPHILS NFR BLD AUTO: 79.7 % — HIGH (ref 43–77)
NRBC # BLD: 0 /100 WBCS — SIGNIFICANT CHANGE UP (ref 0–0)
NT-PROBNP SERPL-SCNC: 125 PG/ML — SIGNIFICANT CHANGE UP (ref 0–125)
PLATELET # BLD AUTO: 146 K/UL — LOW (ref 150–400)
POTASSIUM SERPL-MCNC: 3.6 MMOL/L — SIGNIFICANT CHANGE UP (ref 3.5–5.3)
POTASSIUM SERPL-SCNC: 3.6 MMOL/L — SIGNIFICANT CHANGE UP (ref 3.5–5.3)
PROT SERPL-MCNC: 7.3 G/DL — SIGNIFICANT CHANGE UP (ref 6–8.3)
PROTHROM AB SERPL-ACNC: 12.1 SEC — SIGNIFICANT CHANGE UP (ref 9.9–13.4)
RBC # BLD: 5.02 M/UL — SIGNIFICANT CHANGE UP (ref 4.2–5.8)
RBC # FLD: 14.4 % — SIGNIFICANT CHANGE UP (ref 10.3–14.5)
SODIUM SERPL-SCNC: 140 MMOL/L — SIGNIFICANT CHANGE UP (ref 135–145)
TROPONIN I, HIGH SENSITIVITY RESULT: 16.4 NG/L — SIGNIFICANT CHANGE UP
WBC # BLD: 6.62 K/UL — SIGNIFICANT CHANGE UP (ref 3.8–10.5)
WBC # FLD AUTO: 6.62 K/UL — SIGNIFICANT CHANGE UP (ref 3.8–10.5)

## 2024-11-27 PROCEDURE — 99285 EMERGENCY DEPT VISIT HI MDM: CPT | Mod: 25

## 2024-11-27 PROCEDURE — 93970 EXTREMITY STUDY: CPT

## 2024-11-27 PROCEDURE — 83690 ASSAY OF LIPASE: CPT

## 2024-11-27 PROCEDURE — 93010 ELECTROCARDIOGRAM REPORT: CPT

## 2024-11-27 PROCEDURE — 99285 EMERGENCY DEPT VISIT HI MDM: CPT | Mod: GC

## 2024-11-27 PROCEDURE — 83880 ASSAY OF NATRIURETIC PEPTIDE: CPT

## 2024-11-27 PROCEDURE — 36415 COLL VENOUS BLD VENIPUNCTURE: CPT

## 2024-11-27 PROCEDURE — 96374 THER/PROPH/DIAG INJ IV PUSH: CPT

## 2024-11-27 PROCEDURE — 96375 TX/PRO/DX INJ NEW DRUG ADDON: CPT

## 2024-11-27 PROCEDURE — 83735 ASSAY OF MAGNESIUM: CPT

## 2024-11-27 PROCEDURE — 71045 X-RAY EXAM CHEST 1 VIEW: CPT

## 2024-11-27 PROCEDURE — 82962 GLUCOSE BLOOD TEST: CPT

## 2024-11-27 PROCEDURE — 93005 ELECTROCARDIOGRAM TRACING: CPT

## 2024-11-27 PROCEDURE — 71045 X-RAY EXAM CHEST 1 VIEW: CPT | Mod: 26

## 2024-11-27 PROCEDURE — 85025 COMPLETE CBC W/AUTO DIFF WBC: CPT

## 2024-11-27 PROCEDURE — 82550 ASSAY OF CK (CPK): CPT

## 2024-11-27 PROCEDURE — 93970 EXTREMITY STUDY: CPT | Mod: 26

## 2024-11-27 PROCEDURE — 99285 EMERGENCY DEPT VISIT HI MDM: CPT

## 2024-11-27 PROCEDURE — 82553 CREATINE MB FRACTION: CPT

## 2024-11-27 PROCEDURE — 99053 MED SERV 10PM-8AM 24 HR FAC: CPT

## 2024-11-27 PROCEDURE — 85730 THROMBOPLASTIN TIME PARTIAL: CPT

## 2024-11-27 PROCEDURE — 85610 PROTHROMBIN TIME: CPT

## 2024-11-27 PROCEDURE — 84484 ASSAY OF TROPONIN QUANT: CPT

## 2024-11-27 PROCEDURE — 80053 COMPREHEN METABOLIC PANEL: CPT

## 2024-11-27 RX ORDER — METOPROLOL TARTRATE 50 MG
50 TABLET ORAL ONCE
Refills: 0 | Status: COMPLETED | OUTPATIENT
Start: 2024-11-27 | End: 2024-11-27

## 2024-11-27 RX ORDER — LISINOPRIL 40 MG
40 TABLET ORAL ONCE
Refills: 0 | Status: COMPLETED | OUTPATIENT
Start: 2024-11-27 | End: 2024-11-27

## 2024-11-27 RX ORDER — MAGNESIUM SULFATE IN 0.9% NACL 2 G/50 ML
2 INTRAVENOUS SOLUTION, PIGGYBACK (ML) INTRAVENOUS ONCE
Refills: 0 | Status: COMPLETED | OUTPATIENT
Start: 2024-11-27 | End: 2024-11-27

## 2024-11-27 RX ORDER — FUROSEMIDE 40 MG
20 TABLET ORAL ONCE
Refills: 0 | Status: COMPLETED | OUTPATIENT
Start: 2024-11-27 | End: 2024-11-27

## 2024-11-27 RX ORDER — FUROSEMIDE 40 MG
1 TABLET ORAL
Qty: 30 | Refills: 0
Start: 2024-11-27 | End: 2024-12-26

## 2024-11-27 RX ADMIN — Medication 40 MILLIGRAM(S): at 08:33

## 2024-11-27 RX ADMIN — Medication 50 MILLIGRAM(S): at 08:32

## 2024-11-27 RX ADMIN — Medication 20 MILLIGRAM(S): at 11:03

## 2024-11-27 RX ADMIN — Medication 25 GRAM(S): at 07:57

## 2024-11-27 NOTE — ED PROVIDER NOTE - OBJECTIVE STATEMENT
63 year old male with PMH including DM, AVR, HTN, AAA repair p/w b/l LE and UE edema since yesterday.  Patient denies any associated trauma, falls, chest pain, SOB, abdominal distention. States he has no known history of CHF, but was taken off his diuretic approximately 1 year ago.  Patient noted significantly worsening LE edema yesterday and felt as though the skin on his legs was becoming tight.  This has occurred in the past but not to this extent.  In addition, he reports b/l UE swelling yesterday for the first time.  Currently his UE edema has improved.  PMD Dr. King. Cardiology Dr. Sousa

## 2024-11-27 NOTE — ED PROVIDER NOTE - CLINICAL SUMMARY MEDICAL DECISION MAKING FREE TEXT BOX
63 year old male with a history of HTN, DM, AAA repair, AVR p/w bilateral LE and UE edema since yesterday.  No chest pain, SOB, abdominal distention. 1+ LE pitting edema, equal bilaterally.  No UE edema appreciated.  Check labs, CE, BNP, CXR, EKG, LE doppler, consult cardiology

## 2024-11-27 NOTE — ED ADULT NURSE NOTE - NS TRANSFER PATIENT BELONGINGS
patient placed wedding ring into specimen cup, and into specimen bag and is in patient's possession/Jewelry/Money (specify)/Clothing

## 2024-11-27 NOTE — ED PROVIDER NOTE - MUSCULOSKELETAL, MLM
Spine appears normal, range of motion is not limited, no muscle or joint tenderness.  No upper extremity edema noted.  B/L LE with 1+ pitting edema, worse at ankles.  LE symmetrical, no erythema, rash, or streaking,  Gati steady

## 2024-11-27 NOTE — ED PROVIDER NOTE - NSFOLLOWUPINSTRUCTIONS_ED_ALL_ED_FT
Peripheral Edema  A person's legs and feet. One leg is normal and the other leg is affected by edema.  Peripheral edema is swelling that is caused by a buildup of fluid. Peripheral edema most often affects the lower legs, ankles, and feet. It can also develop in the arms, hands, and face. The area of the body that has peripheral edema will look swollen. It may also feel heavy or warm. Your clothes may start to feel tight. Pressing on the area may make a temporary dent in your skin (pitting edema). You may not be able to move your swollen arm or leg as much as usual.    There are many causes of peripheral edema. It can happen because of a complication of other conditions such as heart failure, kidney disease, or a problem with your circulation. It also can be a side effect of certain medicines or happen because of an infection. It often happens to women during pregnancy. Sometimes, the cause is not known.    Follow these instructions at home:  Managing pain, stiffness, and swelling    Compression stockings on a person's lower legs.  Raise (elevate) your legs while you are sitting or lying down.  Move around often to prevent stiffness and to reduce swelling.  Do not sit or stand for long periods of time.  Do not wear tight clothing. Do not wear garters on your upper legs.  Exercise your legs to get your circulation going. This helps to move the fluid back into your blood vessels, and it may help the swelling go down.  Wear compression stockings as told by your health care provider. These stockings help to prevent blood clots and reduce swelling in your legs. It is important that these are the correct size. These stockings should be prescribed by your doctor to prevent possible injuries.  If elastic bandages or wraps are recommended, use them as told by your health care provider.  Medicines    Take over-the-counter and prescription medicines only as told by your health care provider.  Your health care provider may prescribe medicine to help your body get rid of excess water (diuretic). Take this medicine if you are told to take it.  General instructions    Eat a low-salt (low-sodium) diet as told by your health care provider. Sometimes, eating less salt may reduce swelling.  Pay attention to any changes in your symptoms.  Moisturize your skin daily to help prevent skin from cracking and draining.  Keep all follow-up visits. This is important.  Contact a health care provider if:  You have a fever.  You have swelling in only one leg.  You have increased swelling, redness, or pain in one or both of your legs.  You have drainage or sores at the area where you have edema.  Get help right away if:  You have edema that starts suddenly or is getting worse, especially if you are pregnant or have a medical condition.  You develop shortness of breath, especially when you are lying down.  You have pain in your chest or abdomen.  You feel weak.  You feel like you will faint.  These symptoms may be an emergency. Get help right away. Call 911.  Do not wait to see if the symptoms will go away.  Do not drive yourself to the hospital.  Summary  Peripheral edema is swelling that is caused by a buildup of fluid. Peripheral edema most often affects the lower legs, ankles, and feet.  Move around often to prevent stiffness and to reduce swelling. Do not sit or stand for long periods of time.  Pay attention to any changes in your symptoms.  Contact a health care provider if you have edema that starts suddenly or is getting worse, especially if you are pregnant or have a medical condition.  Get help right away if you develop shortness of breath, especially when lying down.    **Follow up with your doctor. Take lasix 20mg daily as recommended by Dr. Kaur. Follow up with your cardiologist. Elevate your legs. Return for worsening symptoms, any trouble breathing, any concerns.

## 2024-11-27 NOTE — ED PROVIDER NOTE - PROGRESS NOTE DETAILS
received signout from Dr. Erickson. Patient seen by Dr. Kaur (cardio) and recommended for lasix 20mg IVP now, repeat magnesium level and to be discharged with lasix 20mg PO for a few days. (Mitesh) magnesium improving. expect to continue to rise. d/c as planned with lasix. (Mitesh).

## 2024-11-27 NOTE — ED ADULT NURSE NOTE - OBJECTIVE STATEMENT
pt a/o x 4 with a calm affect c/o bilateral feet swelling, worse when standing, and bilateral hand swelling with hypertension.  patient states he has no Hx of CHF, and denies current chest pain or shortness of breath.  MD Erickson at bedside for initial eval.

## 2024-11-27 NOTE — ED ADULT NURSE NOTE - SUICIDE SCREENING QUESTION 3
Add 03327 No

## 2024-11-27 NOTE — ED ADULT NURSE NOTE - ABDOMEN
[id number] [facility]  eMERGENCY dEPARTMENT eNCOUnter      Pt Name: Binh Aldridge  MRN: [id number]  [place] 1980  Date of evaluation: 7/22/2022      CHIEF COMPLAINT       Chief Complaint   Patient presents with    Shortness of Breath         HISTORY OF PRESENT ILLNESS    Binh Aldridge is a 38 y.o. female who presents with a chief complaint of shortness of breath patient has had a history of lung problems she is a smoker she says she thinks it something other than COPD she has Symbicort and albuterol at home and inhaler she has had a cough for about a week or so lungs are very tight she had pneumonia back in May. Cough is nonproductive there is been no nausea vomiting or diarrhea no chest pain      REVIEW OF SYSTEMS         Review of Systems   Constitutional:  Positive for fatigue. Negative for chills and fever. HENT:  Negative for congestion and ear pain. Eyes:  Negative for pain and visual disturbance. Respiratory:  Positive for cough, shortness of breath and wheezing. Cardiovascular:  Negative for chest pain, palpitations and leg swelling. Gastrointestinal:  Negative for abdominal pain, blood in stool, constipation, diarrhea, nausea and vomiting. Endocrine: Negative for polydipsia and polyuria. Genitourinary:  Negative for difficulty urinating, dysuria and frequency. Patient's had a total hysterectomy   Musculoskeletal:  Negative for back pain, joint swelling, myalgias, neck pain and neck stiffness. Skin:  Negative for rash. Neurological:  Negative for dizziness, weakness and headaches. Hematological:  Negative for adenopathy. Bruises/bleeds easily. Psychiatric/Behavioral:  Negative for confusion, self-injury and suicidal ideas.         PAST MEDICAL HISTORY    has a past medical history of Atrophic vaginitis, Chronic fatigue, Constipation, Endometriosis, Genital herpes, Hyperlipidemia, Migraine, Neurocardiogenic syncope, Ovarian cyst, Pelvic inflammatory disease (PID), Pneumonia due to infectious organism, Sinusitis, chronic, Tobacco abuse, and Vitamin D deficiency. SURGICAL HISTORY      has a past surgical history that includes Total abdominal hysterectomy w/ bilateral salpingoophorectomy (2006); Appendectomy; Mobile tooth extraction; laparoscopy; and other surgical history (2016). CURRENT MEDICATIONS       Previous Medications    ALBUTEROL SULFATE HFA (PROVENTIL HFA) 108 (90 BASE) MCG/ACT INHALER    Inhale 2 puffs into the lungs every 4 hours as needed for Wheezing    BLACK COHOSH ROOT    Take 600 mg by mouth 2 times daily as needed. FOR HOT FLASHES    BUDESONIDE-FORMOTEROL (SYMBICORT) 160-4.5 MCG/ACT AERO    Inhale 1 puff into the lungs 2 times daily    BUSPIRONE (BUSPAR) 5 MG TABLET    TAKE ONE TABLET BY MOUTH TWICE A DAY    CITALOPRAM (CELEXA) 40 MG TABLET    Take 1 tablet by mouth daily    DIPHENHYDRAMINE (BENADRYL) 25 MG TABLET    Take 25 mg by mouth nightly as needed for Itching    FLUDROCORTISONE (FLORINEF) 0.1 MG TABLET    Take 1 tablet by mouth daily    FLUNISOLIDE (NASALIDE) 25 MCG/ACT (0.025%) SOLN    2 sprays into both nostrils once a day    LEVOCETIRIZINE (XYZAL) 5 MG TABLET    Take 1 tablet by mouth daily    METOPROLOL TARTRATE (LOPRESSOR) 25 MG TABLET    TAKE ONE TABLET BY MOUTH TWICE A DAY    MIDODRINE (PROAMATINE) 5 MG TABLET    Take 1 tablet by mouth 3 times daily    MONTELUKAST (SINGULAIR) 10 MG TABLET    TAKE ONE TABLET BY MOUTH EVERY EVENING    MULTIPLE VITAMINS-MINERALS (MULTIVITAMIN PO)    Take 1 tablet by mouth daily.     NICOTINE (NICODERM CQ) 14 MG/24HR    Place 1 patch onto the skin every 24 hours for 15 days    NICOTINE (NICODERM CQ) 21 MG/24HR    Place 1 patch onto the skin every 24 hours    NICOTINE (NICODERM CQ) 7 MG/24HR    Place 1 patch onto the skin every 24 hours for 15 days    OXYBUTYNIN (DITROPAN XL) 10 MG EXTENDED RELEASE TABLET    Take 1 tablet by mouth daily    PANTOPRAZOLE (PROTONIX) 40 MG TABLET    TAKE ONE TABLET BY MOUTH TWICE A DAY    SIMVASTATIN (ZOCOR) 20 MG TABLET    TAKE ONE TABLET BY MOUTH ONCE NIGHTLY    SPACER/AERO-HOLDING CHAMBERS MARIA DE JESUS    1 Device by Does not apply route as needed (To be used with albuterol inhaler)    TRAZODONE (DESYREL) 50 MG TABLET    Take 1 tablet by mouth nightly as needed for Sleep    VITAMIN D (CHOLECALCIFEROL) 1000 UNIT TABS TABLET    Take 2 tablets by mouth daily. gummies       ALLERGIES     is allergic to amoxicillin, other, and pcn [penicillins]. FAMILY HISTORY     She indicated that her mother is . She indicated that her father is alive. She indicated that her brother is alive. She indicated that her maternal grandmother is . She indicated that her maternal grandfather is . She indicated that her paternal grandmother is . She indicated that her paternal grandfather is . She indicated that the status of her maternal uncle is unknown.     family history includes Cancer (age of onset: 76) in her paternal grandfather; Depression in her mother; Diabetes in her maternal grandfather, maternal grandmother, and maternal uncle; Heart Disease in her mother; Hypertension in her mother; Migraines in her mother; Other in her father and mother; Parkinsonism in her mother; Stroke (age of onset: 62) in her mother. SOCIAL HISTORY      reports that she has been smoking cigarettes. She started smoking about 24 years ago. She has a 15.00 pack-year smoking history. She has never used smokeless tobacco. She reports current alcohol use. She reports that she does not use drugs. PHYSICAL EXAM     INITIAL VITALS:  height is 5' 1\" (1.549 m) and weight is 132 lb 1.6 oz (59.9 kg). Her tympanic temperature is 98.1 °F (36.7 °C). Her blood pressure is 136/95 (abnormal) and her pulse is 84. Her respiration is 26 and oxygen saturation is 97%. Physical Exam  Constitutional:       Appearance: She is well-developed. HENT:      Head: Normocephalic and atraumatic.       Right Ear: External ear normal.      Left Ear: External ear normal.   Eyes:      Conjunctiva/sclera: Conjunctivae normal.      Pupils: Pupils are equal, round, and reactive to light. Cardiovascular:      Rate and Rhythm: Normal rate and regular rhythm. Pulmonary:      Effort: Pulmonary effort is normal.      Breath sounds: Decreased breath sounds, wheezing and rhonchi present. Abdominal:      General: Bowel sounds are normal.      Palpations: Abdomen is soft. Musculoskeletal:         General: No tenderness. Normal range of motion. Cervical back: Normal range of motion. Right lower leg: No tenderness. No edema. Left lower leg: No tenderness. No edema. Comments: Patient has diffuse bruising of the lower extremities she says she has new puppies were running into her legs   Skin:     General: Skin is warm and dry. Neurological:      Mental Status: She is alert and oriented to person, place, and time. Psychiatric:         Behavior: Behavior normal.         DIFFERENTIAL DIAGNOSIS/ MDM:     COPD pneumonia COVID we will do a work-up    DIAGNOSTIC RESULTS     EKG: All EKG's are interpreted by the Emergency Department Physician who either signs or Co-signs this chart in the absence of a cardiologist.  Sinus tachycardia 106, DC interval is 136 ms QRS duration 74 ms QT corrected 448 ms axis is 59 there is no acute ST or T wave changes she does have increased P waves suggestive of atrial enlargement      RADIOLOGY:   I directly visualized the following  images and reviewed the radiologist interpretations:     EXAMINATION:   ONE XRAY VIEW OF THE CHEST       7/22/2022 11:50 am       COMPARISON:   None. HISTORY:   ORDERING SYSTEM PROVIDED HISTORY: shortness of breath   TECHNOLOGIST PROVIDED HISTORY:   shortness of breath   Reason for Exam: Right lower leg pain; pt fell off truck with right foot   still stuck in truck bed; done portable       FINDINGS:   No acute airspace infiltrate.   No pneumothorax or pleural effusion. Normal   cardiomediastinal silhouette           Impression   No acute cardiopulmonary disease           ED BEDSIDE ULTRASOUND:       LABS:  Labs Reviewed   CBC WITH AUTO DIFFERENTIAL - Abnormal; Notable for the following components:       Result Value    WBC 21.8 (*)     RBC 3.91 (*)     Hemoglobin 11.8 (*)     Seg Neutrophils 83 (*)     Lymphocytes 9 (*)     Immature Granulocytes 1 (*)     Segs Absolute 18.13 (*)     Absolute Mono # 1.35 (*)     All other components within normal limits   COMPREHENSIVE METABOLIC PANEL - Abnormal; Notable for the following components: Total Bilirubin 0.25 (*)     All other components within normal limits   COVID-19, RAPID   CULTURE, BLOOD 1   CULTURE, BLOOD 1   TROPONIN   LACTATE, SEPSIS   LACTATE, SEPSIS   D-DIMER, QUANTITATIVE           EMERGENCY DEPARTMENT COURSE:   Vitals:    Vitals:    07/22/22 1130 07/22/22 1200 07/22/22 1300 07/22/22 1323   BP: 123/73 134/83 (!) 136/95    Pulse: 91 87 84    Resp: 27 16 26    Temp:       TempSrc:       SpO2: 97% 97% 95% 97%   Weight:       Height:         -------------------------  BP: (!) 136/95, Temp: 98.1 °F (36.7 °C), Heart Rate: 84, Resp: 26        Re-evaluation Notes    Resting more comfortably but patient says she feels horrible on 2 L of oxygen she is in the mid 90s    CRITICAL CARE:   IP CONSULT TO HOSPITALIST        CONSULTS:      PROCEDURES:  None    FINAL IMPRESSION      1. COPD exacerbation (HCC)          DISPOSITION/PLAN   DISPOSITION Admitted    Condition on Disposition    Stable    PATIENT REFERRED TO:  No follow-up provider specified. DISCHARGE MEDICATIONS:  New Prescriptions    No medications on file       (Please note that portions of this note were completed with a voice recognition program.  Efforts were made to edit the dictations but occasionally words are mis-transcribed.)    Clarisa Mendoza MD,, MD, F.A.A.E.M.   Attending Emergency Physician                           Tano Harry soft/nondistended

## 2024-11-27 NOTE — ED PROVIDER NOTE - PATIENT PORTAL LINK FT
You can access the FollowMyHealth Patient Portal offered by [facility] by registering at the following website: [email or web address]. By joining [facility]’s FollowMyHealth portal, you will also be able to view your health information using other applications (apps) compatible with our system.

## 2024-11-27 NOTE — ED PROVIDER NOTE - CARE PROVIDER_API CALL
Florin King  Gastroenterology  [address], [address]  [place] [address]  Phone: [phone]  Fax: [phone]  Follow Up Time:     Hamilton Sousa  Cardiovascular Disease  [address] [place] [address]  Phone: [phone]  Fax: [phone]  Follow Up Time:

## 2024-11-27 NOTE — ED ADULT NURSE REASSESSMENT NOTE - NS ED NURSE REASSESS COMMENT FT1
Received from previous nurse, A+OX3 breathing via room air; no apparent distress.
SPoke to patient during this round as advised ED doctor will see him once she is done with the critical patient with understanding
pt is waiting on cardiac consult. pt reports left leg pain. pt denies sob , cp, fever, chills, . pt updated on plan of care. pt in no acute distress

## 2024-11-27 NOTE — ED PROVIDER NOTE - CARE PLAN
Principal Discharge DX:	Bilateral lower extremity edema   1 Principal Discharge DX:	Bilateral lower extremity edema  Secondary Diagnosis:	Hypomagnesemia

## 2024-11-27 NOTE — ED PROVIDER NOTE - DIFFERENTIAL DIAGNOSIS
Ddx includes but not limited to CHF, DVT PVD, venous stasis, lymphedema, cirrhosis Differential Diagnosis

## 2024-11-27 NOTE — ED ADULT NURSE REASSESSMENT NOTE - NSFALLHARMRISKINTERV_ED_ALL_ED

## 2024-11-27 NOTE — ED ADULT NURSE NOTE - PERIPHERAL VASCULAR ED EDEMA
[Normal Appearance] : normal appearance [Well Groomed] : well groomed [General Appearance - In No Acute Distress] : no acute distress [Abdomen Soft] : soft [Abdomen Tenderness] : non-tender [Normal Station and Gait] : the gait and station were normal for the patient's age [] : no rash [No Focal Deficits] : no focal deficits [Oriented To Time, Place, And Person] : oriented to person, place, and time [Affect] : the affect was normal [Mood] : the mood was normal yes

## 2024-11-27 NOTE — ED PROVIDER NOTE - CARE PROVIDERS DIRECT ADDRESSES
[email or web address]

## 2024-11-27 NOTE — CONSULT NOTE ADULT - ATTENDING COMMENTS
63-year-old male with a history of coronary artery disease (1st Diag. AMBER 7/1/19, LPDA AMBER 7/3/19), bioprosthetic aortic valve replacement with repair of a dilated ascending aorta on 3/16/15 (for a bicuspid aortic valve associated with significant aortic stenosis and regurgitation), cardiac tamponade (s/p pericardiocentesis 3/26/15), here with UE and LE edema.     - mild edema on exam, noted over the last 24 hours  - had significant amount of salt last night with cold cuts  - bnp minimal, suggestive against hf  - normal lv function as of recent echo in 10/2024  - BP up in the setting of above  - cont home BP medications  - will given him Lasix 20 mg IV x 1, then 20 mg daily for the week.  - needs to limit salt intake  - no sign of acute ischemia  - sig hypomag, needs to be repleted  - no issue with dc home with outpt fu with Dr. Sousa

## 2024-11-27 NOTE — CONSULT NOTE ADULT - ASSESSMENT
#CHF   - Pro bnp: 125   - S/p Lasix 20mg IV  - Echo from 10/2024: EF 68%   - Will repeat TTE, follow up results   - Continue Fluid restriction  - Strict I/Os, daily weights    #Ischemia   - No clear evidence of acute ischemia  - Troponin negative   - EKG: NSR  - Hx of CAD: Continue   - Continue to monitor for signs or symptoms of ischemia     #Arrhythmia  - EKG: NSR, unchanged from prior EKG  - Monitor and replete lytes, keep K>4, Mg>2.    #HTN  - BP stable currently  - Continue home meds:   - Continue to monitor hemodynamics     - Other cardiovascular workup will depend on clinical course.  - All other workup per primary team.  - Will continue to follow.       62 y/o PMHx of CAD, bioprosthetic AV replacement with repair of ascending aorta, HTN, HLD, DM2, neuropathy, vertigo, GERD, lacunar infarcts, PAD presents with LE edema. Pt likely not in heart failure, edema likely 2/2 to hypertension from high salt meal night before.     #CHF   - Pro bnp: 125   - S/p Lasix 20mg IV  - Echo from 10/2024: EF 68%, no evidence of valve leakage   - Likely not in heart failure    #HTN  - BP stable currently  - Continue home meds: lisinopril 40, hctz 12, metoprolol 50  - avoid high salt food and maintain hydration   - Continue to monitor hemodynamics     #Ischemia   - No clear evidence of acute ischemia  - Troponin negative   - EKG: sinus vero   - Hx of CAD: c/w asa statin   - Continue to monitor for signs or symptoms of ischemia     #Arrhythmia  - EKG: NSR, unchanged from prior EKG  - Magnesium 0.9, replete and f/u repeat mag level  - Monitor and replete lytes, keep K>4, Mg>2.    - Other cardiovascular workup will depend on clinical course.  - All other workup per primary team.  - Will continue to follow.

## 2024-11-27 NOTE — ED PROVIDER NOTE - CROS ED NEURO ALL NEG
[Patient] : patient [Parents] : parents [Follow Up] : a follow up visit [FreeTextEntry1] : Right distal radius and ulnar styloid fracture. Date of injury: 3/24/21 negative...

## 2024-11-27 NOTE — ED PROVIDER NOTE - NSICDXPASTMEDICALHX_GEN_ALL_CORE_FT
PAST MEDICAL HISTORY:  2019 novel coronavirus disease (COVID-19) January 2021    AAA (abdominal aortic aneurysm) dx 2012    Aortic valve replaced     Cardiac tamponade     GERD (gastroesophageal reflux disease)     H/O aortic valve repair     H/O cardiac catheterization Stent Placement X 2 (2019)    H/O vertigo     HTN (hypertension)     Hypertension     Leg weakness     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     08-Aug-2018 07:41

## 2024-11-27 NOTE — CONSULT NOTE ADULT - SUBJECTIVE AND OBJECTIVE BOX
Patient is a 63y old  Male who presents with a chief complaint of LE edema    HPI: 62 y/o PMHx of CAD (s/p stents x2, 2019), bioprosthetic AV replacement with repair of ascending aorta, HTN, HLD, DM2, neuropathy, vertigo, GERD, lacunar infarcts, PAD (s/p right anterior tibial artery angioplasty) presents to the ED w/ swelling of LE this morning. Patient was found to be hypertensive to 200s in ED, patient had not yet taken BP medications.     Interval: Pt seen in bed in ED, stated that his hands and legs were swollen this AM. Hand swelling has resolved, but still has swelling in legs. Pt recieved home BP meds and BP come down to156/79, which he endorses as his baseline. Patient also has concern for occasional rest pain in left lower leg.       PAST MEDICAL & SURGICAL HISTORY:  Hypertension      AAA (abdominal aortic aneurysm)  dx 2012      GERD (gastroesophageal reflux disease)      Leg weakness      Aortic valve replaced      Cardiac tamponade      HTN (hypertension)      H/O aortic valve repair      S/P aneurysm repair      2019 novel coronavirus disease (COVID-19)  January 2021      Non-pressure chronic ulcer of other part of right foot with unspecified severity      Type 2 diabetes mellitus  Not on Insulin but complicated by peripheral neuropathy      H/O cardiac catheterization  Stent Placement X 2 (2019)      H/O vertigo      Aortic valve replaced      S/P AAA repair  Repaired 3/2015      S/P aortic aneurysm repair      H/O aortic valve repair      History of incision and drainage      H/O colonoscopy                ECHO  FINDINGS:      MEDICATIONS  (STANDING):    MEDICATIONS  (PRN):      FAMILY HISTORY:  Family history of breast cancer    Family history of hypertension    Family history of stroke    Family history of prostate cancer    Family history of COPD (chronic obstructive pulmonary disease) (Grandparent)    FH: HTN (hypertension)      Denies Family history of CAD or early MI    ROS:  Constitutional: denies fever, chills  HEENT: denies blurry vision, difficulty hearing  Respiratory: denies SOB, RICHTER, cough  Cardiovascular: denies CP, palpitations, orthopnea, LE edema, hand edema, Occasional rest pains in LLE  Gastrointestinal: denies nausea, vomiting, abdominal pain  Genitourinary: denies urinary changes  Skin: Denies rashes, itching  Neurologic: denies headache, weakness, dizziness  Hematology/Oncology: denies bleeding, easy bruising  ROS negative except as noted above      SOCIAL HISTORY:    No tobacco, Alcohol or Ddrug use    Vital Signs Last 24 Hrs  T(C): 36.6 (27 Nov 2024 07:36), Max: 36.6 (27 Nov 2024 04:41)  T(F): 97.9 (27 Nov 2024 07:36), Max: 97.9 (27 Nov 2024 04:41)  HR: 59 (27 Nov 2024 10:08) (59 - 78)  BP: 156/79 (27 Nov 2024 10:08) (156/79 - 210/85)  BP(mean): --  RR: 18 (27 Nov 2024 10:08) (16 - 18)  SpO2: 97% (27 Nov 2024 10:08) (97% - 100%)    Parameters below as of 27 Nov 2024 10:08  Patient On (Oxygen Delivery Method): room air        Physical Exam:  General: Well developed, well nourished, NAD  HEENT: NCAT, PERRLA, EOMI bl, moist mucous membranes   Neck: Supple, nontender, no mass  Neurology: A&Ox3, nonfocal, sensation intact   Respiratory: CTA B/L, No W/R/R  CV: RRR, mechanical valve appreciated +S1/S2, no murmurs, rubs or gallops  Abdominal: Soft, NT, ND, no palpable masses  Extremities: 2+ pitting edema in LLE upto shin, 2+ pitting edema of RL foot   MSK: Normal ROM, no joint erythema or warmth, no joint swelling   Heme: No obvious ecchymosis or petechiae   Skin: warm, dry, normal color      ECG:  sinus vero with Left axis deviation     I&O's Detail      LABS:                        13.5   6.62  )-----------( 146      ( 27 Nov 2024 05:31 )             42.3     11-27    140  |  104  |  13  ----------------------------<  241[H]  3.6   |  30  |  1.20    Ca    9.2      27 Nov 2024 05:31  Mg     0.9     11-27    TPro  7.3  /  Alb  3.7  /  TBili  0.5  /  DBili  x   /  AST  15  /  ALT  37  /  AlkPhos  140[H]  11-27    CARDIAC MARKERS ( 27 Nov 2024 05:31 )  x     / x     / x     / x     / 2.6 ng/mL      PT/INR - ( 27 Nov 2024 05:31 )   PT: 12.1 sec;   INR: 1.03 ratio         PTT - ( 27 Nov 2024 05:31 )  PTT:31.2 sec  Urinalysis Basic - ( 27 Nov 2024 05:31 )    Color: x / Appearance: x / SG: x / pH: x  Gluc: 241 mg/dL / Ketone: x  / Bili: x / Urobili: x   Blood: x / Protein: x / Nitrite: x   Leuk Esterase: x / RBC: x / WBC x   Sq Epi: x / Non Sq Epi: x / Bacteria: x      I&O's Summary    BNP  RADIOLOGY & ADDITIONAL STUDIES:

## 2024-11-29 ENCOUNTER — RX RENEWAL (OUTPATIENT)
Age: 64
End: 2024-11-29

## 2025-02-10 ENCOUNTER — OUTPATIENT (OUTPATIENT)
Dept: OUTPATIENT SERVICES | Facility: HOSPITAL | Age: 65
LOS: 1 days | End: 2025-02-10
Payer: COMMERCIAL

## 2025-02-10 ENCOUNTER — APPOINTMENT (OUTPATIENT)
Dept: MRI IMAGING | Facility: CLINIC | Age: 65
End: 2025-02-10
Payer: COMMERCIAL

## 2025-02-10 DIAGNOSIS — Z98.890 OTHER SPECIFIED POSTPROCEDURAL STATES: Chronic | ICD-10-CM

## 2025-02-10 DIAGNOSIS — Z98.89 OTHER SPECIFIED POSTPROCEDURAL STATES: Chronic | ICD-10-CM

## 2025-02-10 DIAGNOSIS — Z00.8 ENCOUNTER FOR OTHER GENERAL EXAMINATION: ICD-10-CM

## 2025-02-10 DIAGNOSIS — Z95.4 PRESENCE OF OTHER HEART-VALVE REPLACEMENT: Chronic | ICD-10-CM

## 2025-02-10 PROCEDURE — 73718 MRI LOWER EXTREMITY W/O DYE: CPT

## 2025-02-10 PROCEDURE — 73718 MRI LOWER EXTREMITY W/O DYE: CPT | Mod: 26,RT

## 2025-02-18 ENCOUNTER — RX RENEWAL (OUTPATIENT)
Age: 65
End: 2025-02-18

## 2025-02-19 ENCOUNTER — OUTPATIENT (OUTPATIENT)
Dept: OUTPATIENT SERVICES | Facility: HOSPITAL | Age: 65
LOS: 1 days | Discharge: HOME | End: 2025-02-19
Payer: COMMERCIAL

## 2025-02-19 ENCOUNTER — APPOINTMENT (OUTPATIENT)
Dept: WOUND CARE | Facility: HOSPITAL | Age: 65
End: 2025-02-19

## 2025-02-19 VITALS
HEART RATE: 62 BPM | BODY MASS INDEX: 25.99 KG/M2 | TEMPERATURE: 98.5 F | OXYGEN SATURATION: 97 % | WEIGHT: 209 LBS | DIASTOLIC BLOOD PRESSURE: 83 MMHG | RESPIRATION RATE: 18 BRPM | HEIGHT: 75 IN | SYSTOLIC BLOOD PRESSURE: 155 MMHG

## 2025-02-19 DIAGNOSIS — Z98.89 OTHER SPECIFIED POSTPROCEDURAL STATES: Chronic | ICD-10-CM

## 2025-02-19 DIAGNOSIS — Z98.890 OTHER SPECIFIED POSTPROCEDURAL STATES: Chronic | ICD-10-CM

## 2025-02-19 DIAGNOSIS — M86.9 OSTEOMYELITIS, UNSPECIFIED: ICD-10-CM

## 2025-02-19 DIAGNOSIS — Z95.4 PRESENCE OF OTHER HEART-VALVE REPLACEMENT: Chronic | ICD-10-CM

## 2025-02-19 DIAGNOSIS — E11.69 TYPE 2 DIABETES MELLITUS WITH OTHER SPECIFIED COMPLICATION: ICD-10-CM

## 2025-02-19 DIAGNOSIS — E88.9 TYPE 2 DIABETES MELLITUS WITH OTHER SPECIFIED COMPLICATION: ICD-10-CM

## 2025-02-19 DIAGNOSIS — L97.501 NON-PRESSURE CHRONIC ULCER OF OTHER PART OF UNSPECIFIED FOOT LIMITED TO BREAKDOWN OF SKIN: ICD-10-CM

## 2025-02-19 DIAGNOSIS — L97.514 NON-PRESSURE CHRONIC ULCER OF OTHER PART OF RIGHT FOOT WITH NECROSIS OF BONE: ICD-10-CM

## 2025-02-19 PROCEDURE — 99214 OFFICE O/P EST MOD 30 MIN: CPT

## 2025-02-19 PROCEDURE — G0463: CPT

## 2025-02-19 RX ORDER — CIPROFLOXACIN HYDROCHLORIDE 500 MG/1
500 TABLET, FILM COATED ORAL
Qty: 14 | Refills: 0 | Status: COMPLETED | COMMUNITY
Start: 2025-02-19 | End: 2025-02-26

## 2025-02-21 NOTE — PRE PROCEDURE NOTE - PRE PROCEDURE EVALUATION
DUSTY STRICKLAND is a 64 year old male being seen for Initial Visit.  Operative Date:  ?  Active Problems  Alkaline phosphatase elevation (790.5) (R74.8)  Anemia, unspecified type (285.9) (D64.9)  Aortic valve disorder (424.1) (I35.9)  History of Aortic Valve Replacement  History of Ascending Aorta Graft With Valve Replacement  ASHD (arteriosclerotic heart disease) (414.00) (I25.10)  Atypical chest pain (786.59) (R07.89)  Benign hypertension (401.1) (I10)  History of Cardiac catheterization with stent placement  Change in bowel habit (787.99) (R19.4)  Chest discomfort (786.59) (R07.89)  Chronic diabetic ulcer of right foot determined by examination (250.80,707.15)  (E11.621,L97.519)  Controlled type 2 diabetes mellitus with diabetic peripheral angiopathy without gangrene  (250.70,443.81) (E11.51)  Diabetes mellitus (250.00) (E11.9)  Diabetic peripheral neuropathy (250.60,357.2) (E11.42)  Difficulty walking (719.7) (R26.2)  Draining postoperative wound, subsequent encounter (V58.89,998.89) (T81.89XD)  Dyslipidemia (272.4) (E78.5)  Encounter for immunization (V03.89) (Z23)  Gastric nodule (537.89) (K31.89)  GERD (gastroesophageal reflux disease) (530.81) (K21.9)  GIST, 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  Past Medical History  History of CVA (cerebral infarction) (434.91) (I63.9)  Difficulty walking (719.7) (R26.2)  History of adenomatous polyp of colon (V12.72) (Z86.0101)  History of alcohol abuse (305.03) (F10.11)  History of aortic valve stenosis (V12.59) (Z86.79)  History of diabetes mellitus (V12.29) (Z86.39)  History of gastroesophageal reflux (GERD) (V12.79) (Z87.19)  History of hypertension (V12.59) (Z86.79)  History of pericarditis (V12.59) (Z86.79)           ?  Surgical History  History of Aortic Valve Replacement  History of Ascending Aorta Graft With Valve Replacement  History of Cardiac catheterization with stent placement           ?  Family History  Family history of malignant neoplasm of female breast (V16.3) (Z80.3) : Mother           ?  Social History  Former smoker (V15.82) (Z87.891)           ?  Current Meds  Amoxicillin 500 MG Oral Capsule; 4 capsule 1 to 2 hours prior procedure with sip of  water  Aspirin 81 MG TABS; TAKE 1 TABLET DAILY  Atorvastatin Calcium 80 MG Oral Tablet; take 1 tablet by mouth at bedtime  Clopidogrel Bisulfate 75 MG Oral Tablet; TAKE ONE TABLET BY MOUTH DAILY  Farxiga 5 MG Oral Tablet; Take 1 tablet daily  Gabapentin 600 MG Oral Tablet; TAKE ONE TABLET BY MOUTH FOUR TIMES A DAY  Glimepiride 2 MG Oral Tablet; Take 1 tablet daily  hydroCHLOROthiazide 25 MG Oral Tablet; Take 1 tablet by mouth daily  Lisinopril 40 MG Oral Tablet; TAKE ONE TABLET BY MOUTH DAILY  Meclizine HCl - 12.5 MG Oral Tablet; TAKE 1 TABLET 3 TIMES DAILY AS NEEDED  metFORMIN HCl - 1000 MG Oral Tablet; TAKE ONE TABLET BY MOUTH TWICE A DAY  WITH MEALS  Metoprolol Tartrate 50 MG Oral Tablet; TAKE 1 TABLET TWICE DAILY  Pantoprazole Sodium 40 MG Oral Tablet Delayed Release; TAKE 1 TABLET BY MOUTH  DAILY           ?  Allergies  No Known Drug Allergies           ?  Vitals  Vital Signs  ?  Recorded: [id number] 10:32AM  Systolic  155, LUE, Sitting  Diastolic  83, LUE, Sitting  Height  6 ft 3 in  Weight  209 lb  BMI Calculated  26.12 kg/m2  BSA Calculated  2.24 m2  Height Comment  Stated  Weight Comment  Stated  Temperature  98.5 F, Oral  Heart Rate  62  Pulse Quality  Normal  Respiration  18  Respiration Quality  Normal  O2 Saturation  97 %, Room Air  FiO2 Flow Rate  0 L/min, Room Air  The patient describes the pain as 0/10.    Any changes in medications since last visit?. Initial Visit - Medications reconciled.    MD/DPM Notified? Yes.         ?  Physical Exam          Wound #1:     Location: Right Foot 2nd Digit Toe Infection -  No Open Wound    Drainage: None    Product: No Product    Additional Products: Mechanically cleansed with sterile gauze and 0.9% normal saline.  Dry Dressing  Kerlix  ?  CIRCULATION  Pulses palpable via palpation.  - R Dorsalis Pedis, Regular, 2+  - L Dorsalis Pedis, Regular, 2+  - R Posterior Tibialis, Regular, 2+  - L Posterior Tibialis, Regular, 2+  Extremity Color: Normal  Extremity Temperature: Warm  Capillary Refill: < 3 seconds bilaterally              ?  Assessment         Patient Family Education:    Patient Preferred Teaching Method: Verbal and Demo.    Teaching Method Utilized: Verbal and Demo.    Person(s) Taught: Patient and Family member.    Understanding - Learning Readiness: Good - alert, interested, motivated.    Evaluation: Verbalizes knowledge/understanding.    Topics Taught: dressing changes, skin care, sign and symptoms of infection, surgery, nutrition, how and when to call, labs and tests and patient responsibility to plan of care.    Photo taken this visit? Yes.    Nursing Care Plan: Elevation & Low Sodium Compliance  Foot & Nail Care  Glycemic Control  Infection Prevention  Maintain Optimal Skin Integrity to High Pressure Areas  Nutrition & Wound Healing  Offload & Pressure Relief  Promote & Restore Optimal Skin Integrity  Protect & Guard Wound Site  Wound Care (Dressing Changes).    Has there been improvement since last visit?. Initial Visit.    Notes: - DPM reviewed MRI (done at [facility]) which shoes OM at distal end of toe.  - DPM educated patient and wife (who is an RN Supervisor at [employer]) on recommended treatment which is to perform a partial amputation. Pt to be placed on PO antibiotics until surgery. Wife will accompany patient for surgery.  - DPM to e-scribe CIPRO to patients pharmacy.  - DPM educated patient and wife on what to do if the wound starts to ooze (i.e., go to ER).  - DPM advised patient and wife that a dressing change will be required here at [facility] post-surgery.  - Auth submitted for partial right foot amputation  - Vascular studies auth submitted. Studies to be done AFTER surgery.  F/U Tuesday 2/25/25 for surgery in the OR.      Discharge:    Disposition Condition: stable.    Discharge To: Home.    Discharge Via: Ambulatory.    Orders Disposition: Long Term Care/Home Health Agency: Not Applicable.  ?  Plan  ASHD (arteriosclerotic heart disease)  Continue: Atorvastatin Calcium 80 MG Oral Tablet; take 1 tablet by mouth at bedtime  Continue: Clopidogrel Bisulfate 75 MG Oral Tablet; TAKE ONE TABLET BY MOUTH DAILY  Benign hypertension  Continue: hydroCHLOROthiazide 25 MG Oral Tablet; Take 1 tablet by mouth daily  Continue: Lisinopril 40 MG Oral Tablet; TAKE ONE TABLET BY MOUTH DAILY  Continue: Metoprolol Tartrate 50 MG Oral Tablet; TAKE 1 TABLET TWICE DAILY  Diabetes mellitus  Continue: Farxiga 5 MG Oral Tablet; Take 1 tablet daily  Continue: Glimepiride 2 MG Oral Tablet; Take 1 tablet daily  Diabetic peripheral neuropathy  Continue: Gabapentin 600 MG Oral Tablet; TAKE ONE TABLET BY MOUTH FOUR TIMES  A DAY  Continue: metFORMIN HCl - 1000 MG Oral Tablet; TAKE ONE TABLET BY MOUTH TWICE  A DAY WITH MEALS  GERD (gastroesophageal reflux disease)  Continue: Pantoprazole Sodium 40 MG Oral Tablet Delayed Release; TAKE 1 TABLET BY  MOUTH  DAILY  Unlinked  Continue: Aspirin 81 MG TABS; TAKE 1 TABLET DAILY

## 2025-02-24 ENCOUNTER — OUTPATIENT (OUTPATIENT)
Dept: OUTPATIENT SERVICES | Facility: HOSPITAL | Age: 65
LOS: 1 days | End: 2025-02-24
Payer: COMMERCIAL

## 2025-02-24 DIAGNOSIS — Z95.4 PRESENCE OF OTHER HEART-VALVE REPLACEMENT: Chronic | ICD-10-CM

## 2025-02-24 DIAGNOSIS — Z98.890 OTHER SPECIFIED POSTPROCEDURAL STATES: Chronic | ICD-10-CM

## 2025-02-24 DIAGNOSIS — M86.9 OSTEOMYELITIS, UNSPECIFIED: ICD-10-CM

## 2025-02-24 PROCEDURE — 93923 UPR/LXTR ART STDY 3+ LVLS: CPT | Mod: 26

## 2025-02-24 PROCEDURE — 93923 UPR/LXTR ART STDY 3+ LVLS: CPT

## 2025-02-25 ENCOUNTER — APPOINTMENT (OUTPATIENT)
Dept: WOUND CARE | Facility: HOSPITAL | Age: 65
End: 2025-02-25

## 2025-02-25 ENCOUNTER — OUTPATIENT (OUTPATIENT)
Dept: OUTPATIENT SERVICES | Facility: HOSPITAL | Age: 65
LOS: 1 days | End: 2025-02-25
Payer: COMMERCIAL

## 2025-02-25 DIAGNOSIS — Z98.890 OTHER SPECIFIED POSTPROCEDURAL STATES: Chronic | ICD-10-CM

## 2025-02-25 DIAGNOSIS — M86.172 OTHER ACUTE OSTEOMYELITIS, LEFT ANKLE AND FOOT: ICD-10-CM

## 2025-02-25 DIAGNOSIS — Z98.89 OTHER SPECIFIED POSTPROCEDURAL STATES: Chronic | ICD-10-CM

## 2025-02-25 PROCEDURE — 28825 PARTIAL AMPUTATION OF TOE: CPT | Mod: T6

## 2025-02-25 PROCEDURE — 87070 CULTURE OTHR SPECIMN AEROBIC: CPT

## 2025-02-25 PROCEDURE — 87075 CULTR BACTERIA EXCEPT BLOOD: CPT

## 2025-02-25 PROCEDURE — 88304 TISSUE EXAM BY PATHOLOGIST: CPT

## 2025-02-25 PROCEDURE — 87077 CULTURE AEROBIC IDENTIFY: CPT

## 2025-02-25 PROCEDURE — 88311 DECALCIFY TISSUE: CPT

## 2025-02-25 PROCEDURE — 87186 SC STD MICRODIL/AGAR DIL: CPT

## 2025-02-25 PROCEDURE — 88311 DECALCIFY TISSUE: CPT | Mod: 26

## 2025-02-25 PROCEDURE — 88304 TISSUE EXAM BY PATHOLOGIST: CPT | Mod: 26

## 2025-02-25 DEVICE — SURGICEL NU-KNIT 6 X 9": Type: IMPLANTABLE DEVICE | Site: RIGHT | Status: FUNCTIONAL

## 2025-02-27 ENCOUNTER — OUTPATIENT (OUTPATIENT)
Dept: OUTPATIENT SERVICES | Facility: HOSPITAL | Age: 65
LOS: 1 days | Discharge: HOME | End: 2025-02-27
Payer: COMMERCIAL

## 2025-02-27 ENCOUNTER — APPOINTMENT (OUTPATIENT)
Dept: WOUND CARE | Facility: HOSPITAL | Age: 65
End: 2025-02-27
Payer: COMMERCIAL

## 2025-02-27 DIAGNOSIS — T87.89 OTHER COMPLICATIONS OF AMPUTATION STUMP: ICD-10-CM

## 2025-02-27 DIAGNOSIS — Z80.3 FAMILY HISTORY OF MALIGNANT NEOPLASM OF BREAST: ICD-10-CM

## 2025-02-27 DIAGNOSIS — E11.51 TYPE 2 DIABETES MELLITUS WITH DIABETIC PERIPHERAL ANGIOPATHY W/OUT GANGRENE: ICD-10-CM

## 2025-02-27 DIAGNOSIS — Z95.4 PRESENCE OF OTHER HEART-VALVE REPLACEMENT: Chronic | ICD-10-CM

## 2025-02-27 DIAGNOSIS — K44.9 DIAPHRAGMATIC HERNIA WITHOUT OBSTRUCTION OR GANGRENE: ICD-10-CM

## 2025-02-27 DIAGNOSIS — Z98.890 OTHER SPECIFIED POSTPROCEDURAL STATES: Chronic | ICD-10-CM

## 2025-02-27 DIAGNOSIS — K21.9 GASTRO-ESOPHAGEAL REFLUX DISEASE WITHOUT ESOPHAGITIS: ICD-10-CM

## 2025-02-27 DIAGNOSIS — Z79.84 LONG TERM (CURRENT) USE OF ORAL HYPOGLYCEMIC DRUGS: ICD-10-CM

## 2025-02-27 DIAGNOSIS — Z86.73 PERSONAL HISTORY OF TRANSIENT ISCHEMIC ATTACK (TIA), AND CEREBRAL INFARCTION WITHOUT RESIDUAL DEFICITS: ICD-10-CM

## 2025-02-27 DIAGNOSIS — I25.10 ATHEROSCLEROTIC HEART DISEASE OF NATIVE CORONARY ARTERY WITHOUT ANGINA PECTORIS: ICD-10-CM

## 2025-02-27 DIAGNOSIS — Z89.421 ACQUIRED ABSENCE OF OTHER RIGHT TOE(S): ICD-10-CM

## 2025-02-27 DIAGNOSIS — Z79.82 LONG TERM (CURRENT) USE OF ASPIRIN: ICD-10-CM

## 2025-02-27 DIAGNOSIS — Z79.899 OTHER LONG TERM (CURRENT) DRUG THERAPY: ICD-10-CM
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
## 2025-02-27 DIAGNOSIS — Z98.89 OTHER SPECIFIED POSTPROCEDURAL STATES: Chronic | ICD-10-CM

## 2025-02-27 DIAGNOSIS — I73.9 PERIPHERAL VASCULAR DISEASE, UNSPECIFIED: ICD-10-CM

## 2025-02-27 DIAGNOSIS — Z87.891 PERSONAL HISTORY OF NICOTINE DEPENDENCE: ICD-10-CM

## 2025-02-27 DIAGNOSIS — Z86.79 PERSONAL HISTORY OF OTHER DISEASES OF THE CIRCULATORY SYSTEM: ICD-10-CM

## 2025-02-27 DIAGNOSIS — I35.9 NONRHEUMATIC AORTIC VALVE DISORDER, UNSPECIFIED: ICD-10-CM
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
## 2025-02-27 DIAGNOSIS — E11.621 TYPE 2 DIABETES MELLITUS WITH FOOT ULCER: ICD-10-CM

## 2025-02-27 DIAGNOSIS — E11.42 TYPE 2 DIABETES MELLITUS WITH DIABETIC POLYNEUROPATHY: ICD-10-CM

## 2025-02-27 DIAGNOSIS — E11.51 TYPE 2 DIABETES MELLITUS WITH DIABETIC PERIPHERAL ANGIOPATHY WITHOUT GANGRENE: ICD-10-CM

## 2025-02-27 DIAGNOSIS — I10 ESSENTIAL (PRIMARY) HYPERTENSION: ICD-10-CM

## 2025-02-27 DIAGNOSIS — Z95.5 PRESENCE OF CORONARY ANGIOPLASTY IMPLANT AND GRAFT: ICD-10-CM

## 2025-02-27 DIAGNOSIS — Z95.3 PRESENCE OF XENOGENIC HEART VALVE: ICD-10-CM

## 2025-02-27 PROCEDURE — G0463: CPT

## 2025-02-27 PROCEDURE — 99214 OFFICE O/P EST MOD 30 MIN: CPT

## 2025-03-01 PROBLEM — L97.514 CHRONIC FOOT ULCER, RIGHT, WITH NECROSIS OF BONE: Status: ACTIVE | Noted: 2025-03-01

## 2025-03-01 PROBLEM — E11.69 ABNORMAL METABOLIC STATE IN DIABETES MELLITUS: Status: ACTIVE | Noted: 2025-03-01

## 2025-03-02 DIAGNOSIS — E11.42 TYPE 2 DIABETES MELLITUS WITH DIABETIC POLYNEUROPATHY: ICD-10-CM

## 2025-03-02 DIAGNOSIS — Z86.73 PERSONAL HISTORY OF TRANSIENT ISCHEMIC ATTACK (TIA), AND CEREBRAL INFARCTION WITHOUT RESIDUAL DEFICITS: ICD-10-CM

## 2025-03-02 DIAGNOSIS — I73.9 PERIPHERAL VASCULAR DISEASE, UNSPECIFIED: ICD-10-CM

## 2025-03-02 DIAGNOSIS — E11.69 TYPE 2 DIABETES MELLITUS WITH OTHER SPECIFIED COMPLICATION: ICD-10-CM

## 2025-03-02 DIAGNOSIS — K44.9 DIAPHRAGMATIC HERNIA WITHOUT OBSTRUCTION OR GANGRENE: ICD-10-CM

## 2025-03-02 DIAGNOSIS — Z79.82 LONG TERM (CURRENT) USE OF ASPIRIN: ICD-10-CM

## 2025-03-02 DIAGNOSIS — K21.9 GASTRO-ESOPHAGEAL REFLUX DISEASE WITHOUT ESOPHAGITIS: ICD-10-CM

## 2025-03-02 DIAGNOSIS — Z95.5 PRESENCE OF CORONARY ANGIOPLASTY IMPLANT AND GRAFT: ICD-10-CM

## 2025-03-02 DIAGNOSIS — Z95.3 PRESENCE OF XENOGENIC HEART VALVE: ICD-10-CM

## 2025-03-02 DIAGNOSIS — Z87.891 PERSONAL HISTORY OF NICOTINE DEPENDENCE: ICD-10-CM

## 2025-03-02 DIAGNOSIS — I25.10 ATHEROSCLEROTIC HEART DISEASE OF NATIVE CORONARY ARTERY WITHOUT ANGINA PECTORIS: ICD-10-CM

## 2025-03-02 DIAGNOSIS — Z79.84 LONG TERM (CURRENT) USE OF ORAL HYPOGLYCEMIC DRUGS: ICD-10-CM

## 2025-03-02 DIAGNOSIS — Z80.3 FAMILY HISTORY OF MALIGNANT NEOPLASM OF BREAST: ICD-10-CM

## 2025-03-02 DIAGNOSIS — Z86.79 PERSONAL HISTORY OF OTHER DISEASES OF THE CIRCULATORY SYSTEM: ICD-10-CM

## 2025-03-02 DIAGNOSIS — Z79.899 OTHER LONG TERM (CURRENT) DRUG THERAPY: ICD-10-CM

## 2025-03-02 DIAGNOSIS — I35.9 NONRHEUMATIC AORTIC VALVE DISORDER, UNSPECIFIED: ICD-10-CM

## 2025-03-02 DIAGNOSIS — I10 ESSENTIAL (PRIMARY) HYPERTENSION: ICD-10-CM

## 2025-03-02 DIAGNOSIS — E11.51 TYPE 2 DIABETES MELLITUS WITH DIABETIC PERIPHERAL ANGIOPATHY WITHOUT GANGRENE: ICD-10-CM

## 2025-03-05 ENCOUNTER — APPOINTMENT (OUTPATIENT)
Dept: VASCULAR SURGERY | Facility: HOSPITAL | Age: 65
End: 2025-03-05
Payer: COMMERCIAL

## 2025-03-05 ENCOUNTER — OUTPATIENT (OUTPATIENT)
Dept: OUTPATIENT SERVICES | Facility: HOSPITAL | Age: 65
LOS: 1 days | Discharge: HOME | End: 2025-03-05
Payer: COMMERCIAL

## 2025-03-05 VITALS
HEIGHT: 75 IN | TEMPERATURE: 98.2 F | OXYGEN SATURATION: 99 % | HEART RATE: 61 BPM | SYSTOLIC BLOOD PRESSURE: 174 MMHG | RESPIRATION RATE: 18 BRPM | WEIGHT: 195 LBS | DIASTOLIC BLOOD PRESSURE: 84 MMHG | BODY MASS INDEX: 24.25 KG/M2

## 2025-03-05 DIAGNOSIS — Z95.5 PRESENCE OF CORONARY ANGIOPLASTY IMPLANT AND GRAFT: ICD-10-CM

## 2025-03-05 DIAGNOSIS — I73.9 PERIPHERAL VASCULAR DISEASE, UNSPECIFIED: ICD-10-CM

## 2025-03-05 DIAGNOSIS — Z98.890 OTHER SPECIFIED POSTPROCEDURAL STATES: Chronic | ICD-10-CM

## 2025-03-05 DIAGNOSIS — Z86.73 PERSONAL HISTORY OF TRANSIENT ISCHEMIC ATTACK (TIA), AND CEREBRAL INFARCTION WITHOUT RESIDUAL DEFICITS: ICD-10-CM

## 2025-03-05 DIAGNOSIS — E11.42 TYPE 2 DIABETES MELLITUS WITH DIABETIC POLYNEUROPATHY: ICD-10-CM

## 2025-03-05 DIAGNOSIS — Z98.89 OTHER SPECIFIED POSTPROCEDURAL STATES: Chronic | ICD-10-CM

## 2025-03-05 DIAGNOSIS — Z79.84 LONG TERM (CURRENT) USE OF ORAL HYPOGLYCEMIC DRUGS: ICD-10-CM

## 2025-03-05 DIAGNOSIS — Z79.899 OTHER LONG TERM (CURRENT) DRUG THERAPY: ICD-10-CM

## 2025-03-05 DIAGNOSIS — Z79.82 LONG TERM (CURRENT) USE OF ASPIRIN: ICD-10-CM

## 2025-03-05 DIAGNOSIS — K21.9 GASTRO-ESOPHAGEAL REFLUX DISEASE WITHOUT ESOPHAGITIS: ICD-10-CM

## 2025-03-05 DIAGNOSIS — Z80.3 FAMILY HISTORY OF MALIGNANT NEOPLASM OF BREAST: ICD-10-CM

## 2025-03-05 DIAGNOSIS — E11.51 TYPE 2 DIABETES MELLITUS WITH DIABETIC PERIPHERAL ANGIOPATHY WITHOUT GANGRENE: ICD-10-CM

## 2025-03-05 DIAGNOSIS — I25.10 ATHEROSCLEROTIC HEART DISEASE OF NATIVE CORONARY ARTERY WITHOUT ANGINA PECTORIS: ICD-10-CM

## 2025-03-05 DIAGNOSIS — Z95.3 PRESENCE OF XENOGENIC HEART VALVE: ICD-10-CM

## 2025-03-05 DIAGNOSIS — K44.9 DIAPHRAGMATIC HERNIA WITHOUT OBSTRUCTION OR GANGRENE: ICD-10-CM

## 2025-03-05 DIAGNOSIS — T87.89 OTHER COMPLICATIONS OF AMPUTATION STUMP: ICD-10-CM

## 2025-03-05 DIAGNOSIS — I10 ESSENTIAL (PRIMARY) HYPERTENSION: ICD-10-CM

## 2025-03-05 DIAGNOSIS — Z87.891 PERSONAL HISTORY OF NICOTINE DEPENDENCE: ICD-10-CM

## 2025-03-05 DIAGNOSIS — Z95.4 PRESENCE OF OTHER HEART-VALVE REPLACEMENT: Chronic | ICD-10-CM

## 2025-03-05 DIAGNOSIS — Z86.79 PERSONAL HISTORY OF OTHER DISEASES OF THE CIRCULATORY SYSTEM: ICD-10-CM

## 2025-03-05 DIAGNOSIS — Z89.421 ACQUIRED ABSENCE OF OTHER RIGHT TOE(S): ICD-10-CM

## 2025-03-05 DIAGNOSIS — I35.9 NONRHEUMATIC AORTIC VALVE DISORDER, UNSPECIFIED: ICD-10-CM

## 2025-03-05 PROCEDURE — G0463: CPT

## 2025-03-05 PROCEDURE — 99213 OFFICE O/P EST LOW 20 MIN: CPT

## 2025-03-06 ENCOUNTER — NON-APPOINTMENT (OUTPATIENT)
Age: 65
End: 2025-03-06

## 2025-03-06 ENCOUNTER — OUTPATIENT (OUTPATIENT)
Dept: OUTPATIENT SERVICES | Facility: HOSPITAL | Age: 65
LOS: 1 days | Discharge: HOME | End: 2025-03-06
Payer: COMMERCIAL

## 2025-03-06 ENCOUNTER — APPOINTMENT (OUTPATIENT)
Dept: WOUND CARE | Facility: HOSPITAL | Age: 65
End: 2025-03-06
Payer: COMMERCIAL

## 2025-03-06 VITALS
DIASTOLIC BLOOD PRESSURE: 89 MMHG | WEIGHT: 195 LBS | TEMPERATURE: 98.1 F | RESPIRATION RATE: 18 BRPM | HEIGHT: 75 IN | SYSTOLIC BLOOD PRESSURE: 156 MMHG | BODY MASS INDEX: 24.25 KG/M2 | HEART RATE: 50 BPM | OXYGEN SATURATION: 98 %

## 2025-03-06 DIAGNOSIS — Z98.890 OTHER SPECIFIED POSTPROCEDURAL STATES: Chronic | ICD-10-CM

## 2025-03-06 DIAGNOSIS — Z98.89 OTHER SPECIFIED POSTPROCEDURAL STATES: Chronic | ICD-10-CM

## 2025-03-06 DIAGNOSIS — Z95.4 PRESENCE OF OTHER HEART-VALVE REPLACEMENT: Chronic | ICD-10-CM

## 2025-03-06 DIAGNOSIS — M86.9 OSTEOMYELITIS, UNSPECIFIED: ICD-10-CM

## 2025-03-06 DIAGNOSIS — M86.172 OTHER ACUTE OSTEOMYELITIS, LEFT ANKLE AND FOOT: ICD-10-CM

## 2025-03-06 DIAGNOSIS — L97.514 NON-PRESSURE CHRONIC ULCER OF OTHER PART OF RIGHT FOOT WITH NECROSIS OF BONE: ICD-10-CM

## 2025-03-06 DIAGNOSIS — E88.9 TYPE 2 DIABETES MELLITUS WITH OTHER SPECIFIED COMPLICATION: ICD-10-CM

## 2025-03-06 DIAGNOSIS — E11.69 TYPE 2 DIABETES MELLITUS WITH OTHER SPECIFIED COMPLICATION: ICD-10-CM

## 2025-03-06 DIAGNOSIS — Z89.421 ACQUIRED ABSENCE OF OTHER RIGHT TOE(S): ICD-10-CM

## 2025-03-06 PROCEDURE — 99213 OFFICE O/P EST LOW 20 MIN: CPT

## 2025-03-06 PROCEDURE — G0463: CPT

## 2025-03-09 DIAGNOSIS — E11.51 TYPE 2 DIABETES MELLITUS WITH DIABETIC PERIPHERAL ANGIOPATHY WITHOUT GANGRENE: ICD-10-CM

## 2025-03-09 DIAGNOSIS — I73.9 PERIPHERAL VASCULAR DISEASE, UNSPECIFIED: ICD-10-CM

## 2025-03-09 DIAGNOSIS — I35.9 NONRHEUMATIC AORTIC VALVE DISORDER, UNSPECIFIED: ICD-10-CM

## 2025-03-09 DIAGNOSIS — E11.69 TYPE 2 DIABETES MELLITUS WITH OTHER SPECIFIED COMPLICATION: ICD-10-CM

## 2025-03-09 DIAGNOSIS — Z79.899 OTHER LONG TERM (CURRENT) DRUG THERAPY: ICD-10-CM

## 2025-03-09 DIAGNOSIS — Z79.84 LONG TERM (CURRENT) USE OF ORAL HYPOGLYCEMIC DRUGS: ICD-10-CM

## 2025-03-09 DIAGNOSIS — Z95.5 PRESENCE OF CORONARY ANGIOPLASTY IMPLANT AND GRAFT: ICD-10-CM

## 2025-03-09 DIAGNOSIS — Z95.3 PRESENCE OF XENOGENIC HEART VALVE: ICD-10-CM

## 2025-03-09 DIAGNOSIS — Z87.891 PERSONAL HISTORY OF NICOTINE DEPENDENCE: ICD-10-CM

## 2025-03-09 DIAGNOSIS — Z86.73 PERSONAL HISTORY OF TRANSIENT ISCHEMIC ATTACK (TIA), AND CEREBRAL INFARCTION WITHOUT RESIDUAL DEFICITS: ICD-10-CM

## 2025-03-09 DIAGNOSIS — Z89.421 ACQUIRED ABSENCE OF OTHER RIGHT TOE(S): ICD-10-CM

## 2025-03-09 DIAGNOSIS — Z79.82 LONG TERM (CURRENT) USE OF ASPIRIN: ICD-10-CM

## 2025-03-09 DIAGNOSIS — Z86.79 PERSONAL HISTORY OF OTHER DISEASES OF THE CIRCULATORY SYSTEM: ICD-10-CM

## 2025-03-09 DIAGNOSIS — K21.9 GASTRO-ESOPHAGEAL REFLUX DISEASE WITHOUT ESOPHAGITIS: ICD-10-CM

## 2025-03-09 DIAGNOSIS — I25.10 ATHEROSCLEROTIC HEART DISEASE OF NATIVE CORONARY ARTERY WITHOUT ANGINA PECTORIS: ICD-10-CM

## 2025-03-09 DIAGNOSIS — I10 ESSENTIAL (PRIMARY) HYPERTENSION: ICD-10-CM

## 2025-03-09 DIAGNOSIS — K44.9 DIAPHRAGMATIC HERNIA WITHOUT OBSTRUCTION OR GANGRENE: ICD-10-CM

## 2025-03-09 DIAGNOSIS — E11.42 TYPE 2 DIABETES MELLITUS WITH DIABETIC POLYNEUROPATHY: ICD-10-CM

## 2025-03-09 DIAGNOSIS — Z80.3 FAMILY HISTORY OF MALIGNANT NEOPLASM OF BREAST: ICD-10-CM

## 2025-03-10 ENCOUNTER — OUTPATIENT (OUTPATIENT)
Dept: OUTPATIENT SERVICES | Facility: HOSPITAL | Age: 65
LOS: 1 days | Discharge: HOME | End: 2025-03-10
Payer: COMMERCIAL

## 2025-03-10 ENCOUNTER — NON-APPOINTMENT (OUTPATIENT)
Age: 65
End: 2025-03-10

## 2025-03-10 ENCOUNTER — APPOINTMENT (OUTPATIENT)
Dept: WOUND CARE | Facility: HOSPITAL | Age: 65
End: 2025-03-10
Payer: COMMERCIAL

## 2025-03-10 VITALS
DIASTOLIC BLOOD PRESSURE: 93 MMHG | OXYGEN SATURATION: 95 % | HEART RATE: 80 BPM | WEIGHT: 195 LBS | TEMPERATURE: 97.6 F | HEIGHT: 75 IN | RESPIRATION RATE: 18 BRPM | BODY MASS INDEX: 24.25 KG/M2 | SYSTOLIC BLOOD PRESSURE: 166 MMHG

## 2025-03-10 DIAGNOSIS — Z80.3 FAMILY HISTORY OF MALIGNANT NEOPLASM OF BREAST: ICD-10-CM

## 2025-03-10 DIAGNOSIS — Z98.890 OTHER SPECIFIED POSTPROCEDURAL STATES: Chronic | ICD-10-CM

## 2025-03-10 DIAGNOSIS — Z95.3 PRESENCE OF XENOGENIC HEART VALVE: ICD-10-CM

## 2025-03-10 DIAGNOSIS — M86.172 OTHER ACUTE OSTEOMYELITIS, LEFT ANKLE AND FOOT: ICD-10-CM

## 2025-03-10 DIAGNOSIS — Z98.89 OTHER SPECIFIED POSTPROCEDURAL STATES: Chronic | ICD-10-CM

## 2025-03-10 DIAGNOSIS — I35.9 NONRHEUMATIC AORTIC VALVE DISORDER, UNSPECIFIED: ICD-10-CM

## 2025-03-10 DIAGNOSIS — Z86.73 PERSONAL HISTORY OF TRANSIENT ISCHEMIC ATTACK (TIA), AND CEREBRAL INFARCTION WITHOUT RESIDUAL DEFICITS: ICD-10-CM

## 2025-03-10 DIAGNOSIS — L60.0 INGROWING NAIL: ICD-10-CM

## 2025-03-10 DIAGNOSIS — E11.621 TYPE 2 DIABETES MELLITUS WITH FOOT ULCER: ICD-10-CM

## 2025-03-10 DIAGNOSIS — Z95.4 PRESENCE OF OTHER HEART-VALVE REPLACEMENT: Chronic | ICD-10-CM

## 2025-03-10 DIAGNOSIS — Z87.891 PERSONAL HISTORY OF NICOTINE DEPENDENCE: ICD-10-CM

## 2025-03-10 DIAGNOSIS — Z86.79 PERSONAL HISTORY OF OTHER DISEASES OF THE CIRCULATORY SYSTEM: ICD-10-CM

## 2025-03-10 DIAGNOSIS — I73.9 PERIPHERAL VASCULAR DISEASE, UNSPECIFIED: ICD-10-CM

## 2025-03-10 DIAGNOSIS — K44.9 DIAPHRAGMATIC HERNIA WITHOUT OBSTRUCTION OR GANGRENE: ICD-10-CM

## 2025-03-10 DIAGNOSIS — Z79.84 LONG TERM (CURRENT) USE OF ORAL HYPOGLYCEMIC DRUGS: ICD-10-CM

## 2025-03-10 DIAGNOSIS — Z79.82 LONG TERM (CURRENT) USE OF ASPIRIN: ICD-10-CM

## 2025-03-10 DIAGNOSIS — L97.519 TYPE 2 DIABETES MELLITUS WITH FOOT ULCER: ICD-10-CM

## 2025-03-10 DIAGNOSIS — Z79.899 OTHER LONG TERM (CURRENT) DRUG THERAPY: ICD-10-CM

## 2025-03-10 DIAGNOSIS — E11.51 TYPE 2 DIABETES MELLITUS WITH DIABETIC PERIPHERAL ANGIOPATHY WITHOUT GANGRENE: ICD-10-CM

## 2025-03-10 DIAGNOSIS — Z89.421 ACQUIRED ABSENCE OF OTHER RIGHT TOE(S): ICD-10-CM

## 2025-03-10 DIAGNOSIS — I10 ESSENTIAL (PRIMARY) HYPERTENSION: ICD-10-CM

## 2025-03-10 DIAGNOSIS — I25.10 ATHEROSCLEROTIC HEART DISEASE OF NATIVE CORONARY ARTERY WITHOUT ANGINA PECTORIS: ICD-10-CM

## 2025-03-10 DIAGNOSIS — Z95.5 PRESENCE OF CORONARY ANGIOPLASTY IMPLANT AND GRAFT: ICD-10-CM

## 2025-03-10 DIAGNOSIS — K21.9 GASTRO-ESOPHAGEAL REFLUX DISEASE WITHOUT ESOPHAGITIS: ICD-10-CM

## 2025-03-10 DIAGNOSIS — E11.42 TYPE 2 DIABETES MELLITUS WITH DIABETIC POLYNEUROPATHY: ICD-10-CM

## 2025-03-10 PROCEDURE — G0463: CPT

## 2025-03-10 PROCEDURE — ZZZZZ: CPT

## 2025-03-10 RX ORDER — CIPROFLOXACIN HYDROCHLORIDE 500 MG/1
500 TABLET, FILM COATED ORAL
Qty: 14 | Refills: 0 | Status: ACTIVE | COMMUNITY
Start: 2025-03-10 | End: 1900-01-01

## 2025-03-13 ENCOUNTER — APPOINTMENT (OUTPATIENT)
Dept: WOUND CARE | Facility: HOSPITAL | Age: 65
End: 2025-03-13

## 2025-03-13 ENCOUNTER — RESULT REVIEW (OUTPATIENT)
Age: 65
End: 2025-03-13

## 2025-03-13 ENCOUNTER — OUTPATIENT (OUTPATIENT)
Dept: OUTPATIENT SERVICES | Facility: HOSPITAL | Age: 65
LOS: 1 days | End: 2025-03-13
Payer: COMMERCIAL

## 2025-03-13 ENCOUNTER — OUTPATIENT (OUTPATIENT)
Dept: OUTPATIENT SERVICES | Facility: HOSPITAL | Age: 65
LOS: 1 days | Discharge: HOME | End: 2025-03-13
Payer: COMMERCIAL

## 2025-03-13 VITALS
OXYGEN SATURATION: 95 % | HEIGHT: 75 IN | TEMPERATURE: 97.4 F | DIASTOLIC BLOOD PRESSURE: 89 MMHG | HEART RATE: 82 BPM | RESPIRATION RATE: 18 BRPM | SYSTOLIC BLOOD PRESSURE: 151 MMHG | BODY MASS INDEX: 24.25 KG/M2 | WEIGHT: 195 LBS

## 2025-03-13 DIAGNOSIS — Z98.890 OTHER SPECIFIED POSTPROCEDURAL STATES: Chronic | ICD-10-CM

## 2025-03-13 DIAGNOSIS — M86.172 OTHER ACUTE OSTEOMYELITIS, LEFT ANKLE AND FOOT: ICD-10-CM

## 2025-03-13 DIAGNOSIS — Z95.4 PRESENCE OF OTHER HEART-VALVE REPLACEMENT: Chronic | ICD-10-CM

## 2025-03-13 DIAGNOSIS — I73.9 PERIPHERAL VASCULAR DISEASE, UNSPECIFIED: ICD-10-CM

## 2025-03-13 DIAGNOSIS — Z98.89 OTHER SPECIFIED POSTPROCEDURAL STATES: Chronic | ICD-10-CM

## 2025-03-13 PROCEDURE — 99213 OFFICE O/P EST LOW 20 MIN: CPT

## 2025-03-13 PROCEDURE — 93925 LOWER EXTREMITY STUDY: CPT | Mod: 26

## 2025-03-13 PROCEDURE — G0463: CPT

## 2025-03-13 PROCEDURE — 93925 LOWER EXTREMITY STUDY: CPT

## 2025-03-17 ENCOUNTER — APPOINTMENT (OUTPATIENT)
Dept: WOUND CARE | Facility: HOSPITAL | Age: 65
End: 2025-03-17
Payer: COMMERCIAL

## 2025-03-17 ENCOUNTER — OUTPATIENT (OUTPATIENT)
Dept: OUTPATIENT SERVICES | Facility: HOSPITAL | Age: 65
LOS: 1 days | Discharge: HOME | End: 2025-03-17
Payer: COMMERCIAL

## 2025-03-17 VITALS
SYSTOLIC BLOOD PRESSURE: 140 MMHG | OXYGEN SATURATION: 96 % | DIASTOLIC BLOOD PRESSURE: 84 MMHG | BODY MASS INDEX: 24.25 KG/M2 | HEIGHT: 75 IN | RESPIRATION RATE: 18 BRPM | HEART RATE: 62 BPM | TEMPERATURE: 97.3 F | WEIGHT: 195 LBS

## 2025-03-17 DIAGNOSIS — M86.172 OTHER ACUTE OSTEOMYELITIS, LEFT ANKLE AND FOOT: ICD-10-CM

## 2025-03-17 DIAGNOSIS — E11.69 TYPE 2 DIABETES MELLITUS WITH OTHER SPECIFIED COMPLICATION: ICD-10-CM

## 2025-03-17 DIAGNOSIS — Z89.421 ACQUIRED ABSENCE OF OTHER RIGHT TOE(S): ICD-10-CM

## 2025-03-17 DIAGNOSIS — Z98.890 OTHER SPECIFIED POSTPROCEDURAL STATES: Chronic | ICD-10-CM

## 2025-03-17 DIAGNOSIS — Z98.89 OTHER SPECIFIED POSTPROCEDURAL STATES: Chronic | ICD-10-CM

## 2025-03-17 DIAGNOSIS — Z95.4 PRESENCE OF OTHER HEART-VALVE REPLACEMENT: Chronic | ICD-10-CM

## 2025-03-17 PROCEDURE — ZZZZZ: CPT

## 2025-03-17 PROCEDURE — G0463: CPT

## 2025-03-18 ENCOUNTER — NON-APPOINTMENT (OUTPATIENT)
Age: 65
End: 2025-03-18

## 2025-03-18 ENCOUNTER — APPOINTMENT (OUTPATIENT)
Dept: CARDIOLOGY | Facility: CLINIC | Age: 65
End: 2025-03-18
Payer: COMMERCIAL

## 2025-03-18 VITALS
SYSTOLIC BLOOD PRESSURE: 149 MMHG | HEIGHT: 75 IN | OXYGEN SATURATION: 98 % | DIASTOLIC BLOOD PRESSURE: 80 MMHG | HEART RATE: 77 BPM

## 2025-03-18 DIAGNOSIS — I10 ESSENTIAL (PRIMARY) HYPERTENSION: ICD-10-CM

## 2025-03-18 DIAGNOSIS — Z95.3 PRESENCE OF XENOGENIC HEART VALVE: ICD-10-CM

## 2025-03-18 DIAGNOSIS — Z01.810 ENCOUNTER FOR PREPROCEDURAL CARDIOVASCULAR EXAMINATION: ICD-10-CM

## 2025-03-18 DIAGNOSIS — Z95.5 PRESENCE OF CORONARY ANGIOPLASTY IMPLANT AND GRAFT: ICD-10-CM

## 2025-03-18 DIAGNOSIS — E78.5 HYPERLIPIDEMIA, UNSPECIFIED: ICD-10-CM

## 2025-03-18 PROCEDURE — 93000 ELECTROCARDIOGRAM COMPLETE: CPT

## 2025-03-18 PROCEDURE — G2211 COMPLEX E/M VISIT ADD ON: CPT

## 2025-03-18 PROCEDURE — 99215 OFFICE O/P EST HI 40 MIN: CPT

## 2025-03-19 ENCOUNTER — OUTPATIENT (OUTPATIENT)
Dept: OUTPATIENT SERVICES | Facility: HOSPITAL | Age: 65
LOS: 1 days | Discharge: HOME | End: 2025-03-19
Payer: COMMERCIAL

## 2025-03-19 ENCOUNTER — APPOINTMENT (OUTPATIENT)
Dept: VASCULAR SURGERY | Facility: HOSPITAL | Age: 65
End: 2025-03-19
Payer: COMMERCIAL

## 2025-03-19 VITALS
HEART RATE: 78 BPM | DIASTOLIC BLOOD PRESSURE: 77 MMHG | TEMPERATURE: 97.6 F | WEIGHT: 195 LBS | OXYGEN SATURATION: 98 % | SYSTOLIC BLOOD PRESSURE: 151 MMHG | RESPIRATION RATE: 18 BRPM | BODY MASS INDEX: 24.25 KG/M2 | HEIGHT: 75 IN

## 2025-03-19 DIAGNOSIS — K21.9 GASTRO-ESOPHAGEAL REFLUX DISEASE WITHOUT ESOPHAGITIS: ICD-10-CM

## 2025-03-19 DIAGNOSIS — L60.0 INGROWING NAIL: ICD-10-CM

## 2025-03-19 DIAGNOSIS — I35.9 NONRHEUMATIC AORTIC VALVE DISORDER, UNSPECIFIED: ICD-10-CM

## 2025-03-19 DIAGNOSIS — I25.10 ATHEROSCLEROTIC HEART DISEASE OF NATIVE CORONARY ARTERY WITHOUT ANGINA PECTORIS: ICD-10-CM

## 2025-03-19 DIAGNOSIS — E78.5 HYPERLIPIDEMIA, UNSPECIFIED: ICD-10-CM

## 2025-03-19 DIAGNOSIS — K44.9 DIAPHRAGMATIC HERNIA WITHOUT OBSTRUCTION OR GANGRENE: ICD-10-CM

## 2025-03-19 DIAGNOSIS — Z95.5 PRESENCE OF CORONARY ANGIOPLASTY IMPLANT AND GRAFT: ICD-10-CM

## 2025-03-19 DIAGNOSIS — Z98.890 OTHER SPECIFIED POSTPROCEDURAL STATES: Chronic | ICD-10-CM

## 2025-03-19 DIAGNOSIS — I10 ESSENTIAL (PRIMARY) HYPERTENSION: ICD-10-CM

## 2025-03-19 DIAGNOSIS — Z86.79 PERSONAL HISTORY OF OTHER DISEASES OF THE CIRCULATORY SYSTEM: ICD-10-CM

## 2025-03-19 DIAGNOSIS — Z86.73 PERSONAL HISTORY OF TRANSIENT ISCHEMIC ATTACK (TIA), AND CEREBRAL INFARCTION WITHOUT RESIDUAL DEFICITS: ICD-10-CM

## 2025-03-19 DIAGNOSIS — Z79.82 LONG TERM (CURRENT) USE OF ASPIRIN: ICD-10-CM

## 2025-03-19 DIAGNOSIS — Z79.899 OTHER LONG TERM (CURRENT) DRUG THERAPY: ICD-10-CM

## 2025-03-19 DIAGNOSIS — Z79.84 LONG TERM (CURRENT) USE OF ORAL HYPOGLYCEMIC DRUGS: ICD-10-CM

## 2025-03-19 DIAGNOSIS — E11.51 TYPE 2 DIABETES MELLITUS WITH DIABETIC PERIPHERAL ANGIOPATHY WITHOUT GANGRENE: ICD-10-CM

## 2025-03-19 DIAGNOSIS — Z87.891 PERSONAL HISTORY OF NICOTINE DEPENDENCE: ICD-10-CM

## 2025-03-19 DIAGNOSIS — Z95.3 PRESENCE OF XENOGENIC HEART VALVE: ICD-10-CM

## 2025-03-19 DIAGNOSIS — Z89.421 ACQUIRED ABSENCE OF OTHER RIGHT TOE(S): ICD-10-CM

## 2025-03-19 DIAGNOSIS — E11.42 TYPE 2 DIABETES MELLITUS WITH DIABETIC POLYNEUROPATHY: ICD-10-CM

## 2025-03-19 DIAGNOSIS — I73.9 PERIPHERAL VASCULAR DISEASE, UNSPECIFIED: ICD-10-CM

## 2025-03-19 DIAGNOSIS — Z80.3 FAMILY HISTORY OF MALIGNANT NEOPLASM OF BREAST: ICD-10-CM

## 2025-03-19 DIAGNOSIS — Z95.4 PRESENCE OF OTHER HEART-VALVE REPLACEMENT: Chronic | ICD-10-CM

## 2025-03-19 PROCEDURE — 99213 OFFICE O/P EST LOW 20 MIN: CPT

## 2025-03-19 PROCEDURE — G0463: CPT

## 2025-03-20 ENCOUNTER — APPOINTMENT (OUTPATIENT)
Dept: WOUND CARE | Facility: HOSPITAL | Age: 65
End: 2025-03-20
Payer: COMMERCIAL

## 2025-03-20 ENCOUNTER — OUTPATIENT (OUTPATIENT)
Dept: OUTPATIENT SERVICES | Facility: HOSPITAL | Age: 65
LOS: 1 days | Discharge: HOME | End: 2025-03-20
Payer: COMMERCIAL

## 2025-03-20 VITALS
HEART RATE: 63 BPM | SYSTOLIC BLOOD PRESSURE: 150 MMHG | HEIGHT: 75 IN | DIASTOLIC BLOOD PRESSURE: 78 MMHG | BODY MASS INDEX: 24.25 KG/M2 | OXYGEN SATURATION: 98 % | RESPIRATION RATE: 18 BRPM | WEIGHT: 195 LBS | TEMPERATURE: 97.7 F

## 2025-03-20 DIAGNOSIS — M86.9 OSTEOMYELITIS, UNSPECIFIED: ICD-10-CM

## 2025-03-20 DIAGNOSIS — M86.172 OTHER ACUTE OSTEOMYELITIS, LEFT ANKLE AND FOOT: ICD-10-CM

## 2025-03-20 DIAGNOSIS — E88.9 TYPE 2 DIABETES MELLITUS WITH OTHER SPECIFIED COMPLICATION: ICD-10-CM

## 2025-03-20 DIAGNOSIS — E11.69 TYPE 2 DIABETES MELLITUS WITH OTHER SPECIFIED COMPLICATION: ICD-10-CM

## 2025-03-20 DIAGNOSIS — Z98.890 OTHER SPECIFIED POSTPROCEDURAL STATES: Chronic | ICD-10-CM

## 2025-03-20 DIAGNOSIS — T81.89XD OTHER COMPLICATIONS OF PROCEDURES, NOT ELSEWHERE CLASSIFIED, SUBSEQUENT ENCOUNTER: ICD-10-CM

## 2025-03-20 DIAGNOSIS — Z89.421 ACQUIRED ABSENCE OF OTHER RIGHT TOE(S): ICD-10-CM

## 2025-03-20 DIAGNOSIS — Z95.4 PRESENCE OF OTHER HEART-VALVE REPLACEMENT: Chronic | ICD-10-CM

## 2025-03-20 DIAGNOSIS — Z98.89 OTHER SPECIFIED POSTPROCEDURAL STATES: Chronic | ICD-10-CM

## 2025-03-20 PROCEDURE — 99213 OFFICE O/P EST LOW 20 MIN: CPT

## 2025-03-20 PROCEDURE — G0463: CPT

## 2025-03-26 DIAGNOSIS — E11.42 TYPE 2 DIABETES MELLITUS WITH DIABETIC POLYNEUROPATHY: ICD-10-CM

## 2025-03-26 DIAGNOSIS — E11.51 TYPE 2 DIABETES MELLITUS WITH DIABETIC PERIPHERAL ANGIOPATHY WITHOUT GANGRENE: ICD-10-CM

## 2025-03-26 DIAGNOSIS — I25.10 ATHEROSCLEROTIC HEART DISEASE OF NATIVE CORONARY ARTERY WITHOUT ANGINA PECTORIS: ICD-10-CM

## 2025-03-26 DIAGNOSIS — T87.89 OTHER COMPLICATIONS OF AMPUTATION STUMP: ICD-10-CM

## 2025-03-26 DIAGNOSIS — E78.5 HYPERLIPIDEMIA, UNSPECIFIED: ICD-10-CM

## 2025-03-26 DIAGNOSIS — Z80.3 FAMILY HISTORY OF MALIGNANT NEOPLASM OF BREAST: ICD-10-CM

## 2025-03-26 DIAGNOSIS — Z95.5 PRESENCE OF CORONARY ANGIOPLASTY IMPLANT AND GRAFT: ICD-10-CM

## 2025-03-26 DIAGNOSIS — I73.9 PERIPHERAL VASCULAR DISEASE, UNSPECIFIED: ICD-10-CM

## 2025-03-26 DIAGNOSIS — Z79.84 LONG TERM (CURRENT) USE OF ORAL HYPOGLYCEMIC DRUGS: ICD-10-CM

## 2025-03-26 DIAGNOSIS — Z87.891 PERSONAL HISTORY OF NICOTINE DEPENDENCE: ICD-10-CM

## 2025-03-26 DIAGNOSIS — M86.9 OSTEOMYELITIS, UNSPECIFIED: ICD-10-CM

## 2025-03-26 DIAGNOSIS — Z86.79 PERSONAL HISTORY OF OTHER DISEASES OF THE CIRCULATORY SYSTEM: ICD-10-CM

## 2025-03-26 DIAGNOSIS — Z79.899 OTHER LONG TERM (CURRENT) DRUG THERAPY: ICD-10-CM

## 2025-03-26 DIAGNOSIS — I35.9 NONRHEUMATIC AORTIC VALVE DISORDER, UNSPECIFIED: ICD-10-CM

## 2025-03-26 DIAGNOSIS — Z89.421 ACQUIRED ABSENCE OF OTHER RIGHT TOE(S): ICD-10-CM
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
## 2025-03-26 DIAGNOSIS — K21.9 GASTRO-ESOPHAGEAL REFLUX DISEASE WITHOUT ESOPHAGITIS: ICD-10-CM

## 2025-03-26 DIAGNOSIS — I10 ESSENTIAL (PRIMARY) HYPERTENSION: ICD-10-CM

## 2025-03-26 DIAGNOSIS — Z95.3 PRESENCE OF XENOGENIC HEART VALVE: ICD-10-CM

## 2025-03-26 DIAGNOSIS — E11.69 TYPE 2 DIABETES MELLITUS WITH OTHER SPECIFIED COMPLICATION: ICD-10-CM

## 2025-03-26 DIAGNOSIS — Z79.82 LONG TERM (CURRENT) USE OF ASPIRIN: ICD-10-CM
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
## 2025-03-26 DIAGNOSIS — Z86.73 PERSONAL HISTORY OF TRANSIENT ISCHEMIC ATTACK (TIA), AND CEREBRAL INFARCTION WITHOUT RESIDUAL DEFICITS: ICD-10-CM

## 2025-03-26 DIAGNOSIS — K44.9 DIAPHRAGMATIC HERNIA WITHOUT OBSTRUCTION OR GANGRENE: ICD-10-CM

## 2025-04-03 ENCOUNTER — OUTPATIENT (OUTPATIENT)
Dept: OUTPATIENT SERVICES | Facility: HOSPITAL | Age: 65
LOS: 1 days | End: 2025-04-03
Payer: COMMERCIAL

## 2025-04-03 ENCOUNTER — APPOINTMENT (OUTPATIENT)
Dept: WOUND CARE | Facility: HOSPITAL | Age: 65
End: 2025-04-03
Payer: COMMERCIAL

## 2025-04-03 VITALS
DIASTOLIC BLOOD PRESSURE: 83 MMHG | RESPIRATION RATE: 18 BRPM | TEMPERATURE: 97.3 F | HEIGHT: 75 IN | SYSTOLIC BLOOD PRESSURE: 143 MMHG | BODY MASS INDEX: 24.25 KG/M2 | HEART RATE: 58 BPM | WEIGHT: 195 LBS | OXYGEN SATURATION: 98 %

## 2025-04-03 DIAGNOSIS — E88.9 TYPE 2 DIABETES MELLITUS WITH OTHER SPECIFIED COMPLICATION: ICD-10-CM

## 2025-04-03 DIAGNOSIS — Z89.421 ACQUIRED ABSENCE OF OTHER RIGHT TOE(S): ICD-10-CM

## 2025-04-03 DIAGNOSIS — E11.69 TYPE 2 DIABETES MELLITUS WITH OTHER SPECIFIED COMPLICATION: ICD-10-CM

## 2025-04-03 DIAGNOSIS — Z98.890 OTHER SPECIFIED POSTPROCEDURAL STATES: Chronic | ICD-10-CM

## 2025-04-03 DIAGNOSIS — Z98.89 OTHER SPECIFIED POSTPROCEDURAL STATES: Chronic | ICD-10-CM

## 2025-04-03 DIAGNOSIS — Z95.4 PRESENCE OF OTHER HEART-VALVE REPLACEMENT: Chronic | ICD-10-CM

## 2025-04-03 DIAGNOSIS — M86.172 OTHER ACUTE OSTEOMYELITIS, LEFT ANKLE AND FOOT: ICD-10-CM

## 2025-04-03 DIAGNOSIS — M86.9 OSTEOMYELITIS, UNSPECIFIED: ICD-10-CM

## 2025-04-03 DIAGNOSIS — T81.89XD OTHER COMPLICATIONS OF PROCEDURES, NOT ELSEWHERE CLASSIFIED, SUBSEQUENT ENCOUNTER: ICD-10-CM

## 2025-04-03 PROCEDURE — 99213 OFFICE O/P EST LOW 20 MIN: CPT

## 2025-04-03 PROCEDURE — G0463: CPT

## 2025-04-07 DIAGNOSIS — Z87.81 PERSONAL HISTORY OF (HEALED) TRAUMATIC FRACTURE: ICD-10-CM
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
## 2025-04-07 DIAGNOSIS — I10 ESSENTIAL (PRIMARY) HYPERTENSION: ICD-10-CM

## 2025-04-07 DIAGNOSIS — E11.51 TYPE 2 DIABETES MELLITUS WITH DIABETIC PERIPHERAL ANGIOPATHY WITHOUT GANGRENE: ICD-10-CM

## 2025-04-07 DIAGNOSIS — K21.9 GASTRO-ESOPHAGEAL REFLUX DISEASE WITHOUT ESOPHAGITIS: ICD-10-CM

## 2025-04-07 DIAGNOSIS — E11.69 TYPE 2 DIABETES MELLITUS WITH OTHER SPECIFIED COMPLICATION: ICD-10-CM

## 2025-04-07 DIAGNOSIS — Z79.82 LONG TERM (CURRENT) USE OF ASPIRIN: ICD-10-CM

## 2025-04-07 DIAGNOSIS — T87.89 OTHER COMPLICATIONS OF AMPUTATION STUMP: ICD-10-CM

## 2025-04-07 DIAGNOSIS — Z86.73 PERSONAL HISTORY OF TRANSIENT ISCHEMIC ATTACK (TIA), AND CEREBRAL INFARCTION WITHOUT RESIDUAL DEFICITS: ICD-10-CM

## 2025-04-07 DIAGNOSIS — Z79.84 LONG TERM (CURRENT) USE OF ORAL HYPOGLYCEMIC DRUGS: ICD-10-CM
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
## 2025-04-07 DIAGNOSIS — Z95.3 PRESENCE OF XENOGENIC HEART VALVE: ICD-10-CM

## 2025-04-07 DIAGNOSIS — Z80.3 FAMILY HISTORY OF MALIGNANT NEOPLASM OF BREAST: ICD-10-CM

## 2025-04-07 DIAGNOSIS — I35.9 NONRHEUMATIC AORTIC VALVE DISORDER, UNSPECIFIED: ICD-10-CM

## 2025-04-07 DIAGNOSIS — K44.9 DIAPHRAGMATIC HERNIA WITHOUT OBSTRUCTION OR GANGRENE: ICD-10-CM

## 2025-04-07 DIAGNOSIS — E11.42 TYPE 2 DIABETES MELLITUS WITH DIABETIC POLYNEUROPATHY: ICD-10-CM

## 2025-04-07 DIAGNOSIS — E78.5 HYPERLIPIDEMIA, UNSPECIFIED: ICD-10-CM

## 2025-04-07 DIAGNOSIS — I25.10 ATHEROSCLEROTIC HEART DISEASE OF NATIVE CORONARY ARTERY WITHOUT ANGINA PECTORIS: ICD-10-CM

## 2025-04-07 DIAGNOSIS — Z86.79 PERSONAL HISTORY OF OTHER DISEASES OF THE CIRCULATORY SYSTEM: ICD-10-CM

## 2025-04-07 DIAGNOSIS — I73.9 PERIPHERAL VASCULAR DISEASE, UNSPECIFIED: ICD-10-CM

## 2025-04-07 DIAGNOSIS — Z79.899 OTHER LONG TERM (CURRENT) DRUG THERAPY: ICD-10-CM

## 2025-04-07 DIAGNOSIS — Z89.421 ACQUIRED ABSENCE OF OTHER RIGHT TOE(S): ICD-10-CM

## 2025-04-07 DIAGNOSIS — Z95.5 PRESENCE OF CORONARY ANGIOPLASTY IMPLANT AND GRAFT: ICD-10-CM

## 2025-04-09 NOTE — ED ADULT NURSE NOTE - BREATHING, MLM
Red Jimenes is a 59 y.o. female   Shortness of Breath  Associated symptoms include abdominal pain.   Abdominal Pain       Patient with a past medical history of HTN, HFpEF, nonobstructive CAD based on cath, CKD IV, Anemia/ MGUS, COPD, Pulmonary Nodules (Dec), CHF, Peripheral Neuropathy, Osteoarthritis, Dumping Syndrome, ANDERSON with cirrhosis of the liver/ Morbid Obesity presented to the hospital with worsening shortness of breath  Patient says she has been having some difficulty swallowing food and pills  When she takes her pills the office sit in her throat for a while before she is able to swallow them  Has been drinking a lot of water recently  Sodium in the emergency room was 123  Denies any emesis  Diarrhea persists from the dumping syndrome but generally well-controlled if she takes the Questran  Was recently treated for exacerbation of COPD with antibiotics and steroids by Dr. Velarde  Denies any chest pain or palpitations    Past Medical History  Past Medical History:   Diagnosis Date    Pain in unspecified knee 2022    Knee pain    Personal history of other diseases of the circulatory system 2021    History of congestive heart failure    Unspecified diastolic (congestive) heart failure 2022    (HFpEF) heart failure with preserved ejection fraction       Surgical History  Past Surgical History:   Procedure Laterality Date    KNEE ARTHROSCOPY W/ DEBRIDEMENT      OTHER SURGICAL HISTORY  10/19/2020    Bariatric surgery    OTHER SURGICAL HISTORY  10/19/2020    Hysterectomy    OTHER SURGICAL HISTORY  10/19/2020    Laparoscopic partial colectomy    OTHER SURGICAL HISTORY  10/19/2020    Abdominal liposuction    OTHER SURGICAL HISTORY  10/19/2020     section        Social History  She reports that she has been smoking cigarettes. She has a 40 pack-year smoking history. She has never used smokeless tobacco. She reports current alcohol use of about 3.0 standard drinks of alcohol per week.  She reports that she does not use drugs.    Family History  Family History   Problem Relation Name Age of Onset    Coronary artery disease Mother      Hypertension Mother      Coronary artery disease Father      Hypertension Father      Breast cancer Maternal Grandmother 50     Breast cancer Mother's Sister 50     Ovarian cancer Father's Sister 58     Breast cancer Father's Sister 58         Allergies  Patient has no known allergies.    Review of Systems   Respiratory:  Positive for shortness of breath.    Gastrointestinal:  Positive for abdominal pain.        Constitutional: Feeling poorly  Eyes: no blurred vision and no diplopia.   ENT: no hearing loss, no tinnitus, no earache, no sore throat, no hoarseness and no swollen glands in the neck.   Cardiovascular: Shortness of breath   No chest pain  Respiratory: Persistent cough and shortness of breath  Gastrointestinal: Dysphagia diarrhea  Genitourinary: no urinary frequency, no dysuria, no hematuria, no burning sensation during urination, urinary stream is not smaller and urinary stream does not start and stop.   Musculoskeletal: no arthralgias, no joint stiffness, no muscle weakness, no back pain and no difficulty walking.   Skin: no rashes, no change in skin color and pigmentation, no skin lesions and no skin lumps.   Neurological: no headaches, no dizziness, no seizures, no tingling, no numbness, no signs and symptoms of stroke and no limb weakness.   Psychiatric: no confusion, no memory lapses or loss, no depression and no sleep disturbances.   Endocrine: no goiter, no thyroid disorder, no diabetes mellitus, no excessive thirst, no dry skin, no cold intolerance, no heat intolerance and no increased urinary frequency.   Hematologic/Lymphatic: is not slow to heal, does not bleed easily, does not bruise easily, no thrombophlebitis, no anemia and no history of blood transfusion.   All other systems have been reviewed and are negative for complaint.     Vitals:     04/09/25 1325   BP:    Pulse:    Resp:    Temp:    SpO2: 98%        Scheduled medications  allopurinol, 100 mg, oral, Daily  aspirin, 81 mg, oral, Daily  atorvastatin, 40 mg, oral, Nightly  azithromycin, 500 mg, intravenous, q24h  barium sulfate, , ,   budesonide, 0.5 mg, nebulization, BID  buPROPion XL, 300 mg, oral, Daily  cholestyramine light, 4 g, oral, TID  enoxaparin, 40 mg, subcutaneous, q12h BO  formoterol, 20 mcg, nebulization, BID  ipratropium-albuteroL, 3 mL, nebulization, TID  methylPREDNISolone sodium succinate (PF), 40 mg, intravenous, q12h  metoprolol succinate XL, 25 mg, oral, Daily  [START ON 4/10/2025] pantoprazole, 40 mg, oral, Daily before breakfast   Or  [START ON 4/10/2025] pantoprazole, 40 mg, intravenous, Daily before breakfast  pregabalin, 225 mg, oral, BID  valsartan, 160 mg, oral, Daily      Continuous medications     PRN medications  PRN medications: acetaminophen **OR** acetaminophen **OR** acetaminophen, alum-mag hydroxide-simeth, barium sulfate, guaiFENesin, ipratropium-albuteroL, melatonin, ondansetron **OR** ondansetron, polyethylene glycol, traMADol    Results from last 7 days   Lab Units 04/09/25  1105 04/09/25  0538 04/08/25  1731   WBC AUTO x10*3/uL 6.8 6.5 8.5   HEMOGLOBIN g/dL 9.9* 9.1* 9.8*   HEMATOCRIT % 29.2* 27.4* 28.5*   PLATELETS AUTO x10*3/uL 185 175 193     Results from last 7 days   Lab Units 04/09/25  1105 04/09/25  0533 04/08/25  1731   SODIUM mmol/L 126* 125* 123*   POTASSIUM mmol/L 3.7 3.8 3.7   CHLORIDE mmol/L 95* 94* 92*   CO2 mmol/L 22 21 18*   BUN mg/dL 12 12 13   CREATININE mg/dL 1.02 0.87 0.90   CALCIUM mg/dL 9.0 8.9 8.8   PROTEIN TOTAL g/dL  --  6.8 6.6   BILIRUBIN TOTAL mg/dL  --  0.6 0.7   ALK PHOS U/L  --  136 149*   ALT U/L  --  30 33   AST U/L  --  30 31   GLUCOSE mg/dL 142* 131* 81     Results from last 7 days   Lab Units 04/08/25  1909 04/08/25  1731   TROPHS ng/L 4 4        US right upper quadrant   Final Result   Hepatic steatosis.         Cholelithiasis. No sonographic evidence for cholecystitis.        The gallbladder is poorly visualized due to overlying bowel gas.        MACRO:   None        Signed by: Lance Romero 4/9/2025 6:07 AM   Dictation workstation:   [id number]      CT abdomen pelvis w IV contrast   Final Result   1. No acute abdominal or pelvic process.   2. High-density material layering within the gallbladder lumen,   likely sludge and/or small gallstones.   3. Hepatic steatosis. Borderline hepatomegaly.        Signed by: Iban Qiu 4/8/2025 7:34 PM   Dictation workstation:   [id number]      XR chest 2 views   Final Result   1.  No evidence of acute cardiopulmonary process.             Signed by: Jules Jaimes 4/8/2025 5:46 PM   Dictation workstation:   [id number]      FL GI esophagram    (Results Pending)       Physical Exam      Constitutional   General appearance: Alert and in no acute distress.  Obese  Eyes   Inspection of eyes: Sclera and conjunctiva were normal.    Pupil exam: Pupils were equal in size. Extraocular movements were intact.   Pulmonary   Respiratory assessment: No respiratory distress, normal respiratory rhythm and effort.    Auscultation of Lungs: Diminished breath sounds  Cardiovascular   Auscultation of heart: Apical pulse normal, heart rate and rhythm normal, normal S1 and S2, no murmurs and no pericardial rub.    Exam for edema: No peripheral edema.   Abdomen   Abdominal Exam: No bruits, normal bowel sounds, soft, non-tender, no abdominal mass palpated.    Liver and Spleen exam: No hepato-splenomegaly.   Musculoskeletal      Inspection/palpation of joints, bones and muscles: No joint swelling. Normal movement of all extremities.   Skin   Skin inspection: Normal skin color and pigmentation, normal skin turgor and no visible rash.   Neurologic   Cranial nerves: Nerves 2-12 were intact, no focal neuro defects.   Psychiatric   Orientation: Oriented to person, place, and time.    Mood and affect:  Normal.      Assessment/Plan      #Exacerbation of COPD  ?  Pneumonia  Doubt CHF  Start IV antibiotics as Solu-Medrol and DuoNebs    #Hyponatremia  Suspect from poor solute intake versus excessive water intake  Initiating workup  Will start salt rich diet and fluid restrict  Monitor daily sodium    #Dysphagia  Will schedule esophagram    #ANDERSON with cirrhosis  We obtain a FibroScan last month which confirmed cirrhosis of the liver  Discussed with the patient  Will need to establish with hepatology as an outpatient    #Hypertension  #Dumping syndrome  #Chronic kidney disease  #Dyslipidemia  Resume home medications and monitor daily CBC BMP   Spontaneous, unlabored and symmetrical

## 2025-05-07 ENCOUNTER — APPOINTMENT (OUTPATIENT)
Dept: WOUND CARE | Facility: HOSPITAL | Age: 65
End: 2025-05-07
Payer: COMMERCIAL

## 2025-05-07 ENCOUNTER — OUTPATIENT (OUTPATIENT)
Dept: OUTPATIENT SERVICES | Facility: HOSPITAL | Age: 65
LOS: 1 days | End: 2025-05-07
Payer: COMMERCIAL

## 2025-05-07 DIAGNOSIS — Z98.890 OTHER SPECIFIED POSTPROCEDURAL STATES: Chronic | ICD-10-CM

## 2025-05-07 DIAGNOSIS — M86.172 OTHER ACUTE OSTEOMYELITIS, LEFT ANKLE AND FOOT: ICD-10-CM

## 2025-05-07 DIAGNOSIS — Z98.89 OTHER SPECIFIED POSTPROCEDURAL STATES: Chronic | ICD-10-CM

## 2025-05-07 PROBLEM — L03.116 CELLULITIS OF FOOT, LEFT: Status: ACTIVE | Noted: 2025-05-07

## 2025-05-07 PROCEDURE — G0463: CPT

## 2025-05-07 PROCEDURE — 99213 OFFICE O/P EST LOW 20 MIN: CPT

## 2025-05-07 RX ORDER — AMOXICILLIN AND CLAVULANATE POTASSIUM 875; 125 MG/1; MG/1
875-125 TABLET, COATED ORAL
Qty: 14 | Refills: 0 | Status: COMPLETED | COMMUNITY
Start: 2025-05-07 | End: 2025-05-14

## 2025-05-09 ENCOUNTER — APPOINTMENT (OUTPATIENT)
Dept: WOUND CARE | Facility: HOSPITAL | Age: 65
End: 2025-05-09

## 2025-05-09 DIAGNOSIS — Z79.899 OTHER LONG TERM (CURRENT) DRUG THERAPY: ICD-10-CM

## 2025-05-09 DIAGNOSIS — Z79.84 LONG TERM (CURRENT) USE OF ORAL HYPOGLYCEMIC DRUGS: ICD-10-CM

## 2025-05-09 DIAGNOSIS — I73.9 PERIPHERAL VASCULAR DISEASE, UNSPECIFIED: ICD-10-CM

## 2025-05-09 DIAGNOSIS — Z89.421 ACQUIRED ABSENCE OF OTHER RIGHT TOE(S): ICD-10-CM

## 2025-05-09 DIAGNOSIS — E11.42 TYPE 2 DIABETES MELLITUS WITH DIABETIC POLYNEUROPATHY: ICD-10-CM

## 2025-05-09 DIAGNOSIS — K21.9 GASTRO-ESOPHAGEAL REFLUX DISEASE WITHOUT ESOPHAGITIS: ICD-10-CM

## 2025-05-09 DIAGNOSIS — E78.5 HYPERLIPIDEMIA, UNSPECIFIED: ICD-10-CM

## 2025-05-09 DIAGNOSIS — I25.10 ATHEROSCLEROTIC HEART DISEASE OF NATIVE CORONARY ARTERY WITHOUT ANGINA PECTORIS: ICD-10-CM

## 2025-05-09 DIAGNOSIS — I35.9 NONRHEUMATIC AORTIC VALVE DISORDER, UNSPECIFIED: ICD-10-CM

## 2025-05-09 DIAGNOSIS — Z79.82 LONG TERM (CURRENT) USE OF ASPIRIN: ICD-10-CM

## 2025-05-09 DIAGNOSIS — L03.116 CELLULITIS OF LEFT LOWER LIMB: ICD-10-CM

## 2025-05-09 DIAGNOSIS — Z95.3 PRESENCE OF XENOGENIC HEART VALVE: ICD-10-CM

## 2025-05-09 DIAGNOSIS — I10 ESSENTIAL (PRIMARY) HYPERTENSION: ICD-10-CM
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
## 2025-05-09 DIAGNOSIS — Z87.891 PERSONAL HISTORY OF NICOTINE DEPENDENCE: ICD-10-CM

## 2025-05-09 DIAGNOSIS — Z86.79 PERSONAL HISTORY OF OTHER DISEASES OF THE CIRCULATORY SYSTEM: ICD-10-CM

## 2025-05-09 DIAGNOSIS — Z95.5 PRESENCE OF CORONARY ANGIOPLASTY IMPLANT AND GRAFT: ICD-10-CM

## 2025-05-09 DIAGNOSIS — Z86.73 PERSONAL HISTORY OF TRANSIENT ISCHEMIC ATTACK (TIA), AND CEREBRAL INFARCTION WITHOUT RESIDUAL DEFICITS: ICD-10-CM

## 2025-05-09 DIAGNOSIS — E11.51 TYPE 2 DIABETES MELLITUS WITH DIABETIC PERIPHERAL ANGIOPATHY WITHOUT GANGRENE: ICD-10-CM

## 2025-05-09 DIAGNOSIS — Z80.3 FAMILY HISTORY OF MALIGNANT NEOPLASM OF BREAST: ICD-10-CM

## 2025-05-09 DIAGNOSIS — K44.9 DIAPHRAGMATIC HERNIA WITHOUT OBSTRUCTION OR GANGRENE: ICD-10-CM

## 2025-05-14 ENCOUNTER — OUTPATIENT (OUTPATIENT)
Dept: OUTPATIENT SERVICES | Facility: HOSPITAL | Age: 65
LOS: 1 days | End: 2025-05-14
Payer: COMMERCIAL

## 2025-05-14 ENCOUNTER — APPOINTMENT (OUTPATIENT)
Dept: WOUND CARE | Facility: HOSPITAL | Age: 65
End: 2025-05-14
Payer: COMMERCIAL

## 2025-05-14 VITALS
BODY MASS INDEX: 24.25 KG/M2 | SYSTOLIC BLOOD PRESSURE: 172 MMHG | WEIGHT: 195 LBS | HEIGHT: 75 IN | OXYGEN SATURATION: 99 % | TEMPERATURE: 97.3 F | DIASTOLIC BLOOD PRESSURE: 81 MMHG | RESPIRATION RATE: 18 BRPM | HEART RATE: 61 BPM

## 2025-05-14 DIAGNOSIS — Z95.4 PRESENCE OF OTHER HEART-VALVE REPLACEMENT: Chronic | ICD-10-CM

## 2025-05-14 DIAGNOSIS — Z98.89 OTHER SPECIFIED POSTPROCEDURAL STATES: Chronic | ICD-10-CM

## 2025-05-14 DIAGNOSIS — E11.42 TYPE 2 DIABETES MELLITUS WITH DIABETIC POLYNEUROPATHY: ICD-10-CM

## 2025-05-14 DIAGNOSIS — Z98.890 OTHER SPECIFIED POSTPROCEDURAL STATES: Chronic | ICD-10-CM

## 2025-05-14 DIAGNOSIS — M86.172 OTHER ACUTE OSTEOMYELITIS, LEFT ANKLE AND FOOT: ICD-10-CM

## 2025-05-14 DIAGNOSIS — L03.116 CELLULITIS OF LEFT LOWER LIMB: ICD-10-CM

## 2025-05-14 PROCEDURE — G0463: CPT

## 2025-05-14 PROCEDURE — 99213 OFFICE O/P EST LOW 20 MIN: CPT

## 2025-05-15 DIAGNOSIS — I35.9 NONRHEUMATIC AORTIC VALVE DISORDER, UNSPECIFIED: ICD-10-CM

## 2025-05-15 DIAGNOSIS — E11.42 TYPE 2 DIABETES MELLITUS WITH DIABETIC POLYNEUROPATHY: ICD-10-CM

## 2025-05-15 DIAGNOSIS — Z80.3 FAMILY HISTORY OF MALIGNANT NEOPLASM OF BREAST: ICD-10-CM

## 2025-05-15 DIAGNOSIS — K21.9 GASTRO-ESOPHAGEAL REFLUX DISEASE WITHOUT ESOPHAGITIS: ICD-10-CM

## 2025-05-15 DIAGNOSIS — Z79.899 OTHER LONG TERM (CURRENT) DRUG THERAPY: ICD-10-CM

## 2025-05-15 DIAGNOSIS — I25.10 ATHEROSCLEROTIC HEART DISEASE OF NATIVE CORONARY ARTERY WITHOUT ANGINA PECTORIS: ICD-10-CM

## 2025-05-15 DIAGNOSIS — L03.116 CELLULITIS OF LEFT LOWER LIMB: ICD-10-CM

## 2025-05-15 DIAGNOSIS — E11.51 TYPE 2 DIABETES MELLITUS WITH DIABETIC PERIPHERAL ANGIOPATHY WITHOUT GANGRENE: ICD-10-CM

## 2025-05-15 DIAGNOSIS — I10 ESSENTIAL (PRIMARY) HYPERTENSION: ICD-10-CM

## 2025-05-15 DIAGNOSIS — Z79.82 LONG TERM (CURRENT) USE OF ASPIRIN: ICD-10-CM

## 2025-05-15 DIAGNOSIS — Z87.891 PERSONAL HISTORY OF NICOTINE DEPENDENCE: ICD-10-CM

## 2025-05-15 DIAGNOSIS — Z95.5 PRESENCE OF CORONARY ANGIOPLASTY IMPLANT AND GRAFT: ICD-10-CM

## 2025-05-15 DIAGNOSIS — E78.5 HYPERLIPIDEMIA, UNSPECIFIED: ICD-10-CM

## 2025-05-15 DIAGNOSIS — Z79.84 LONG TERM (CURRENT) USE OF ORAL HYPOGLYCEMIC DRUGS: ICD-10-CM

## 2025-05-15 DIAGNOSIS — Z89.421 ACQUIRED ABSENCE OF OTHER RIGHT TOE(S): ICD-10-CM

## 2025-05-15 DIAGNOSIS — Z86.79 PERSONAL HISTORY OF OTHER DISEASES OF THE CIRCULATORY SYSTEM: ICD-10-CM

## 2025-05-15 DIAGNOSIS — I73.9 PERIPHERAL VASCULAR DISEASE, UNSPECIFIED: ICD-10-CM

## 2025-05-15 DIAGNOSIS — Z95.3 PRESENCE OF XENOGENIC HEART VALVE: ICD-10-CM
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
## 2025-05-15 DIAGNOSIS — Z86.73 PERSONAL HISTORY OF TRANSIENT ISCHEMIC ATTACK (TIA), AND CEREBRAL INFARCTION WITHOUT RESIDUAL DEFICITS: ICD-10-CM
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
## 2025-05-15 RX ORDER — AMOXICILLIN AND CLAVULANATE POTASSIUM 875; 125 MG/1; MG/1
875-125 TABLET, COATED ORAL
Qty: 14 | Refills: 0 | Status: COMPLETED | COMMUNITY
Start: 2025-05-15 | End: 2025-05-22

## 2025-05-21 ENCOUNTER — APPOINTMENT (OUTPATIENT)
Dept: WOUND CARE | Facility: HOSPITAL | Age: 65
End: 2025-05-21

## 2025-06-19 ENCOUNTER — APPOINTMENT (OUTPATIENT)
Age: 65
End: 2025-06-19
Payer: COMMERCIAL

## 2025-06-19 VITALS — WEIGHT: 195 LBS | BODY MASS INDEX: 24.25 KG/M2 | HEIGHT: 75 IN

## 2025-06-19 VITALS — BODY MASS INDEX: 24.25 KG/M2 | HEIGHT: 75 IN | WEIGHT: 195 LBS

## 2025-06-19 PROCEDURE — 11721 DEBRIDE NAIL 6 OR MORE: CPT

## 2025-06-19 PROCEDURE — 99203 OFFICE O/P NEW LOW 30 MIN: CPT | Mod: 25

## 2025-06-22 PROBLEM — B35.1 ONYCHOMYCOSIS: Status: ACTIVE | Noted: 2025-06-22

## 2025-06-25 ENCOUNTER — APPOINTMENT (OUTPATIENT)
Dept: VASCULAR SURGERY | Facility: CLINIC | Age: 65
End: 2025-06-25
Payer: COMMERCIAL

## 2025-06-25 ENCOUNTER — APPOINTMENT (OUTPATIENT)
Dept: VASCULAR SURGERY | Facility: CLINIC | Age: 65
End: 2025-06-25

## 2025-06-25 VITALS
SYSTOLIC BLOOD PRESSURE: 150 MMHG | HEIGHT: 75 IN | DIASTOLIC BLOOD PRESSURE: 82 MMHG | HEART RATE: 63 BPM | TEMPERATURE: 97.5 F | BODY MASS INDEX: 26.49 KG/M2 | RESPIRATION RATE: 18 BRPM | OXYGEN SATURATION: 99 % | WEIGHT: 213 LBS

## 2025-06-25 PROBLEM — I73.9 PERIPHERAL ARTERIAL DISEASE: Status: ACTIVE | Noted: 2025-06-25

## 2025-06-25 PROCEDURE — 99214 OFFICE O/P EST MOD 30 MIN: CPT

## 2025-06-25 PROCEDURE — 93923 UPR/LXTR ART STDY 3+ LVLS: CPT

## 2025-07-07 ENCOUNTER — RX RENEWAL (OUTPATIENT)
Age: 65
End: 2025-07-07

## 2025-07-15 ENCOUNTER — APPOINTMENT (OUTPATIENT)
Dept: VASCULAR SURGERY | Facility: CLINIC | Age: 65
End: 2025-07-15
Payer: COMMERCIAL

## 2025-07-15 PROCEDURE — 37232Z: CUSTOM | Mod: LT

## 2025-07-15 PROCEDURE — 37228Z: CUSTOM | Mod: LT

## 2025-07-15 PROCEDURE — 76937 US GUIDE VASCULAR ACCESS: CPT

## 2025-07-15 PROCEDURE — 75630 X-RAY AORTA LEG ARTERIES: CPT | Mod: 26

## 2025-07-30 ENCOUNTER — OUTPATIENT (OUTPATIENT)
Dept: OUTPATIENT SERVICES | Facility: HOSPITAL | Age: 65
LOS: 1 days | End: 2025-07-30
Payer: COMMERCIAL

## 2025-07-30 ENCOUNTER — APPOINTMENT (OUTPATIENT)
Dept: VASCULAR SURGERY | Facility: HOSPITAL | Age: 65
End: 2025-07-30
Payer: COMMERCIAL

## 2025-07-30 DIAGNOSIS — Z98.890 OTHER SPECIFIED POSTPROCEDURAL STATES: Chronic | ICD-10-CM

## 2025-07-30 DIAGNOSIS — R60.0 LOCALIZED EDEMA: ICD-10-CM

## 2025-07-30 DIAGNOSIS — I73.9 PERIPHERAL VASCULAR DISEASE, UNSPECIFIED: ICD-10-CM

## 2025-07-30 DIAGNOSIS — I70.209 UNSPECIFIED ATHEROSCLEROSIS OF NATIVE ARTERIES OF EXTREMITIES, UNSPECIFIED EXTREMITY: ICD-10-CM

## 2025-07-30 DIAGNOSIS — Z98.89 OTHER SPECIFIED POSTPROCEDURAL STATES: Chronic | ICD-10-CM

## 2025-07-30 DIAGNOSIS — E11.51 TYPE 2 DIABETES MELLITUS WITH DIABETIC PERIPHERAL ANGIOPATHY WITHOUT GANGRENE: ICD-10-CM

## 2025-07-30 DIAGNOSIS — I10 ESSENTIAL (PRIMARY) HYPERTENSION: ICD-10-CM

## 2025-07-30 DIAGNOSIS — Z95.3 PRESENCE OF XENOGENIC HEART VALVE: ICD-10-CM

## 2025-07-30 DIAGNOSIS — I35.9 NONRHEUMATIC AORTIC VALVE DISORDER, UNSPECIFIED: ICD-10-CM

## 2025-07-30 DIAGNOSIS — L03.116 CELLULITIS OF LEFT LOWER LIMB: ICD-10-CM

## 2025-07-30 DIAGNOSIS — L98.499 UNSPECIFIED ATHEROSCLEROSIS OF NATIVE ARTERIES OF EXTREMITIES, UNSPECIFIED EXTREMITY: ICD-10-CM

## 2025-07-30 DIAGNOSIS — Z79.899 OTHER LONG TERM (CURRENT) DRUG THERAPY: ICD-10-CM

## 2025-07-30 DIAGNOSIS — I70.245 ATHEROSCLEROSIS OF NATIVE ARTERIES OF LEFT LEG WITH ULCERATION OF OTHER PART OF FOOT: ICD-10-CM

## 2025-07-30 DIAGNOSIS — T81.49XA INFECTION FOLLOWING A PROCEDURE, OTHER SURGICAL SITE, INITIAL ENCOUNTER: ICD-10-CM

## 2025-07-30 DIAGNOSIS — Z95.5 PRESENCE OF CORONARY ANGIOPLASTY IMPLANT AND GRAFT: ICD-10-CM

## 2025-07-30 DIAGNOSIS — Z89.421 ACQUIRED ABSENCE OF OTHER RIGHT TOE(S): ICD-10-CM

## 2025-07-30 DIAGNOSIS — I25.10 ATHEROSCLEROTIC HEART DISEASE OF NATIVE CORONARY ARTERY WITHOUT ANGINA PECTORIS: ICD-10-CM

## 2025-07-30 DIAGNOSIS — Z86.73 PERSONAL HISTORY OF TRANSIENT ISCHEMIC ATTACK (TIA), AND CEREBRAL INFARCTION WITHOUT RESIDUAL DEFICITS: ICD-10-CM

## 2025-07-30 DIAGNOSIS — E78.5 HYPERLIPIDEMIA, UNSPECIFIED: ICD-10-CM

## 2025-07-30 DIAGNOSIS — Z79.84 LONG TERM (CURRENT) USE OF ORAL HYPOGLYCEMIC DRUGS: ICD-10-CM
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
## 2025-07-30 DIAGNOSIS — Z86.79 PERSONAL HISTORY OF OTHER DISEASES OF THE CIRCULATORY SYSTEM: ICD-10-CM

## 2025-07-30 DIAGNOSIS — K21.9 GASTRO-ESOPHAGEAL REFLUX DISEASE WITHOUT ESOPHAGITIS: ICD-10-CM

## 2025-07-30 DIAGNOSIS — M86.172 OTHER ACUTE OSTEOMYELITIS, LEFT ANKLE AND FOOT: ICD-10-CM

## 2025-07-30 DIAGNOSIS — L97.529 NON-PRESSURE CHRONIC ULCER OF OTHER PART OF LEFT FOOT WITH UNSPECIFIED SEVERITY: ICD-10-CM

## 2025-07-30 DIAGNOSIS — Z95.4 PRESENCE OF OTHER HEART-VALVE REPLACEMENT: Chronic | ICD-10-CM
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
## 2025-07-30 DIAGNOSIS — Z79.82 LONG TERM (CURRENT) USE OF ASPIRIN: ICD-10-CM

## 2025-07-30 PROCEDURE — G0463: CPT

## 2025-07-30 PROCEDURE — 99214 OFFICE O/P EST MOD 30 MIN: CPT

## 2025-07-30 RX ORDER — AMOXICILLIN AND CLAVULANATE POTASSIUM 875; 125 MG/1; MG/1
875-125 TABLET, COATED ORAL
Qty: 20 | Refills: 0 | Status: ACTIVE | COMMUNITY
Start: 2025-07-30 | End: 1900-01-01

## 2025-08-04 DIAGNOSIS — R60.0 LOCALIZED EDEMA: ICD-10-CM

## 2025-08-04 DIAGNOSIS — M79.606 PAIN IN LEG, UNSPECIFIED: ICD-10-CM

## 2025-08-05 ENCOUNTER — APPOINTMENT (OUTPATIENT)
Dept: WOUND CARE | Facility: HOSPITAL | Age: 65
End: 2025-08-05

## 2025-08-05 ENCOUNTER — OUTPATIENT (OUTPATIENT)
Dept: OUTPATIENT SERVICES | Facility: HOSPITAL | Age: 65
LOS: 1 days | End: 2025-08-05
Payer: COMMERCIAL

## 2025-08-05 DIAGNOSIS — Z95.4 PRESENCE OF OTHER HEART-VALVE REPLACEMENT: Chronic | ICD-10-CM

## 2025-08-05 DIAGNOSIS — Z98.890 OTHER SPECIFIED POSTPROCEDURAL STATES: Chronic | ICD-10-CM

## 2025-08-05 DIAGNOSIS — R60.0 LOCALIZED EDEMA: ICD-10-CM

## 2025-08-05 DIAGNOSIS — Z98.89 OTHER SPECIFIED POSTPROCEDURAL STATES: Chronic | ICD-10-CM

## 2025-08-05 PROCEDURE — 93970 EXTREMITY STUDY: CPT | Mod: 26

## 2025-08-05 PROCEDURE — 93971 EXTREMITY STUDY: CPT

## 2025-08-06 ENCOUNTER — APPOINTMENT (OUTPATIENT)
Dept: WOUND CARE | Facility: HOSPITAL | Age: 65
End: 2025-08-06
Payer: COMMERCIAL

## 2025-08-06 ENCOUNTER — OUTPATIENT (OUTPATIENT)
Dept: OUTPATIENT SERVICES | Facility: HOSPITAL | Age: 65
LOS: 1 days | End: 2025-08-06
Payer: COMMERCIAL

## 2025-08-06 VITALS
RESPIRATION RATE: 15 BRPM | SYSTOLIC BLOOD PRESSURE: 161 MMHG | HEIGHT: 75 IN | OXYGEN SATURATION: 98 % | HEART RATE: 69 BPM | WEIGHT: 213 LBS | BODY MASS INDEX: 26.49 KG/M2 | TEMPERATURE: 98.6 F | DIASTOLIC BLOOD PRESSURE: 89 MMHG

## 2025-08-06 DIAGNOSIS — Z98.890 OTHER SPECIFIED POSTPROCEDURAL STATES: Chronic | ICD-10-CM

## 2025-08-06 DIAGNOSIS — R60.0 LOCALIZED EDEMA: ICD-10-CM

## 2025-08-06 DIAGNOSIS — Z95.4 PRESENCE OF OTHER HEART-VALVE REPLACEMENT: Chronic | ICD-10-CM

## 2025-08-06 DIAGNOSIS — Z98.89 OTHER SPECIFIED POSTPROCEDURAL STATES: Chronic | ICD-10-CM

## 2025-08-06 PROCEDURE — 87077 CULTURE AEROBIC IDENTIFY: CPT

## 2025-08-06 PROCEDURE — G0463: CPT

## 2025-08-06 PROCEDURE — 99215 OFFICE O/P EST HI 40 MIN: CPT

## 2025-08-06 PROCEDURE — 87070 CULTURE OTHR SPECIMN AEROBIC: CPT

## 2025-08-06 RX ORDER — SULFAMETHOXAZOLE AND TRIMETHOPRIM 800; 160 MG/1; MG/1
800-160 TABLET ORAL TWICE DAILY
Qty: 14 | Refills: 0 | Status: ACTIVE | COMMUNITY
Start: 2025-08-06 | End: 1900-01-01

## 2025-08-08 DIAGNOSIS — K21.9 GASTRO-ESOPHAGEAL REFLUX DISEASE WITHOUT ESOPHAGITIS: ICD-10-CM

## 2025-08-08 DIAGNOSIS — Z79.84 LONG TERM (CURRENT) USE OF ORAL HYPOGLYCEMIC DRUGS: ICD-10-CM

## 2025-08-08 DIAGNOSIS — Z98.62 PERIPHERAL VASCULAR ANGIOPLASTY STATUS: ICD-10-CM

## 2025-08-08 DIAGNOSIS — Z95.3 PRESENCE OF XENOGENIC HEART VALVE: ICD-10-CM

## 2025-08-08 DIAGNOSIS — E11.621 TYPE 2 DIABETES MELLITUS WITH FOOT ULCER: ICD-10-CM

## 2025-08-08 DIAGNOSIS — Z89.421 ACQUIRED ABSENCE OF OTHER RIGHT TOE(S): ICD-10-CM

## 2025-08-08 DIAGNOSIS — E11.51 TYPE 2 DIABETES MELLITUS WITH DIABETIC PERIPHERAL ANGIOPATHY WITHOUT GANGRENE: ICD-10-CM

## 2025-08-08 DIAGNOSIS — I35.9 NONRHEUMATIC AORTIC VALVE DISORDER, UNSPECIFIED: ICD-10-CM

## 2025-08-08 DIAGNOSIS — Z86.73 PERSONAL HISTORY OF TRANSIENT ISCHEMIC ATTACK (TIA), AND CEREBRAL INFARCTION WITHOUT RESIDUAL DEFICITS: ICD-10-CM

## 2025-08-08 DIAGNOSIS — E11.42 TYPE 2 DIABETES MELLITUS WITH DIABETIC POLYNEUROPATHY: ICD-10-CM

## 2025-08-08 DIAGNOSIS — Z79.899 OTHER LONG TERM (CURRENT) DRUG THERAPY: ICD-10-CM

## 2025-08-08 DIAGNOSIS — Z79.82 LONG TERM (CURRENT) USE OF ASPIRIN: ICD-10-CM

## 2025-08-08 DIAGNOSIS — Z86.79 PERSONAL HISTORY OF OTHER DISEASES OF THE CIRCULATORY SYSTEM: ICD-10-CM

## 2025-08-08 DIAGNOSIS — Z87.891 PERSONAL HISTORY OF NICOTINE DEPENDENCE: ICD-10-CM

## 2025-08-08 DIAGNOSIS — Z95.5 PRESENCE OF CORONARY ANGIOPLASTY IMPLANT AND GRAFT: ICD-10-CM

## 2025-08-08 DIAGNOSIS — I10 ESSENTIAL (PRIMARY) HYPERTENSION: ICD-10-CM

## 2025-08-08 DIAGNOSIS — I25.10 ATHEROSCLEROTIC HEART DISEASE OF NATIVE CORONARY ARTERY WITHOUT ANGINA PECTORIS: ICD-10-CM

## 2025-08-08 DIAGNOSIS — L97.524 NON-PRESSURE CHRONIC ULCER OF OTHER PART OF LEFT FOOT WITH NECROSIS OF BONE: ICD-10-CM

## 2025-08-08 DIAGNOSIS — I73.9 PERIPHERAL VASCULAR DISEASE, UNSPECIFIED: ICD-10-CM

## 2025-08-08 DIAGNOSIS — Z80.3 FAMILY HISTORY OF MALIGNANT NEOPLASM OF BREAST: ICD-10-CM

## 2025-08-08 DIAGNOSIS — E78.5 HYPERLIPIDEMIA, UNSPECIFIED: ICD-10-CM

## 2025-08-09 LAB
-  AMIKACIN: SIGNIFICANT CHANGE UP
-  AMPICILLIN/SULBACTAM: SIGNIFICANT CHANGE UP
-  CEFEPIME: SIGNIFICANT CHANGE UP
-  CEFTAZIDIME: SIGNIFICANT CHANGE UP
-  CIPROFLOXACIN: SIGNIFICANT CHANGE UP
-  CLINDAMYCIN: SIGNIFICANT CHANGE UP
-  ERYTHROMYCIN: SIGNIFICANT CHANGE UP
-  GENTAMICIN: SIGNIFICANT CHANGE UP
-  GENTAMICIN: SIGNIFICANT CHANGE UP
-  IMIPENEM: SIGNIFICANT CHANGE UP
-  LEVOFLOXACIN: SIGNIFICANT CHANGE UP
-  LEVOFLOXACIN: SIGNIFICANT CHANGE UP
-  MEROPENEM: SIGNIFICANT CHANGE UP
-  MINOCYCLINE: SIGNIFICANT CHANGE UP
-  OXACILLIN: SIGNIFICANT CHANGE UP
-  PIPERACILLIN/TAZOBACTAM: SIGNIFICANT CHANGE UP
-  RIFAMPIN: SIGNIFICANT CHANGE UP
-  TETRACYCLINE: SIGNIFICANT CHANGE UP
-  TOBRAMYCIN: SIGNIFICANT CHANGE UP
-  TRIMETHOPRIM/SULFAMETHOXAZOLE: SIGNIFICANT CHANGE UP
-  VANCOMYCIN: SIGNIFICANT CHANGE UP
CULTURE RESULTS: ABNORMAL
METHOD TYPE: SIGNIFICANT CHANGE UP
ORGANISM # SPEC MICROSCOPIC CNT: ABNORMAL
ORGANISM # SPEC MICROSCOPIC CNT: SIGNIFICANT CHANGE UP
SPECIMEN SOURCE: SIGNIFICANT CHANGE UP

## 2025-08-11 ENCOUNTER — NON-APPOINTMENT (OUTPATIENT)
Age: 65
End: 2025-08-11

## 2025-08-13 ENCOUNTER — NON-APPOINTMENT (OUTPATIENT)
Age: 65
End: 2025-08-13

## 2025-08-14 ENCOUNTER — OUTPATIENT (OUTPATIENT)
Dept: OUTPATIENT SERVICES | Facility: HOSPITAL | Age: 65
LOS: 1 days | End: 2025-08-14
Payer: COMMERCIAL

## 2025-08-14 ENCOUNTER — APPOINTMENT (OUTPATIENT)
Dept: WOUND CARE | Facility: HOSPITAL | Age: 65
End: 2025-08-14
Payer: COMMERCIAL

## 2025-08-14 DIAGNOSIS — Z95.4 PRESENCE OF OTHER HEART-VALVE REPLACEMENT: Chronic | ICD-10-CM

## 2025-08-14 DIAGNOSIS — Z98.890 OTHER SPECIFIED POSTPROCEDURAL STATES: Chronic | ICD-10-CM

## 2025-08-14 DIAGNOSIS — L97.519 TYPE 2 DIABETES MELLITUS WITH FOOT ULCER: ICD-10-CM

## 2025-08-14 DIAGNOSIS — E11.621 TYPE 2 DIABETES MELLITUS WITH FOOT ULCER: ICD-10-CM

## 2025-08-14 DIAGNOSIS — Z98.89 OTHER SPECIFIED POSTPROCEDURAL STATES: Chronic | ICD-10-CM

## 2025-08-14 DIAGNOSIS — Z98.62 PERIPHERAL VASCULAR ANGIOPLASTY STATUS: ICD-10-CM

## 2025-08-14 DIAGNOSIS — L97.524 NON-PRESSURE CHRONIC ULCER OF OTHER PART OF LEFT FOOT WITH NECROSIS OF BONE: ICD-10-CM

## 2025-08-14 PROCEDURE — 99215 OFFICE O/P EST HI 40 MIN: CPT

## 2025-08-14 RX ORDER — SULFAMETHOXAZOLE AND TRIMETHOPRIM 800; 160 MG/1; MG/1
800-160 TABLET ORAL TWICE DAILY
Qty: 14 | Refills: 0 | Status: ACTIVE | COMMUNITY
Start: 2025-08-14 | End: 1900-01-01

## 2025-08-15 ENCOUNTER — NON-APPOINTMENT (OUTPATIENT)
Age: 65
End: 2025-08-15

## 2025-08-15 ENCOUNTER — OUTPATIENT (OUTPATIENT)
Dept: OUTPATIENT SERVICES | Facility: HOSPITAL | Age: 65
LOS: 1 days | End: 2025-08-15
Payer: COMMERCIAL

## 2025-08-15 DIAGNOSIS — E11.621 TYPE 2 DIABETES MELLITUS WITH FOOT ULCER: ICD-10-CM

## 2025-08-15 DIAGNOSIS — E11.42 TYPE 2 DIABETES MELLITUS WITH DIABETIC POLYNEUROPATHY: ICD-10-CM

## 2025-08-15 DIAGNOSIS — Z95.5 PRESENCE OF CORONARY ANGIOPLASTY IMPLANT AND GRAFT: ICD-10-CM

## 2025-08-15 DIAGNOSIS — Z80.3 FAMILY HISTORY OF MALIGNANT NEOPLASM OF BREAST: ICD-10-CM

## 2025-08-15 DIAGNOSIS — I25.10 ATHEROSCLEROTIC HEART DISEASE OF NATIVE CORONARY ARTERY WITHOUT ANGINA PECTORIS: ICD-10-CM

## 2025-08-15 DIAGNOSIS — Z98.89 OTHER SPECIFIED POSTPROCEDURAL STATES: Chronic | ICD-10-CM

## 2025-08-15 DIAGNOSIS — Z89.421 ACQUIRED ABSENCE OF OTHER RIGHT TOE(S): ICD-10-CM

## 2025-08-15 DIAGNOSIS — Z79.899 OTHER LONG TERM (CURRENT) DRUG THERAPY: ICD-10-CM

## 2025-08-15 DIAGNOSIS — Z79.82 LONG TERM (CURRENT) USE OF ASPIRIN: ICD-10-CM

## 2025-08-15 DIAGNOSIS — Z95.4 PRESENCE OF OTHER HEART-VALVE REPLACEMENT: Chronic | ICD-10-CM

## 2025-08-15 DIAGNOSIS — I73.9 PERIPHERAL VASCULAR DISEASE, UNSPECIFIED: ICD-10-CM

## 2025-08-15 DIAGNOSIS — Z98.890 OTHER SPECIFIED POSTPROCEDURAL STATES: Chronic | ICD-10-CM

## 2025-08-15 DIAGNOSIS — L03.116 CELLULITIS OF LEFT LOWER LIMB: ICD-10-CM

## 2025-08-15 DIAGNOSIS — I35.9 NONRHEUMATIC AORTIC VALVE DISORDER, UNSPECIFIED: ICD-10-CM

## 2025-08-15 DIAGNOSIS — I10 ESSENTIAL (PRIMARY) HYPERTENSION: ICD-10-CM

## 2025-08-15 DIAGNOSIS — K21.9 GASTRO-ESOPHAGEAL REFLUX DISEASE WITHOUT ESOPHAGITIS: ICD-10-CM

## 2025-08-15 DIAGNOSIS — Z98.62 PERIPHERAL VASCULAR ANGIOPLASTY STATUS: ICD-10-CM

## 2025-08-15 DIAGNOSIS — L97.524 NON-PRESSURE CHRONIC ULCER OF OTHER PART OF LEFT FOOT WITH NECROSIS OF BONE: ICD-10-CM

## 2025-08-15 DIAGNOSIS — E11.51 TYPE 2 DIABETES MELLITUS WITH DIABETIC PERIPHERAL ANGIOPATHY WITHOUT GANGRENE: ICD-10-CM

## 2025-08-15 DIAGNOSIS — Z95.3 PRESENCE OF XENOGENIC HEART VALVE: ICD-10-CM

## 2025-08-15 DIAGNOSIS — Z79.84 LONG TERM (CURRENT) USE OF ORAL HYPOGLYCEMIC DRUGS: ICD-10-CM

## 2025-08-15 DIAGNOSIS — E78.5 HYPERLIPIDEMIA, UNSPECIFIED: ICD-10-CM

## 2025-08-15 DIAGNOSIS — Z87.891 PERSONAL HISTORY OF NICOTINE DEPENDENCE: ICD-10-CM

## 2025-08-15 DIAGNOSIS — Z01.818 ENCOUNTER FOR OTHER PREPROCEDURAL EXAMINATION: ICD-10-CM

## 2025-08-15 DIAGNOSIS — Z86.73 PERSONAL HISTORY OF TRANSIENT ISCHEMIC ATTACK (TIA), AND CEREBRAL INFARCTION WITHOUT RESIDUAL DEFICITS: ICD-10-CM

## 2025-08-15 DIAGNOSIS — Z86.79 PERSONAL HISTORY OF OTHER DISEASES OF THE CIRCULATORY SYSTEM: ICD-10-CM

## 2025-08-15 LAB
A1C WITH ESTIMATED AVERAGE GLUCOSE RESULT: 9.8 % — HIGH (ref 4–5.6)
CRP SERPL-MCNC: <3 MG/L — SIGNIFICANT CHANGE UP
ESTIMATED AVERAGE GLUCOSE: 235 MG/DL — HIGH (ref 68–114)

## 2025-08-15 PROCEDURE — 71046 X-RAY EXAM CHEST 2 VIEWS: CPT

## 2025-08-15 PROCEDURE — G0463: CPT

## 2025-08-15 PROCEDURE — 86140 C-REACTIVE PROTEIN: CPT

## 2025-08-15 PROCEDURE — 71046 X-RAY EXAM CHEST 2 VIEWS: CPT | Mod: 26

## 2025-08-15 PROCEDURE — 83036 HEMOGLOBIN GLYCOSYLATED A1C: CPT

## 2025-08-16 LAB
ALBUMIN SERPL ELPH-MCNC: 4.4 G/DL — SIGNIFICANT CHANGE UP (ref 3.3–5)
ALP SERPL-CCNC: 122 U/L — HIGH (ref 40–120)
ALT FLD-CCNC: 33 U/L — SIGNIFICANT CHANGE UP (ref 10–50)
ANION GAP SERPL CALC-SCNC: 14 MMOL/L — SIGNIFICANT CHANGE UP (ref 5–17)
AST SERPL-CCNC: 22 U/L — SIGNIFICANT CHANGE UP (ref 10–40)
BASOPHILS # BLD AUTO: 0.04 K/UL — SIGNIFICANT CHANGE UP (ref 0–0.2)
BASOPHILS NFR BLD AUTO: 0.6 % — SIGNIFICANT CHANGE UP (ref 0–2)
BILIRUB SERPL-MCNC: 0.3 MG/DL — SIGNIFICANT CHANGE UP (ref 0.2–1.2)
BUN SERPL-MCNC: 12 MG/DL — SIGNIFICANT CHANGE UP (ref 7–23)
CALCIUM SERPL-MCNC: 10 MG/DL — SIGNIFICANT CHANGE UP (ref 8.4–10.5)
CHLORIDE SERPL-SCNC: 106 MMOL/L — SIGNIFICANT CHANGE UP (ref 96–108)
CO2 SERPL-SCNC: 21 MMOL/L — LOW (ref 22–31)
CREAT SERPL-MCNC: 0.99 MG/DL — SIGNIFICANT CHANGE UP (ref 0.5–1.3)
EGFR: 84 ML/MIN/1.73M2 — SIGNIFICANT CHANGE UP
EGFR: 84 ML/MIN/1.73M2 — SIGNIFICANT CHANGE UP
EOSINOPHIL # BLD AUTO: 0.06 K/UL — SIGNIFICANT CHANGE UP (ref 0–0.5)
EOSINOPHIL NFR BLD AUTO: 0.9 % — SIGNIFICANT CHANGE UP (ref 0–6)
ERYTHROCYTE [SEDIMENTATION RATE] IN BLOOD: 18 MM/HR — HIGH (ref 0–15)
GLUCOSE SERPL-MCNC: 240 MG/DL — HIGH (ref 70–99)
HCT VFR BLD CALC: 42 % — SIGNIFICANT CHANGE UP (ref 39–50)
HGB BLD-MCNC: 12.8 G/DL — LOW (ref 13–17)
IMM GRANULOCYTES NFR BLD AUTO: 0.3 % — SIGNIFICANT CHANGE UP (ref 0–0.9)
LYMPHOCYTES # BLD AUTO: 0.9 K/UL — LOW (ref 1–3.3)
LYMPHOCYTES # BLD AUTO: 12.9 % — LOW (ref 13–44)
MCHC RBC-ENTMCNC: 27.4 PG — SIGNIFICANT CHANGE UP (ref 27–34)
MCHC RBC-ENTMCNC: 30.5 G/DL — LOW (ref 32–36)
MCV RBC AUTO: 89.9 FL — SIGNIFICANT CHANGE UP (ref 80–100)
MONOCYTES # BLD AUTO: 0.5 K/UL — SIGNIFICANT CHANGE UP (ref 0–0.9)
MONOCYTES NFR BLD AUTO: 7.2 % — SIGNIFICANT CHANGE UP (ref 2–14)
NEUTROPHILS # BLD AUTO: 5.43 K/UL — SIGNIFICANT CHANGE UP (ref 1.8–7.4)
NEUTROPHILS NFR BLD AUTO: 78.1 % — HIGH (ref 43–77)
PLATELET # BLD AUTO: 187 K/UL — SIGNIFICANT CHANGE UP (ref 150–400)
POTASSIUM SERPL-MCNC: 4.7 MMOL/L — SIGNIFICANT CHANGE UP (ref 3.5–5.3)
POTASSIUM SERPL-SCNC: 4.7 MMOL/L — SIGNIFICANT CHANGE UP (ref 3.5–5.3)
PROT SERPL-MCNC: 7.6 G/DL — SIGNIFICANT CHANGE UP (ref 6–8.3)
RBC # BLD: 4.67 M/UL — SIGNIFICANT CHANGE UP (ref 4.2–5.8)
RBC # FLD: 14.2 % — SIGNIFICANT CHANGE UP (ref 10.3–14.5)
SODIUM SERPL-SCNC: 140 MMOL/L — SIGNIFICANT CHANGE UP (ref 135–145)
WBC # BLD: 6.95 K/UL — SIGNIFICANT CHANGE UP (ref 3.8–10.5)
WBC # FLD AUTO: 6.95 K/UL — SIGNIFICANT CHANGE UP (ref 3.8–10.5)

## 2025-08-18 ENCOUNTER — NON-APPOINTMENT (OUTPATIENT)
Age: 65
End: 2025-08-18

## 2025-08-18 ENCOUNTER — APPOINTMENT (OUTPATIENT)
Dept: HYPERBARIC MEDICINE | Facility: HOSPITAL | Age: 65
End: 2025-08-18

## 2025-08-18 ENCOUNTER — OUTPATIENT (OUTPATIENT)
Dept: OUTPATIENT SERVICES | Facility: HOSPITAL | Age: 65
LOS: 1 days | End: 2025-08-18
Payer: COMMERCIAL

## 2025-08-18 VITALS
DIASTOLIC BLOOD PRESSURE: 84 MMHG | SYSTOLIC BLOOD PRESSURE: 176 MMHG | TEMPERATURE: 97.7 F | RESPIRATION RATE: 16 BRPM | OXYGEN SATURATION: 98 % | HEART RATE: 62 BPM

## 2025-08-18 VITALS
DIASTOLIC BLOOD PRESSURE: 94 MMHG | TEMPERATURE: 97.6 F | OXYGEN SATURATION: 100 % | HEART RATE: 60 BPM | SYSTOLIC BLOOD PRESSURE: 190 MMHG | RESPIRATION RATE: 16 BRPM

## 2025-08-18 DIAGNOSIS — Z95.4 PRESENCE OF OTHER HEART-VALVE REPLACEMENT: Chronic | ICD-10-CM

## 2025-08-18 DIAGNOSIS — Z98.89 OTHER SPECIFIED POSTPROCEDURAL STATES: Chronic | ICD-10-CM

## 2025-08-18 DIAGNOSIS — Z98.890 OTHER SPECIFIED POSTPROCEDURAL STATES: Chronic | ICD-10-CM

## 2025-08-18 DIAGNOSIS — L97.519 TYPE 2 DIABETES MELLITUS WITH FOOT ULCER: ICD-10-CM

## 2025-08-18 DIAGNOSIS — E11.621 TYPE 2 DIABETES MELLITUS WITH FOOT ULCER: ICD-10-CM

## 2025-08-18 DIAGNOSIS — L97.524 NON-PRESSURE CHRONIC ULCER OF OTHER PART OF LEFT FOOT WITH NECROSIS OF BONE: ICD-10-CM

## 2025-08-18 PROCEDURE — 99183 HYPERBARIC OXYGEN THERAPY: CPT

## 2025-08-18 PROCEDURE — G0277: CPT

## 2025-08-19 ENCOUNTER — APPOINTMENT (OUTPATIENT)
Dept: HYPERBARIC MEDICINE | Facility: HOSPITAL | Age: 65
End: 2025-08-19

## 2025-08-20 ENCOUNTER — APPOINTMENT (OUTPATIENT)
Dept: HYPERBARIC MEDICINE | Facility: HOSPITAL | Age: 65
End: 2025-08-20

## 2025-08-21 ENCOUNTER — APPOINTMENT (OUTPATIENT)
Dept: HYPERBARIC MEDICINE | Facility: HOSPITAL | Age: 65
End: 2025-08-21

## 2025-08-22 ENCOUNTER — APPOINTMENT (OUTPATIENT)
Dept: OTOLARYNGOLOGY | Facility: CLINIC | Age: 65
End: 2025-08-22
Payer: COMMERCIAL

## 2025-08-22 ENCOUNTER — APPOINTMENT (OUTPATIENT)
Dept: HYPERBARIC MEDICINE | Facility: HOSPITAL | Age: 65
End: 2025-08-22

## 2025-08-22 VITALS
DIASTOLIC BLOOD PRESSURE: 72 MMHG | BODY MASS INDEX: 24.25 KG/M2 | HEART RATE: 62 BPM | SYSTOLIC BLOOD PRESSURE: 158 MMHG | WEIGHT: 195 LBS | HEIGHT: 75 IN

## 2025-08-22 DIAGNOSIS — H61.23 IMPACTED CERUMEN, BILATERAL: ICD-10-CM

## 2025-08-22 DIAGNOSIS — Z78.9 OTHER SPECIFIED HEALTH STATUS: ICD-10-CM

## 2025-08-22 DIAGNOSIS — H93.8X3 OTHER SPECIFIED DISORDERS OF EAR, BILATERAL: ICD-10-CM

## 2025-08-22 DIAGNOSIS — H69.90 UNSPECIFIED EUSTACHIAN TUBE DISORDER, UNSPECIFIED EAR: ICD-10-CM

## 2025-08-22 PROCEDURE — 69210 REMOVE IMPACTED EAR WAX UNI: CPT | Mod: RT

## 2025-08-22 PROCEDURE — 99203 OFFICE O/P NEW LOW 30 MIN: CPT | Mod: 25

## 2025-08-25 ENCOUNTER — NON-APPOINTMENT (OUTPATIENT)
Age: 65
End: 2025-08-25

## 2025-08-25 ENCOUNTER — APPOINTMENT (OUTPATIENT)
Dept: HYPERBARIC MEDICINE | Facility: HOSPITAL | Age: 65
End: 2025-08-25

## 2025-08-26 ENCOUNTER — APPOINTMENT (OUTPATIENT)
Dept: HYPERBARIC MEDICINE | Facility: HOSPITAL | Age: 65
End: 2025-08-26

## 2025-08-27 ENCOUNTER — APPOINTMENT (OUTPATIENT)
Dept: HYPERBARIC MEDICINE | Facility: HOSPITAL | Age: 65
End: 2025-08-27

## 2025-08-28 ENCOUNTER — APPOINTMENT (OUTPATIENT)
Dept: HYPERBARIC MEDICINE | Facility: HOSPITAL | Age: 65
End: 2025-08-28

## 2025-08-29 ENCOUNTER — APPOINTMENT (OUTPATIENT)
Dept: HYPERBARIC MEDICINE | Facility: HOSPITAL | Age: 65
End: 2025-08-29

## 2025-09-03 DIAGNOSIS — E11.621 TYPE 2 DIABETES MELLITUS WITH FOOT ULCER: ICD-10-CM

## 2025-09-03 DIAGNOSIS — L97.524 NON-PRESSURE CHRONIC ULCER OF OTHER PART OF LEFT FOOT WITH NECROSIS OF BONE: ICD-10-CM

## (undated) DEVICE — NDL HYPO SAFE 18G X 1.5" (PINK)

## (undated) DEVICE — DRAPE 3/4 SHEET W REINFORCEMENT 56X77"

## (undated) DEVICE — BUR MICROAIRE CARBIDE OVAL 4MM 8 FLUTE

## (undated) DEVICE — SAW BLADE LINVATEC SAGITTAL 19.5X41.0X..4MM COARSE

## (undated) DEVICE — DRSG KERLIX ROLL 4.5"

## (undated) DEVICE — PREP BETADINE KIT

## (undated) DEVICE — SUT MONOSOF 3-0 18" P-14

## (undated) DEVICE — SUT POLYSORB 2-0 30" GS-22 UNDYED

## (undated) DEVICE — SOL IRR POUR NS 0.9% 1000ML

## (undated) DEVICE — PLV-SCD MACHINE: Type: DURABLE MEDICAL EQUIPMENT

## (undated) DEVICE — DRSG COMBINE 5X9"

## (undated) DEVICE — VENODYNE/SCD SLEEVE CALF LARGE

## (undated) DEVICE — DRSG TELFA 3 X 8

## (undated) DEVICE — SYR LUER LOK 10CC

## (undated) DEVICE — PREP ALCOHOL PAD

## (undated) DEVICE — BLADE SCALPEL SAFETYLOCK #15

## (undated) DEVICE — FLOORMAT SURGISAFE ABSORBANT WHITE 36" X 72"

## (undated) DEVICE — SOL IRR POUR H2O 1000ML

## (undated) DEVICE — GLV 6.5 PROTEXIS (BLUE)

## (undated) DEVICE — VENODYNE/SCD SLEEVE CALF MEDIUM

## (undated) DEVICE — SAW BLADE LINVATEC SAGITTAL MIC 9.5X25.5X0.4MM

## (undated) DEVICE — NDL HYPO SAFE 22G X 1.5" (BLACK)

## (undated) DEVICE — BLADE SCALPEL SAFETYLOCK #10

## (undated) DEVICE — SUT MONOSOF 2-0 18" C-15

## (undated) DEVICE — PACK LOWER EXTREMITY NS PLAINVI

## (undated) DEVICE — DRSG XEROFORM 1 X 8"

## (undated) DEVICE — DRSG CURITY GAUZE SPONGE 4 X 4" 12-PLY

## (undated) DEVICE — PREP ALCOHOL PAD MED

## (undated) DEVICE — SUT POLYSORB 4-0 18" P-12 UNDYED

## (undated) DEVICE — SPECIMEN CONTAINER 4OZ

## (undated) DEVICE — DRSG KERLIX ROLL LG 4.5"

## (undated) DEVICE — WARMING BLANKET UPPER ADULT

## (undated) DEVICE — GLV 7 PROTEXIS (WHITE)

## (undated) DEVICE — DRSG COMBINE 5 X 9"